# Patient Record
Sex: MALE | Race: WHITE | Employment: OTHER | URBAN - METROPOLITAN AREA
[De-identification: names, ages, dates, MRNs, and addresses within clinical notes are randomized per-mention and may not be internally consistent; named-entity substitution may affect disease eponyms.]

---

## 2017-02-27 ENCOUNTER — HOSPITAL ENCOUNTER (OUTPATIENT)
Dept: VASCULAR SURGERY | Age: 77
Discharge: HOME | End: 2017-02-27
Attending: INTERNAL MEDICINE
Payer: MEDICARE

## 2017-02-27 DIAGNOSIS — R09.89 DECREASED PULSES IN FEET: ICD-10-CM

## 2017-02-27 PROCEDURE — 93922 UPR/L XTREMITY ART 2 LEVELS: CPT

## 2017-02-27 NOTE — PROCEDURES
[facility]  *** FINAL REPORT ***    Name: Belvia Castleman  MRN: [id number]    Outpatient  : 1940  HIS Order #: [id number]  [address] [facility] Visit #: [id number]  Date: 2017    TYPE OF TEST: Peripheral Arterial Testing    REASON FOR TEST  Tingling, Cold Feet, Decreased pulses. Right Leg  Segmentals: Abnormal                     mmHg  Brachial         144  High thigh  Low thigh  Calf  Posterior tibial 121  Dorsalis pedis   101  Peroneal  Metatarsal  Toe pressure      98  Doppler:    Normal  PVR:  Ankle/Brachial: 0.83    Left Leg  Segmentals: Normal                     mmHg  Brachial         145  High thigh  Low thigh  Calf  Posterior tibial 164  Dorsalis pedis   154  Peroneal  Metatarsal  Toe pressure     127  Doppler:    Normal  PVR:  Ankle/Brachial: 1.13    INTERPRETATION/FINDINGS  PROCEDURE:  Evaluation of lower extremity arteries with systolic blood   pressure measurement at the ankle and brachial level and calculation  of the ankle/brachial pressure index (TANNA). The exam may also include   pulse volume recording (PVR) plethysmography at the ankle level. IMPRESSION:  1. Mild peripheral arterial disease indicated at rest in the right  leg. 2. No evidence of significant peripheral arterial disease at rest in  the left leg. 3. The right ankle/brachial index is 0.83 and the left ankle/brachial  index is 1.13.  4. The right toe/brachial index is 0.68 and the left toe/brachial  index is 0.88. ADDITIONAL COMMENTS    I have personally reviewed the data relevant to the interpretation of  this  study. TECHNOLOGIST: Robles Miller RDCS  Signed: 2017 05:12 PM    PHYSICIAN: Darline Ferreira.  Angeli Renteria MD  Signed: 2017 08:36 AM

## 2017-08-08 ENCOUNTER — OFFICE VISIT (OUTPATIENT)
Dept: CARDIOLOGY CLINIC | Age: 77
End: 2017-08-08

## 2017-08-08 VITALS
HEART RATE: 73 BPM | HEIGHT: 72 IN | WEIGHT: 228 LBS | BODY MASS INDEX: 30.88 KG/M2 | SYSTOLIC BLOOD PRESSURE: 122 MMHG | DIASTOLIC BLOOD PRESSURE: 62 MMHG

## 2017-08-08 DIAGNOSIS — Z79.4 TYPE 2 DIABETES MELLITUS WITHOUT COMPLICATION, WITH LONG-TERM CURRENT USE OF INSULIN (HCC): ICD-10-CM

## 2017-08-08 DIAGNOSIS — E78.5 DYSLIPIDEMIA: ICD-10-CM

## 2017-08-08 DIAGNOSIS — R06.00 PND (PAROXYSMAL NOCTURNAL DYSPNEA): ICD-10-CM

## 2017-08-08 DIAGNOSIS — I10 HYPERTENSION, BENIGN: ICD-10-CM

## 2017-08-08 DIAGNOSIS — I25.10 CORONARY ARTERY DISEASE INVOLVING NATIVE CORONARY ARTERY OF NATIVE HEART WITHOUT ANGINA PECTORIS: Primary | ICD-10-CM

## 2017-08-08 DIAGNOSIS — E11.9 TYPE 2 DIABETES MELLITUS WITHOUT COMPLICATION, WITH LONG-TERM CURRENT USE OF INSULIN (HCC): ICD-10-CM

## 2017-08-08 RX ORDER — CARVEDILOL 12.5 MG/1
TABLET ORAL
Qty: 180 TAB | Refills: 3 | Status: SHIPPED | OUTPATIENT
Start: 2017-08-08 | End: 2017-08-08 | Stop reason: SDUPTHER

## 2017-08-08 RX ORDER — CARVEDILOL 12.5 MG/1
TABLET ORAL
Qty: 180 TAB | Refills: 3 | Status: SHIPPED | OUTPATIENT
Start: 2017-08-08 | End: 2018-08-10 | Stop reason: SDUPTHER

## 2017-08-08 NOTE — MR AVS SNAPSHOT
Visit Information Date & Time Provider Department Dept. Phone Encounter #  
 8/8/2017  1:00 PM Kendall Delarosa MD [facility] Hilaria Wilkes [phone] [id number] Follow-up Instructions Return in about 1 year (around 8/8/2018). Your Appointments 8/23/2017  2:00 PM  
ECHO CARDIOGRAMS 2D with ECHO, [facility]  
[facility] ([facility] SCHEDULING) Appt Note: echo per dr Lock Adrianna [address] [facility] [address] [facility] [address]  
[phone]  
  
   
 [address] [facility] [address] [place] [id number]  
  
    
 8/10/2018  1:20 PM  
ESTABLISHED PATIENT with Kendall Delaroas MD  
[facility] ([address]) Appt Note: annual  
 [address] [facility] [address] [address] [place] [address]  
[address] [facility] [address] [address] [address] Upcoming Health Maintenance Date Due  
 FOOT EXAM Q1 4/29/1952 DTaP/Tdap/Td series (1 - Tdap) 4/29/1963 ZOSTER VACCINE AGE 60> 2/28/2002 Pneumococcal 65+ Low/Medium Risk (1 of 2 - PCV13) 4/29/2007 MEDICARE YEARLY EXAM 4/29/2007 MICROALBUMIN Q1 6/26/2015 LIPID PANEL Q1 6/26/2015 EYE EXAM RETINAL OR DILATED Q1 4/9/2016 HEMOGLOBIN A1C Q6M 4/28/2016 GLAUCOMA SCREENING Q2Y 4/9/2017 INFLUENZA AGE 9 TO ADULT 8/1/2017 Allergies as of 8/8/2017  Review Complete On: 8/8/2017 By: Judith Aly LPN Severity Noted Reaction Type Reactions Amoxicillin  12/10/2010   Systemic Hives, Itching Current Immunizations  Never Reviewed No immunizations on file. Not reviewed this visit You Were Diagnosed With   
  
 Codes Comments Coronary artery disease involving native coronary artery of native heart without angina pectoris    -  Primary ICD-10-CM: I25.10 ICD-9-CM: 414.01 Dyslipidemia     ICD-10-CM: E78.5 ICD-9-CM: 272.4 Hypertension, benign     ICD-10-CM: I10 
ICD-9-CM: 401.1 Type 2 diabetes mellitus without complication, without long-term current use of insulin (HCC)     ICD-10-CM: E11.9 ICD-9-CM: 250.00 Vitals BP Pulse Height(growth percentile) Weight(growth percentile) BMI Smoking Status 122/62 (BP 1 Location: Left arm, BP Patient Position: Sitting) 73 6' (1.829 m) 228 lb (103.4 kg) 30.92 kg/m2 Never Smoker Vitals History BMI and BSA Data Body Mass Index Body Surface Area 30.92 kg/m 2 2.29 m 2 Your Updated Medication List  
  
   
This list is accurate as of: 8/8/17  1:46 PM.  Always use your most recent med list.  
  
  
  
  
 aspirin delayed-release 81 mg tablet Take 81 mg by mouth daily. carvedilol 12.5 mg tablet Commonly known as:  COREG  
TAKE ONE (1) TABLET(S) BY MOUTH TWICE DAILY WITH FOOD  
  
 COZAAR 25 mg tablet Generic drug:  losartan Take  by mouth daily. insulin NPH/insulin regular 100 unit/mL (70-30) injection Commonly known as:  NOVOLIN 70/30, HUMULIN 70/30  
by SubCUTAneous route. pravastatin 40 mg tablet Commonly known as:  PRAVACHOL Take 1 Tab by mouth daily. Prescriptions Printed Refills  
 carvedilol (COREG) 12.5 mg tablet (Discontinued) 3 Sig: TAKE ONE (1) TABLET(S) BY MOUTH TWICE DAILY WITH FOOD Class: Print Reason for Discontinue: Reorder We Performed the Following AMB POC EKG ROUTINE W/ 12 LEADS, INTER & REP [02371 CPT(R)] Follow-up Instructions Return in about 1 year (around 8/8/2018). Please provide this summary of care documentation to your next provider. Your primary care clinician is listed as NONE. If you have any questions after today's visit, please call [phone].

## 2017-08-08 NOTE — PROGRESS NOTES
Luca Bai MD [facility]  [address]  [address]  [place], [address] [facility]    Office [phone]  Fax [phone]  Cell [phone]         Gautam Elise is a 78 y.o. male. Last seen 1 year ago. Assessment  Encounter Diagnoses   Name Primary?  Coronary artery disease involving native coronary artery of native heart without angina pectoris Yes    Dyslipidemia     Hypertension, benign     Type 2 diabetes mellitus without complication, with long-term current use of insulin (HCC)     PND (paroxysmal nocturnal dyspnea)        Recommendations:    Nick Champion Sr. 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 Stress echo 2012 demonstrated no evidence of ischemia. Echo 2013 demonstrated EF 55-60% with inferior akinesis. He leads a fairly sedentary lifestyle but I am concerned about recent episodes of PND. He does not have any new exertional sxs. Will reassess LV function with Echo. Consider repeat stress assessment since it has been 8 years post CABG. Normotensive at home. Lipids and DM monitored by Dr. Gillian العراقي. Phone follow up after reviewing tests    Follow-up Disposition:  Return in about 1 year (around 8/8/2018). Echo near future    Subjective:    Mr. David Horne is here for a 1 month follow up. During the night while the pt is sleeping he wakes up with SOB. He has difficulty breathing. When this occurs he sits up on the edge of his bed and catch his breath. These sxs are new within in the past few weeks. His wife says that he snores at time. He does have a large hernia. He denies any indigestion. Pt leads a fairly sedentary lifestyle. He denies any claudication sxs but suffers from dysequilibrium. Pt has had recent falls due to his balance problems. He also suffers from neuropathy in his feet bilaterally. He notes dysphagia and occasionally chokes on his food.  Patient denies any exertional chest pain, palpitations, syncope, orthopnea, or edema. Cardiac risk factors   HTN yes  DM yes   Smoking no    Cardiac testing  Echo 2d adult 12/2009  EF 50%, basal inferior akinesis, mild MR  Echo 2d adult 9/21/11  LVEF 50%, inferior AK, unchanged from Dec 2009  Stress echo March 2012 - 3 min, resting EF 50%, inferior AK, no ischemia  Echo 10/29/13 - EF 55 % to 60 %. There was akinesis of the basal-mid inferior wall(s). There was akinesis of the basal inferolateral wall(s). Paradoxical ventricular septal motion, no PFO, no significant  Change from previous study. Past Medical History:   Diagnosis Date    CAD (coronary artery disease), native coronary artery     Cancer ([facility] [address]     melanoma removed on forehead    Depression     Diabetic neuropathy ([facility]     ED (erectile dysfunction)     Hypertension, benign     Ill-defined condition     states he has memory problems    Postsurgical aortocoronary bypass status     Type II or unspecified type diabetes mellitus without mention of complication, not stated as uncontrolled         Current Outpatient Prescriptions   Medication Sig Dispense Refill    insulin NPH/insulin regular (NOVOLIN 70/30, HUMULIN 70/30) 100 unit/mL (70-30) injection by SubCUTAneous route.  pravastatin (PRAVACHOL) 40 mg tablet Take 1 Tab by mouth daily. 30 Tab 3    losartan (COZAAR) 25 mg tablet Take  by mouth daily.  aspirin delayed-release 81 mg tablet Take 81 mg by mouth daily.  carvedilol (COREG) 12.5 mg tablet TAKE ONE (1) TABLET(S) BY MOUTH TWICE DAILY WITH FOOD 180 Tab 3       Allergies   Allergen Reactions    Amoxicillin Hives and Itching          Review of Systems  Constitutional: Negative for fever, chills, malaise/fatigue and diaphoresis. Respiratory: Negative for cough, hemoptysis, sputum production, shortness of breath and wheezing. Cardiovascular: Negative for chest pain, palpitations, orthopnea, claudication, leg swelling and PND.    Gastrointestinal: Negative for heartburn, nausea, vomiting, blood in stool and melena. Positive for constipation. Genitourinary: Negative for dysuria and flank pain. Musculoskeletal: Positive for right leg pain. Skin: Negative for rash. Neurological: Negative for focal weakness, seizures, loss of consciousness, weakness and headaches. Positive for LE numbness. Endo/Heme/Allergies: Does not bruise/bleed easily. Psychiatric/Behavioral: Negative for memory loss. The patient does not have insomnia. Physical Exam    Visit Vitals    /62 (BP 1 Location: Left arm, BP Patient Position: Sitting)  Comment: 136/69    Pulse 73    Ht 6' (1.829 m)    Wt 228 lb (103.4 kg)    BMI 30.92 kg/m2     Wt Readings from Last 3 Encounters:   08/08/17 228 lb (103.4 kg)   08/08/16 229 lb (103.9 kg)   05/03/16 229 lb (103.9 kg)      General - well developed well nourished  Neck - JVP normal, thyroid nl  Cardiac - normal S1,S2, no murmurs, rubs or gallops.  No clicks  Vascular - carotids without bruits, radials, femorals and pedal pulses equal bilateral  Lungs - clear to auscultation bilaterals, no rales, wheezing or rhonchi  Abd - soft nontender, no HSM, no abd bruits  Extremities - no edema  Skin - no rash  Neuro - nonfocal  Psych - normal mood and affect      Cardiographics  EKG 8/8/16 - SR 79, old inferior MI, unchanged from 10/29/13  EKG 8/8/17 - SR, old inferior MI, unchanged from 8/8/16    Written by Sonny Mason, as dictated by Sean Gallardo MD.  Sean Gallardo MD

## 2017-08-13 PROBLEM — R06.00 PND (PAROXYSMAL NOCTURNAL DYSPNEA): Status: ACTIVE | Noted: 2017-08-13

## 2017-08-23 ENCOUNTER — CLINICAL SUPPORT (OUTPATIENT)
Dept: CARDIOLOGY CLINIC | Age: 77
End: 2017-08-23

## 2017-08-23 DIAGNOSIS — I25.10 CORONARY ARTERY DISEASE INVOLVING NATIVE CORONARY ARTERY OF NATIVE HEART WITHOUT ANGINA PECTORIS: Primary | ICD-10-CM

## 2017-09-07 ENCOUNTER — TELEPHONE (OUTPATIENT)
Dept: CARDIOLOGY CLINIC | Age: 77
End: 2017-09-07

## 2017-09-07 NOTE — TELEPHONE ENCOUNTER
Papa Parr, LINNEA De Luna LPN                     Cardiac testing   Echo 8/23/17 - EF 55-60%. Inferior AK. Mild LVH. Mild MAC with no MR. AoV sclerosis without stenosis. No change compared to study 2013. Please notify Mr. Misbah Arroyo that his Echo is unchanged compared to 2013.       Please continue his current medication regimen and notify us of any progression of symptoms recently discussed with Dr. Parikh Bolus will see him in 1 year or sooner as needed.         Patient notified. He voices understanding.

## 2018-08-10 ENCOUNTER — OFFICE VISIT (OUTPATIENT)
Dept: CARDIOLOGY CLINIC | Age: 78
End: 2018-08-10

## 2018-08-10 VITALS
DIASTOLIC BLOOD PRESSURE: 70 MMHG | HEIGHT: 72 IN | BODY MASS INDEX: 30.07 KG/M2 | OXYGEN SATURATION: 96 % | WEIGHT: 222 LBS | SYSTOLIC BLOOD PRESSURE: 120 MMHG | RESPIRATION RATE: 20 BRPM | HEART RATE: 78 BPM

## 2018-08-10 DIAGNOSIS — Z79.4 TYPE 2 DIABETES MELLITUS WITHOUT COMPLICATION, WITH LONG-TERM CURRENT USE OF INSULIN (HCC): ICD-10-CM

## 2018-08-10 DIAGNOSIS — I25.10 CORONARY ARTERY DISEASE INVOLVING NATIVE CORONARY ARTERY OF NATIVE HEART WITHOUT ANGINA PECTORIS: Primary | ICD-10-CM

## 2018-08-10 DIAGNOSIS — E78.5 DYSLIPIDEMIA: ICD-10-CM

## 2018-08-10 DIAGNOSIS — I10 HYPERTENSION, BENIGN: ICD-10-CM

## 2018-08-10 DIAGNOSIS — E11.9 TYPE 2 DIABETES MELLITUS WITHOUT COMPLICATION, WITH LONG-TERM CURRENT USE OF INSULIN (HCC): ICD-10-CM

## 2018-08-10 RX ORDER — CARVEDILOL 12.5 MG/1
TABLET ORAL
Qty: 180 TAB | Refills: 3 | Status: SHIPPED | OUTPATIENT
Start: 2018-08-10 | End: 2019-10-14 | Stop reason: SDUPTHER

## 2018-08-10 NOTE — PROGRESS NOTES
Visit Vitals    /70    Pulse 78    Resp 20    Ht 6' (1.829 m)    Wt 222 lb (100.7 kg)    SpO2 96%    BMI 30.11 kg/m2

## 2018-08-10 NOTE — MR AVS SNAPSHOT
[address] [facility] [address] [facility] [address] [phone] Patient: Christa Devine Sr. MRN: [id number] DYG:[id number] Visit Information Date & Time Provider Department Dept. Phone Encounter #  
 8/10/2018  1:20 PM Eric Salazar MD [facility] Leida Garner [phone] [id number] Follow-up Instructions Return in about 1 year (around 8/10/2019). Upcoming Health Maintenance Date Due  
 FOOT EXAM Q1 4/29/1952 DTaP/Tdap/Td series (1 - Tdap) 4/29/1963 ZOSTER VACCINE AGE 60> 2/28/2002 Pneumococcal 65+ Low/Medium Risk (1 of 2 - PCV13) 4/29/2007 MICROALBUMIN Q1 6/26/2015 LIPID PANEL Q1 6/26/2015 EYE EXAM RETINAL OR DILATED Q1 4/9/2016 HEMOGLOBIN A1C Q6M 4/28/2016 GLAUCOMA SCREENING Q2Y 4/9/2017 MEDICARE YEARLY EXAM 3/14/2018 Influenza Age 5 to Adult 8/1/2018 Allergies as of 8/10/2018  Review Complete On: 8/8/2017 By: Luis Calderon LPN Severity Noted Reaction Type Reactions Amoxicillin  12/10/2010   Systemic Hives, Itching Current Immunizations  Never Reviewed No immunizations on file. Not reviewed this visit You Were Diagnosed With   
  
 Codes Comments Coronary artery disease involving native coronary artery of native heart without angina pectoris    -  Primary ICD-10-CM: I25.10 ICD-9-CM: 414.01 Hypertension, benign     ICD-10-CM: I10 
ICD-9-CM: 401.1 Dyslipidemia     ICD-10-CM: E78.5 ICD-9-CM: 272.4 Type 2 diabetes mellitus without complication, with long-term current use of insulin (HCC)     ICD-10-CM: E11.9, Z79.4 ICD-9-CM: 250.00, V58.67 Vitals BP Pulse Resp Height(growth percentile) Weight(growth percentile) SpO2  
 120/70 78 20 6' (1.829 m) 222 lb (100.7 kg) 96% BMI Smoking Status 30.11 kg/m2 Never Smoker Vitals History BMI and BSA Data Body Mass Index Body Surface Area 30.11 kg/m 2 2.26 m 2 Your Updated Medication List  
  
   
This list is accurate as of 8/10/18  1:43 PM.  Always use your most recent med list.  
  
  
  
  
 aspirin delayed-release 81 mg tablet Take 81 mg by mouth daily. carvedilol 12.5 mg tablet Commonly known as:  COREG  
TAKE ONE (1) TABLET(S) BY MOUTH TWICE DAILY WITH FOOD  
  
 COZAAR 25 mg tablet Generic drug:  losartan Take  by mouth daily. insulin NPH/insulin regular 100 unit/mL (70-30) injection Commonly known as:  NOVOLIN 70/30, HUMULIN 70/30  
by SubCUTAneous route. pravastatin 40 mg tablet Commonly known as:  PRAVACHOL Take 1 Tab by mouth daily. We Performed the Following AMB POC EKG ROUTINE W/ 12 LEADS, INTER & REP [12852 CPT(R)] Follow-up Instructions Return in about 1 year (around 8/10/2019). Introducing [facility]! Oniel La introduces [employer] patient portal. Now you can access parts of your medical record, email your doctor's office, and request medication refills online. 1. In your internet browser, go to [email or web address] 2. Click on the First Time User? Click Here link in the Sign In box. You will see the New Member Sign Up page. 3. Enter your [employer] Access Code exactly as it appears below. You will not need to use this code after youve completed the sign-up process. If you do not sign up before the expiration date, you must request a new code. · [employer] Access Code: [email or web address] Expires: 11/8/2018  1:41 PM 
 
4. Enter the last four digits of your Social Security Number (xxxx) and Date of Birth (mm/dd/yyyy) as indicated and click Submit. You will be taken to the next sign-up page. 5. Create a [employer] ID. This will be your [employer] login ID and cannot be changed, so think of one that is secure and easy to remember. 6. Create a [employer] password. You can change your password at any time. 7. Enter your Password Reset Question and Answer. This can be used at a later time if you forget your password. 8. Enter your e-mail address. You will receive e-mail notification when new information is available in [address] 9. Click Sign Up. You can now view and download portions of your medical record. 10. Click the Download Summary menu link to download a portable copy of your medical information. If you have questions, please visit the Frequently Asked Questions section of the This [employer] In website. Remember, [employer] is NOT to be used for urgent needs. For medical emergencies, dial 911. Now available from your iPhone and Android! Please provide this summary of care documentation to your next provider. Your primary care clinician is listed as Jeannie Juarez. If you have any questions after today's visit, please call [phone].

## 2018-08-10 NOTE — PROGRESS NOTES
Luca Maya MD [facility]  [address]  [address]  [place], [address] [facility]    Office [phone]  Fax [phone]  Cell [phone]    Tatiana Bowling is a 70 y.o. male. Last seen 1 year ago. Assessment  Encounter Diagnoses   Name Primary?  Coronary artery disease involving native coronary artery of native heart without angina pectoris Yes    Hypertension, benign        Recommendations:    Guilherme Cota Sr. 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 Stress echo 2012 demonstrated no evidence of ischemia. Echo 1 year ago demonstrated EF 55-60% with inferior akinesis. We discussed the role of stress testing next year, but he is not interested. His quality of life seems impaired by balance and depression issues. Normotensive at home. Lipids and DM monitored by Dr. Michael Baron. Recent A1c 7.0%. Follow-up Disposition: Not on File     Subjective:  Mr. Hemant Malone states he has balance issues, and has had multiple falls in the past year. He also has some neuropathy in his feet. He has not done physical therapy. He leads a sedentary lifestyle. Per wife, he is frequently \"parked on his chair\" watching television. He notes his last A1c was 7.0% on 7/3/18. He is only on insulin. His wife notes he enjoys eating sweets. Patient denies any exertional chest pain, dyspnea, palpitations, syncope, orthopnea, edema or paroxysmal nocturnal dyspnea. Cardiac risk factors   HTN yes  DM yes   Smoking no    Cardiac testing  Echo 2d adult 12/2009  EF 50%, basal inferior akinesis, mild MR  Echo 2d adult 9/21/11  LVEF 50%, inferior AK, unchanged from Dec 2009  Stress echo March 2012 - 3 min, resting EF 50%, inferior AK, no ischemia  Echo 10/29/13 - EF 55 % to 60 %. There was akinesis of the basal-mid inferior wall(s). There was akinesis of the basal inferolateral wall(s).  Paradoxical ventricular septal motion, no PFO, no significant Change from previous study. Echo 8/23/17 - EF 55-60%. Inferior AK. Mild LVH. Mild MAC with no MR. AoV sclerosis without stenosis. No change compared to study 2013. Past Medical History:   Diagnosis Date    CAD (coronary artery disease), native coronary artery     Cancer ([facility] [address]     melanoma removed on forehead    Depression     Diabetic neuropathy ([facility] [address]     ED (erectile dysfunction)     Hypertension, benign     Ill-defined condition     states he has memory problems    Postsurgical aortocoronary bypass status     Type II or unspecified type diabetes mellitus without mention of complication, not stated as uncontrolled         Current Outpatient Prescriptions   Medication Sig Dispense Refill    carvedilol (COREG) 12.5 mg tablet TAKE ONE (1) TABLET(S) BY MOUTH TWICE DAILY WITH FOOD 180 Tab 3    insulin NPH/insulin regular (NOVOLIN 70/30, HUMULIN 70/30) 100 unit/mL (70-30) injection by SubCUTAneous route.  pravastatin (PRAVACHOL) 40 mg tablet Take 1 Tab by mouth daily. 30 Tab 3    losartan (COZAAR) 25 mg tablet Take  by mouth daily.  aspirin delayed-release 81 mg tablet Take 81 mg by mouth daily. Allergies   Allergen Reactions    Amoxicillin Hives and Itching          Review of Systems  Constitutional: Negative for fever, chills, malaise/fatigue and diaphoresis. Respiratory: Negative for cough, hemoptysis, sputum production, shortness of breath and wheezing. Cardiovascular: Negative for chest pain, palpitations, orthopnea, claudication, leg swelling and PND. Gastrointestinal: Negative for heartburn, nausea, vomiting, blood in stool and melena. Genitourinary: Negative for dysuria and flank pain. Musculoskeletal: Negative for back pain. Skin: Negative for rash. Neurological: Negative for focal weakness, seizures, loss of consciousness, weakness and headaches. Positive for LE numbness, gait instability. Endo/Heme/Allergies: Does not bruise/bleed easily. Psychiatric/Behavioral: Negative for memory loss. The patient does not have insomnia. Physical Exam    Visit Vitals    Ht 6' (1.829 m)     Wt Readings from Last 3 Encounters:   08/08/17 228 lb (103.4 kg)   08/08/16 229 lb (103.9 kg)   05/03/16 229 lb (103.9 kg)      General - well developed well nourished  Neck - JVP normal, thyroid nl  Cardiac - normal S1,S2, no murmurs, rubs or gallops. No clicks  Vascular - carotids without bruits, radials, femorals and pedal pulses equal bilateral  Lungs - clear to auscultation bilaterals, no rales, wheezing or rhonchi  Abd - soft nontender, no HSM, no abd bruits  Extremities - no edema  Skin - no rash  Neuro - nonfocal  Psych - normal mood and affect      Cardiographics  EKG 8/8/16 - SR 79, old inferior MI, unchanged from 10/29/13  EKG 8/8/17 - SR, old inferior MI, unchanged from 8/8/16  EKG 8/10/18 - Sinus 73, nonspecific T wave flattening, old inferior MI, no significant change from 8/8/17    Written by John Romeo, as dictated by Dr. Hari Franklin.      Hari Franklin MD

## 2018-08-11 PROBLEM — R06.00 PND (PAROXYSMAL NOCTURNAL DYSPNEA): Status: RESOLVED | Noted: 2017-08-13 | Resolved: 2018-08-11

## 2019-05-03 ENCOUNTER — OFFICE VISIT (OUTPATIENT)
Dept: NEUROLOGY | Age: 79
End: 2019-05-03

## 2019-05-03 VITALS
HEART RATE: 73 BPM | HEIGHT: 72 IN | RESPIRATION RATE: 18 BRPM | DIASTOLIC BLOOD PRESSURE: 84 MMHG | BODY MASS INDEX: 30.34 KG/M2 | OXYGEN SATURATION: 97 % | SYSTOLIC BLOOD PRESSURE: 142 MMHG | TEMPERATURE: 98.2 F | WEIGHT: 224 LBS

## 2019-05-03 DIAGNOSIS — R26.9 GAIT ABNORMALITY: ICD-10-CM

## 2019-05-03 DIAGNOSIS — R42 DIZZY: ICD-10-CM

## 2019-05-03 DIAGNOSIS — R27.8 SENSORY ATAXIA: Primary | ICD-10-CM

## 2019-05-03 DIAGNOSIS — R27.8 SENSORY ATAXIA: ICD-10-CM

## 2019-05-03 NOTE — PROGRESS NOTES
[facility] and [address]atan Collins at [facility] at [place] [address] [address] [place], [address] [place] [address] [address] [facility], [address] [place] [address] 
[phone] Office 
[phone] Referring: Dr. David Sweeney 
Primary: Whit Rosario MD 
 
 
Chief Complaint Patient presents with  New Patient  Fall  
  freqent over the past yr but increasing over the past few mo  
 
42-year-old gentleman who presents today coming by his wife for evaluation was called balance problems and falls. He has been progressively having difficulty with gait and balance over the last year. He has just started to use a Rollator walker in the last few months after they were out at a restaurant and he was used and fell in the parking lot. He has had multiple falls. He will fall forward. He will fall backward. Was out of the mailbox and fell backwards into the road. He has no tremor. He has some difficulty getting out of a chair his wife says. He does not drag his feet. He says he has not walked well after his heart surgery which was in 2009. He says sometimes he will pitch forward and cannot catch himself. He does have numbness and tingling in his feet. He has long-standing diabetes. His last hemoglobin A1c was 7. No focal weakness. No loss or alteration in consciousness. No visual loss. No palpitations or chest pain. There is a question raised whether or not he may have Parkinson's. No rest tremor. Past Medical History:  
Diagnosis Date  CAD (coronary artery disease), native coronary artery  Cancer ([address] melanoma removed on forehead  Depression  Diabetic neuropathy ([facility]  ED (erectile dysfunction)  Hypertension, benign  Ill-defined condition   
 states he has memory problems  Postsurgical aortocoronary bypass status  Type II or unspecified type diabetes mellitus without mention of complication, not stated as uncontrolled Past Surgical History:  
Procedure Laterality Date  ECHO 2D ADULT  12/2009 EF 50%, basal inferior akinesis, mild MR  
 ECHO 2D ADULT  9/21/11 LVEF 50%, inferior AK, unchanged from Dec 2009  HX CORONARY ARTERY BYPASS GRAFT  2009  HX OTHER SURGICAL  2008  
 melanoma removed from forehead Current Outpatient Medications Medication Sig Dispense Refill  carvedilol (COREG) 12.5 mg tablet TAKE ONE (1) TABLET(S) BY MOUTH TWICE DAILY WITH FOOD 180 Tab 3  
 insulin NPH/insulin regular (NOVOLIN 70/30, HUMULIN 70/30) 100 unit/mL (70-30) injection 45 Units by SubCUTAneous route two (2) times a day.  losartan (COZAAR) 25 mg tablet Take  by mouth daily.  aspirin delayed-release 81 mg tablet Take 81 mg by mouth daily.  pravastatin (PRAVACHOL) 40 mg tablet Take 1 Tab by mouth daily. (Patient not taking: Reported on 5/3/2019) 30 Tab 3 Allergies Allergen Reactions  Amoxicillin Hives and Itching  Pravastatin Myalgia Social History Tobacco Use  Smoking status: Never Smoker  Smokeless tobacco: Never Used Substance Use Topics  Alcohol use: No  
 Drug use: No  
 
 
Family History Problem Relation Age of Onset  Breast Cancer Mother   
     w bone mets  Parkinson's Disease Maternal Aunt  Parkinson's Disease Maternal Uncle  Parkinson's Disease Maternal Grandfather Review of Systems Pertinent positives and negatives as noted with remainder of comprehensive review negative Examination Visit Vitals /84 (BP 1 Location: Left arm, BP Patient Position: Sitting) Pulse 73 Temp 98.2 °F (36.8 °C) (Oral) Resp 18 Ht 6' (1.829 m) Wt 101.6 kg (224 lb) SpO2 97% BMI 30.38 kg/m² Pleasant overweight elderly gentleman. He is with regular heart rate. I do not hear bruit. Neck is supple.   He has edema of the lower extremities. Symmetric pulses. Neurologically he is awake alert oriented. Normal speech and language. Discussed his medical history. He is not hypophonic. He has left pupil status post cataract surgery. Right with a cataract. Reactive pupils. Disc margins not well seen. Full versions. He has no limitation of gaze in any direction. No nystagmus. Face symmetrical.  Facial sensation symmetrical.  Tongue and palate midline. 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 There is no abnormal movement and specifically there is no tremor. He has no cogwheeling at the wrists. He has no pronation or drift. He resists symmetrically early in the upper and lower extremities in all muscle groups to direct testing. Reflexes globally depressed and he has no ankle jerks bilaterally. No pathologic reflexes elicited. He has graded sensory loss in the feet to pinprick up to mid shin and to vibratory sense at the ankles. He has slowness in arising off the examination table and he is imbalanced and doing that. He has a wide-based stance and he is quite tentative without using his device. Just trying to narrow in his stance causes him to become increasingly imbalance. He is unable to do Romberg testing. He does however just standing even with his wide-based stance closes eyes and begins to fall over. Impression/Plan 56-year-old gentleman with gait imbalance and falls and sensory ataxia. This is likely secondary to diabetic peripheral neuropathy. Discussed this today with him and his wife at length. Explained that his brain does not know where his feet are. Discussed that we cannot reverse this but we may make it a bit better and that he can have some physical therapy to learn to overcome this and to improve his balance a bit. Continue use assistive device. We will get an EMG of the lower extremities to document the peripheral neuropathy. Carotid Doppler to be complete.   Follow-up in about 6 weeks after he is had a chance to have some therapy and certainly if there is anything from his testing that needs to be discussed we will call sooner Jinny Hutchison MD 
 
This note was created using voice recognition software. Despite editing, there may be syntax errors. This note will not be viewable in [address]

## 2019-05-03 NOTE — PATIENT INSTRUCTIONS
If you choose to go to imaging center outside of [facility], please be sure to bring imaging report and disc to follow up appointment. If we have ordered testing for you, know that; \"NO NEWS IS GOOD NEWS! \" It is our policy that we know longer call patients with results, nor do we  give test results over the phone. We schedule follow up appointments so that your results can be discussed in person. This allows you to address any questions you have regarding the results. If something of concern is revealed on your test, we will contact you to discuss the matter and if needed schedule a sooner follow up appointment. Additionally, results may be found by using the My Chart feature and one of our patient service representatives at the  can give you instructions on how to access this feature to utilize our electronic medical record system. Thank you for your understanding. Preventing Falls: Care Instructions Your Care Instructions Getting around your home safely can be a challenge if you have injuries or health problems that make it easy for you to fall. Loose rugs and furniture in walkways are among the dangers for many older people who have problems walking or who have poor eyesight. People who have conditions such as arthritis, osteoporosis, or dementia also have to be careful not to fall. You can make your home safer with a few simple measures. Follow-up care is a key part of your treatment and safety. Be sure to make and go to all appointments, and call your doctor if you are having problems. It's also a good idea to know your test results and keep a list of the medicines you take. How can you care for yourself at home? Taking care of yourself · You may get dizzy if you do not drink enough water. To prevent dehydration, drink plenty of fluids, enough so that your urine is light yellow or clear like water.  Choose water and other caffeine-free clear liquids. If you have kidney, heart, or liver disease and have to limit fluids, talk with your doctor before you increase the amount of fluids you drink. · Exercise regularly to improve your strength, muscle tone, and balance. Walk if you can. Swimming may be a good choice if you cannot walk easily. · Have your vision and hearing checked each year or any time you notice a change. If you have trouble seeing and hearing, you might not be able to avoid objects and could lose your balance. · Know the side effects of the medicines you take. Ask your doctor or pharmacist whether the medicines you take can affect your balance. Sleeping pills or sedatives can affect your balance. · Limit the amount of alcohol you drink. Alcohol can impair your balance and other senses. · Ask your doctor whether calluses or corns on your feet need to be removed. If you wear loose-fitting shoes because of calluses or corns, you can lose your balance and fall. · Talk to your doctor if you have numbness in your feet. Preventing falls at home · Remove raised doorway thresholds, throw rugs, and clutter. Repair loose carpet or raised areas in the floor. · Move furniture and electrical cords to keep them out of walking paths. · Use nonskid floor wax, and wipe up spills right away, especially on ceramic tile floors. · If you use a walker or cane, put rubber tips on it. If you use crutches, clean the bottoms of them regularly with an abrasive pad, such as steel wool. · Keep your house well lit, especially Mammie Sitter, and outside walkways. Use night-lights in areas such as hallways and bathrooms. Add extra light switches or use remote switches (such as switches that go on or off when you clap your hands) to make it easier to turn lights on if you have to get up during the night. · Install sturdy handrails on stairways. · Move items in your cabinets so that the things you use a lot are on the lower shelves (about waist level). · Keep a cordless phone and a flashlight with new batteries by your bed. If possible, put a phone in each of the main rooms of your house, or carry a cell phone in case you fall and cannot reach a phone. Or, you can wear a device around your neck or wrist. You push a button that sends a signal for help. · Wear low-heeled shoes that fit well and give your feet good support. Use footwear with nonskid soles. Check the heels and soles of your shoes for wear. Repair or replace worn heels or soles. · Do not wear socks without shoes on wood floors. · Walk on the grass when the sidewalks are slippery. If you live in an area that gets snow and ice in the winter, sprinkle salt on slippery steps and sidewalks. Preventing falls in the bath · Install grab bars and nonskid mats inside and outside your shower or tub and near the toilet and sinks. · Use shower chairs and bath benches. · Use a hand-held shower head that will allow you to sit while showering. · Get into a tub or shower by putting the weaker leg in first. Get out of a tub or shower with your strong side first. 
· Repair loose toilet seats and consider installing a raised toilet seat to make getting on and off the toilet easier. · Keep your bathroom door unlocked while you are in the shower. Where can you learn more? Go to http://jonathan-gonzalo.info/. Enter 0476 [id number] in the search box to learn more about \"Preventing Falls: Care Instructions. \" Current as of: March 15, 2018 Content Version: 11.9 © 2014-6095 [employer]. Care instructions adapted under license by [employer] (which disclaims liability or warranty for this information). If you have questions about a medical condition or this instruction, always ask your healthcare professional. [address] any warranty or liability for your use of this information. How to Get Up Safely After a Fall: Care Instructions Your Care Instructions If you have injuries, health problems, or other reasons that may make it easy for you to fall at home, it is a good idea to learn how to get up safely after a fall. Learning how to get up correctly can help you avoid making an injury worse. Also, knowing what to do if you cannot get up can help you stay safe until help arrives. Follow-up care is a key part of your treatment and safety. Be sure to make and go to all appointments, and call your doctor if you are having problems. It's also a good idea to know your test results and keep a list of the medicines you take. How can you care for yourself after a fall? If you think you can get up First lie still for a few minutes and think about how you feel. If your body feels okay and you think you can get up safely, follow the rest of the steps below: 1. Look for a chair or other piece of furniture that is close to you. 2. Roll onto your side and rest. Roll by turning your head in the direction you want to roll, move your shoulder and arm, then hip and leg in the same direction. 3. Lie still for a moment to let your blood pressure adjust. 
4. Slowly push your upper body up, lift your head, and take a moment to rest. 
5. Slowly get up on your hands and knees, and crawl to the chair or other stable piece of furniture. 6. Put your hands on the chair. 7. Move one foot forward, and place it flat on the floor. Your other leg should be bent with the knee on the floor. 8. Rise slowly, turn your body, and sit in the chair. Stay seated for a bit and think about how you feel. Call for help. Even if you feel okay, let someone know what happened to you. You might not know that you have a serious injury. If you cannot get up 1. If you think you are injured after a fall or you cannot get up, try not to panic. 2. Call out for help. 3. If you have a phone within reach or you have an emergency call device, use it to call for help. 4. If you do not have a phone within reach, try to slide yourself toward it. If you cannot get to the phone, try to slide toward a door or window or a place where you think you can be heard. 5. Coal or use an object to make noise so someone might hear you. 6. If you can reach something that you can use for a pillow, place it under your head. Try to stay warm by covering yourself with a blanket or clothing while you wait for help. When should you call for help? Call 911 anytime you think you may need emergency care. For example, call if: 
  · You passed out (lost consciousness).  
  · You cannot get up after a fall.  
  · You have severe pain.  
 Call your doctor now or seek immediate medical care if: 
  · You have new or worse pain.  
  · You are dizzy or lightheaded.  
  · You hit your head.  
 Watch closely for changes in your health, and be sure to contact your doctor if: 
  · You do not get better as expected. Where can you learn more? Go to http://jonathan-gonzalo.info/. Enter V742 in the search box to learn more about \"How to Get Up Safely After a Fall: Care Instructions. \" Current as of: March 15, 2018 Content Version: 11.9 © 4686-3225 [employer]. Care instructions adapted under license by [employer] (which disclaims liability or warranty for this information). If you have questions about a medical condition or this instruction, always ask your healthcare professional. [place] [address] any warranty or liability for your use of this information.

## 2019-05-13 ENCOUNTER — OFFICE VISIT (OUTPATIENT)
Dept: NEUROLOGY | Age: 79
End: 2019-05-13

## 2019-05-13 DIAGNOSIS — G60.8 POLYNEUROPATHY, PERIPHERAL SENSORIMOTOR AXONAL: Primary | ICD-10-CM

## 2019-05-13 NOTE — PROCEDURES
EMG/ NCS Report  [facility], [address]  Ph: [phone]/ [phone]  [facility] [address]: [address] [phone]  Test Date:  2019    Patient: Shay Rodriguez : 1940 Physician: Monika Peralta M.D. Sex: Male Height: ' \" Ref Phys: Miki Clark MD   ID#: [id number] Weight:  lbs. Technician: Jennifer Garcia     Patient History:  CC: SENSORY ATAXIA,R/O NEUROPATHY,KNOWN DM    Focused Exam: reduced pinprick, temp, vibration in both feet, normal upper calves; DTRs: absent Achilles, 1+ patellars (symmetric); 2-3/ 5 dorsiflexion/ plantar flexion bilaterally, proximal leg strength 4+/ 5 bilateral; upper exts 5/ 5. EMG & NCV Findings:  Evaluation of the Left Fibular motor and the Right Fibular motor nerves showed no response (Ankle), no response (B Fib), and no response (Poplt). The Left Fibular TA motor nerve showed prolonged distal onset latency (6.3 ms), reduced amplitude (0.4 mV), and normal conduction velocity (Poplit-Fib Head, 43 m/s). The Right Fibular TA motor nerve showed prolonged distal onset latency (5.9 ms), reduced amplitude (0.7 mV), and decreased conduction velocity (Poplit-Fib Head, 24 m/s). The Left median motor and the Right median motor nerves showed normal distal onset latency (L3.6, R3.5 ms), normal amplitude (L7.5, R5.0 mV), and decreased conduction velocity (Elbow-Wrist, L39, R39 m/s). The Left tibial motor and the Right tibial motor nerves showed no response (Ankle) and no response (Knee). The Left median sensory and the Right median sensory nerves showed no response (Wrist). The Left Sup Fibular sensory and the Right Sup Fibular sensory nerves showed no response (Lower leg). The Left sural sensory and the Right sural sensory nerves showed no response (Calf). F Wave studies indicate that the Left median F wave has prolonged latency (33.06 ms). The Right median F wave has prolonged latency (33.97 ms).   The Left tibial F wave has no response. The Right tibial F wave has no response. H-reflex studies indicate that the Left tibial H-reflex has no response. The Right tibial H-reflex has no response. Needle evaluation of the Left medial gastrocnemius muscle showed increased insertional activity, widespread spontaneous activity, recruitment, and decreased motor unit amplitude. The Left anterior tibialis muscle showed increased insertional activity, increased spontaneous activity, diminished recruitment, Incr Duration, and increased motor unit amplitude. The Right medial gastrocnemius muscle showed increased insertional activity, widespread spontaneous activity, diminished recruitment, and decreased motor unit amplitude. The Right anterior tibialis muscle showed increased insertional activity, widespread spontaneous activity, diminished recruitment, Incr Duration, and increased motor unit amplitude. The Right vastus medialis muscle showed diminished recruitment, Incr Duration, and increased motor unit amplitude. The Left biceps femoris (long head) and the Right biceps femoris (long head) muscles showed diminished recruitment and increased motor unit amplitude. All remaining muscles (as indicated in the following table) showed no evidence of electrical instability. 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 Not demyelinating as upper extremity median motor responses and F-waves are only mildly slowed, without demyelinating waveforms. Severe active denervation in bilateral L5, S1 myotomes. May be due to bilateral lumbar radiculopathies (L5 and S1) but more likely related to the severe underlying peripheral neuropathy.      No evidence of denervation in the right lower thoracic paraspinal muscles that would suggest motor neuron disease.     __________________________  Sung Velez M.D.    Nerve Conduction Studies  Anti Sensory Summary Table     Site NR Peak (ms) Norm Peak (ms) P-T Amp (µV) Norm P-T Amp Site1 Site2 Dist (cm)   Left Median Anti Sensory (2nd Digit)  30.3°C   Wrist NR  <4  >13 Wrist 2nd Digit 14.0   Right Median Anti Sensory (2nd Digit)  31.8°C   Wrist NR  <4  >13 Wrist 2nd Digit 14.0   Left Sup Fibular Anti Sensory (Lat ankle)  29°C   Lower leg NR  <4.6  >4 Lower leg Lat ankle 10.0   Right Sup Fibular Anti Sensory (Lat ankle)  31.8°C   Lower leg NR  <4.6  >4 Lower leg Lat ankle 10.0   Left Sural Anti Sensory (Lat Mall)  32.3°C   Calf NR  <4.5  >4.0 Calf Lat Mall 14.0   Right Sural Anti Sensory (Lat Mall)  31.1°C   Calf NR  <4.5  >4.0 Calf Lat Mall 14.0     Motor Summary Table     Site NR Onset (ms) Norm Onset (ms) O-P Amp (mV) Norm O-P Amp Amp% (Prev) Site1 Site2 Dist (cm) Chaparro (m/s) Norm Chaparro (m/s)   Left Fibular Motor (Ext Dig Brev)  32°C   Ankle NR  <6.5  >1.1  Ankle Ext Dig Brev 8.0     B Fib NR      B Fib Ankle 0.0  >38   Poplt NR      Poplt B Fib 10.0  >42   Right Fibular Motor (Ext Dig Brev)  29.2°C   Ankle NR  <6.5  >1.1  Ankle Ext Dig Brev 8.0     B Fib NR      B Fib Ankle 0.0  >38   Poplt NR      Poplt B Fib 10.0  >42   Left Fibular TA Motor (Tib Ant)  31°C   Fib Head    6.3 <4.5 0.4 >3.0 100.0 Fib Head Tib Ant 10.0     Poplit    8.6  0.5  125.0 Poplit Fib Head 10.0 43 >40   Right Fibular TA Motor (Tib Ant)  27.8°C   Fib Head    5.9 <4.5 0.7 >3.0 100.0 Fib Head Tib Ant 10.0     Poplit    10.0  0.7  100.0 Poplit Fib Head 10.0 24 >40   Left Median Motor (Abd Poll Brev)  29.5°C   Wrist    3.6 <4.5 7.5 >4.1 100.0 Wrist Abd Poll Brev 8.0     Elbow    9.1  6.8  90.7 Elbow Wrist 21.5 39 >49   Right Median Motor (Abd Poll Brev)  28.3°C   Wrist    3.5 <4.5 5.0 >4.1 100.0 Wrist Abd Poll Brev 8.0     Elbow    9.1  4.8  96.0 Elbow Wrist 22.0 39 >49   Left Tibial Motor (Abd Garcia Brev)  31.4°C   Ankle NR  <6.1  >1.1  Ankle Abd Garcia Brev 8.0     Knee NR      Knee Ankle 0.0  >39   Right Tibial Motor (Abd Garcia Brev)  28.5°C   Ankle NR  <6.1  >1.1  Ankle Abd Garcia Brev 8.0 Knee NR      Knee Ankle 0.0  >39     F Wave Studies     NR F-Lat (ms) Lat Norm (ms) L-R F-Lat (ms) L-R Lat Norm   Left Median (Mrkrs) (Abd Poll Brev)  29°C      33.06 <31 0.91 <2.2   Right Median (Mrkrs) (Abd Poll Brev)  32.9°C      33.97 <31 0.91 <2.2   Left Tibial (Mrkrs) (Abd Hallucis)  30.4°C   NR  <56  <5.7   Right Tibial (Mrkrs) (Abd Hallucis)  27.7°C   NR  <56  <5.7     H Reflex Studies     NR H-Lat (ms) L-R H-Lat (ms) L-R Lat Norm   Left Tibial (Gastroc)  30.3°C   NR   <2.0   Right Tibial (Gastroc)  27.5°C   NR   <2.0     EMG     Side Muscle Nerve Root Ins Act Fibs Psw Recrt Duration Amp Poly Comment   Left MedGastroc Tibial S1-2 Incr Nml 4+ No MUAP Nml Decr Nml very few/ small units   Left AntTibialis Dp Br Peron L4-5 Incr Nml 3+ Reduced Incr Incr Nml    Left VastusMed Femoral L2-4 Nml Nml Nml Nml Nml Nml Nml    Left GluteusMax InfGluteal L5-S2 Nml Nml Nml Nml Nml Nml Nml    Left Lower Lumb Parasp Rami L5,S1 Nml Nml Nml Nml Nml Nml Nml    Left Mid Lumb Parasp Rami L4,5 Nml Nml Nml Nml Nml Nml Nml    Left Iliopsoas Femoral L2-3 Nml Nml Nml Nml Nml Nml Nml    Left BicepsFemL Sciatic L5-S2 Nml Nml Nml Reduced Nml Incr Nml    Right AntTibialis Dp Br Peron L4-5 Incr Nml 4+ Reduced Incr Incr Nml    Right GluteusMax InfGluteal L5-S2 Nml Nml Nml Nml Nml Nml Nml    Right Lower Lumb Parasp Rami L5,S1 Nml Nml Nml Nml Nml Nml Nml    Right BicepsFemS Sciatic L5-S1 Nml Nml Nml Nml Nml Nml Nml    Right BicepsFemL Sciatic L5-S2 Nml Nml Nml Reduced Nml Incr Nml    Right MedGastroc Tibial S1-2 Incr 1+ 4+ Reduced Nml Decr Nml severe reduced rcrt   Right Mid Lumb Parasp Rami L4,5 Nml Nml Nml Nml Nml Nml Nml    Right VastusMed Femoral L2-4 Nml Nml Nml Reduced Incr Incr Nml    Right T1 Rami T1 Nml Nml Nml Nml Nml Nml Nml T9-T10         Waveforms:

## 2019-05-21 ENCOUNTER — HOSPITAL ENCOUNTER (OUTPATIENT)
Dept: PHYSICAL THERAPY | Age: 79
Discharge: HOME | End: 2019-05-21
Payer: MEDICARE

## 2019-05-21 PROCEDURE — 97116 GAIT TRAINING THERAPY: CPT | Performed by: PHYSICAL THERAPIST

## 2019-05-21 PROCEDURE — 97110 THERAPEUTIC EXERCISES: CPT | Performed by: PHYSICAL THERAPIST

## 2019-05-21 PROCEDURE — 97162 PT EVAL MOD COMPLEX 30 MIN: CPT | Performed by: PHYSICAL THERAPIST

## 2019-05-21 NOTE — PROGRESS NOTES
[address] [place] [address] [place] [address] Lina Green 59  Phone: [phone]  Fax: [phone]    Plan of Care/Statement of Necessity for Physical Therapy Services  2-15    Patient name: Melany Boyce Sr.  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 Assessment/ key information: The patient presents with progressively worsening balance, weakness and low back pain contributing to difficulty standing, walking, negotiating steps and performing ADLs. The patient's condition is complicated by complex PMH noted above, memory deficits, inactive lifestyle and postural dysfunction. The patient completed the TUG in 20 seconds indicating increased risk for falls. He is unable to hold NBOS wiht EO or WBOS with EC without LOB. He reports 6-8 falls over the past month and is ambulating with a rollator walker that is too short for his height and has been encouraged to find another rollator walker that is the appropriate height. Evaluation Complexity History HIGH Complexity :3+ comorbidities / personal factors will impact the outcome/ POC ; Examination HIGH Complexity : 4+ Standardized tests and measures addressing body structure, function, activity limitation and / or participation in recreation  ;Presentation MEDIUM Complexity : Evolving with changing characteristics  ; Clinical Decision Making MEDIUM Complexity : FOTO score of 26-74  Overall Complexity Rating: MEDIUM    Problem List: pain affecting function, decrease ROM, decrease strength, edema affecting function, impaired gait/ balance, decrease ADL/ functional abilitiies, decrease activity tolerance, decrease flexibility/ joint mobility and decrease transfer abilities   Treatment Plan may include any combination of the following: Therapeutic exercise, Neuromuscular re-education, Physical agent/modality, Gait/balance training, Manual therapy, Patient education and Functional mobility training  Patient / Family readiness to learn indicated by: asking questions, trying to perform skills and interest  Persons(s) to be included in education: patient (P)  Barriers to Learning/Limitations: yes;  cognitive  Patient Goal (s): better balance to keep from falling and pain free back  Patient Self Reported Health Status: poor  Rehabilitation Potential: good    Short Term Goals: To be accomplished in 4 weeks:  1) The patient will be independent with introductory HEP  2) The patient will demonstrate ability to hold NBOS EO for 30 seconds without LOB to indicate decreased risk of falls  3) The patient will demonstrate ability to transfer sit to/from stand without UE assist  Long Term Goals: To be accomplished in 12 weeks:  1) The patient will improve time to complete TUG to 13 seconds or faster to indicate decreased risk of falls  2) The patient will report ability to negotiate 12 steps reciprocally with railing, without fear of falling to improve mobility in the home  3) The patient will report ability to complete ADLs without a significant increase in pain  Frequency / Duration: Patient to be seen 2 times per week for 12 weeks.     Patient/ Caregiver education and instruction: self care, activity modification and exercises    [x]  Plan of care has been reviewed with PTA    Certification Period: 5/21/19-8/21/19    Nicole Kaur, PT 5/21/2019 ________________________________________________________________________    I certify that the above Therapy Services are being furnished while the patient is under my care. I agree with the treatment plan and certify that this therapy is necessary.     [de-identified] Signature:____________________  Date:____________Time: _________

## 2019-05-21 NOTE — PROGRESS NOTES
PT INITIAL EVALUATION NOTE - [facility] 2-15    Patient Name: Yadiel Alonso.  Date:2019  : 1940  [x]  Patient  Verified  Payor: [employer]ar Signs / Plan: [place] MEDICARE PART A & B / Product Type: Medicare /    In time:1:30 PM  Out time:2:40 PM  Total Treatment Time (min): 70  Total Timed Codes (min): 30  1:1 Treatment Time ( only): 70   Visit #: 1     Treatment Area: Dizziness and giddiness [R42]  Other abnormalities of gait and mobility [R26.89]    SUBJECTIVE  Pain Level (0-10 scale): 0/10  At worst 10/10, pain is increased by standing   At best: 0/10, pain is decreased with rest  Any medication changes, allergies to medications, adverse drug reactions, diagnosis change, or new procedure performed?: [] No    [x] Yes (see summary sheet for update)  Subjective:    Pt reports \"I keep falling down\" Pt reports this has been going on for the past year. \"Its getting worse\" Pt reports he has 6-8 falls in the past month. Pt denies dizziness. 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 Pt reports he has some tension in his head which has not changed over the past year. \"I have fallen backwards a couple times\"  Pt reports he has not fallen when using the rollator walker. Pt has been using the rollator walker for the past year. Pt has fallen inside and outside. \"The majority of them have been outside. \" \"I have crashed into the furniture a couple times\" Pt does not have throw rugs in his house. Pt has not noticed a difference on firm or hard surfaces. Pt c/o back pain. Back pain does not radiate. Pain is in the middle. Pt c/o back pain for the past 6 months. Pt has not had any imaging.   Pt does not take anything for pain  Pt reports he has noticed a loss of memory over the past year  Pt has trouble negotiating steps, \"I have trouble going down stairs and I have fallen several times\"  PLOF: Pt is a retired male, he enjoys watching TV, using the computer  Mechanism of Injury: insidious onset  Previous Treatment/Compliance: None  PMHx/Surgical Hx: Depression, DM, HTN, [place], Melanoma, heart By-pass (09)  Work Hx: Not working  Living Situation: Lives with his wife, 2 story home  Pt Goals: \"Better balance to keep from falling and pain free back\"  Barriers: chronic nature of condition  Motivation: fair  Substance use: none  Cognition: A & O x 3        OBJECTIVE/EXAMINATION    Posture:   Forward flexed posture  Gait: Toe out gait posture, decreased stance time on R  *Rollator walker too short for patient height, unable to adjust to a taller height, pt encouraged to purchase a new rollator    Lumbar AROM:                             Flexion: unable due to fear of falling, \"I have to hold on\"  Rotation: L 17.5 inches R 17 inches        LOWER QUARTER   MUSCLE STRENGTH  KEY       R  L  0 - No Contraction  L1, L2 Psoas  4  4+  1 - Trace   L3 Quads  4  4+  2 - Poor   L4 Tib Ant  3  3  3 - Fair    L5 EHL  unable  unable  4 - Good   S1 FHL  4  4+  5 - Normal   S2 Hams  5  4+        Sensation: WNL    Balance/ Equilibrium:         Standing Balance:    WBOS EO 30 seconds EC 4 seconds    *when cued to look straight ahead instead of down at feet pt only able to tolerate 4 seconds WBOS EO   [place]homberg EO 8 seconds    Functional Mobility      Bed Mobility:            Transfers:       Sit-Stand: Pt requires B UE assist       Optional Tests:       TUG 20 seconds using rollator walker and verbal cues to push up from the chair instead of reaching for the rollator, 2 trials      Modality rationale: decrease pain and increase tissue extensibility to improve the patients ability to sit, stand, transfer, ambulate, lift, carry, reach, complete ADLs   Min Type Additional Details    [] Estim: []Att   []Unatt        []TENS instruct                  []IFC  []Premod   []NMES                     []Other:  []w/US   []w/ice   []w/heat  Position:  Location:    []  Traction: [] Cervical []Lumbar                       [] Prone          []Supine                       []Intermittent   []Continuous Lbs:  [] before manual  [] after manual  []w/heat    []  Ultrasound: []Continuous   [] Pulsed at:                           []1MHz   []3MHz Location:  W/cm2:    [] Paraffin         Location:   []w/heat   15 []  Ice     [x]  Heat  []  Ice massage Position: supine  Location: lumbar spine    []  Laser  []  Other: Position:  Location:      []  Vasopneumatic Device Pressure:       [] lo [] med [] hi   Temperature:      [x] Skin assessment post-treatment:  [x]intact []redness- no adverse reaction    []redness  adverse reaction:     20 min Therapeutic Exercise:  [x] See flow sheet :   Rationale: increase ROM, increase strength and improve coordination to improve the patients ability to sit, stand, transfer, ambulate, lift, carry, reach, complete ADLs    10 min Gait Training:  __30_ feet with _rollator walker_ device on level surfaces with _SBA__ level of assist, instructed on purchasing taller walker   Rationale: improve coordination and improve balance  to improve the patients ability to ambulate safely in the home          With   [x] TE   [] TA   [] neuro   [] other: Patient Education: [x] Review HEP    [] Progressed/Changed HEP based on:   [x] positioning   [x] body mechanics   [] transfers   [x] heat/ice application    [] other:      Other Objective/Functional Measures:   Unable to perform standing HR    Pain Level (0-10 scale) post treatment: 2    ASSESSMENT/Changes in Function:     [x]  See Plan of [address] [place] [address], PT 5/21/2019

## 2019-05-23 ENCOUNTER — HOSPITAL ENCOUNTER (OUTPATIENT)
Dept: PHYSICAL THERAPY | Age: 79
Discharge: HOME | End: 2019-05-23
Payer: MEDICARE

## 2019-05-23 PROCEDURE — 97112 NEUROMUSCULAR REEDUCATION: CPT

## 2019-05-23 PROCEDURE — 97110 THERAPEUTIC EXERCISES: CPT

## 2019-05-23 NOTE — PROGRESS NOTES
PT DAILY TREATMENT NOTE - [facility] 2-15    Patient Name: Melany Boyce Sr.  Date:2019  : 1940  [x]  Patient  Verified  Payor: Johnson Irwin / Plan: [place] MEDICARE PART A & B / Product Type: Medicare /    In time:11:30a  Out time:12:35p  Total Treatment Time (min): 65  Total Timed Codes (min): 55  1:1 Treatment Time ( [address] only): 54   Visit #:  2    Treatment Area: Dizziness and giddiness [R42]  Other abnormalities of gait and mobility [R26.89]    SUBJECTIVE  Pain Level (0-10 scale): 5  Any medication changes, allergies to medications, adverse drug reactions, diagnosis change, or new procedure performed?: [x] No    [] Yes (see summary sheet for update)  Subjective functional status/changes:   [] No changes reported  Patient reports his back is bothering him and has a difficult time getting going today.     OBJECTIVE    Modality rationale: decrease pain and increase tissue extensibility to improve the patients ability to sit, stand, ambulate, lift, carry, reach and complete ADL's   Min Type Additional Details       [] Estim: []Att   []Unatt    []TENS instruct                  []IFC  []Premod   []NMES                     []Other:  []w/US   []w/ice   []w/heat  Position:  Location:       []  Traction: [] Cervical       []Lumbar                       [] Prone          []Supine                       []Intermittent   []Continuous Lbs:  [] before manual  [] after manual  []w/heat    []  Ultrasound: []Continuous   [] Pulsed                       at: []1MHz   []3MHz Location:  W/cm2:    [] Paraffin         Location:   []w/heat   10 []  Ice     [x]  Heat  []  Ice massage Position:supine  Location: low back    []  Laser  []  Other: Position:  Location:      []  Vasopneumatic Device Pressure:       [] lo [] med [] hi   Temperature:      [x] Skin assessment post-treatment:  [x]intact []redness- no adverse reaction    []redness  adverse reaction:     30 min Therapeutic Exercise:  [x] See flow sheet :   Rationale: increase ROM, increase strength, improve coordination, improve balance and increase proprioception to improve the patients ability to sit, stand, ambulate, lift, carry, reach and complete ADL's    25 min Neuromuscular Re-education:  [x]  See flow sheet :   Rationale: increase ROM, increase strength, improve coordination, improve balance and increase proprioception  to improve the patients ability to sit, stand, ambulate, lift, carry, reach and complete ADL's    With   [] TE   [] TA   [] neuro   [] other: Patient Education: [x] Review HEP    [] Progressed/Changed HEP based on:   [] positioning   [] body mechanics   [] transfers   [] heat/ice application    [] other:      Other Objective/Functional Measures:  Pt able to perform NBOS stand with 1 UE with mod sway, but had increased back pain after 20 seconds so was unable to continue, pt required max verbal and tactile cueing for proper muscle activation      Pain Level (0-10 scale) post treatment: 2    ASSESSMENT/Changes in Function:     Patient will continue to benefit from skilled PT services to modify and progress therapeutic interventions, address functional mobility deficits, address ROM deficits, address strength deficits, analyze and address soft tissue restrictions, analyze and cue movement patterns, analyze and modify body mechanics/ergonomics, assess and modify postural abnormalities and address imbalance/dizziness to attain remaining goals. []  See Plan of Care  []  See progress note/recertification  []  See Discharge Summary         Progress towards goals / Updated goals:  Patient demonstrates fair tolerance for interventions with increased fatigue. Patient will do well with continued strengthening to improve endurance and decrease risk of falls.     PLAN  [x]  Upgrade activities as tolerated     [x]  Continue plan of care  [x]  Update interventions per flow sheet       []  Discharge due to:_  []  Other:_      Reginald Banks 5/23/2019

## 2019-05-28 ENCOUNTER — HOSPITAL ENCOUNTER (OUTPATIENT)
Dept: PHYSICAL THERAPY | Age: 79
Discharge: HOME | End: 2019-05-28
Payer: MEDICARE

## 2019-05-28 PROCEDURE — 97110 THERAPEUTIC EXERCISES: CPT

## 2019-05-28 PROCEDURE — 97112 NEUROMUSCULAR REEDUCATION: CPT

## 2019-05-28 NOTE — PROGRESS NOTES
PT DAILY TREATMENT NOTE - [facility] 2-15    Patient Name: Dano Stanton Sr.  Date:2019  : 1940  [x]  Patient  Verified  Payor: Cholo Solders / Plan: VA MEDICARE PART A & B / Product Type: Medicare /    In time:3:00p  Out time: 3:55p  Total Treatment Time (min): 55  Total Timed Codes (min): 55  1:1 Treatment Time ( [address] only): 25   Visit #:  3    Treatment Area: Dizziness and giddiness [R42]  Other abnormalities of gait and mobility [R26.89]    SUBJECTIVE  Pain Level (0-10 scale): 3  Any medication changes, allergies to medications, adverse drug reactions, diagnosis change, or new procedure performed?: [x] No    [] Yes (see summary sheet for update)  Subjective functional status/changes:   [] No changes reported  Patient states no issues after last session, but his back is still bothersome.     OBJECTIVE    Modality rationale: declined   Min Type Additional Details       [] Estim: []Att   []Unatt    []TENS instruct                  []IFC  []Premod   []NMES                     []Other:  []w/US   []w/ice   []w/heat  Position:  Location:       []  Traction: [] Cervical       []Lumbar                       [] Prone          []Supine                       []Intermittent   []Continuous Lbs:  [] before manual  [] after manual  []w/heat    []  Ultrasound: []Continuous   [] Pulsed                       at: []1MHz   []3MHz Location:  W/cm2:    [] Paraffin         Location:   []w/heat   10 []  Ice     [x]  Heat  []  Ice massage Position:supine  Location: low back    []  Laser  []  Other: Position:  Location:      []  Vasopneumatic Device Pressure:       [] lo [] med [] hi   Temperature:      [x] Skin assessment post-treatment:  [x]intact []redness- no adverse reaction    []redness  adverse reaction:     30 min Therapeutic Exercise:  [x] See flow sheet :   Rationale: increase ROM, increase strength, improve coordination, improve balance and increase proprioception to improve the patients ability to sit, stand, ambulate, lift, carry, reach and complete ADL's    25 min Neuromuscular Re-education:  [x]  See flow sheet :   Rationale: increase ROM, increase strength, improve coordination, improve balance and increase proprioception  to improve the patients ability to sit, stand, ambulate, lift, carry, reach and complete ADL's    With   [] TE   [] TA   [] neuro   [] other: Patient Education: [x] Review HEP    [] Progressed/Changed HEP based on:   [] positioning   [] body mechanics   [] transfers   [] heat/ice application    [] other:      Other Objective/Functional Measures:  Pt presents with new rollator adjusted to appropriate height, pt required additional straps behind heels to keep bilateral feet on bike pedals. Pain Level (0-10 scale) post treatment: 2    ASSESSMENT/Changes in Function:     Patient will continue to benefit from skilled PT services to modify and progress therapeutic interventions, address functional mobility deficits, address ROM deficits, address strength deficits, analyze and address soft tissue restrictions, analyze and cue movement patterns, analyze and modify body mechanics/ergonomics, assess and modify postural abnormalities and address imbalance/dizziness to attain remaining goals. []  See Plan of Care  []  See progress note/recertification  []  See Discharge Summary         Progress towards goals / Updated goals:  Patient continues to require mod verbal cues for proper muscle activation and decreased compensation strategies with several exercises. Patient with mod fatigue noted at end of session.      PLAN  [x]  Upgrade activities as tolerated     [x]  Continue plan of care  [x]  Update interventions per flow sheet       []  Discharge due to:_  []  Other:_      Sharona Palumbo 5/28/2019

## 2019-05-30 ENCOUNTER — HOSPITAL ENCOUNTER (OUTPATIENT)
Dept: PHYSICAL THERAPY | Age: 79
Discharge: HOME | End: 2019-05-30
Payer: MEDICARE

## 2019-05-30 PROCEDURE — 97112 NEUROMUSCULAR REEDUCATION: CPT | Performed by: PHYSICAL MEDICINE & REHABILITATION

## 2019-05-30 PROCEDURE — 97110 THERAPEUTIC EXERCISES: CPT | Performed by: PHYSICAL MEDICINE & REHABILITATION

## 2019-05-30 NOTE — PROGRESS NOTES
PT DAILY TREATMENT NOTE - [facility] 2-15    Patient Name: Good Ash.  Date:2019  : 1940  [x]  Patient  Verified  Payor: Elena Ellis / Plan: [place] MEDICARE PART A & B / Product Type: Medicare /    In time:130p  Out time: 225p  Total Treatment Time (min): 55  Total Timed Codes (min): 45  1:1 Treatment Time ([address] only): 45  Visit #:  4    Treatment Area: Dizziness and giddiness [R42]  Other abnormalities of gait and mobility [R26.89]    SUBJECTIVE  Pain Level (0-10 scale): 0  Any medication changes, allergies to medications, adverse drug reactions, diagnosis change, or new procedure performed?: [x] No    [] Yes (see summary sheet for update)  Subjective functional status/changes:   [] No changes reported  Patient reports he is just tired today but is not having any pain. Patient stated he was slightly fatigued following last visit.      OBJECTIVE    Modality rationale: decrease pain and increase tissue extensibility to improve the patients ability to sit, stand, ambulate, lift, carry, reach and complete ADL's   Min Type Additional Details       [] Estim: []Att   []Unatt    []TENS instruct                  []IFC  []Premod   []NMES                     []Other:  []w/US   []w/ice   []w/heat  Position:  Location:       []  Traction: [] Cervical       []Lumbar                       [] Prone          []Supine                       []Intermittent   []Continuous Lbs:  [] before manual  [] after manual  []w/heat    []  Ultrasound: []Continuous   [] Pulsed                       at: []1MHz   []3MHz Location:  W/cm2:    [] Paraffin         Location:   []w/heat   10 []  Ice     [x]  Heat  []  Ice massage Position:supine  Location: low back    []  Laser  []  Other: Position:  Location:      []  Vasopneumatic Device Pressure:       [] lo [] med [] hi   Temperature:      [x] Skin assessment post-treatment:  [x]intact []redness- no adverse reaction    []redness  adverse reaction:     30 min Therapeutic Exercise:  [x] See flow sheet :   Rationale: increase ROM, increase strength, improve coordination, improve balance and increase proprioception to improve the patients ability to sit, stand, ambulate, lift, carry, reach and complete ADL's    15 min Neuromuscular Re-education:  [x]  See flow sheet :   Rationale: increase ROM, increase strength, improve coordination, improve balance and increase proprioception  to improve the patients ability to sit, stand, ambulate, lift, carry, reach and complete ADL's    With   [] TE   [] TA   [] neuro   [] other: Patient Education: [x] Review HEP    [] Progressed/Changed HEP based on:   [] positioning   [] body mechanics   [] transfers   [] heat/ice application    [] other:      Other Objective/Functional Measures:    Multiple LOB with EO WBOS today. All LOB were falling backwards even after max vcs. Pain Level (0-10 scale) post treatment: 0    ASSESSMENT/Changes in Function:     Patient will continue to benefit from skilled PT services to modify and progress therapeutic interventions, address functional mobility deficits, address ROM deficits, address strength deficits, analyze and address soft tissue restrictions, analyze and cue movement patterns, analyze and modify body mechanics/ergonomics, assess and modify postural abnormalities and address imbalance/dizziness to attain remaining goals. []  See Plan of Care  []  See progress note/recertification  []  See Discharge Summary         Progress towards goals / Updated goals:  Patient continues to require mod verbal cues for proper muscle activation and decreased compensation strategies with several exercises. Patient with mod fatigue noted at end of session.       PLAN  [x]  Upgrade activities as tolerated     [x]  Continue plan of care  [x]  Update interventions per flow sheet       []  Discharge due to:_  []  Other:_      Luis Felipe Gomez PTA, CPT 5/30/2019

## 2019-06-04 ENCOUNTER — HOSPITAL ENCOUNTER (OUTPATIENT)
Dept: PHYSICAL THERAPY | Age: 79
Discharge: HOME | End: 2019-06-04
Payer: MEDICARE

## 2019-06-04 PROCEDURE — 97112 NEUROMUSCULAR REEDUCATION: CPT | Performed by: PHYSICAL MEDICINE & REHABILITATION

## 2019-06-04 PROCEDURE — 97110 THERAPEUTIC EXERCISES: CPT | Performed by: PHYSICAL MEDICINE & REHABILITATION

## 2019-06-04 NOTE — PROGRESS NOTES
PT DAILY TREATMENT NOTE - [facility] 2-15    Patient Name: Yadiel Alonso.  Date:2019  : 1940  [x]  Patient  Verified  Payor: Bandar Signs / Plan: [place] MED[place] & [place] / Product Type: Medicare /    In time:200p  Out time: 255p  Total Treatment Time (min): 55  Total Timed Codes (min): 45  1:1 Treatment Time ([address] only): 30  Visit #:  5    Treatment Area: Dizziness and giddiness [R42]  Other abnormalities of gait and mobility [R26.89]    SUBJECTIVE  Pain Level (0-10 scale): 0  Any medication changes, allergies to medications, adverse drug reactions, diagnosis change, or new procedure performed?: [x] No    [] Yes (see summary sheet for update)  Subjective functional status/changes:   [] No changes reported  Patient reports he had some aching in his legs after last visit but that went away the next day.     OBJECTIVE    Modality rationale: decrease pain and increase tissue extensibility to improve the patients ability to sit, stand, ambulate, lift, carry, reach and complete ADL's   Min Type Additional Details       [] Estim: []Att   []Unatt    []TENS instruct                  []IFC  []Premod   []NMES                     []Other:  []w/US   []w/ice   []w/heat  Position:  Location:       []  Traction: [] Cervical       []Lumbar                       [] Prone          []Supine                       []Intermittent   []Continuous Lbs:  [] before manual  [] after manual  []w/heat    []  Ultrasound: []Continuous   [] Pulsed                       at: []1MHz   []3MHz Location:  W/cm2:    [] Paraffin         Location:   []w/heat   10 []  Ice     [x]  Heat  []  Ice massage Position:supine  Location: low back    []  Laser  []  Other: Position:  Location:      []  Vasopneumatic Device Pressure:       [] lo [] med [] hi   Temperature:      [x] Skin assessment post-treatment:  [x]intact []redness- no adverse reaction    []redness  adverse reaction:     30 min Therapeutic Exercise:  [x] See flow sheet :   Rationale: increase ROM, increase strength, improve coordination, improve balance and increase proprioception to improve the patients ability to sit, stand, ambulate, lift, carry, reach and complete ADL's    15 min Neuromuscular Re-education:  [x]  See flow sheet :   Rationale: increase ROM, increase strength, improve coordination, improve balance and increase proprioception  to improve the patients ability to sit, stand, ambulate, lift, carry, reach and complete ADL's    With   [] TE   [] TA   [] neuro   [] other: Patient Education: [x] Review HEP    [] Progressed/Changed HEP based on:   [] positioning   [] body mechanics   [] transfers   [] heat/ice application    [] other:      Other Objective/Functional Measures:    Continued multiple LOB with EO WBOS today. All LOB were falling backwards even after max vcs. Overall good tolerance with today's standing therex with mod fatigue present. Pain Level (0-10 scale) post treatment: 0    ASSESSMENT/Changes in Function:     Patient will continue to benefit from skilled PT services to modify and progress therapeutic interventions, address functional mobility deficits, address ROM deficits, address strength deficits, analyze and address soft tissue restrictions, analyze and cue movement patterns, analyze and modify body mechanics/ergonomics, assess and modify postural abnormalities and address imbalance/dizziness to attain remaining goals. []  See Plan of Care  []  See progress note/recertification  []  See Discharge Summary         Progress towards goals / Updated goals:  No progression today due to aching after last visit. Will look to progress next visit.     PLAN  [x]  Upgrade activities as tolerated     [x]  Continue plan of care  [x]  Update interventions per flow sheet       []  Discharge due to:_  []  Other:_      [facility], PTA, CPT 6/4/2019

## 2019-06-06 ENCOUNTER — HOSPITAL ENCOUNTER (OUTPATIENT)
Dept: PHYSICAL THERAPY | Age: 79
Discharge: HOME | End: 2019-06-06
Payer: MEDICARE

## 2019-06-06 PROCEDURE — 97110 THERAPEUTIC EXERCISES: CPT | Performed by: PHYSICAL MEDICINE & REHABILITATION

## 2019-06-06 PROCEDURE — 97112 NEUROMUSCULAR REEDUCATION: CPT | Performed by: PHYSICAL MEDICINE & REHABILITATION

## 2019-06-06 NOTE — PROGRESS NOTES
PT DAILY TREATMENT NOTE - [facility] 2-15    Patient Name: Chente Flannery.  Date:2019  : 1940  [x]  Patient  Verified  Payor: Rico Trejo / Plan: [place] MEDICARE PART A & B / Product Type: Medicare /    In time:130p  Out time: 230p  Total Treatment Time (min): 60  Total Timed Codes (min): 45  1:1 Treatment Time ([address] only): 30  Visit #:  6    Treatment Area: Dizziness and giddiness [R42]  Other abnormalities of gait and mobility [R26.89]    SUBJECTIVE  Pain Level (0-10 scale): 0  Any medication changes, allergies to medications, adverse drug reactions, diagnosis change, or new procedure performed?: [x] No    [] Yes (see summary sheet for update)  Subjective functional status/changes:   [] No changes reported  Patient reports he felt better after last visit with no aching in the legs.     OBJECTIVE    Modality rationale: decrease pain and increase tissue extensibility to improve the patients ability to sit, stand, ambulate, lift, carry, reach and complete ADL's   Min Type Additional Details       [] Estim: []Att   []Unatt    []TENS instruct                  []IFC  []Premod   []NMES                     []Other:  []w/US   []w/ice   []w/heat  Position:  Location:       []  Traction: [] Cervical       []Lumbar                       [] Prone          []Supine                       []Intermittent   []Continuous Lbs:  [] before manual  [] after manual  []w/heat    []  Ultrasound: []Continuous   [] Pulsed                       at: []1MHz   []3MHz Location:  W/cm2:    [] Paraffin         Location:   []w/heat   10 []  Ice     [x]  Heat  []  Ice massage Position:supine  Location: low back    []  Laser  []  Other: Position:  Location:      []  Vasopneumatic Device Pressure:       [] lo [] med [] hi   Temperature:      [x] Skin assessment post-treatment:  [x]intact []redness- no adverse reaction    []redness  adverse reaction:     35 min Therapeutic Exercise:  [x] See flow sheet :   Rationale: increase ROM, increase strength, improve coordination, improve balance and increase proprioception to improve the patients ability to sit, stand, ambulate, lift, carry, reach and complete ADL's    15 min Neuromuscular Re-education:  [x]  See flow sheet :   Rationale: increase ROM, increase strength, improve coordination, improve balance and increase proprioception  to improve the patients ability to sit, stand, ambulate, lift, carry, reach and complete ADL's    With   [] TE   [] TA   [] neuro   [] other: Patient Education: [x] Review HEP    [] Progressed/Changed HEP based on:   [] positioning   [] body mechanics   [] transfers   [] heat/ice application    [] other:      Other Objective/Functional Measures:    Slight improvement in balance exercise but continued max difficulty overall. Pain Level (0-10 scale) post treatment: 0    ASSESSMENT/Changes in Function:     Patient will continue to benefit from skilled PT services to modify and progress therapeutic interventions, address functional mobility deficits, address ROM deficits, address strength deficits, analyze and address soft tissue restrictions, analyze and cue movement patterns, analyze and modify body mechanics/ergonomics, assess and modify postural abnormalities and address imbalance/dizziness to attain remaining goals. []  See Plan of Care  []  See progress note/recertification  []  See Discharge Summary         Progress towards goals / Updated goals:  Able to progress today and will monitor aching following each session.     PLAN  [x]  Upgrade activities as tolerated     [x]  Continue plan of care  [x]  Update interventions per flow sheet       []  Discharge due to:_  []  Other:_      Ranjit Munoz PTA, CPT 6/6/2019

## 2019-06-11 ENCOUNTER — HOSPITAL ENCOUNTER (OUTPATIENT)
Dept: PHYSICAL THERAPY | Age: 79
Discharge: HOME | End: 2019-06-11
Payer: MEDICARE

## 2019-06-11 PROCEDURE — 97112 NEUROMUSCULAR REEDUCATION: CPT | Performed by: PHYSICAL MEDICINE & REHABILITATION

## 2019-06-11 PROCEDURE — 97110 THERAPEUTIC EXERCISES: CPT | Performed by: PHYSICAL MEDICINE & REHABILITATION

## 2019-06-11 NOTE — PROGRESS NOTES
PT DAILY TREATMENT NOTE - [facility] 2-15    Patient Name: Abby Burnett Sr.  Date:2019  : 1940  [x]  Patient  Verified  Payor: Mine Sawyer / Plan: [place] MEDICARE PART A & B / Product Type: Medicare /    In time:130p  Out time: 220p  Total Treatment Time (min): 50  Total Timed Codes (min): 50  1:1 Treatment Time ([facility] only): 40  Visit #:  7    Treatment Area: Dizziness and giddiness [R42]  Other abnormalities of gait and mobility [R26.89]    SUBJECTIVE  Pain Level (0-10 scale): 0  Any medication changes, allergies to medications, adverse drug reactions, diagnosis change, or new procedure performed?: [x] No    [] Yes (see summary sheet for update)  Subjective functional status/changes:   [] No changes reported  Patient reports he had some aching after last visit but that went away the next day. OBJECTIVE    40 min Therapeutic Exercise:  [x] See flow sheet :   Rationale: increase ROM, increase strength, improve coordination, improve balance and increase proprioception to improve the patients ability to sit, stand, ambulate, lift, carry, reach and complete ADL's    10 min Neuromuscular Re-education:  [x]  See flow sheet :   Rationale: increase ROM, increase strength, improve coordination, improve balance and increase proprioception  to improve the patients ability to sit, stand, ambulate, lift, carry, reach and complete ADL's    With   [] TE   [] TA   [] neuro   [] other: Patient Education: [x] Review HEP    [] Progressed/Changed HEP based on:   [] positioning   [] body mechanics   [] transfers   [] heat/ice application    [] other:      Other Objective/Functional Measures:    Patient need max verbal and tactile cues for proper weight shifting forward to prevent losing balance backwards. Unable to perform consistently on his own.     Pain Level (0-10 scale) post treatment: 0    ASSESSMENT/Changes in Function:     Patient will continue to benefit from skilled PT services to modify and progress therapeutic interventions, address functional mobility deficits, address ROM deficits, address strength deficits, analyze and address soft tissue restrictions, analyze and cue movement patterns, analyze and modify body mechanics/ergonomics, assess and modify postural abnormalities and address imbalance/dizziness to attain remaining goals. []  See Plan of Care  []  See progress note/recertification  []  See Discharge Summary         Progress towards goals / Updated goals: Will continue to work on anterior shifting to prevent falls backwards when without rollator.     PLAN  [x]  Upgrade activities as tolerated     [x]  Continue plan of care  [x]  Update interventions per flow sheet       []  Discharge due to:_  []  Other:_      Krishna Hsu, PTA, CPT 6/11/2019

## 2019-06-12 ENCOUNTER — OFFICE VISIT (OUTPATIENT)
Dept: NEUROLOGY | Age: 79
End: 2019-06-12

## 2019-06-12 VITALS
RESPIRATION RATE: 18 BRPM | BODY MASS INDEX: 30.07 KG/M2 | SYSTOLIC BLOOD PRESSURE: 120 MMHG | HEART RATE: 69 BPM | WEIGHT: 222 LBS | TEMPERATURE: 98.2 F | DIASTOLIC BLOOD PRESSURE: 72 MMHG | OXYGEN SATURATION: 98 % | HEIGHT: 72 IN

## 2019-06-12 DIAGNOSIS — R27.8 SENSORY ATAXIA: Primary | ICD-10-CM

## 2019-06-12 DIAGNOSIS — G60.8 POLYNEUROPATHY, PERIPHERAL SENSORIMOTOR AXONAL: ICD-10-CM

## 2019-06-12 NOTE — PROGRESS NOTES
[facility] and [address] at [facility] at [address] [facility], [address] [place] [address] [address] [facility], [address] [place] [address]   [phone]              Chief Complaint   Patient presents with    Results     dop, emg for polyneuropathy     Current Outpatient Medications   Medication Sig Dispense Refill    carvedilol (COREG) 12.5 mg tablet TAKE ONE (1) TABLET(S) BY MOUTH TWICE DAILY WITH FOOD 180 Tab 3    insulin NPH/insulin regular (NOVOLIN 70/30, HUMULIN 70/30) 100 unit/mL (70-30) injection 45 Units by SubCUTAneous route two (2) times a day.  losartan (COZAAR) 25 mg tablet Take  by mouth daily.  aspirin delayed-release 81 mg tablet Take 81 mg by mouth daily.  pravastatin (PRAVACHOL) 40 mg tablet Take 1 Tab by mouth daily. (Patient not taking: Reported on 5/3/2019) 30 Tab 3      Allergies   Allergen Reactions    Amoxicillin Hives and Itching    Pravastatin Myalgia     Social History     Tobacco Use    Smoking status: Never Smoker    Smokeless tobacco: Never Used   Substance Use Topics    Alcohol use: No    Drug use: No     Patient returns today for follow-up sensory ataxia imbalance and falls. EMG does indeed reveal neuropathy likely on the basis of his diabetes. He finishes physical therapy tomorrow. He has a home exercise program.  Carotid Dopplers are fine    We discussed his test results. We discussed what peripheral neuropathy is in the fact that his brain does not know where his feet are i.e. the sensory ataxia. Discussed doing a home exercise program and that will help keep things hopefully stable but he really needs to get his sugar under good control. His wife notes that is a difficult thing for him. Answered all their questions and concerns today.     From my standpoint follow as needed    Carmen Farias MD    This note will not be viewable in [facility]. Examination  Visit Vitals  /72 (BP 1 Location: Left arm, BP Patient Position: Sitting)   Pulse 69   Temp 98.2 °F (36.8 °C) (Oral)   Resp 18   Ht 6' (1.829 m)   Wt 100.7 kg (222 lb)   SpO2 98%   BMI 30.11 kg/m²         Impression/Plan      Reinier Hi MD      This note was created using voice recognition software. Despite editing, there may be syntax errors.

## 2019-06-13 ENCOUNTER — HOSPITAL ENCOUNTER (OUTPATIENT)
Dept: PHYSICAL THERAPY | Age: 79
Discharge: HOME | End: 2019-06-13
Payer: MEDICARE

## 2019-06-13 PROCEDURE — 97112 NEUROMUSCULAR REEDUCATION: CPT | Performed by: PHYSICAL MEDICINE & REHABILITATION

## 2019-06-13 PROCEDURE — 97110 THERAPEUTIC EXERCISES: CPT | Performed by: PHYSICAL MEDICINE & REHABILITATION

## 2019-06-13 NOTE — PROGRESS NOTES
PT DAILY TREATMENT NOTE - [facility] 2-15    Patient Name: Alvaro Hamilton Sr.  Date:2019  : 1940  [x]  Patient  Verified  Payor: Jesse Dear / Plan: [place] MEDICARE PART A & B / Product Type: Medicare /    In time:125p  Out time: 220p  Total Treatment Time (min): 55  Total Timed Codes (min): 55  1:1 Treatment Time ( [address] only): 40  Visit #:  8    Treatment Area: Dizziness and giddiness [R42]  Other abnormalities of gait and mobility [R26.89]    SUBJECTIVE  Pain Level (0-10 scale): 0  Any medication changes, allergies to medications, adverse drug reactions, diagnosis change, or new procedure performed?: [x] No    [] Yes (see summary sheet for update)  Subjective functional status/changes:   [] No changes reported  Patient reports he feels like he can continue his HEP at home at this time. OBJECTIVE    45 min Therapeutic Exercise:  [x] See flow sheet :   Rationale: increase ROM, increase strength, improve coordination, improve balance and increase proprioception to improve the patients ability to sit, stand, ambulate, lift, carry, reach and complete ADL's    10 min Neuromuscular Re-education:  [x]  See flow sheet :   Rationale: increase ROM, increase strength, improve coordination, improve balance and increase proprioception  to improve the patients ability to sit, stand, ambulate, lift, carry, reach and complete ADL's    With   [] TE   [] TA   [] neuro   [] other: Patient Education: [x] Review HEP    [] Progressed/Changed HEP based on:   [] positioning   [] body mechanics   [] transfers   [] heat/ice application    [] other:      Other Objective/Functional Measures:    Patient need max verbal and tactile cues for proper weight shifting forward to prevent losing balance backwards. Unable to perform consistently on his own. Mod fatigue present with standing strength exercises.     Updated HEP    Pain Level (0-10 scale) post treatment: 0    ASSESSMENT/Changes in Function:        []  See Plan of Care  []  See progress note/recertification  [x]  See Discharge Summary         Progress towards goals / Updated goals:  Patient has been seen for 8 visits and has significant reduction in low back pain with ADLs and ambulation. Patient continues to have max difficulty with balance and is a high fall risk. Attempted to have patient continue PT for 3-4 weeks but patient wanted to be done and continue his HEP on his own. Short Term Goals: To be accomplished in 4 weeks:  1) The patient will be independent with introductory HEP - MET  2) The patient will demonstrate ability to hold NBOS EO for 30 seconds without LOB to indicate decreased risk of falls - Not Met  3) The patient will demonstrate ability to transfer sit to/from stand without UE assist - Not Met  Long Term Goals:  To be accomplished in 12 weeks:  1) The patient will improve time to complete TUG to 13 seconds or faster to indicate decreased risk of falls - Not Met  2) The patient will report ability to negotiate 12 steps reciprocally with railing, without fear of falling to improve mobility in the home - MET  3) The patient will report ability to complete ADLs without a significant increase in pain - MET        PLAN  []  Upgrade activities as tolerated     []  Continue plan of care  []  Update interventions per flow sheet       [x]  Discharge due to: Self discharge  []  Other:_      Oralia Fontenot PTA, CPT 6/13/2019

## 2019-08-07 NOTE — ANCILLARY DISCHARGE INSTRUCTIONS
[facility] [address] [facility] [facility], [address] Phone: [phone] Fax: [phone] Discharge Summary 2-15 Patient name: Nino Boudreaux  : 1940  Provider#: [id number] Referral source: Johnna Callahan MD     
Medical/Treatment Diagnosis: Dizziness and giddiness [R42] Other abnormalities of gait and mobility [R26.89] Prior Hospitalization: see medical history Comorbidities: See Plan of Care Prior Level of Function: See Plan of Care Medications: Verified on Patient Summary List 
 
Start of Care: 19      Onset Date:year ago Visits from Start of Care: 8     Missed Visits: 0 Reporting Period : 19 to 19 Assessment/Summary of care:  
Patient has been seen for 8 visits and reports a significant reduction in low back pain with ADLs and ambulation. Patient continues to have max difficulty with balance and is a high fall risk. Attempted to have patient continue PT for 3-4 weeks but patient wanted to be done and continue his HEP on his own.  
  
Short Term Goals: To be accomplished in 4 weeks: 
1) The patient will be independent with introductory HEP - MET 2) The patient will demonstrate ability to hold NBOS EO for 30 seconds without LOB to indicate decreased risk of falls - NOT MET 3) The patient will demonstrate ability to transfer sit to/from stand without UE assist - NOT MET Long Term Goals: To be accomplished in 12 weeks: 
1) The patient will improve time to complete TUG to 13 seconds or faster to indicate decreased risk of falls - NOT MET 
2) The patient will report ability to negotiate 12 steps reciprocally with railing, without fear of falling to improve mobility in the home - MET 3) The patient will report ability to complete ADLs without a significant increase in pain - MET 
 
 
 
RECOMMENDATIONS: 
[x]Discontinue therapy: []Patient has reached or is progressing toward set goals []Patient is non-compliant or has abdicated 
   []Due to lack of appreciable progress towards set goals [x]Other - Self Discharge Cl Burkett, PT 8/7/2019

## 2019-10-14 ENCOUNTER — OFFICE VISIT (OUTPATIENT)
Dept: CARDIOLOGY CLINIC | Age: 79
End: 2019-10-14

## 2019-10-14 VITALS
SYSTOLIC BLOOD PRESSURE: 130 MMHG | HEART RATE: 80 BPM | WEIGHT: 221.8 LBS | OXYGEN SATURATION: 98 % | DIASTOLIC BLOOD PRESSURE: 80 MMHG | BODY MASS INDEX: 30.04 KG/M2 | HEIGHT: 72 IN

## 2019-10-14 DIAGNOSIS — I10 HYPERTENSION, BENIGN: ICD-10-CM

## 2019-10-14 DIAGNOSIS — I25.10 CORONARY ARTERY DISEASE INVOLVING NATIVE CORONARY ARTERY OF NATIVE HEART WITHOUT ANGINA PECTORIS: ICD-10-CM

## 2019-10-14 DIAGNOSIS — R07.89 OTHER CHEST PAIN: Primary | ICD-10-CM

## 2019-10-14 DIAGNOSIS — E78.5 DYSLIPIDEMIA: ICD-10-CM

## 2019-10-14 DIAGNOSIS — Z79.4 TYPE 2 DIABETES MELLITUS WITHOUT COMPLICATION, WITH LONG-TERM CURRENT USE OF INSULIN (HCC): ICD-10-CM

## 2019-10-14 DIAGNOSIS — E11.9 TYPE 2 DIABETES MELLITUS WITHOUT COMPLICATION, WITH LONG-TERM CURRENT USE OF INSULIN (HCC): ICD-10-CM

## 2019-10-14 RX ORDER — CARVEDILOL 12.5 MG/1
TABLET ORAL
Qty: 180 TAB | Refills: 3 | Status: SHIPPED | OUTPATIENT
Start: 2019-10-14 | End: 2020-09-28 | Stop reason: ALTCHOICE

## 2019-10-14 RX ORDER — GLUCOSAMINE SULFATE 1500 MG
1000 POWDER IN PACKET (EA) ORAL DAILY
Status: ON HOLD | COMMUNITY
End: 2020-02-08

## 2019-10-14 NOTE — PROGRESS NOTES
[address] [facility]  [place], [address] [facility]    Office [phone]  Fax [phone]      Briana Billings. is a 70 y.o. male. Last seen approximately 1 year ago. Assessment  Encounter Diagnoses   Name Primary?  Coronary artery disease involving native coronary artery of native heart without angina pectoris     Hypertension, benign     Type 2 diabetes mellitus without complication, with long-term current use of insulin (HCC)     Dyslipidemia     Other chest pain Yes     Recommendations:  1. 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 Stress echo 2012 demonstrated no evidence of ischemia. Echo 1 year ago demonstrated EF 55-60% with inferior akinesis. Now with new onset jaw pain and chest pain. - Plan to update stress testing with [facility] in the near future. Phone follow up to review results  - We discussed a  trial of 20 mg dose of Pravastatin. Add back COQ10 enzyme, if we re-challenge. - Continue ASA     2. HTN - Normotensive in the office today  - Continue current medication regimen.   - Continue home monitoring. Subjective:  Mr. Tessa Stewart reports that his balance remains an issue for him. Diagnosed with ataxia. He continues to have intermittent falls. Using a rollator walker in place of a cane now. He also has neuropathy pain in his feet. His activity level is limited due to his instability. He is followed closely by Dr. Jacquelin Anderson. DM and Lipids followed by Dr. Rex Wilkes. He discontinued statin several months ago due to myalgias. Previously intolerant to another statin, but they cannot recall the name. Has taken CoQ10 in the past, but discontinued this as he didn't find much benefit. Most recent dose of statin was Prava 40. He denies any attempts to reduce dose to 20 mg before stopping. He notes new onset bilateral jaw pain and left sided chest pain at rest and with exertion. Denies any relieving factors. He continues to enjoy eating sweets. He is only on insulin. He denies any palpitations, syncope, orthopnea, edema or paroxysmal nocturnal dyspnea. Lipids and DM monitored by Dr. Robin Burgess. Cardiac risk factors   HTN yes  DM yes   Smoking no    Cardiac testing  Echo 2d adult 12/2009  EF 50%, basal inferior akinesis, mild MR  Echo 9/21/11 - LVEF 50%, inferior AK, unchanged from Dec 2009    Stress echo March 2012 - 3 min, resting EF 50%, inferior AK, no ischemia    Echo 10/29/13 - EF 55 % to 60 %. There was akinesis of the basal-mid inferior wall(s). There was akinesis of the basal inferolateral wall(s). Paradoxical ventricular septal motion, no PFO, no significant Change from previous study. Echo 8/23/17 - EF 55-60%. Inferior AK. Mild LVH. Mild MAC with no MR. AoV sclerosis without stenosis. No change compared to study 2013. Past Medical History:   Diagnosis Date    CAD (coronary artery disease), native coronary artery     Cancer ([facility] [address]     melanoma removed on forehead    Depression     Diabetic neuropathy ([facility]     ED (erectile dysfunction)     Hypertension, benign     Ill-defined condition     states he has memory problems    Postsurgical aortocoronary bypass status     Type II or unspecified type diabetes mellitus without mention of complication, not stated as uncontrolled         Current Outpatient Medications   Medication Sig Dispense Refill    carvedilol (COREG) 12.5 mg tablet TAKE ONE (1) TABLET(S) BY MOUTH TWICE DAILY WITH FOOD 180 Tab 3    insulin NPH/insulin regular (NOVOLIN 70/30, HUMULIN 70/30) 100 unit/mL (70-30) injection 45 Units by SubCUTAneous route two (2) times a day.  pravastatin (PRAVACHOL) 40 mg tablet Take 1 Tab by mouth daily. (Patient not taking: Reported on 5/3/2019) 30 Tab 3    losartan (COZAAR) 25 mg tablet Take  by mouth daily.  aspirin delayed-release 81 mg tablet Take 81 mg by mouth daily.          Allergies   Allergen Reactions    Amoxicillin Hives and Itching    Pravastatin Myalgia      Review of Systems  Constitutional: Negative for fever, chills,and diaphoresis. +fatigue  Respiratory: Negative for cough, hemoptysis, sputum production, shortness of breath and wheezing. Cardiovascular: Negative for  palpitations, orthopnea, claudication, leg swelling and PND. +chest pain   Gastrointestinal: Negative for heartburn, nausea, vomiting, blood in stool and melena. Genitourinary: Negative for dysuria and flank pain. Musculoskeletal: Negative for back pain. Skin: Negative for rash. Neurological: Negative for focal weakness, seizures, loss of consciousness, weakness and headaches. +LE numbness, gait instability. Endo/Heme/Allergies: Does not bruise/bleed easily. Psychiatric/Behavioral: Negative for memory loss. The patient does not have insomnia. Physical Exam    Visit Vitals  /80 (BP 1 Location: Left arm, BP Patient Position: Sitting)   Pulse 80   Ht 6' (1.829 m)   Wt 221 lb 12.8 oz (100.6 kg)   SpO2 98%   BMI 30.08 kg/m²     Wt Readings from Last 3 Encounters:   10/14/19 221 lb 12.8 oz (100.6 kg)   06/12/19 222 lb (100.7 kg)   05/03/19 224 lb (101.6 kg)      General - well developed well nourished  Neck - JVP normal, thyroid nl  Cardiac - normal S1,S2, no murmurs, rubs or gallops.  No clicks  Vascular - carotids without bruits, radials, femorals and pedal pulses equal bilateral  Lungs - clear to auscultation bilaterals, no rales, wheezing or rhonchi  Abd - soft nontender, no HSM, no abd bruits  Extremities - no edema  Skin - no rash  Neuro - nonfocal  Psych - normal mood and affect    Cardiographics  EKG 8/8/16 - SR 79, old inferior MI, unchanged from 10/29/13  EKG 8/8/17 - SR, old inferior MI, unchanged from 8/8/16  EKG 8/10/18 - Sinus 73, nonspecific T wave flattening, old inferior MI, no significant change from 8/8/17  EKG 10/14/19 - SR 76    Luhesham Graft, NP  Eric Brand MD

## 2019-10-14 NOTE — PROGRESS NOTES
Patient says that he has a pain on left side breast area    Patient says that is lip is numb at time and that he has jaw pain       Visit Vitals  /80 (BP 1 Location: Left arm, BP Patient Position: Sitting)   Pulse 80   Ht 6' (1.829 m)   Wt 221 lb 12.8 oz (100.6 kg)   SpO2 98%   BMI 30.08 kg/m²

## 2019-10-14 NOTE — Clinical Note
10/15/19 Patient: Lety Crouch Sr. YOB: 1940 Date of Visit: 10/14/2019 Milton Sanford, [address] VIA Facsimile: [phone] Dear Milton Sanford MD, Thank you for referring Mr. Williams Su to [facility] for evaluation. My notes for this consultation are attached. If you have questions, please do not hesitate to call me. I look forward to following your patient along with you. Sincerely, Annia Howard MD

## 2019-10-14 NOTE — PATIENT INSTRUCTIONS
We are going to re-evaluate your heart with a stress test with a chemical called Lexiscan. I will call you with the results and we will continue our discussion about the statin medication when I call.

## 2019-11-27 ENCOUNTER — HOSPITAL ENCOUNTER (EMERGENCY)
Age: 79
Discharge: HOME | DRG: 566 | End: 2019-11-27
Attending: EMERGENCY MEDICINE
Payer: MEDICARE

## 2019-11-27 ENCOUNTER — APPOINTMENT (OUTPATIENT)
Dept: CT IMAGING | Age: 79
DRG: 566 | End: 2019-11-27
Attending: EMERGENCY MEDICINE
Payer: MEDICARE

## 2019-11-27 ENCOUNTER — APPOINTMENT (OUTPATIENT)
Dept: GENERAL RADIOLOGY | Age: 79
DRG: 566 | End: 2019-11-27
Attending: EMERGENCY MEDICINE
Payer: MEDICARE

## 2019-11-27 VITALS
HEIGHT: 72 IN | WEIGHT: 220 LBS | OXYGEN SATURATION: 99 % | BODY MASS INDEX: 29.8 KG/M2 | DIASTOLIC BLOOD PRESSURE: 72 MMHG | TEMPERATURE: 97.8 F | HEART RATE: 92 BPM | RESPIRATION RATE: 16 BRPM | SYSTOLIC BLOOD PRESSURE: 148 MMHG

## 2019-11-27 DIAGNOSIS — R53.83 MALAISE AND FATIGUE: ICD-10-CM

## 2019-11-27 DIAGNOSIS — R10.9 FLANK PAIN: Primary | ICD-10-CM

## 2019-11-27 DIAGNOSIS — R53.81 MALAISE AND FATIGUE: ICD-10-CM

## 2019-11-27 LAB
ANION GAP SERPL CALC-SCNC: 9 MMOL/L (ref 5–15)
APPEARANCE UR: CLEAR
ATRIAL RATE: 82 BPM
BACTERIA URNS QL MICRO: ABNORMAL /HPF
BASOPHILS # BLD: 0 K/UL (ref 0–0.1)
BASOPHILS NFR BLD: 0 % (ref 0–1)
BILIRUB UR QL: NEGATIVE
BUN SERPL-MCNC: 24 MG/DL (ref 6–20)
BUN/CREAT SERPL: 23 (ref 12–20)
CALCIUM SERPL-MCNC: 9.2 MG/DL (ref 8.5–10.1)
CALCULATED P AXIS, ECG09: 36 DEGREES
CALCULATED R AXIS, ECG10: -36 DEGREES
CALCULATED T AXIS, ECG11: 31 DEGREES
CHLORIDE SERPL-SCNC: 105 MMOL/L (ref 97–108)
CO2 SERPL-SCNC: 23 MMOL/L (ref 21–32)
COLOR UR: ABNORMAL
COMMENT, HOLDF: NORMAL
CREAT SERPL-MCNC: 1.04 MG/DL (ref 0.7–1.3)
DIAGNOSIS, 93000: NORMAL
DIFFERENTIAL METHOD BLD: ABNORMAL
EOSINOPHIL # BLD: 0 K/UL (ref 0–0.4)
EOSINOPHIL NFR BLD: 0 % (ref 0–7)
EPITH CASTS URNS QL MICRO: ABNORMAL /LPF
ERYTHROCYTE [DISTWIDTH] IN BLOOD BY AUTOMATED COUNT: 13.1 % (ref 11.5–14.5)
GLUCOSE SERPL-MCNC: 222 MG/DL (ref 65–100)
GLUCOSE UR STRIP.AUTO-MCNC: >1000 MG/DL
HCT VFR BLD AUTO: 44.4 % (ref 36.6–50.3)
HGB BLD-MCNC: 15 G/DL (ref 12.1–17)
HGB UR QL STRIP: ABNORMAL
HYALINE CASTS URNS QL MICRO: ABNORMAL /LPF (ref 0–5)
IMM GRANULOCYTES # BLD AUTO: 0 K/UL (ref 0–0.04)
IMM GRANULOCYTES NFR BLD AUTO: 0 % (ref 0–0.5)
KETONES UR QL STRIP.AUTO: 40 MG/DL
LEUKOCYTE ESTERASE UR QL STRIP.AUTO: NEGATIVE
LYMPHOCYTES # BLD: 0.7 K/UL (ref 0.8–3.5)
LYMPHOCYTES NFR BLD: 11 % (ref 12–49)
MAGNESIUM SERPL-MCNC: 2.2 MG/DL (ref 1.6–2.4)
MCH RBC QN AUTO: 29.6 PG (ref 26–34)
MCHC RBC AUTO-ENTMCNC: 33.8 G/DL (ref 30–36.5)
MCV RBC AUTO: 87.6 FL (ref 80–99)
MONOCYTES # BLD: 0.8 K/UL (ref 0–1)
MONOCYTES NFR BLD: 13 % (ref 5–13)
MUCOUS THREADS URNS QL MICRO: ABNORMAL /LPF
NEUTS SEG # BLD: 4.5 K/UL (ref 1.8–8)
NEUTS SEG NFR BLD: 76 % (ref 32–75)
NITRITE UR QL STRIP.AUTO: NEGATIVE
NRBC # BLD: 0 K/UL (ref 0–0.01)
NRBC BLD-RTO: 0 PER 100 WBC
P-R INTERVAL, ECG05: 158 MS
PH UR STRIP: 5 [PH] (ref 5–8)
PLATELET # BLD AUTO: 270 K/UL (ref 150–400)
PMV BLD AUTO: 8.7 FL (ref 8.9–12.9)
POTASSIUM SERPL-SCNC: 4.1 MMOL/L (ref 3.5–5.1)
PROT UR STRIP-MCNC: 30 MG/DL
Q-T INTERVAL, ECG07: 402 MS
QRS DURATION, ECG06: 96 MS
QTC CALCULATION (BEZET), ECG08: 469 MS
RBC # BLD AUTO: 5.07 M/UL (ref 4.1–5.7)
RBC #/AREA URNS HPF: ABNORMAL /HPF (ref 0–5)
RBC MORPH BLD: ABNORMAL
SAMPLES BEING HELD,HOLD: NORMAL
SODIUM SERPL-SCNC: 137 MMOL/L (ref 136–145)
SP GR UR REFRACTOMETRY: >1.03 (ref 1–1.03)
TROPONIN I SERPL-MCNC: 0.05 NG/ML
TSH SERPL DL<=0.05 MIU/L-ACNC: 0.84 UIU/ML (ref 0.36–3.74)
UR CULT HOLD, URHOLD: NORMAL
UROBILINOGEN UR QL STRIP.AUTO: 0.2 EU/DL (ref 0.2–1)
VENTRICULAR RATE, ECG03: 82 BPM
WBC # BLD AUTO: 6 K/UL (ref 4.1–11.1)
WBC URNS QL MICRO: ABNORMAL /HPF (ref 0–4)

## 2019-11-27 PROCEDURE — 36415 COLL VENOUS BLD VENIPUNCTURE: CPT

## 2019-11-27 PROCEDURE — 80048 BASIC METABOLIC PNL TOTAL CA: CPT

## 2019-11-27 PROCEDURE — 74176 CT ABD & PELVIS W/O CONTRAST: CPT

## 2019-11-27 PROCEDURE — 74011250637 HC RX REV CODE- 250/637: Performed by: EMERGENCY MEDICINE

## 2019-11-27 PROCEDURE — 81001 URINALYSIS AUTO W/SCOPE: CPT

## 2019-11-27 PROCEDURE — 84484 ASSAY OF TROPONIN QUANT: CPT

## 2019-11-27 PROCEDURE — 84443 ASSAY THYROID STIM HORMONE: CPT

## 2019-11-27 PROCEDURE — 96361 HYDRATE IV INFUSION ADD-ON: CPT

## 2019-11-27 PROCEDURE — 93005 ELECTROCARDIOGRAM TRACING: CPT

## 2019-11-27 PROCEDURE — 74011000250 HC RX REV CODE- 250: Performed by: EMERGENCY MEDICINE

## 2019-11-27 PROCEDURE — 70450 CT HEAD/BRAIN W/O DYE: CPT

## 2019-11-27 PROCEDURE — 74011250636 HC RX REV CODE- 250/636: Performed by: EMERGENCY MEDICINE

## 2019-11-27 PROCEDURE — 71046 X-RAY EXAM CHEST 2 VIEWS: CPT

## 2019-11-27 PROCEDURE — 83735 ASSAY OF MAGNESIUM: CPT

## 2019-11-27 PROCEDURE — 96360 HYDRATION IV INFUSION INIT: CPT

## 2019-11-27 PROCEDURE — 85025 COMPLETE CBC W/AUTO DIFF WBC: CPT

## 2019-11-27 PROCEDURE — 99285 EMERGENCY DEPT VISIT HI MDM: CPT

## 2019-11-27 RX ORDER — GUAIFENESIN 100 MG/5ML
LIQUID (ML) ORAL
Status: ON HOLD | COMMUNITY
End: 2019-11-30 | Stop reason: CLARIF

## 2019-11-27 RX ORDER — LIDOCAINE 4 G/100G
1 PATCH TOPICAL
Status: DISCONTINUED | OUTPATIENT
Start: 2019-11-27 | End: 2019-11-27 | Stop reason: HOSPADM

## 2019-11-27 RX ORDER — ACETAMINOPHEN 325 MG/1
975 TABLET ORAL
Status: COMPLETED | OUTPATIENT
Start: 2019-11-27 | End: 2019-11-27

## 2019-11-27 RX ORDER — SODIUM CHLORIDE 9 MG/ML
125 INJECTION, SOLUTION INTRAVENOUS CONTINUOUS
Status: DISCONTINUED | OUTPATIENT
Start: 2019-11-27 | End: 2019-11-27 | Stop reason: HOSPADM

## 2019-11-27 RX ORDER — LIDOCAINE 50 MG/G
PATCH TOPICAL
Qty: 1 EACH | Refills: 0 | Status: ON HOLD | OUTPATIENT
Start: 2019-11-27 | End: 2020-02-08

## 2019-11-27 RX ADMIN — SODIUM CHLORIDE 1000 ML: 900 INJECTION, SOLUTION INTRAVENOUS at 11:08

## 2019-11-27 RX ADMIN — SODIUM CHLORIDE 125 ML/HR: 900 INJECTION, SOLUTION INTRAVENOUS at 12:17

## 2019-11-27 RX ADMIN — ACETAMINOPHEN 975 MG: 325 TABLET ORAL at 12:14

## 2019-11-27 NOTE — ED NOTES
Provider has reviewed discharge instructions with the patient. The patient verbalized understanding. Patient was wheeled out of the emergency department in a wheelchair and assisted in to the car of the son.

## 2019-11-27 NOTE — ED TRIAGE NOTES
Pt arrives with the c/c of generalized weakness and fatigue that has been going on for the last week, pt reports went to get out of bed this morning and had glf; pt denies any loss of consciousness of hitting head, pt takes 81 of asa at home, pt has history of falls and uses cane and walker at home. Per ems pt bs 240; pt denies injury or trauma at this time.

## 2019-11-27 NOTE — DISCHARGE INSTRUCTIONS
Patient Education        Patient Education        Fatigue: Care Instructions  Your Care Instructions    Fatigue is a feeling of tiredness, exhaustion, or lack of energy. You may feel fatigue because of too much or not enough activity. It can also come from stress, lack of sleep, boredom, and poor diet. Many medical problems, such as viral infections, can cause fatigue. Emotional problems, especially depression, are often the cause of fatigue. Fatigue is most often a symptom of another problem. Treatment for fatigue depends on the cause. For example, if you have fatigue because you have a certain health problem, treating this problem also treats your fatigue. If depression or anxiety is the cause, treatment may help. Follow-up care is a key part of your treatment and safety. Be sure to make and go to all appointments, and call your doctor if you are having problems. It's also a good idea to know your test results and keep a list of the medicines you take. How can you care for yourself at home? · Get regular exercise. But don't overdo it. Go back and forth between rest and exercise. · Get plenty of rest.  · Eat a healthy diet. Do not skip meals, especially breakfast.  · Reduce your use of caffeine, tobacco, and alcohol. Caffeine is most often found in coffee, tea, cola drinks, and chocolate. · Limit medicines that can cause fatigue. This includes tranquilizers and cold and allergy medicines. When should you call for help? Watch closely for changes in your health, and be sure to contact your doctor if:    · You have new symptoms such as fever or a rash.     · Your fatigue gets worse.     · You have been feeling down, depressed, or hopeless. Or you may have lost interest in things that you usually enjoy.     · You are not getting better as expected. Where can you learn more? Go to http://jonathan-gonzalo.info/.   Enter Z900 in the search box to learn more about \"Fatigue: Care Instructions. \"  Current as of: June 26, 2019  Content Version: 12.2  © 4164-9730 [employer]. Care instructions adapted under license by [employer] (which disclaims liability or warranty for this information). If you have questions about a medical condition or this instruction, always ask your healthcare professional. [address] any warranty or liability for your use of this information. Learning About Relief for Back Pain  What is back tension and strain? Back strain happens when you overstretch, or pull, a muscle in your back. You may hurt your back in an accident or when you exercise or lift something. Most back pain will get better with rest and time. You can take care of yourself at home to help your back heal.  What can you do first to relieve back pain? When you first feel back pain, try these steps:  · Walk. Take a short walk (10 to 20 minutes) on a level surface (no slopes, hills, or stairs) every 2 to 3 hours. Walk only distances you can manage without pain, especially leg pain. · Relax. Find a comfortable position for rest. Some people are comfortable on the floor or a medium-firm bed with a small pillow under their head and another under their knees. Some people prefer to lie on their side with a pillow between their knees. Don't stay in one position for too long. · Try heat or ice. Try using a heating pad on a low or medium setting, or take a warm shower, for 15 to 20 minutes every 2 to 3 hours. Or you can buy single-use heat wraps that last up to 8 hours. You can also try an ice pack for 10 to 15 minutes every 2 to 3 hours. You can use an ice pack or a bag of frozen vegetables wrapped in a thin towel. There is not strong evidence that either heat or ice will help, but you can try them to see if they help. You may also want to try switching between heat and cold. · Take pain medicine exactly as directed.   ? If the doctor gave you a prescription medicine for pain, take it as prescribed. ? If you are not taking a prescription pain medicine, ask your doctor if you can take an over-the-counter medicine. What else can you do? · Stretch and exercise. Exercises that increase flexibility may relieve your pain and make it easier for your muscles to keep your spine in a good, neutral position. And don't forget to keep walking. · Do self-massage. You can use self-massage to unwind after work or school or to energize yourself in the morning. You can easily massage your feet, hands, or neck. Self-massage works best if you are in comfortable clothes and are sitting or lying in a comfortable position. Use oil or lotion to massage bare skin. · Reduce stress. Back pain can lead to a vicious [place]: Distress about the pain tenses the muscles in your back, which in turn causes more pain. Learn how to relax your mind and your muscles to lower your stress. Where can you learn more? Go to http://jonathan-gonzalo.info/. Enter D141 in the search box to learn more about \"Learning About Relief for Back Pain. \"  Current as of: June 26, 2019  Content Version: 12.2  © 9314-4389 [employer]. Care instructions adapted under license by [employer] (which disclaims liability or warranty for this information). If you have questions about a medical condition or this instruction, always ask your healthcare professional. [address] any warranty or liability for your use of this information.

## 2019-11-27 NOTE — ED NOTES
Patient was able to ambulate to the restroom and back with assistance. Reported that he has a walker and a cane at home for use. Patient desires to go home at this time.

## 2019-11-27 NOTE — ED PROVIDER NOTES
70 y.o. male with past medical history significant for CAD, DM, HTN, Depression, neuropathy, ED, melanoma, MI, and hiatal hernia who presents via EMS with chief complaint of back pain. Pt c/o pain to her R lower back that he says has been going on for months. His wife states he fell 1 year ago and injured his back and never saw a doctor and his pain has worsened today causing him difficulty ambulating. Pt endorses coughing and weakness. His wife states he was on the floor most of last night due to sliding down to floor due to weakness. His wife reports decreased appetite for the past few days and says he has not eaten anything, but has been drinking some water. He has trouble with his memory at baseline and had to defer to his wife to answer many of the questions. There are no other acute medical concerns at this time. Social hx: denies tobacco use and ETOH    PCP: Orin Roland MD  Old Chart review: PT last seen at cardiologist 10/14 for f/u HTN and CAD    Note written by Pennie Springer, as dictated by Kris Ross DO 10:02 AM    Spoke to pt's wife via phone. She is not present in ED. The history is provided by the patient and the spouse. No  was used. Full history, physical exam, and ROS unable to be obtained due to:  Dementia, poor short term memory.       Past Medical History:   Diagnosis Date    CAD (coronary artery disease), native coronary artery     Cancer ([facility]     melanoma removed on forehead    Depression     Diabetic neuropathy ([facility]     ED (erectile dysfunction)     Hypertension, benign     Ill-defined condition     states he has memory problems    Postsurgical aortocoronary bypass status     Type II or unspecified type diabetes mellitus without mention of complication, not stated as uncontrolled        Past Surgical History:   Procedure Laterality Date    ECHO 2D ADULT  12/2009    EF 50%, basal inferior akinesis, mild MR    ECHO 2D ADULT  9/21/11    LVEF 50%, inferior AK, unchanged from Dec 2009    HX CORONARY ARTERY BYPASS GRAFT  2009    HX OTHER SURGICAL  2008    melanoma removed from forehead         Family History:   Problem Relation Age of Onset    Breast Cancer Mother         w bone mets    Parkinson's Disease Maternal Aunt     Parkinson's Disease Maternal Uncle     Parkinson's Disease Maternal Grandfather        Social History     Socioeconomic History    Marital status:      Spouse name: Not on file    Number of children: Not on file    Years of education: Not on file    Highest education level: Not on file   Occupational History    Not on file   Social Needs    Financial resource strain: Not on file    Food insecurity:     Worry: Not on file     Inability: Not on file    Transportation needs:     Medical: Not on file     Non-medical: Not on file   Tobacco Use    Smoking status: Never Smoker    Smokeless tobacco: Never Used   Substance and Sexual Activity    Alcohol use: No    Drug use: No    Sexual activity: Not on file   Lifestyle    Physical activity:     Days per week: Not on file     Minutes per session: Not on file    Stress: Not on file   Relationships    Social connections:     Talks on phone: Not on file     Gets together: Not on file     Attends Hoahaoism service: Not on file     Active member of club or organization: Not on file     Attends meetings of clubs or organizations: Not on file     Relationship status: Not on file    Intimate partner violence:     Fear of current or ex partner: Not on file     Emotionally abused: Not on file     Physically abused: Not on file     Forced sexual activity: Not on file   Other Topics Concern    Not on file   Social History Narrative    Not on file         ALLERGIES: Amoxicillin and Pravastatin    Review of Systems   Constitutional: Negative for fever. Respiratory: Positive for cough. Cardiovascular: Negative for chest pain.    Gastrointestinal: Negative for abdominal pain, diarrhea, nausea and vomiting. Musculoskeletal: Positive for back pain. Neurological: Positive for weakness. All other systems reviewed and are negative. Vitals:    11/27/19 0944   BP: 136/73   Pulse: 87   Resp: 16   Temp: 97.6 °F (36.4 °C)   SpO2: 95%   Weight: 99.8 kg (220 lb)   Height: 6' (1.829 m)            Physical Exam  Vitals signs and nursing note reviewed. Constitutional:       Appearance: He is normal weight. HENT:      Head: Normocephalic. Nose: Nose normal.      Mouth/Throat:      Mouth: Mucous membranes are moist.   Eyes:      Pupils: Pupils are equal, round, and reactive to light. Neck:      Comments: Trachea midline  Cardiovascular:      Rate and Rhythm: Normal rate and regular rhythm. Pulmonary:      Effort: Pulmonary effort is normal.      Breath sounds: Normal breath sounds. No rales. Abdominal:      Palpations: Abdomen is soft. Tenderness: There is no tenderness. Musculoskeletal: Normal range of motion. General: No swelling. Skin:     General: Skin is warm and dry. Comments: Old abrasion to R knee near tibial tuberosity   Neurological:      General: No focal deficit present. Mental Status: He is alert. Sensory: Sensation is intact. No sensory deficit. Motor: No weakness. Psychiatric:         Mood and Affect: Mood normal.      full ROM of all large joints without pain or trauma noted other than abrasion. MDM       Procedures    ED EKG interpretation:  Rhythm: normal sinus rhythm; and regular . Rate (approx.): 82; Axis: right axis deviation; ST/T wave: normal; no acute ST or T wave changes. Note written by Pennie Desai, as dictated by Kenya Santamaria DO 10:13 AM    11:48 AM  Kenya Santamaria DO reviewed chest X-ray. 12:00 PM  Kenya Santamaria DO spoke with pt's son, Bridgette Terrell. Updated him on plan to try to ambulate the patient.     1:26 PM  Kenya Santamaria DO spoke to pt and family and reviewed results. Will try a lidocaine patch and pt will take tylenol at home. Labs Reviewed   CBC WITH AUTOMATED DIFF - Abnormal; Notable for the following components:       Result Value    MPV 8.7 (*)     NEUTROPHILS 76 (*)     LYMPHOCYTES 11 (*)     ABS.  LYMPHOCYTES 0.7 (*)     All other components within normal limits   METABOLIC PANEL, BASIC - Abnormal; Notable for the following components:    Glucose 222 (*)     BUN 24 (*)     BUN/Creatinine ratio 23 (*)     All other components within normal limits   TROPONIN I - Abnormal; Notable for the following components:    Troponin-I, Qt. 0.05 (*)     All other components within normal limits   URINALYSIS W/MICROSCOPIC - Abnormal; Notable for the following components:    Specific gravity >1.030 (*)     Protein 30 (*)     Glucose >1,000 (*)     Ketone 40 (*)     Blood SMALL (*)     Bacteria 1+ (*)     Mucus 1+ (*)     All other components within normal limits   URINE CULTURE HOLD SAMPLE   MAGNESIUM   TSH 3RD GENERATION   SAMPLES BEING HELD

## 2019-11-29 ENCOUNTER — APPOINTMENT (OUTPATIENT)
Dept: CT IMAGING | Age: 79
DRG: 566 | End: 2019-11-29
Attending: EMERGENCY MEDICINE
Payer: MEDICARE

## 2019-11-29 ENCOUNTER — HOSPITAL ENCOUNTER (INPATIENT)
Age: 79
LOS: 7 days | Discharge: NURSING, REHAB OR LONG-TERM CARE | DRG: 566 | End: 2019-12-06
Attending: EMERGENCY MEDICINE | Admitting: FAMILY MEDICINE
Payer: MEDICARE

## 2019-11-29 DIAGNOSIS — R41.0 CONFUSION: ICD-10-CM

## 2019-11-29 DIAGNOSIS — T79.6XXA TRAUMATIC RHABDOMYOLYSIS, INITIAL ENCOUNTER (HCC): Primary | ICD-10-CM
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
## 2019-11-29 LAB
ALBUMIN SERPL-MCNC: 3.2 G/DL (ref 3.5–5)
ALBUMIN/GLOB SERPL: 0.8 {RATIO} (ref 1.1–2.2)
ALP SERPL-CCNC: 51 U/L (ref 45–117)
ALT SERPL-CCNC: 43 U/L (ref 12–78)
ANION GAP SERPL CALC-SCNC: 9 MMOL/L (ref 5–15)
APPEARANCE UR: CLEAR
AST SERPL-CCNC: 101 U/L (ref 15–37)
BACTERIA URNS QL MICRO: NEGATIVE /HPF
BASOPHILS # BLD: 0 K/UL (ref 0–0.1)
BASOPHILS NFR BLD: 0 % (ref 0–1)
BILIRUB SERPL-MCNC: 0.9 MG/DL (ref 0.2–1)
BILIRUB UR QL: NEGATIVE
BUN SERPL-MCNC: 22 MG/DL (ref 6–20)
BUN/CREAT SERPL: 26 (ref 12–20)
CALCIUM SERPL-MCNC: 8.3 MG/DL (ref 8.5–10.1)
CHLORIDE SERPL-SCNC: 105 MMOL/L (ref 97–108)
CK SERPL-CCNC: 3365 U/L (ref 39–308)
CO2 SERPL-SCNC: 21 MMOL/L (ref 21–32)
COLOR UR: ABNORMAL
COMMENT, HOLDF: NORMAL
CREAT SERPL-MCNC: 0.86 MG/DL (ref 0.7–1.3)
DIFFERENTIAL METHOD BLD: ABNORMAL
EOSINOPHIL # BLD: 0 K/UL (ref 0–0.4)
EOSINOPHIL NFR BLD: 0 % (ref 0–7)
EPITH CASTS URNS QL MICRO: ABNORMAL /LPF
ERYTHROCYTE [DISTWIDTH] IN BLOOD BY AUTOMATED COUNT: 12.7 % (ref 11.5–14.5)
GLOBULIN SER CALC-MCNC: 4.1 G/DL (ref 2–4)
GLUCOSE BLD STRIP.AUTO-MCNC: 203 MG/DL (ref 65–100)
GLUCOSE SERPL-MCNC: 198 MG/DL (ref 65–100)
GLUCOSE UR STRIP.AUTO-MCNC: >1000 MG/DL
HCT VFR BLD AUTO: 45.5 % (ref 36.6–50.3)
HGB BLD-MCNC: 15.4 G/DL (ref 12.1–17)
HGB UR QL STRIP: ABNORMAL
IMM GRANULOCYTES # BLD AUTO: 0 K/UL (ref 0–0.04)
IMM GRANULOCYTES NFR BLD AUTO: 0 % (ref 0–0.5)
KETONES UR QL STRIP.AUTO: 15 MG/DL
LEUKOCYTE ESTERASE UR QL STRIP.AUTO: NEGATIVE
LYMPHOCYTES # BLD: 1.5 K/UL (ref 0.8–3.5)
LYMPHOCYTES NFR BLD: 22 % (ref 12–49)
MAGNESIUM SERPL-MCNC: 2.3 MG/DL (ref 1.6–2.4)
MCH RBC QN AUTO: 29.3 PG (ref 26–34)
MCHC RBC AUTO-ENTMCNC: 33.8 G/DL (ref 30–36.5)
MCV RBC AUTO: 86.7 FL (ref 80–99)
MONOCYTES # BLD: 0.9 K/UL (ref 0–1)
MONOCYTES NFR BLD: 14 % (ref 5–13)
MUCOUS THREADS URNS QL MICRO: ABNORMAL /LPF
NEUTS SEG # BLD: 4.1 K/UL (ref 1.8–8)
NEUTS SEG NFR BLD: 64 % (ref 32–75)
NITRITE UR QL STRIP.AUTO: NEGATIVE
NRBC # BLD: 0 K/UL (ref 0–0.01)
NRBC BLD-RTO: 0 PER 100 WBC
PH UR STRIP: 5.5 [PH] (ref 5–8)
PLATELET # BLD AUTO: 236 K/UL (ref 150–400)
PMV BLD AUTO: 9 FL (ref 8.9–12.9)
POTASSIUM SERPL-SCNC: 3.6 MMOL/L (ref 3.5–5.1)
PROT SERPL-MCNC: 7.3 G/DL (ref 6.4–8.2)
PROT UR STRIP-MCNC: 30 MG/DL
RBC # BLD AUTO: 5.25 M/UL (ref 4.1–5.7)
RBC #/AREA URNS HPF: ABNORMAL /HPF (ref 0–5)
SAMPLES BEING HELD,HOLD: NORMAL
SERVICE CMNT-IMP: ABNORMAL
SODIUM SERPL-SCNC: 135 MMOL/L (ref 136–145)
SP GR UR REFRACTOMETRY: >1.03 (ref 1–1.03)
UA: UC IF INDICATED,UAUC: ABNORMAL
UROBILINOGEN UR QL STRIP.AUTO: 0.2 EU/DL (ref 0.2–1)
WBC # BLD AUTO: 6.5 K/UL (ref 4.1–11.1)
WBC URNS QL MICRO: ABNORMAL /HPF (ref 0–4)

## 2019-11-29 PROCEDURE — 82962 GLUCOSE BLOOD TEST: CPT

## 2019-11-29 PROCEDURE — 65270000029 HC RM PRIVATE

## 2019-11-29 PROCEDURE — 83735 ASSAY OF MAGNESIUM: CPT

## 2019-11-29 PROCEDURE — 74011250636 HC RX REV CODE- 250/636: Performed by: EMERGENCY MEDICINE

## 2019-11-29 PROCEDURE — 70450 CT HEAD/BRAIN W/O DYE: CPT

## 2019-11-29 PROCEDURE — 81001 URINALYSIS AUTO W/SCOPE: CPT

## 2019-11-29 PROCEDURE — 85025 COMPLETE CBC W/AUTO DIFF WBC: CPT

## 2019-11-29 PROCEDURE — 80053 COMPREHEN METABOLIC PANEL: CPT

## 2019-11-29 PROCEDURE — 82550 ASSAY OF CK (CPK): CPT

## 2019-11-29 PROCEDURE — 74011250636 HC RX REV CODE- 250/636: Performed by: FAMILY MEDICINE

## 2019-11-29 PROCEDURE — 99285 EMERGENCY DEPT VISIT HI MDM: CPT

## 2019-11-29 PROCEDURE — 94762 N-INVAS EAR/PLS OXIMTRY CONT: CPT

## 2019-11-29 RX ORDER — SODIUM CHLORIDE 9 MG/ML
25 INJECTION, SOLUTION INTRAVENOUS CONTINUOUS
Status: DISCONTINUED | OUTPATIENT
Start: 2019-11-30 | End: 2019-12-04

## 2019-11-29 RX ORDER — PRAVASTATIN SODIUM 20 MG/1
40 TABLET ORAL DAILY
Status: CANCELLED | OUTPATIENT
Start: 2019-11-30

## 2019-11-29 RX ADMIN — SODIUM CHLORIDE 1000 ML: 900 INJECTION, SOLUTION INTRAVENOUS at 20:09

## 2019-11-29 RX ADMIN — SODIUM CHLORIDE 1000 ML: 900 INJECTION, SOLUTION INTRAVENOUS at 23:13

## 2019-11-29 NOTE — PROGRESS NOTES
11/29/2019  Case management note    Wife called back today stating her  was having trouble walking. She stated she needs help  Ordered home health with [facility]. Referral sent  Wife also took number for dispatch health  Will continue to follow if needed.   Bro Flores

## 2019-11-30 ENCOUNTER — APPOINTMENT (OUTPATIENT)
Dept: GENERAL RADIOLOGY | Age: 79
DRG: 566 | End: 2019-11-30
Attending: FAMILY MEDICINE
Payer: MEDICARE

## 2019-11-30 LAB
CK SERPL-CCNC: 1738 U/L (ref 39–308)
CK SERPL-CCNC: 2074 U/L (ref 39–308)
CK SERPL-CCNC: 2883 U/L (ref 39–308)
GLUCOSE BLD STRIP.AUTO-MCNC: 149 MG/DL (ref 65–100)
GLUCOSE BLD STRIP.AUTO-MCNC: 188 MG/DL (ref 65–100)
GLUCOSE BLD STRIP.AUTO-MCNC: 195 MG/DL (ref 65–100)
GLUCOSE BLD STRIP.AUTO-MCNC: 210 MG/DL (ref 65–100)
LACTATE SERPL-SCNC: 1.6 MMOL/L (ref 0.4–2)
SERVICE CMNT-IMP: ABNORMAL
TROPONIN I SERPL-MCNC: <0.05 NG/ML

## 2019-11-30 PROCEDURE — 73562 X-RAY EXAM OF KNEE 3: CPT

## 2019-11-30 PROCEDURE — 74011250636 HC RX REV CODE- 250/636: Performed by: FAMILY MEDICINE

## 2019-11-30 PROCEDURE — 82962 GLUCOSE BLOOD TEST: CPT

## 2019-11-30 PROCEDURE — 74011636637 HC RX REV CODE- 636/637: Performed by: FAMILY MEDICINE

## 2019-11-30 PROCEDURE — 73502 X-RAY EXAM HIP UNI 2-3 VIEWS: CPT

## 2019-11-30 PROCEDURE — 36415 COLL VENOUS BLD VENIPUNCTURE: CPT

## 2019-11-30 PROCEDURE — 74011000250 HC RX REV CODE- 250: Performed by: FAMILY MEDICINE

## 2019-11-30 PROCEDURE — 82550 ASSAY OF CK (CPK): CPT

## 2019-11-30 PROCEDURE — 65660000000 HC RM CCU STEPDOWN

## 2019-11-30 PROCEDURE — 84484 ASSAY OF TROPONIN QUANT: CPT

## 2019-11-30 PROCEDURE — 83605 ASSAY OF LACTIC ACID: CPT

## 2019-11-30 PROCEDURE — 74011250637 HC RX REV CODE- 250/637: Performed by: FAMILY MEDICINE

## 2019-11-30 RX ORDER — HEPARIN SODIUM 5000 [USP'U]/ML
5000 INJECTION, SOLUTION INTRAVENOUS; SUBCUTANEOUS EVERY 8 HOURS
Status: DISCONTINUED | OUTPATIENT
Start: 2019-11-30 | End: 2019-12-06 | Stop reason: HOSPADM

## 2019-11-30 RX ORDER — LOSARTAN POTASSIUM 25 MG/1
25 TABLET ORAL DAILY
Status: DISCONTINUED | OUTPATIENT
Start: 2019-11-30 | End: 2019-12-06 | Stop reason: HOSPADM

## 2019-11-30 RX ORDER — SODIUM CHLORIDE 0.9 % (FLUSH) 0.9 %
5-40 SYRINGE (ML) INJECTION EVERY 8 HOURS
Status: DISCONTINUED | OUTPATIENT
Start: 2019-11-30 | End: 2019-12-06 | Stop reason: HOSPADM

## 2019-11-30 RX ORDER — GUAIFENESIN 100 MG/5ML
81 LIQUID (ML) ORAL DAILY
Status: DISCONTINUED | OUTPATIENT
Start: 2019-11-30 | End: 2019-12-06 | Stop reason: HOSPADM

## 2019-11-30 RX ORDER — LIDOCAINE 4 G/100G
1 PATCH TOPICAL DAILY
Status: DISCONTINUED | OUTPATIENT
Start: 2019-11-30 | End: 2019-12-06 | Stop reason: HOSPADM

## 2019-11-30 RX ORDER — MELATONIN
1000 DAILY
Status: DISCONTINUED | OUTPATIENT
Start: 2019-11-30 | End: 2019-12-06 | Stop reason: HOSPADM

## 2019-11-30 RX ORDER — SODIUM CHLORIDE 0.9 % (FLUSH) 0.9 %
5-40 SYRINGE (ML) INJECTION AS NEEDED
Status: DISCONTINUED | OUTPATIENT
Start: 2019-11-30 | End: 2019-12-06 | Stop reason: HOSPADM

## 2019-11-30 RX ORDER — CARVEDILOL 12.5 MG/1
12.5 TABLET ORAL 2 TIMES DAILY WITH MEALS
Status: DISCONTINUED | OUTPATIENT
Start: 2019-11-30 | End: 2019-12-06 | Stop reason: HOSPADM

## 2019-11-30 RX ADMIN — INSULIN HUMAN 10 UNITS: 100 INJECTION, SUSPENSION SUBCUTANEOUS at 17:29

## 2019-11-30 RX ADMIN — SODIUM CHLORIDE 125 ML/HR: 900 INJECTION, SOLUTION INTRAVENOUS at 00:34

## 2019-11-30 RX ADMIN — CARVEDILOL 12.5 MG: 12.5 TABLET, FILM COATED ORAL at 17:29

## 2019-11-30 RX ADMIN — SODIUM CHLORIDE 125 ML/HR: 900 INJECTION, SOLUTION INTRAVENOUS at 13:49

## 2019-11-30 RX ADMIN — CARVEDILOL 12.5 MG: 12.5 TABLET, FILM COATED ORAL at 08:33

## 2019-11-30 RX ADMIN — LOSARTAN POTASSIUM 25 MG: 25 TABLET, FILM COATED ORAL at 08:33

## 2019-11-30 RX ADMIN — HEPARIN SODIUM 5000 UNITS: 5000 INJECTION INTRAVENOUS; SUBCUTANEOUS at 13:48

## 2019-11-30 RX ADMIN — Medication 10 ML: at 13:49

## 2019-11-30 RX ADMIN — ASPIRIN 81 MG 81 MG: 81 TABLET ORAL at 08:33

## 2019-11-30 RX ADMIN — VITAMIN D, TAB 1000IU (100/BT) 1 TABLET: 25 TAB at 08:32

## 2019-11-30 RX ADMIN — HEPARIN SODIUM 5000 UNITS: 5000 INJECTION INTRAVENOUS; SUBCUTANEOUS at 22:21

## 2019-11-30 RX ADMIN — INSULIN HUMAN 10 UNITS: 100 INJECTION, SUSPENSION SUBCUTANEOUS at 08:32

## 2019-11-30 NOTE — ED NOTES
TRANSFER - OUT REPORT:    Verbal report given to Natty(name) on Germán Lopez Sr.  being transferred to [facility](unit) for routine progression of care       Report consisted of patients Situation, Background, Assessment and   Recommendations(SBAR). Information from the following report(s) SBAR, Kardex, ED Summary and MAR was reviewed with the receiving nurse. Lines:   Peripheral IV 11/29/19 Right Hand (Active)   Site Assessment Clean, dry, & intact 11/29/2019  8:16 PM   Phlebitis Assessment 0 11/29/2019  8:16 PM   Infiltration Assessment 0 11/29/2019  8:16 PM   Dressing Status Clean, dry, & intact 11/29/2019  8:16 PM   Dressing Type Tape;Transparent 11/29/2019  8:16 PM   Hub Color/Line Status Patent; Flushed;Pink 11/29/2019  8:16 PM   Action Taken Blood drawn 11/29/2019  8:16 PM        Opportunity for questions and clarification was provided.       Patient transported with:   Monitor  Registered Nurse

## 2019-11-30 NOTE — PROGRESS NOTES
Daily Progress Note: 11/30/2019  [place] Gary, Artie Carl MD    Assessment/Plan:   Traumatic rhabdomyolysis  -- 0.9% normal saline IV fluids for hydration/ resuscitation  -- Repeat serial CK levels   -- Hold statin drugs     S/p fall  -- Place on fall precautions  -- neuro checks     Bilateral knee and right hip pain  -- Xray knee with OA  -- Right hip Xray neg for Fx     Type 2 DM with neuropathy  -- Humalog insulin correctional coverage  -- Restart NPH but only at 10 units BID  -- BS ac and HS  -- A1c pending     Generalized weakness/ physical debility  -- Consult PT     CAD  -- check troponin     VTE prophylaxis  -- SCDs            Problem List:  Problem List as of 11/30/2019 Date Reviewed: 10/14/2019          Codes Class Noted - Resolved    Fall ICD-10-CM: W19. Jayson Groom  ICD-9-CM: S515.0  11/29/2019 - Present        Traumatic rhabdomyolysis ([facility] ICD-10-CM: T79. 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  - PCI RCA with PTCA, significant LAD disease  - suspected reocclusion several hrs later with VF, IABP placed  3v CABG July 2009 Riaz Aleman @ [facility] [place])  - LIMA-LAD, VG-OM, VG-PDA             Type 2 diabetes mellitus without complication ([facility] CNI-32-OP: E11.9  ICD-9-CM: 250.00  Unknown - Present        Hypertension, benign ICD-10-CM: I10  ICD-9-CM: 401.1  Unknown - Present        RESOLVED: PND (paroxysmal nocturnal dyspnea) ICD-10-CM: R06.00  ICD-9-CM: 786.09  8/13/2017 - 8/11/2018        RESOLVED: Near syncope ICD-10-CM: R55  ICD-9-CM: 780.2  10/29/2013 - 4/15/2014              HPI:    Ty Ingram Sr. is a 70 y.o. white male with past medical history of CAD, melanoma, depression, diabetic neuropathy, ED, and type 2 DM presented to the ED from home with chief complaints of fall with right hip and bilateral knee pain. Patient is a limited historian. He provided limited details regarding recent ground level fall at home. He had poor recall; loss of consciousness was not definitively known. Patient was found with uncertain down time on the ground. Pain in right hip was severe, constant, aggravated with touch or movement with onset after fall. On arrival in the ED, workup included CT head which was negative. CK= 3,365. ED requested admission to the hospitalist service. There were no reports of new onset syncope, loss of consciousness, headache, neck pain, visual disturbance, numbness, paresthesias, focal weakness, chest pain, shortness of breath, cough, palpitations, abdominal pain, nausea, vomiting, diarrhea, melena, dysuria, hematuria, calf pain, increased leg swelling/ edema, fever, chills, or rash. (Dr Cherri Lynch)    : He is not sure what happened to bring him in. He is having right hip and knee pain. Xrays are neg. Blood sugars are good but he reports that he has not eaten. Will restart NPH but at a  Lower dose than at home. CK is a little better. PT consulted. Review of Systems:   Review of systems not obtained due to patient factors. Objective:   Physical Exam:     Visit Vitals  /82 (BP 1 Location: Left arm, BP Patient Position: At rest)   Pulse 60   Temp 97.9 °F (36.6 °C)   Resp 24   Ht 6' (1.829 m)   Wt 220 lb (99.8 kg)   SpO2 95%   BMI 29.84 kg/m²      O2 Device: Room air    Temp (24hrs), Av.1 °F (36.7 °C), Min:97.5 °F (36.4 °C), Max:98.5 °F (36.9 °C)    No intake/output data recorded.  1901 -  0700  In: 766.7 [I.V.:766.7]  Out: 650 [Urine:650]    General:  Alert, cooperative, no distress, appears stated age. Head:  Normocephalic, without obvious abnormality, atraumatic. Eyes:  Conjunctivae/corneas clear. PERRL, EOMs intact. Nose: Nares normal. Septum midline.  Mucosa normal. No drainage or sinus tenderness. Throat: Lips, mucosa, and tongue normal. Teeth and gums normal.   Neck: Supple, symmetrical, trachea midline, no adenopathy, thyroid: no enlargement/tenderness/nodules, no carotid bruit and no JVD. Back:   Symmetric, no curvature. ROM normal. No CVA tenderness. Lungs:   Clear to auscultation bilaterally. Chest wall:  No tenderness or deformity. Heart:  Regular rate and rhythm, S1, S2 normal, no murmur, click, rub or gallop. Abdomen:   Soft, non-tender. Bowel sounds normal. No masses,  No organomegaly. Extremities: Extremities with abrasions of both knees, able to rotate right hip without difficulty, no cyanosis or edema. No calf tenderness or cords. Pulses: 2+ and symmetric all extremities. Skin: Skin color, texture, turgor normal. No rashes or lesions   Neurologic: CNII-XII intact. Alert and oriented X 2. Fine motor of hands and fingers normal.   equal.  No cogwheeling or rigidity. Gait not tested at this time. Sensation grossly normal to touch. Gross motor of extremities normal.       Data Review:       Recent Days:  Recent Labs     11/29/19 2000 11/27/19  1058   WBC 6.5 6.0   HGB 15.4 15.0   HCT 45.5 44.4    270     Recent Labs     11/29/19 2000 11/27/19  1058   * 137   K 3.6 4.1    105   CO2 21 23   * 222*   BUN 22* 24*   CREA 0.86 1.04   CA 8.3* 9.2   MG 2.3 2.2   ALB 3.2*  --    TBILI 0.9  --    SGOT 101*  --    ALT 43  --      No results for input(s): PH, PCO2, PO2, HCO3, FIO2 in the last 72 hours. 24 Hour Results:  Recent Results (from the past 24 hour(s))   SAMPLES BEING HELD    Collection Time: 11/29/19  8:00 PM   Result Value Ref Range    SAMPLES BEING HELD [email or web address]     COMMENT        Add-on orders for these samples will be processed based on acceptable specimen integrity and analyte stability, which may vary by analyte.    CBC WITH AUTOMATED DIFF    Collection Time: 11/29/19  8:00 PM   Result Value Ref Range    WBC 6.5 4.1 - 11.1 K/uL    RBC 5.25 4. 10 - 5.70 M/uL    HGB 15.4 12.1 - 17.0 g/dL    HCT 45.5 36.6 - 50.3 %    MCV 86.7 80.0 - 99.0 FL    MCH 29.3 26.0 - 34.0 PG    MCHC 33.8 30.0 - 36.5 g/dL    RDW 12.7 11.5 - 14.5 %    PLATELET 015 741 - 297 K/uL    MPV 9.0 8.9 - 12.9 FL    NRBC 0.0 0  WBC    ABSOLUTE NRBC 0.00 0.00 - 0.01 K/uL    NEUTROPHILS 64 32 - 75 %    LYMPHOCYTES 22 12 - 49 %    MONOCYTES 14 (H) 5 - 13 %    EOSINOPHILS 0 0 - 7 %    BASOPHILS 0 0 - 1 %    IMMATURE GRANULOCYTES 0 0.0 - 0.5 %    ABS. NEUTROPHILS 4.1 1.8 - 8.0 K/UL    ABS. LYMPHOCYTES 1.5 0.8 - 3.5 K/UL    ABS. MONOCYTES 0.9 0.0 - 1.0 K/UL    ABS. EOSINOPHILS 0.0 0.0 - 0.4 K/UL    ABS. BASOPHILS 0.0 0.0 - 0.1 K/UL    ABS. IMM. GRANS. 0.0 0.00 - 0.04 K/UL    DF AUTOMATED     METABOLIC PANEL, COMPREHENSIVE    Collection Time: 11/29/19  8:00 PM   Result Value Ref Range    Sodium 135 (L) 136 - 145 mmol/L    Potassium 3.6 3.5 - 5.1 mmol/L    Chloride 105 97 - 108 mmol/L    CO2 21 21 - 32 mmol/L    Anion gap 9 5 - 15 mmol/L    Glucose 198 (H) 65 - 100 mg/dL    BUN 22 (H) 6 - 20 MG/DL    Creatinine 0.86 0.70 - 1.30 MG/DL    BUN/Creatinine ratio 26 (H) 12 - 20      GFR est AA >60 >60 ml/min/1.73m2    GFR est non-AA >60 >60 ml/min/1.73m2    Calcium 8.3 (L) 8.5 - 10.1 MG/DL    Bilirubin, total 0.9 0.2 - 1.0 MG/DL    ALT (SGPT) 43 12 - 78 U/L    AST (SGOT) 101 (H) 15 - 37 U/L    Alk.  phosphatase 51 45 - 117 U/L    Protein, total 7.3 6.4 - 8.2 g/dL    Albumin 3.2 (L) 3.5 - 5.0 g/dL    Globulin 4.1 (H) 2.0 - 4.0 g/dL    A-G Ratio 0.8 (L) 1.1 - 2.2     MAGNESIUM    Collection Time: 11/29/19  8:00 PM   Result Value Ref Range    Magnesium 2.3 1.6 - 2.4 mg/dL   CK    Collection Time: 11/29/19  8:00 PM   Result Value Ref Range    CK 3,365 (H) 39 - 308 U/L   GLUCOSE, POC    Collection Time: 11/29/19  8:05 PM   Result Value Ref Range    Glucose (POC) 203 (H) 65 - 100 mg/dL    Performed by Joao Rich [facility])    URINALYSIS W/ REFLEX CULTURE    Collection Time: 11/29/19  9:54 PM   Result Value Ref Range    Color YELLOW/STRAW      Appearance CLEAR CLEAR      Specific gravity >1.030 (H) 1.003 - 1.030    pH (UA) 5.5 5.0 - 8.0      Protein 30 (A) NEG mg/dL    Glucose >1,000 (A) NEG mg/dL    Ketone 15 (A) NEG mg/dL    Bilirubin NEGATIVE  NEG      Blood MODERATE (A) NEG      Urobilinogen 0.2 0.2 - 1.0 EU/dL    Nitrites NEGATIVE  NEG      Leukocyte Esterase NEGATIVE  NEG      WBC 0-4 0 - 4 /hpf    RBC 0-5 0 - 5 /hpf    Epithelial cells FEW FEW /lpf    Bacteria NEGATIVE  NEG /hpf    UA:UC IF INDICATED CULTURE NOT INDICATED BY UA RESULT CNI      Mucus 1+ (A) NEG /lpf   CK    Collection Time: 11/30/19  3:20 AM   Result Value Ref Range    CK 2,883 (H) 39 - 308 U/L   TROPONIN I    Collection Time: 11/30/19  3:20 AM   Result Value Ref Range    Troponin-I, Qt. <0.05 <0.05 ng/mL       Medications reviewed  Current Facility-Administered Medications   Medication Dose Route Frequency    aspirin chewable tablet 81 mg  81 mg Oral DAILY    carvedilol (COREG) tablet 12.5 mg  12.5 mg Oral BID WITH MEALS    cholecalciferol (VITAMIN D3) (1000 Units /25 mcg) tablet 1 Tab  1,000 Units Oral DAILY    lidocaine 4 % patch 1 Patch  1 Patch TransDERmal DAILY    losartan (COZAAR) tablet 25 mg  25 mg Oral DAILY    sodium chloride (NS) flush 5-40 mL  5-40 mL IntraVENous Q8H    sodium chloride (NS) flush 5-40 mL  5-40 mL IntraVENous PRN    heparin (porcine) injection 5,000 Units  5,000 Units SubCUTAneous Q8H    0.9% sodium chloride infusion  125 mL/hr IntraVENous CONTINUOUS       Care Plan discussed with: Patient/Family    Total time spent with patient and review of records: 30 minutes.     Mary Benito MD

## 2019-11-30 NOTE — ED TRIAGE NOTES
Pt experienced a ground level fall today at home, time unknown per EMS. Pt denies hitting his head and LOC. Pt reports he was on the floor for a couple hours. Pt c/o of pain with urination, generalized weakness, confusion, and visual hallucinations.

## 2019-11-30 NOTE — PROGRESS NOTES
[facility]: MED RECONCILIATION    Comments/Recommendations:   Patient alert and oriented at time of interview, but occasionally grimacing in pain. Patient confirmed name and date of birth. Patient seemed unsure about some of his medications. He did state that he is not currently taking any medications for hypercholesterolemia. Information obtained from: patient    Prior to Admission Medications   Prescriptions Last Dose Informant Patient Reported? Taking?   aspirin delayed-release 81 mg tablet 2019 at Unknown time Self Yes Yes   Sig: Take 81 mg by mouth daily. carvedilol (COREG) 12.5 mg tablet 2019 at Unknown time Self No Yes   Sig: TAKE ONE (1) TABLET(S) BY MOUTH TWICE DAILY WITH FOOD   cholecalciferol (VITAMIN D3) 1,000 unit cap 2019 at Unknown time Self Yes Yes   Sig: Take 1,000 Units by mouth daily. insulin NPH/insulin regular (NOVOLIN 70/30, HUMULIN 70/30) 100 unit/mL (70-30) injection  Self Yes Yes   Si Units by SubCUTAneous route two (2) times a day. lidocaine (LIDODERM) 5 %  Self No Yes   Sig: Apply patch to the affected area for 12 hours a day and remove for 12 hours a day. losartan (COZAAR) 25 mg tablet 2019 at Unknown time Self Yes Yes   Sig: Take 25 mg by mouth daily.         Itz Dejesus, Pharmacist

## 2019-11-30 NOTE — H&P
History & Physical      Date of admission: 11/29/2019    Patient name: Maile Carl Sr. MRN: [id number]  YOB: 1940  Age: 70 y.o. Primary care provider:  Bassem Molina MD     Source of Information: patient, ED and electronic medical records                              Chief complaint:  Fall, right hip and knee pain    History of present illness  Zhanna Mullen is a 70 y.o. white male with past medical history of CAD, melanoma, depression, diabetic neuropathy, ED, and type 2 DM presented to the ED from home with chief complaints of fall with right hip and bilateral knee pain. Patient is a limited historian. He provided limited details regarding recent ground level fall at home. He had poor recall; loss of consciousness was not definitively known. Patient was found with uncertain down time on the ground. Pain in right hip was severe, constant, aggravated with touch or movement with onset after fall. On arrival in the ED, workup included CT head which was negative. CK= 3,365. ED requested admission to the hospitalist service. There were no reports of new onset syncope, loss of consciousness, headache, neck pain, visual disturbance, numbness, paresthesias, focal weakness, chest pain, shortness of breath, cough, palpitations, abdominal pain, nausea, vomiting, diarrhea, melena, dysuria, hematuria, calf pain, increased leg swelling/ edema, fever, chills, or rash.      Past Medical History:   Diagnosis Date    CAD (coronary artery disease), native coronary artery     Cancer ([facility]     melanoma removed on forehead    Depression     Diabetic neuropathy ([facility]     ED (erectile dysfunction)     Hypertension, benign     Ill-defined condition     states he has memory problems    Postsurgical aortocoronary bypass status     Type II or unspecified type diabetes mellitus without mention of complication, not stated as uncontrolled       Past Surgical History:   Procedure Laterality Date    ECHO 2D ADULT  12/2009    EF 50%, basal inferior akinesis, mild MR    ECHO 2D ADULT  9/21/11    LVEF 50%, inferior AK, unchanged from Dec 2009    HX CORONARY ARTERY BYPASS GRAFT  2009    HX OTHER SURGICAL  2008    melanoma removed from forehead     Prior to Admission medications    Medication Sig Start Date End Date Taking? Authorizing Provider   aspirin 81 mg chewable tablet 1 tablet    Other, MD Sandi   lidocaine (LIDODERM) 5 % Apply patch to the affected area for 12 hours a day and remove for 12 hours a day. 11/27/19   Juan Jose Day,    cholecalciferol (VITAMIN D3) 1,000 unit cap Take  by mouth daily. Provider, Historical   carvedilol (COREG) 12.5 mg tablet TAKE ONE (1) TABLET(S) BY MOUTH TWICE DAILY WITH FOOD 10/14/19   Ranjeet Avendaño NP   insulin NPH/insulin regular (NOVOLIN 70/30, HUMULIN 70/30) 100 unit/mL (70-30) injection 45 Units by SubCUTAneous route two (2) times a day. Provider, Historical   pravastatin (PRAVACHOL) 40 mg tablet Take 1 Tab by mouth daily. 4/15/14   Gume Luna MD   losartan (COZAAR) 25 mg tablet Take  by mouth daily. Provider, Historical   aspirin delayed-release 81 mg tablet Take 81 mg by mouth daily. Provider, Historical     Allergies   Allergen Reactions    Amoxicillin Hives and Itching    Pravastatin Myalgia         Social history  Patient resides       x  With family care            Ambulates      x  With cane     x  Assisted walker         Alcohol history   x  None           Smoking history  x  None             Family History   Problem Relation Age of Onset    Breast Cancer Mother         w bone mets    Parkinson's Disease Maternal Aunt     Parkinson's Disease Maternal Uncle     Parkinson's Disease Maternal Grandfather       Review of systems  Pertinent positives as noted in HPI.   All other systems were reviewed and were negative     Physical Examination   Visit Vitals  BP 155/72 (BP 1 Location: Right arm, BP Patient Position: At rest)   Pulse 66   Temp 97.5 °F (36.4 °C)   Resp 24   Ht 6' (1.829 m)   Wt 99.8 kg (220 lb)   SpO2 98%   BMI 29.84 kg/m²          O2 Device: Room air    General:  Patient is in no acute respiratory distress. Head:  Normocephalic, without obvious abnormality, atraumatic   Eyes:  Conjunctivae/corneas clear. PERRL, EOMs intact   E/N/M/T: Nares normal. Septum midline. No nasal drainage or sinus tenderness  Tongue midline/ non-edematous  Clear oropharynx   Neck: Normal appearance and movements, symmetrical, trachea midline  No palpable adenopathy  No thyroid enlargement, tenderness or nodules  No carotid bruit   No JVD  Trachea midline   Lungs:   Symmetrical chest expansion and respiratory effort  Clear to auscultation bilaterally   Chest wall:  No tenderness or deformity   Heart:  Regular rate and rhythm   Normal S1 and S2; no murmur, click, rub or gallop   Abdomen:   Soft, no tenderness  No rebound, guarding, or rigidity  Non-distended   Bowel sounds normal  No masses or hepatosplenomegaly  No hernias present   Back: No costovertebral angle tenderness  No step-off deformity   Extremities: Tenderness on palpation of right hip and bilateral knees (which had old skin abrasions)  No cyanosis   Trace leg non-pitting edema     Vascular/  Pulses: 2+ radial/ 1+ DP bilateral pulses   Integument/  Skin: Warm and dry   Musculo-      skeletal: Gait not tested  Limited range of motion of BLE  No calf tenderness   Neuro: GCS 15. Moves all extremities x 4 with generalized weakness. No slurred speech. No facial droop. Sensation grossly intact. No pronator drift.    Psych: Alert, oriented x 3           I reviewed the following data:        24 Hour Results:  Recent Results (from the past 24 hour(s))   SAMPLES BEING HELD    Collection Time: 11/29/19  8:00 PM   Result Value Ref Range    SAMPLES BEING HELD [email or web address]     COMMENT        Add-on orders for these samples will be processed based on acceptable specimen integrity and analyte stability, which may vary by analyte. CBC WITH AUTOMATED DIFF    Collection Time: 11/29/19  8:00 PM   Result Value Ref Range    WBC 6.5 4.1 - 11.1 K/uL    RBC 5.25 4. 10 - 5.70 M/uL    HGB 15.4 12.1 - 17.0 g/dL    HCT 45.5 36.6 - 50.3 %    MCV 86.7 80.0 - 99.0 FL    MCH 29.3 26.0 - 34.0 PG    MCHC 33.8 30.0 - 36.5 g/dL    RDW 12.7 11.5 - 14.5 %    PLATELET 414 997 - 341 K/uL    MPV 9.0 8.9 - 12.9 FL    NRBC 0.0 0  WBC    ABSOLUTE NRBC 0.00 0.00 - 0.01 K/uL    NEUTROPHILS 64 32 - 75 %    LYMPHOCYTES 22 12 - 49 %    MONOCYTES 14 (H) 5 - 13 %    EOSINOPHILS 0 0 - 7 %    BASOPHILS 0 0 - 1 %    IMMATURE GRANULOCYTES 0 0.0 - 0.5 %    ABS. NEUTROPHILS 4.1 1.8 - 8.0 K/UL    ABS. LYMPHOCYTES 1.5 0.8 - 3.5 K/UL    ABS. MONOCYTES 0.9 0.0 - 1.0 K/UL    ABS. EOSINOPHILS 0.0 0.0 - 0.4 K/UL    ABS. BASOPHILS 0.0 0.0 - 0.1 K/UL    ABS. IMM. GRANS. 0.0 0.00 - 0.04 K/UL    DF AUTOMATED     METABOLIC PANEL, COMPREHENSIVE    Collection Time: 11/29/19  8:00 PM   Result Value Ref Range    Sodium 135 (L) 136 - 145 mmol/L    Potassium 3.6 3.5 - 5.1 mmol/L    Chloride 105 97 - 108 mmol/L    CO2 21 21 - 32 mmol/L    Anion gap 9 5 - 15 mmol/L    Glucose 198 (H) 65 - 100 mg/dL    BUN 22 (H) 6 - 20 MG/DL    Creatinine 0.86 0.70 - 1.30 MG/DL    BUN/Creatinine ratio 26 (H) 12 - 20      GFR est AA >60 >60 ml/min/1.73m2    GFR est non-AA >60 >60 ml/min/1.73m2    Calcium 8.3 (L) 8.5 - 10.1 MG/DL    Bilirubin, total 0.9 0.2 - 1.0 MG/DL    ALT (SGPT) 43 12 - 78 U/L    AST (SGOT) 101 (H) 15 - 37 U/L    Alk.  phosphatase 51 45 - 117 U/L    Protein, total 7.3 6.4 - 8.2 g/dL    Albumin 3.2 (L) 3.5 - 5.0 g/dL    Globulin 4.1 (H) 2.0 - 4.0 g/dL    A-G Ratio 0.8 (L) 1.1 - 2.2     MAGNESIUM    Collection Time: 11/29/19  8:00 PM   Result Value Ref Range    Magnesium 2.3 1.6 - 2.4 mg/dL   CK    Collection Time: 11/29/19  8:00 PM   Result Value Ref Range    CK 3,365 (H) 39 - 308 U/L GLUCOSE, POC    Collection Time: 11/29/19  8:05 PM   Result Value Ref Range    Glucose (POC) 203 (H) 65 - 100 mg/dL    Performed by Joao [facility])    URINALYSIS W/ REFLEX CULTURE    Collection Time: 11/29/19  9:54 PM   Result Value Ref Range    Color YELLOW/STRAW      Appearance CLEAR CLEAR      Specific gravity >1.030 (H) 1.003 - 1.030    pH (UA) 5.5 5.0 - 8.0      Protein 30 (A) NEG mg/dL    Glucose >1,000 (A) NEG mg/dL    Ketone 15 (A) NEG mg/dL    Bilirubin NEGATIVE  NEG      Blood MODERATE (A) NEG      Urobilinogen 0.2 0.2 - 1.0 EU/dL    Nitrites NEGATIVE  NEG      Leukocyte Esterase NEGATIVE  NEG      WBC 0-4 0 - 4 /hpf    RBC 0-5 0 - 5 /hpf    Epithelial cells FEW FEW /lpf    Bacteria NEGATIVE  NEG /hpf    UA:UC IF INDICATED CULTURE NOT INDICATED BY UA RESULT CNI      Mucus 1+ (A) NEG /lpf   CK    Collection Time: 11/30/19  3:20 AM   Result Value Ref Range    CK 2,883 (H) 39 - 308 U/L     Recent Labs     11/29/19 2000 11/27/19  1058   WBC 6.5 6.0   HGB 15.4 15.0   HCT 45.5 44.4    270     Recent Labs     11/29/19 2000 11/27/19  1058   * 137   K 3.6 4.1    105   CO2 21 23   * 222*   BUN 22* 24*   CREA 0.86 1.04   CA 8.3* 9.2   MG 2.3 2.2   ALB 3.2*  --    TBILI 0.9  --    SGOT 101*  --    ALT 43  --        Imaging  CT HEAD WO CONT     INDICATION: Ground-level fall and down for a couple of hours. Generalized  weakness and confusion.     COMPARISON: CT head on 11/27/2019.     TECHNIQUE: Noncontrast head CT. Coronal and sagittal reformats. CT dose  reduction was achieved through the use of a standardized protocol tailored for  this examination and automatic exposure control for dose modulation.     FINDINGS: Motion artifact obscures fine detail and limits sensitivity for  detecting pathology. The ventricles and sulci are age-appropriate without  hydrocephalus. There is no mass effect or midline shift. There is no  intracranial hemorrhage or extra-axial fluid collection. There is no abnormal  area of decreased density to suggest infarct. Pineal gland is calcified.     The calvarium is intact. The visualized paranasal sinuses and mastoid air cells  are clear.     IMPRESSION  IMPRESSION:      No acute intracranial abnormality on this noncontrast head CT. Limited by motion  artifact. No significant change since 2 days ago.              Assessment and Plan     1. Traumatic rhabdomyolysis        -  Admit to remote telemetry        -  Order 0.9% normal saline IV fluids for hydration/ resuscitation        -  Repeat serial CK levels         -  Order lactic acid level        -  Hold statin drugs    2. S/p fall       -  Place on fall precautions/ neuro checks    3. Bilateral knee and right hip pain       - order xrays to evaluation for acute injury    4. Type 2 DM       - Order Humalog insulin correctional coverage, scheduled blood glucose checks and check hemoglobin A1c level. 5.  Generalized weakness/ physical debility       -  Consult PT    6. CAD       - check troponin; continue remote telemetry    7.   VTE prophylaxis       - SCDs to BLEs             Signed by: Lucero Moulton MD    November 30, 2019 at 4:58 AM

## 2019-11-30 NOTE — ED PROVIDER NOTES
Patient presents via EMS after being found down after a fall. Patient reports that he fell earlier today but he is unsure of the circumstances of his fall or how long he was on the ground but he believes he was on the ground for a number of hours. He reports that he has had some nausea but no vomiting, as well as dysuria. Patient has not had fevers or chills. He has not found any alleviating or exacerbating factors. He reports that he has had falls and once he falls he is unable to get up without assistance. The history is provided by the patient. Fall   The accident occurred 6 to 12 hours ago. Fall occurred: ground level. He fell from a height of ground level. Point of impact: unknown. The patient is experiencing no pain. He was not ambulatory at the scene. Associated symptoms include nausea and extremity weakness. Pertinent negatives include no fever, no abdominal pain, no vomiting and no loss of consciousness. The risk factors include recurrent falls and being elderly. Fatigue   Associated symptoms include nausea. Pertinent negatives include no shortness of breath, no chest pain and no vomiting.         Past Medical History:   Diagnosis Date    CAD (coronary artery disease), native coronary artery     Cancer ([facility]     melanoma removed on forehead    Depression     Diabetic neuropathy ([facility]     ED (erectile dysfunction)     Hypertension, benign     Ill-defined condition     states he has memory problems    Postsurgical aortocoronary bypass status     Type II or unspecified type diabetes mellitus without mention of complication, not stated as uncontrolled        Past Surgical History:   Procedure Laterality Date    ECHO 2D ADULT  12/2009    EF 50%, basal inferior akinesis, mild MR    ECHO 2D ADULT  9/21/11    LVEF 50%, inferior AK, unchanged from Dec 2009    HX CORONARY ARTERY BYPASS GRAFT  2009    HX OTHER SURGICAL  2008    melanoma removed from forehead         Family History:   Problem Relation Age of Onset    Breast Cancer Mother         w bone mets    Parkinson's Disease Maternal Aunt     Parkinson's Disease Maternal Uncle     Parkinson's Disease Maternal Grandfather        Social History     Socioeconomic History    Marital status:      Spouse name: Not on file    Number of children: Not on file    Years of education: Not on file    Highest education level: Not on file   Occupational History    Not on file   Social Needs    Financial resource strain: Not on file    Food insecurity:     Worry: Not on file     Inability: Not on file    Transportation needs:     Medical: Not on file     Non-medical: Not on file   Tobacco Use    Smoking status: Never Smoker    Smokeless tobacco: Never Used   Substance and Sexual Activity    Alcohol use: No    Drug use: No    Sexual activity: Not on file   Lifestyle    Physical activity:     Days per week: Not on file     Minutes per session: Not on file    Stress: Not on file   Relationships    Social connections:     Talks on phone: Not on file     Gets together: Not on file     Attends Yazidism service: Not on file     Active member of club or organization: Not on file     Attends meetings of clubs or organizations: Not on file     Relationship status: Not on file    Intimate partner violence:     Fear of current or ex partner: Not on file     Emotionally abused: Not on file     Physically abused: Not on file     Forced sexual activity: Not on file   Other Topics Concern    Not on file   Social History Narrative    Not on file         ALLERGIES: Amoxicillin and Pravastatin    Review of Systems   Constitutional: Positive for fatigue. Negative for chills and fever. Respiratory: Negative for shortness of breath. Cardiovascular: Negative for chest pain. Gastrointestinal: Positive for nausea. Negative for abdominal pain, constipation, diarrhea and vomiting. Musculoskeletal: Positive for extremity weakness.    Neurological: Negative for dizziness, loss of consciousness and light-headedness. All other systems reviewed and are negative. Vitals:    11/29/19 1955 11/29/19 2003   BP: 140/51    Pulse: 66    Resp: 25    Temp: 98.5 °F (36.9 °C)    SpO2: 95% 95%   Weight: 99.8 kg (220 lb)    Height: 6' (1.829 m)             Physical Exam  Vitals signs and nursing note reviewed. Constitutional:       General: He is not in acute distress. Appearance: He is well-developed. HENT:      Head: Normocephalic and atraumatic. Eyes:      Conjunctiva/sclera: Conjunctivae normal.      Pupils: Pupils are equal, round, and reactive to light. Neck:      Musculoskeletal: Normal range of motion. Cardiovascular:      Rate and Rhythm: Normal rate and regular rhythm. Pulmonary:      Effort: Pulmonary effort is normal.      Breath sounds: Normal breath sounds. Abdominal:      General: There is no distension. Palpations: Abdomen is soft. Tenderness: There is no tenderness. Musculoskeletal: Normal range of motion. Skin:     General: Skin is warm and dry. Capillary Refill: Capillary refill takes less than 2 seconds. Neurological:      Mental Status: He is alert. Psychiatric:         Mood and Affect: Mood normal.         Behavior: Behavior normal.          MDM  Number of Diagnoses or Management Options  Traumatic rhabdomyolysis, initial encounter [facility]):   Diagnosis management comments: The patient is now resting comfortably and feels better, is alert and in no distress. DDX: Arrhythmia, UTI, sepsis, rhabdomyolysis, intra-cranial, intra-thoracic, intra-abdominal, or musculoskeletal trauma. The vital signs have been stable. Procedures    Patient is being admitted to the hospital.  The results of their tests and reasons for their admission have been discussed with them and/or available family. They convey agreement and understanding for the need to be admitted and for their admission diagnosis.   Consultation will be made now with the inpatient physician for hospitalization. Susanist Rosy Serve for Admission  9:35 PM    ED Room Number: LP12/13  Patient Name and age:  Pascale Delarosa. 70 y.o.  male  Working Diagnosis:   1.  Traumatic rhabdomyolysis, initial encounter [facility])      Readmission: no  Isolation Requirements:  no  Recommended Level of Care:  med/surg  Code Status:  Full Code  Department:[facility] - [phone]  Other:  Found down

## 2019-12-01 LAB
ALBUMIN SERPL-MCNC: 2.8 G/DL (ref 3.5–5)
ALBUMIN/GLOB SERPL: 0.8 {RATIO} (ref 1.1–2.2)
ALP SERPL-CCNC: 46 U/L (ref 45–117)
ALT SERPL-CCNC: 38 U/L (ref 12–78)
ANION GAP SERPL CALC-SCNC: 7 MMOL/L (ref 5–15)
AST SERPL-CCNC: 64 U/L (ref 15–37)
BASOPHILS # BLD: 0 K/UL (ref 0–0.1)
BASOPHILS NFR BLD: 0 % (ref 0–1)
BILIRUB SERPL-MCNC: 0.8 MG/DL (ref 0.2–1)
BUN SERPL-MCNC: 14 MG/DL (ref 6–20)
BUN/CREAT SERPL: 18 (ref 12–20)
CALCIUM SERPL-MCNC: 7.7 MG/DL (ref 8.5–10.1)
CHLORIDE SERPL-SCNC: 109 MMOL/L (ref 97–108)
CK SERPL-CCNC: 1158 U/L (ref 39–308)
CO2 SERPL-SCNC: 21 MMOL/L (ref 21–32)
CREAT SERPL-MCNC: 0.76 MG/DL (ref 0.7–1.3)
DIFFERENTIAL METHOD BLD: NORMAL
EOSINOPHIL # BLD: 0 K/UL (ref 0–0.4)
EOSINOPHIL NFR BLD: 1 % (ref 0–7)
ERYTHROCYTE [DISTWIDTH] IN BLOOD BY AUTOMATED COUNT: 12.7 % (ref 11.5–14.5)
EST. AVERAGE GLUCOSE BLD GHB EST-MCNC: 148 MG/DL
GLOBULIN SER CALC-MCNC: 3.6 G/DL (ref 2–4)
GLUCOSE BLD STRIP.AUTO-MCNC: 115 MG/DL (ref 65–100)
GLUCOSE BLD STRIP.AUTO-MCNC: 122 MG/DL (ref 65–100)
GLUCOSE BLD STRIP.AUTO-MCNC: 135 MG/DL (ref 65–100)
GLUCOSE BLD STRIP.AUTO-MCNC: 142 MG/DL (ref 65–100)
GLUCOSE SERPL-MCNC: 120 MG/DL (ref 65–100)
HBA1C MFR BLD: 6.8 % (ref 4–5.6)
HCT VFR BLD AUTO: 41 % (ref 36.6–50.3)
HGB BLD-MCNC: 13.9 G/DL (ref 12.1–17)
IMM GRANULOCYTES # BLD AUTO: 0 K/UL (ref 0–0.04)
IMM GRANULOCYTES NFR BLD AUTO: 0 % (ref 0–0.5)
LYMPHOCYTES # BLD: 1.1 K/UL (ref 0.8–3.5)
LYMPHOCYTES NFR BLD: 20 % (ref 12–49)
MAGNESIUM SERPL-MCNC: 2 MG/DL (ref 1.6–2.4)
MCH RBC QN AUTO: 29.4 PG (ref 26–34)
MCHC RBC AUTO-ENTMCNC: 33.9 G/DL (ref 30–36.5)
MCV RBC AUTO: 86.7 FL (ref 80–99)
MONOCYTES # BLD: 0.5 K/UL (ref 0–1)
MONOCYTES NFR BLD: 10 % (ref 5–13)
NEUTS SEG # BLD: 3.7 K/UL (ref 1.8–8)
NEUTS SEG NFR BLD: 69 % (ref 32–75)
NRBC # BLD: 0 K/UL (ref 0–0.01)
NRBC BLD-RTO: 0 PER 100 WBC
PLATELET # BLD AUTO: 195 K/UL (ref 150–400)
PMV BLD AUTO: 9 FL (ref 8.9–12.9)
POTASSIUM SERPL-SCNC: 3.3 MMOL/L (ref 3.5–5.1)
PROT SERPL-MCNC: 6.4 G/DL (ref 6.4–8.2)
RBC # BLD AUTO: 4.73 M/UL (ref 4.1–5.7)
SERVICE CMNT-IMP: ABNORMAL
SODIUM SERPL-SCNC: 137 MMOL/L (ref 136–145)
WBC # BLD AUTO: 5.4 K/UL (ref 4.1–11.1)

## 2019-12-01 PROCEDURE — 74011250637 HC RX REV CODE- 250/637: Performed by: FAMILY MEDICINE

## 2019-12-01 PROCEDURE — 85025 COMPLETE CBC W/AUTO DIFF WBC: CPT

## 2019-12-01 PROCEDURE — 97535 SELF CARE MNGMENT TRAINING: CPT

## 2019-12-01 PROCEDURE — 97530 THERAPEUTIC ACTIVITIES: CPT

## 2019-12-01 PROCEDURE — 83735 ASSAY OF MAGNESIUM: CPT

## 2019-12-01 PROCEDURE — 94760 N-INVAS EAR/PLS OXIMETRY 1: CPT

## 2019-12-01 PROCEDURE — 36415 COLL VENOUS BLD VENIPUNCTURE: CPT

## 2019-12-01 PROCEDURE — 74011000250 HC RX REV CODE- 250: Performed by: FAMILY MEDICINE

## 2019-12-01 PROCEDURE — 82962 GLUCOSE BLOOD TEST: CPT

## 2019-12-01 PROCEDURE — 82550 ASSAY OF CK (CPK): CPT

## 2019-12-01 PROCEDURE — 97165 OT EVAL LOW COMPLEX 30 MIN: CPT

## 2019-12-01 PROCEDURE — 83036 HEMOGLOBIN GLYCOSYLATED A1C: CPT

## 2019-12-01 PROCEDURE — 80053 COMPREHEN METABOLIC PANEL: CPT

## 2019-12-01 PROCEDURE — 74011636637 HC RX REV CODE- 636/637: Performed by: FAMILY MEDICINE

## 2019-12-01 PROCEDURE — 97161 PT EVAL LOW COMPLEX 20 MIN: CPT

## 2019-12-01 PROCEDURE — 65660000000 HC RM CCU STEPDOWN

## 2019-12-01 PROCEDURE — 74011250636 HC RX REV CODE- 250/636: Performed by: FAMILY MEDICINE

## 2019-12-01 RX ORDER — POTASSIUM CHLORIDE 750 MG/1
40 TABLET, FILM COATED, EXTENDED RELEASE ORAL
Status: COMPLETED | OUTPATIENT
Start: 2019-12-01 | End: 2019-12-01

## 2019-12-01 RX ADMIN — INSULIN HUMAN 10 UNITS: 100 INJECTION, SUSPENSION SUBCUTANEOUS at 08:43

## 2019-12-01 RX ADMIN — Medication 10 ML: at 22:29

## 2019-12-01 RX ADMIN — HEPARIN SODIUM 5000 UNITS: 5000 INJECTION INTRAVENOUS; SUBCUTANEOUS at 22:29

## 2019-12-01 RX ADMIN — CARVEDILOL 12.5 MG: 12.5 TABLET, FILM COATED ORAL at 08:43

## 2019-12-01 RX ADMIN — INSULIN HUMAN 10 UNITS: 100 INJECTION, SUSPENSION SUBCUTANEOUS at 16:37

## 2019-12-01 RX ADMIN — SODIUM CHLORIDE 125 ML/HR: 900 INJECTION, SOLUTION INTRAVENOUS at 06:19

## 2019-12-01 RX ADMIN — LOSARTAN POTASSIUM 25 MG: 25 TABLET, FILM COATED ORAL at 08:43

## 2019-12-01 RX ADMIN — HEPARIN SODIUM 5000 UNITS: 5000 INJECTION INTRAVENOUS; SUBCUTANEOUS at 15:08

## 2019-12-01 RX ADMIN — HEPARIN SODIUM 5000 UNITS: 5000 INJECTION INTRAVENOUS; SUBCUTANEOUS at 05:29

## 2019-12-01 RX ADMIN — POTASSIUM CHLORIDE 40 MEQ: 750 TABLET, FILM COATED, EXTENDED RELEASE ORAL at 08:43

## 2019-12-01 RX ADMIN — CARVEDILOL 12.5 MG: 12.5 TABLET, FILM COATED ORAL at 16:37

## 2019-12-01 RX ADMIN — Medication 10 ML: at 15:09

## 2019-12-01 RX ADMIN — VITAMIN D, TAB 1000IU (100/BT) 1 TABLET: 25 TAB at 08:43

## 2019-12-01 RX ADMIN — ASPIRIN 81 MG 81 MG: 81 TABLET ORAL at 08:43

## 2019-12-01 NOTE — PROGRESS NOTES
12/1/2019  11:43 AM    Reason for Admission:   Fall (right hip pain); relevant medical diagnoses: CAD, melanoma, depression, diabetic neuropathy                   RRAT Score:    9                 Plan for utilizing home health:     Not at this time; patient states he \"might be\" interested if it is recommended                     Current Advanced Directive/Advance Care Plan:   Patient stated he had one but not with him                         Transition of Care Plan:      EMR reviewed with patient, who was a limited historian. Patient reports he lives with his wife and son (55 yo, handicapped) in a 1 level home with 5 entry steps. He reports having a rollator and denies a history of HH/rehab. His preferred pharmacy is [facility]. CM provided information on [facility] as well as brochure. CM educated anne marie that he would be assessed by PT/OT and CM would follow for recommendations; however, patient states he has had PT in the past \"and it did nothing for me. \"  Cm left a message with patient's son to have his mother call back to discuss transportation at discharge. Of note, patient's wife has not been in to see patient since his admission via ambulance. 1.  Pending PT/OT consults  2. CM to provide 2nd IMM letter prior to discharge  3. Cm to follow-up with family about possibility of discharge transport  4. Pending CM consult for possible rehab after patient is seen by PT/OT                  Care Management Interventions  PCP Verified by CM: Yes(Dr. Yang Sagastume; No NN)  Last Visit to PCP: 04/01/19  Mode of Transport at Discharge:  Other (see comment)(attempted to contact patient's wife; unable to reach her)  Transition of Care Consult (CM Consult): Discharge Planning  Physical Therapy Consult: Yes  Occupational Therapy Consult: Yes  Current Support Network: Lives with Spouse  Discharge Location  Discharge Placement: Unable to determine at this time

## 2019-12-01 NOTE — PROGRESS NOTES
Problem: Self Care Deficits Care Plan (Adult)  Goal: *Acute Goals and Plan of Care (Insert Text)  Description    FUNCTIONAL STATUS PRIOR TO ADMISSION: Patient was modified independent using a rolling walker for functional mobility. HOME SUPPORT: The patient lived with spouse but did not require assist.    Occupational Therapy Goals  Initiated 12/1/2019  1. Patient will perform grooming with supervision/set-up within 7 day(s). 2.  Patient will perform lower body dressing with supervision/set-up within 7 day(s). 3.  Patient will perform toilet transfers with minimum assistance within 7 day(s). 4.  Patient will perform all aspects of toileting with minimal assistance/contact guard assist within 7 day(s). 5.  Patient will participate in upper extremity therapeutic exercise/activities with supervision/set-up for 10 minutes within 7 day(s). 6.  Patient will utilize energy conservation techniques during functional activities with verbal cues within 7 day(s). Outcome: Progressing Towards Goal   OCCUPATIONAL THERAPY EVALUATION  Patient: Dixie Dukes Sr. (81 y.o. male)  Date: 12/1/2019  Primary Diagnosis: [facility] [T79. Kristina Crea. XXXA]        Precautions: fall       ASSESSMENT  Based on the objective data described below, the patient presents with decreased functional mobility, decreased activity tolerance, decreased cognition -though currently oriented, decreased balance in standing and decreased insight into deficits following hospital admission secondary to  traumatic rhabdomyolysis. Patient complains of pain to right hip but tolerated mobility. Patient reports ability to perform ADLs at home with increased time and use of RW, however today patient presents with posterior lean in standing, mod assist x 2 for transfers and up to max assist for ADLs.      Current Level of Function Impacting Discharge (ADLs/self-care): mod x 2 for transfers     Functional Outcome Measure: The patient scored 30/100 on the Barthel Index  outcome measure. Other factors to consider for discharge: lives with spouse and disabled son     Patient will benefit from skilled therapy intervention to address the above noted impairments. PLAN :  Recommendations and Planned Interventions: self care training, functional mobility training, therapeutic exercise, balance training, therapeutic activities, endurance activities, patient education, home safety training, and family training/education    Frequency/Duration: Patient will be followed by occupational therapy 5 times a week to address goals. Recommendation for discharge: (in order for the patient to meet his/her long term goals)  Therapy up to 5 days/week in SNF setting    This discharge recommendation:  Has not yet been discussed the attending provider and/or case management    IF patient discharges home will need the following DME: to be determined (TBD)       SUBJECTIVE:   Patient stated I'm in a mood today.     OBJECTIVE DATA SUMMARY:   HISTORY:   Past Medical History:   Diagnosis Date    CAD (coronary artery disease), native coronary artery     Cancer ([facility] [address]     melanoma removed on forehead    Depression     Diabetic neuropathy ([facility]     ED (erectile dysfunction)     Hypertension, benign     Ill-defined condition     states he has memory problems    Postsurgical aortocoronary bypass status     Type II or unspecified type diabetes mellitus without mention of complication, not stated as uncontrolled      Past Surgical History:   Procedure Laterality Date    ECHO 2D ADULT  12/2009    EF 50%, basal inferior akinesis, mild MR    ECHO 2D ADULT  9/21/11    LVEF 50%, inferior AK, unchanged from Dec 2009    HX CORONARY ARTERY BYPASS GRAFT  2009    HX OTHER SURGICAL  2008    melanoma removed from forehead       Expanded or extensive additional review of patient history:     Home Situation  Home Environment: Private residence  Living Alone: No  Support Systems: Spouse/Significant Other/Partner, Child(ana laura)  Patient Expects to be Discharged to[de-identified] Private residence  Current DME Used/Available at Home: Walker, rolling    Hand dominance: Right    EXAMINATION OF PERFORMANCE DEFICITS:  Cognitive/Behavioral Status:  Neurologic State: Alert;Confused  Orientation Level: Oriented X4  Cognition: Follows commands  Perception: Appears intact  Perseveration: No perseveration noted  Safety/Judgement: Awareness of environment    Skin: intact as seen, abrasions to knees    Edema: none noted     Hearing: Auditory  Auditory Impairment: None    Vision/Perceptual:                                     Range of Motion:  AROM: Generally decreased, functional  PROM: Within functional limits                      Strength:  Strength: Generally decreased, functional                Coordination:  Coordination: Within functional limits  Fine Motor Skills-Upper: Left Intact; Right Intact    Gross Motor Skills-Upper: Left Intact; Right Intact    Tone & Sensation:    Tone: Normal  Sensation: Intact                      Balance:  Sitting: Intact  Standing: Impaired; With support  Standing - Static: Constant support  Standing - Dynamic : Poor    Functional Mobility and Transfers for ADLs:  Bed Mobility:  Supine to Sit: Minimum assistance  Scooting: Contact guard assistance    Transfers:  Sit to Stand: Moderate assistance;Assist x2  Stand to Sit: Moderate assistance;Assist x2(verbal cues )  Bed to Chair: Moderate assistance;Assist x2; Additional time  Toilet Transfer : Moderate assistance;Assist x2(BSC next to bed)  Assistive Device : Walker, rolling    ADL Assessment:  Feeding: Supervision    Oral Facial Hygiene/Grooming: Minimum assistance    Bathing: Moderate assistance    Upper Body Dressing: Minimum assistance    Lower Body Dressing: Maximum assistance    Toileting:  Moderate assistance                ADL Intervention and task modifications:                                     Cognitive Retraining  Safety/Judgement: Awareness of environment    Therapeutic Exercise:     Functional Measure:  Barthel Index:    Bathin  Bladder: 0  Bowels: 10  Groomin  Dressin  Feedin  Mobility: 0  Stairs: 0  Toilet Use: 5  Transfer (Bed to Chair and Back): 5  Total: 30/100        The Barthel ADL Index: Guidelines  1. The index should be used as a record of what a patient does, not as a record of what a patient could do. 2. The main aim is to establish degree of independence from any help, physical or verbal, however minor and for whatever reason. 3. The need for supervision renders the patient not independent. 4. A patient's performance should be established using the best available evidence. Asking the patient, friends/relatives and nurses are the usual sources, but direct observation and common sense are also important. However direct testing is not needed. 5. Usually the patient's performance over the preceding 24-48 hours is important, but occasionally longer periods will be relevant. 6. Middle categories imply that the patient supplies over 50 per cent of the effort. 7. Use of aids to be independent is allowed. Renford Brittle., Barthel, D.W. ([id number]). Functional evaluation: the Barthel Index. [facility] (14)2. Chico Abreu divine Fernando, GEGEF, Bailey Jarquin., Leopoldo Bruce., [place], [address] [place] [address] (). Measuring the change indisability after inpatient rehabilitation; comparison of the responsiveness of the Barthel Index and Functional Cutler Measure. Journal of Neurology, Neurosurgery, and Psychiatry, 66(4), 432-736. Hailey Soni, N.J.A, TAM Aquino.BLAYNE, & He Bernstein M.A. (2004.) Assessment of post-stroke quality of life in cost-effectiveness studies: The usefulness of the Barthel Index and the EuroQoL-5D.  Quality of Life Research, 15, 671-91         Occupational Therapy Evaluation Charge Determination   History Examination Decision-Making   LOW Complexity : Brief history review  LOW Complexity : 1-3 performance deficits relating to physical, cognitive , or psychosocial skils that result in activity limitations and / or participation restrictions  LOW Complexity : No comorbidities that affect functional and no verbal or physical assistance needed to complete eval tasks       Based on the above components, the patient evaluation is determined to be of the following complexity level: LOW   Pain Rating:      Activity Tolerance:   Fair  Please refer to the flowsheet for vital signs taken during this treatment. After treatment patient left in no apparent distress:    Sitting in chair, Call bell within reach, and Bed / chair alarm activated    COMMUNICATION/EDUCATION:   The patients plan of care was discussed with: Physical Therapist and Registered Nurse. Home safety education was provided and the patient/caregiver indicated understanding., Patient/family have participated as able in goal setting and plan of care. , and Patient/family agree to work toward stated goals and plan of care. This patients plan of care is appropriate for delegation to [facility].     Thank you for this referral.  Meir Zeng OTR/L  Time Calculation: 26 mins

## 2019-12-01 NOTE — PROGRESS NOTES
Problem: Mobility Impaired (Adult and Pediatric)  Goal: *Acute Goals and Plan of Care (Insert Text)  Description  FUNCTIONAL STATUS PRIOR TO ADMISSION: patient not a good historian    HOME SUPPORT PRIOR TO ADMISSION: The patient lived with wife and 49 yo disabled son. Physical Therapy Goals  Initiated 12/1/2019  1. Patient will move from supine to sit and sit to supine , scoot up and down and roll side to side in bed with minimal assistance/contact guard assist within 7 day(s). 2.  Patient will transfer from bed to chair and chair to bed with minimal assistance/contact guard assist using the least restrictive device within 7 day(s). 3.  Patient will perform sit to stand with minimal assistance/contact guard assist within 7 day(s). 4.  Patient will ambulate with minimal assistance/contact guard assist for 25 feet with the least restrictive device within 7 day(s). Note:   PHYSICAL THERAPY EVALUATION  Patient: Gopi Barber Sr. (81 y.o. male)  Date: 12/1/2019  Primary Diagnosis: Traumatic rhabdomyolysis ([facility] [T79. Desirae Sahu. XXXA]        Precautions: Bed Alarm, Fall      ASSESSMENT  Based on the objective data described below, the 69 yo patient presents with decreased strength and ROM throughout, decreased standing balance, decreased tolerance for activity, reported pain but states all over including R hip not the knee, and as a result poor functional mob skills. Patient came to ED by ambulance on 11/29/19 after being found on the ground following GLF. Unknown how long patient lay on ground. Patient's family has yet to come to ED or return call. Patient answers orientation questions correctly but is confused and unsafe to be alone. He was received soiled with urine in bed and exposing himself removing the gown and linens. Patient yelled he couldn't find his urinal. Assisted with out of bed and change of gown. Stand pivot to chair after initially marching in place bedside.  Patient complains of dizziness but VSS pre /post. Patient appears to require 24/7 care and supervision. Unknown if family can manage at home. Will follow for PT   Current Level of Function Impacting Discharge (mobility/balance): mod x 2 ; heavy posterior lean; cognitive deficits    Functional Outcome Measure: The patient scored 30/100 on the Barthel Index outcome measure. Other factors to consider for discharge: wife able to manage at home, stairs? Patient will benefit from skilled therapy intervention to address the above noted impairments. PLAN :  Recommendations and Planned Interventions: bed mobility training, transfer training, gait training, therapeutic exercises, patient and family training/education, and therapeutic activities      Frequency/Duration: Patient will be followed by physical therapy:  5 times a week to address goals. Recommendation for discharge: (in order for the patient to meet his/her long term goals)  Therapy up to 5 days/week in SNF setting    This discharge recommendation:  Has not yet been discussed the attending provider and/or case management    IF patient discharges home will need the following DME: TBD         SUBJECTIVE:   Patient stated Jeseniabrock Monzon sometimes she gets me picked up when I fall.     OBJECTIVE DATA SUMMARY:   HISTORY:    Past Medical History:   Diagnosis Date    CAD (coronary artery disease), native coronary artery     Cancer ([facility]     melanoma removed on forehead    Depression     Diabetic neuropathy ([facility]     ED (erectile dysfunction)     Hypertension, benign     Ill-defined condition     states he has memory problems    Postsurgical aortocoronary bypass status     Type II or unspecified type diabetes mellitus without mention of complication, not stated as uncontrolled      Past Surgical History:   Procedure Laterality Date    ECHO 2D ADULT  12/2009    EF 50%, basal inferior akinesis, mild MR    ECHO 2D ADULT  9/21/11    LVEF 50%, inferior AK, unchanged from Dec 2009    HX CORONARY ARTERY BYPASS GRAFT  2009    HX OTHER SURGICAL  2008    melanoma removed from forehead       Personal factors and/or comorbidities impacting plan of care: see above    Home Situation  Home Environment: Private residence  Living Alone: No  Support Systems: Spouse/Significant Other/Partner, Child(ana laura)  Patient Expects to be Discharged to[de-identified] Private residence  Current DME Used/Available at Home: Junious Locket, rolling    EXAMINATION/PRESENTATION/DECISION MAKING:   Critical Behavior:  Neurologic State: Alert, Confused  Orientation Level: Oriented X4  Cognition: Follows commands  Safety/Judgement: Awareness of environment  Hearing: Auditory  Auditory Impairment: None    Range Of Motion:  AROM: Generally decreased, functional           PROM: Within functional limits           Strength:    Strength: Generally decreased, functional                    Tone & Sensation:   Tone: Normal              Sensation: Intact               Coordination:  Coordination: Within functional limits        Functional Mobility:  Bed Mobility:     Supine to Sit: Minimum assistance     Scooting: Contact guard assistance  Transfers:  Sit to Stand: Moderate assistance;Assist x2  Stand to Sit: Moderate assistance;Assist x2(verbal cues )        Bed to Chair: Moderate assistance;Assist x2; Additional time              Balance:   Sitting: Intact  Standing: Impaired; With support  Standing - Static: Constant support  Standing - Dynamic : Poor  Ambulation/Gait Training:  Distance (ft): 1 Feet (ft)  Assistive Device: Gait belt;Walker, rolling  Ambulation - Level of Assistance: Moderate assistance; Additional time;Assist x2        Gait Abnormalities: Antalgic;Decreased step clearance;Shuffling gait(HEavy posterior lean)        Base of Support: Center of gravity altered;Narrowed          Stairs - Level of Assistance: (NT)      Functional Measure:  Barthel Index:    Bathin  Bladder: 0  Bowels: 10  Groomin  Dressin  Feedin  Mobility: 0  Stairs: 0  Toilet Use: 5  Transfer (Bed to Chair and Back): 5  Total: 30/100       The Barthel ADL Index: Guidelines  1. The index should be used as a record of what a patient does, not as a record of what a patient could do. 2. The main aim is to establish degree of independence from any help, physical or verbal, however minor and for whatever reason. 3. The need for supervision renders the patient not independent. 4. A patient's performance should be established using the best available evidence. Asking the patient, friends/relatives and nurses are the usual sources, but direct observation and common sense are also important. However direct testing is not needed. 5. Usually the patient's performance over the preceding 24-48 hours is important, but occasionally longer periods will be relevant. 6. Middle categories imply that the patient supplies over 50 per cent of the effort. 7. Use of aids to be independent is allowed. Hina Nevarez., Barthel, D.W. ([id number]). Functional evaluation: the Barthel Index. [facility] (14)2. JEROD Sellers, Nicole Arrington., Essie Rhoades., [place], [address] [place] [address] (1999). Measuring the change indisability after inpatient rehabilitation; comparison of the responsiveness of the Barthel Index and Functional [place] Measure. Journal of Neurology, Neurosurgery, and Psychiatry, 66(4), 812-389. KEYANNA Whitmore, ANDRIY Aquino, & Sherly Shelton, M.A. (2004.) Assessment of post-stroke quality of life in cost-effectiveness studies: The usefulness of the Barthel Index and the EuroQoL-5D.  Quality of Life Research, 15, 874-62           Physical Therapy Evaluation Charge Determination   History Examination Presentation Decision-Making   HIGH Complexity :3+ comorbidities / personal factors will impact the outcome/ POC  HIGH Complexity : 4+ Standardized tests and measures addressing body structure, function, activity limitation and / or participation in recreation  HIGH Complexity : Unstable and unpredictable characteristics  Other outcome measures Barthel Index  HIGH       Based on the above components, the patient evaluation is determined to be of the following complexity level: HIGH     Pain Rating:  Right hip pain but not rated    Activity Tolerance:   Poor  Please refer to the flowsheet for vital signs taken during this treatment. After treatment patient left in no apparent distress:   Sitting in chair, Heels elevated for pressure relief, Call bell within reach, and Bed / chair alarm activated    COMMUNICATION/EDUCATION:   The patients plan of care was discussed with: Occupational Therapist and Registered Nurse. Patient is unable to participate in goal setting and plan of care.     Thank you for this referral.  Ester Samuel, PT, DPT   Time Calculation: 25 mins

## 2019-12-01 NOTE — PROGRESS NOTES
Daily Progress Note: 12/1/2019  Mershon  Paty Elizabeth, Rosa Carlos MD    Assessment/Plan:   Traumatic rhabdomyolysis  -- 0.9% normal saline IV fluids for hydration/ resuscitation  -- Serial CK levels   -- Hold statin drugs     S/p fall  -- Place on fall precautions  -- neuro checks  -- OT/PT     Bilateral knee and right hip pain  -- Xray knee with OA  -- Right hip Xray neg for Fx     Type 2 DM with neuropathy  -- Humalog insulin correctional coverage  -- Restart NPH but only at 10 units BID  -- BS ac and HS  -- A1c pending     Generalized weakness/ physical debility  -- Consult PT     CAD  -- check troponin    Hypokalemia  -- Replete and monitor     VTE prophylaxis  -- SCDs     Disp  -- Case management for Rehab placement           Problem List:  Problem List as of 12/1/2019 Date Reviewed: 10/14/2019          Codes Class Noted - Resolved    Fall ICD-10-CM: W19. Guillaume Sean  ICD-9-CM: G537.8  11/29/2019 - Present        Tra[facility] ICD-10-CM: T79. 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 FJS-50-AC: E11.9  ICD-9-CM: 250.00  Unknown - Present        Hypertension, benign ICD-10-CM: I10  ICD-9-CM: 401.1  Unknown - Present        RESOLVED: PND (paroxysmal nocturnal dyspnea) ICD-10-CM: R06.00  ICD-9-CM: 786.09  8/13/2017 - 8/11/2018        RESOLVED: Near syncope ICD-10-CM: R55  ICD-9-CM: 780.2  10/29/2013 - 4/15/2014              HPI:    Lety Crouch Sr. is a 70 y.o. white male with past medical history of CAD, melanoma, depression, diabetic neuropathy, ED, and type 2 DM presented to the ED from home with chief complaints of fall with right hip and bilateral knee pain. Patient is a limited historian. He provided limited details regarding recent ground level fall at home. He had poor recall; loss of consciousness was not definitively known. Patient was found with uncertain down time on the ground. Pain in right hip was severe, constant, aggravated with touch or movement with onset after fall. On arrival in the ED, workup included CT head which was negative. CK= 3,365. ED requested admission to the hospitalist service. There were no reports of new onset syncope, loss of consciousness, headache, neck pain, visual disturbance, numbness, paresthesias, focal weakness, chest pain, shortness of breath, cough, palpitations, abdominal pain, nausea, vomiting, diarrhea, melena, dysuria, hematuria, calf pain, increased leg swelling/ edema, fever, chills, or rash. (Dr Rasheed Gruber)    : He is not sure what happened to bring him in. He is having right hip and knee pain. Xrays are neg. Blood sugars are good but he reports that he has not eaten. Will restart NPH but at a lower dose than at home. CK is a little better. PT consulted. : Nurses report he is max assist and quite weak. Will ask case management to see for rehab placement. CK down so will decrease IVF and replete K+. BS are stable. Continue NPH. A1c pending. Tried calling family yesterday and this am but no answer at home number. Review of Systems:   Review of systems not obtained due to patient factors.     Objective:   Physical Exam:     Visit Vitals  /68 (BP 1 Location: Left arm, BP Patient Position: At rest)   Pulse 67   Temp 98.7 °F (37.1 °C)   Resp 18   Ht 6' (1.829 m)   Wt 220 lb (99.8 kg)   SpO2 94%   BMI 29.84 kg/m²      O2 Device: Room air    Temp (24hrs), Av.3 °F (36.8 °C), Min:97.2 °F (36.2 °C), Max:99 °F (37.2 °C)    No intake/output data recorded. 11/29 1901 - 12/01 0700  In: 3350 [I.V.:3350]  Out: 1775 [Urine:1775]    General:  Alert, cooperative, no distress, appears stated age. Head:  Normocephalic, without obvious abnormality, atraumatic. Eyes:  Conjunctivae/corneas clear. PERRL, EOMs intact. Nose: Nares normal. Septum midline. Mucosa normal. No drainage or sinus tenderness. Throat: Lips, mucosa, and tongue normal. Teeth and gums normal.   Neck: Supple, symmetrical, trachea midline, no adenopathy, thyroid: no enlargement/tenderness/nodules, no carotid bruit and no JVD. Back:   Symmetric, no curvature. ROM normal. No CVA tenderness. Lungs:   Clear to auscultation bilaterally. Chest wall:  No tenderness or deformity. Heart:  Regular rate and rhythm, S1, S2 normal, no murmur, click, rub or gallop. Abdomen:   Soft, non-tender. Bowel sounds normal. No masses,  No organomegaly. Extremities: Extremities with abrasions of both knees, able to rotate right hip without difficulty, no cyanosis or edema. No calf tenderness or cords. Pulses: 2+ and symmetric all extremities. Skin: Skin color, texture, turgor normal. No rashes or lesions   Neurologic: CNII-XII intact. Alert and oriented X 2. Fine motor of hands and fingers normal.   equal.  No cogwheeling or rigidity. Gait not tested at this time. Sensation grossly normal to touch. Gross motor of extremities normal.       Data Review:       Recent Days:  Recent Labs     12/01/19  0303 11/29/19 2000   WBC 5.4 6.5   HGB 13.9 15.4   HCT 41.0 45.5    236     Recent Labs     12/01/19  0303 11/29/19 2000    135*   K 3.3* 3.6   * 105   CO2 21 21   * 198*   BUN 14 22*   CREA 0.76 0.86   CA 7.7* 8.3*   MG  --  2.3   ALB 2.8* 3.2*   TBILI 0.8 0.9   SGOT 64* 101*   ALT 38 43     No results for input(s): PH, PCO2, PO2, HCO3, FIO2 in the last 72 hours.     24 Hour Results:  Recent Results (from the past 24 hour(s))   CK    Collection Time: 11/30/19 10:38 AM   Result Value Ref Range    CK 2,074 (H) 39 - 308 U/L   GLUCOSE, POC    Collection Time: 11/30/19 12:11 PM   Result Value Ref Range    Glucose (POC) 210 (H) 65 - 100 mg/dL    Performed by Cordell Berumen  (PCT)    GLUCOSE, POC    Collection Time: 11/30/19  4:34 PM   Result Value Ref Range    Glucose (POC) 188 (H) 65 - 100 mg/dL    Performed by Cordell Berumen  (PCT)    CK    Collection Time: 11/30/19  5:30 PM   Result Value Ref Range    CK 1,738 (H) 39 - 308 U/L   GLUCOSE, POC    Collection Time: 11/30/19  8:56 PM   Result Value Ref Range    Glucose (POC) 195 (H) 65 - 100 mg/dL    Performed by Mara Francisco (PCT)    CBC WITH AUTOMATED DIFF    Collection Time: 12/01/19  3:03 AM   Result Value Ref Range    WBC 5.4 4.1 - 11.1 K/uL    RBC 4.73 4.10 - 5.70 M/uL    HGB 13.9 12.1 - 17.0 g/dL    HCT 41.0 36.6 - 50.3 %    MCV 86.7 80.0 - 99.0 FL    MCH 29.4 26.0 - 34.0 PG    MCHC 33.9 30.0 - 36.5 g/dL    RDW 12.7 11.5 - 14.5 %    PLATELET 012 905 - 954 K/uL    MPV 9.0 8.9 - 12.9 FL    NRBC 0.0 0  WBC    ABSOLUTE NRBC 0.00 0.00 - 0.01 K/uL    NEUTROPHILS 69 32 - 75 %    LYMPHOCYTES 20 12 - 49 %    MONOCYTES 10 5 - 13 %    EOSINOPHILS 1 0 - 7 %    BASOPHILS 0 0 - 1 %    IMMATURE GRANULOCYTES 0 0.0 - 0.5 %    ABS. NEUTROPHILS 3.7 1.8 - 8.0 K/UL    ABS. LYMPHOCYTES 1.1 0.8 - 3.5 K/UL    ABS. MONOCYTES 0.5 0.0 - 1.0 K/UL    ABS. EOSINOPHILS 0.0 0.0 - 0.4 K/UL    ABS. BASOPHILS 0.0 0.0 - 0.1 K/UL    ABS. IMM.  GRANS. 0.0 0.00 - 0.04 K/UL    DF AUTOMATED     METABOLIC PANEL, COMPREHENSIVE    Collection Time: 12/01/19  3:03 AM   Result Value Ref Range    Sodium 137 136 - 145 mmol/L    Potassium 3.3 (L) 3.5 - 5.1 mmol/L    Chloride 109 (H) 97 - 108 mmol/L    CO2 21 21 - 32 mmol/L    Anion gap 7 5 - 15 mmol/L    Glucose 120 (H) 65 - 100 mg/dL    BUN 14 6 - 20 MG/DL    Creatinine 0.76 0.70 - 1.30 MG/DL    BUN/Creatinine ratio 18 12 - 20      GFR est AA >60 >60 ml/min/1.73m2    GFR est non-AA >60 >60 ml/min/1.73m2 Calcium 7.7 (L) 8.5 - 10.1 MG/DL    Bilirubin, total 0.8 0.2 - 1.0 MG/DL    ALT (SGPT) 38 12 - 78 U/L    AST (SGOT) 64 (H) 15 - 37 U/L    Alk. phosphatase 46 45 - 117 U/L    Protein, total 6.4 6.4 - 8.2 g/dL    Albumin 2.8 (L) 3.5 - 5.0 g/dL    Globulin 3.6 2.0 - 4.0 g/dL    A-G Ratio 0.8 (L) 1.1 - 2.2     CK    Collection Time: 12/01/19  3:03 AM   Result Value Ref Range    CK 1,158 (H) 39 - 308 U/L   GLUCOSE, POC    Collection Time: 12/01/19  7:02 AM   Result Value Ref Range    Glucose (POC) 122 (H) 65 - 100 mg/dL    Performed by Rito Jones (PCT)        Medications reviewed  Current Facility-Administered Medications   Medication Dose Route Frequency    aspirin chewable tablet 81 mg  81 mg Oral DAILY    carvedilol (COREG) tablet 12.5 mg  12.5 mg Oral BID WITH MEALS    cholecalciferol (VITAMIN D3) (1000 Units /25 mcg) tablet 1 Tab  1,000 Units Oral DAILY    lidocaine 4 % patch 1 Patch  1 Patch TransDERmal DAILY    losartan (COZAAR) tablet 25 mg  25 mg Oral DAILY    sodium chloride (NS) flush 5-40 mL  5-40 mL IntraVENous Q8H    sodium chloride (NS) flush 5-40 mL  5-40 mL IntraVENous PRN    heparin (porcine) injection 5,000 Units  5,000 Units SubCUTAneous Q8H    insulin NPH (NOVOLIN N, HUMULIN N) injection 10 Units  10 Units SubCUTAneous ACB&D    0.9% sodium chloride infusion  125 mL/hr IntraVENous CONTINUOUS       Care Plan discussed with: Patient/Family    Total time spent with patient and review of records: 30 minutes.     Juan Thornton MD

## 2019-12-02 LAB
ALBUMIN SERPL-MCNC: 2.5 G/DL (ref 3.5–5)
ALBUMIN/GLOB SERPL: 0.7 {RATIO} (ref 1.1–2.2)
ALP SERPL-CCNC: 41 U/L (ref 45–117)
ALT SERPL-CCNC: 32 U/L (ref 12–78)
ANION GAP SERPL CALC-SCNC: 8 MMOL/L (ref 5–15)
AST SERPL-CCNC: 48 U/L (ref 15–37)
BASOPHILS # BLD: 0 K/UL (ref 0–0.1)
BASOPHILS NFR BLD: 0 % (ref 0–1)
BILIRUB SERPL-MCNC: 0.8 MG/DL (ref 0.2–1)
BUN SERPL-MCNC: 11 MG/DL (ref 6–20)
BUN/CREAT SERPL: 17 (ref 12–20)
CALCIUM SERPL-MCNC: 7.8 MG/DL (ref 8.5–10.1)
CHLORIDE SERPL-SCNC: 109 MMOL/L (ref 97–108)
CK SERPL-CCNC: 689 U/L (ref 39–308)
CO2 SERPL-SCNC: 22 MMOL/L (ref 21–32)
CREAT SERPL-MCNC: 0.64 MG/DL (ref 0.7–1.3)
DIFFERENTIAL METHOD BLD: NORMAL
EOSINOPHIL # BLD: 0.1 K/UL (ref 0–0.4)
EOSINOPHIL NFR BLD: 1 % (ref 0–7)
ERYTHROCYTE [DISTWIDTH] IN BLOOD BY AUTOMATED COUNT: 12.6 % (ref 11.5–14.5)
GLOBULIN SER CALC-MCNC: 3.4 G/DL (ref 2–4)
GLUCOSE BLD STRIP.AUTO-MCNC: 163 MG/DL (ref 65–100)
GLUCOSE BLD STRIP.AUTO-MCNC: 179 MG/DL (ref 65–100)
GLUCOSE BLD STRIP.AUTO-MCNC: 201 MG/DL (ref 65–100)
GLUCOSE BLD STRIP.AUTO-MCNC: 88 MG/DL (ref 65–100)
GLUCOSE SERPL-MCNC: 85 MG/DL (ref 65–100)
HCT VFR BLD AUTO: 39.7 % (ref 36.6–50.3)
HGB BLD-MCNC: 13.1 G/DL (ref 12.1–17)
IMM GRANULOCYTES # BLD AUTO: 0 K/UL (ref 0–0.04)
IMM GRANULOCYTES NFR BLD AUTO: 0 % (ref 0–0.5)
LYMPHOCYTES # BLD: 1.7 K/UL (ref 0.8–3.5)
LYMPHOCYTES NFR BLD: 36 % (ref 12–49)
MCH RBC QN AUTO: 29.4 PG (ref 26–34)
MCHC RBC AUTO-ENTMCNC: 33 G/DL (ref 30–36.5)
MCV RBC AUTO: 89 FL (ref 80–99)
MONOCYTES # BLD: 0.6 K/UL (ref 0–1)
MONOCYTES NFR BLD: 12 % (ref 5–13)
NEUTS SEG # BLD: 2.4 K/UL (ref 1.8–8)
NEUTS SEG NFR BLD: 51 % (ref 32–75)
NRBC # BLD: 0 K/UL (ref 0–0.01)
NRBC BLD-RTO: 0 PER 100 WBC
PLATELET # BLD AUTO: 176 K/UL (ref 150–400)
PMV BLD AUTO: 9.2 FL (ref 8.9–12.9)
POTASSIUM SERPL-SCNC: 3.3 MMOL/L (ref 3.5–5.1)
PROT SERPL-MCNC: 5.9 G/DL (ref 6.4–8.2)
RBC # BLD AUTO: 4.46 M/UL (ref 4.1–5.7)
SERVICE CMNT-IMP: ABNORMAL
SERVICE CMNT-IMP: NORMAL
SODIUM SERPL-SCNC: 139 MMOL/L (ref 136–145)
WBC # BLD AUTO: 4.8 K/UL (ref 4.1–11.1)

## 2019-12-02 PROCEDURE — 85025 COMPLETE CBC W/AUTO DIFF WBC: CPT

## 2019-12-02 PROCEDURE — 74011250637 HC RX REV CODE- 250/637: Performed by: FAMILY MEDICINE

## 2019-12-02 PROCEDURE — 74011000250 HC RX REV CODE- 250: Performed by: FAMILY MEDICINE

## 2019-12-02 PROCEDURE — 82550 ASSAY OF CK (CPK): CPT

## 2019-12-02 PROCEDURE — 36415 COLL VENOUS BLD VENIPUNCTURE: CPT

## 2019-12-02 PROCEDURE — 74011636637 HC RX REV CODE- 636/637: Performed by: FAMILY MEDICINE

## 2019-12-02 PROCEDURE — 97530 THERAPEUTIC ACTIVITIES: CPT

## 2019-12-02 PROCEDURE — 82962 GLUCOSE BLOOD TEST: CPT

## 2019-12-02 PROCEDURE — 65660000000 HC RM CCU STEPDOWN

## 2019-12-02 PROCEDURE — 74011250636 HC RX REV CODE- 250/636: Performed by: FAMILY MEDICINE

## 2019-12-02 PROCEDURE — 94760 N-INVAS EAR/PLS OXIMETRY 1: CPT

## 2019-12-02 PROCEDURE — 80053 COMPREHEN METABOLIC PANEL: CPT

## 2019-12-02 PROCEDURE — 97116 GAIT TRAINING THERAPY: CPT

## 2019-12-02 RX ORDER — POTASSIUM CHLORIDE 750 MG/1
10 TABLET, FILM COATED, EXTENDED RELEASE ORAL 2 TIMES DAILY
Status: DISCONTINUED | OUTPATIENT
Start: 2019-12-02 | End: 2019-12-03

## 2019-12-02 RX ADMIN — ASPIRIN 81 MG 81 MG: 81 TABLET ORAL at 08:47

## 2019-12-02 RX ADMIN — Medication 10 ML: at 21:33

## 2019-12-02 RX ADMIN — HEPARIN SODIUM 5000 UNITS: 5000 INJECTION INTRAVENOUS; SUBCUTANEOUS at 21:32

## 2019-12-02 RX ADMIN — Medication 10 ML: at 14:00

## 2019-12-02 RX ADMIN — VITAMIN D, TAB 1000IU (100/BT) 1 TABLET: 25 TAB at 08:47

## 2019-12-02 RX ADMIN — CARVEDILOL 12.5 MG: 12.5 TABLET, FILM COATED ORAL at 18:35

## 2019-12-02 RX ADMIN — CARVEDILOL 12.5 MG: 12.5 TABLET, FILM COATED ORAL at 08:47

## 2019-12-02 RX ADMIN — INSULIN HUMAN 10 UNITS: 100 INJECTION, SUSPENSION SUBCUTANEOUS at 18:37

## 2019-12-02 RX ADMIN — HEPARIN SODIUM 5000 UNITS: 5000 INJECTION INTRAVENOUS; SUBCUTANEOUS at 15:18

## 2019-12-02 RX ADMIN — POTASSIUM CHLORIDE 10 MEQ: 750 TABLET, FILM COATED, EXTENDED RELEASE ORAL at 08:47

## 2019-12-02 RX ADMIN — HEPARIN SODIUM 5000 UNITS: 5000 INJECTION INTRAVENOUS; SUBCUTANEOUS at 05:34

## 2019-12-02 RX ADMIN — POTASSIUM CHLORIDE 10 MEQ: 750 TABLET, FILM COATED, EXTENDED RELEASE ORAL at 18:35

## 2019-12-02 RX ADMIN — INSULIN HUMAN 10 UNITS: 100 INJECTION, SUSPENSION SUBCUTANEOUS at 08:47

## 2019-12-02 RX ADMIN — LOSARTAN POTASSIUM 25 MG: 25 TABLET, FILM COATED ORAL at 08:47

## 2019-12-02 RX ADMIN — SODIUM CHLORIDE 25 ML/HR: 900 INJECTION, SOLUTION INTRAVENOUS at 06:32

## 2019-12-02 NOTE — PROGRESS NOTES
12/2/2019   CARE MANAGEMENT NOTE:  CM reviewed EMR and handoff received from the weekend . Pt was admitted with dx of Rhabdo and falls. Reportedly, pt resides with his wife and handicapped son. Transition Plan of Care:  1. CM met with pt and wife at bedside to discuss SNF as an option (choices are [id number], [facility], and [facility]). Pt is somewhat reluctant however he will allow me to explore SNF availability. [address] [facility] (skilled nursing, PT, OT) was arranged prior to hospital admission. CM will continue to follow pt for definitive discharge plan.   Antonette

## 2019-12-02 NOTE — ROUTINE PROCESS
Bedside and Verbal shift change report given to Júnior Prakash 69 (oncoming nurse) by Leslie Garcia RN (offgoing nurse). Report included the following information SBAR, Kardex, MAR, Accordion and Med Rec Status.

## 2019-12-02 NOTE — PROGRESS NOTES
Problem: Self Care Deficits Care Plan (Adult)  Goal: *Acute Goals and Plan of Care (Insert Text)  Description    FUNCTIONAL STATUS PRIOR TO ADMISSION: Patient was modified independent using a rolling walker for functional mobility. HOME SUPPORT: The patient lived with spouse but did not require assist.    Occupational Therapy Goals  Initiated 12/1/2019  1. Patient will perform grooming with supervision/set-up within 7 day(s). 2.  Patient will perform lower body dressing with supervision/set-up within 7 day(s). 3.  Patient will perform toilet transfers with minimum assistance within 7 day(s). 4.  Patient will perform all aspects of toileting with minimal assistance/contact guard assist within 7 day(s). 5.  Patient will participate in upper extremity therapeutic exercise/activities with supervision/set-up for 10 minutes within 7 day(s). 6.  Patient will utilize energy conservation techniques during functional activities with verbal cues within 7 day(s). Outcome: Progressing Towards Goal   OCCUPATIONAL THERAPY TREATMENT  Patient: Michael Harris Sr. (81 y.o. male)  Date: 12/2/2019  Diagnosis: [facility] [T79. Jacklyn Velasquez. XXXA]   <principal problem not specified>       Precautions: Bed Alarm, Fall  Chart, occupational therapy assessment, plan of care, and goals were reviewed. ASSESSMENT  Patient continues with skilled OT services and is progressing towards goals. Pts wife present for treatment and verbalized that pt \"falls a lot\" at home and with this last episode pt lay on the floor for 4 days because she was unable to get him up. Today after pt sat edge of bed he needed 2 assist for sit to  prep for functional tasks. He verbalizes pain lower right back/hip area. Pt needs assist x 2 to transfer to [facility] and when he side stepped he had LOB x 1. Pt encouraged to engage with UE exercises to increase strength and endurance for functional tasks.  O2 sats 97% on room air however pt has a raspy cough which his wife states has been present for a week or more. Current Level of Function Impacting Discharge (ADLs): Moderate assist LB bath and dress    Other factors to consider for discharge: Pt lives with his wife who is unable to assist him safely or consistently         PLAN :  Patient continues to benefit from skilled intervention to address the above impairments. Continue treatment per established plan of care. to address goals. Recommend with staff: OOB to chair as tolerated, BSC for safety    Recommend next OT session: Toileting, LB dressing    Recommendation for discharge: (in order for the patient to meet his/her long term goals)  Therapy up to 5 days/week in SNF setting    This discharge recommendation:  Has not yet been discussed the attending provider and/or case management    IF patient discharges home will need the following DME: none       SUBJECTIVE:   Patient stated Raad Elizabeth is so concerned about cleaning.     OBJECTIVE DATA SUMMARY:   Cognitive/Behavioral Status:  Neurologic State: Alert;Confused  Orientation Level: Oriented to person;Oriented to place;Oriented to situation;Disoriented to time  Cognition: Follows commands             Functional Mobility and Transfers for ADLs:  Bed Mobility:  Supine to Sit: Minimum assistance  Sit to Supine: Contact guard assistance  Scooting: Contact guard assistance    Transfers:  Sit to Stand: Minimum assistance;Assist x2; Additional time          Balance:  Sitting: Intact  Standing: Impaired; With support  Standing - Static: Constant support; Fair  Standing - Dynamic : Fair    ADL Intervention:     Moderate assist LB bath and dress          Therapeutic Exercises:   Pt engaged with one set of chest presses, encouraged to engage with UE exercises to increase strength and endurance for functional tasks. Activity Tolerance:   Fair  Please refer to the flowsheet for vital signs taken during this treatment.     After treatment patient left in no apparent distress:   Call bell within reach    COMMUNICATION/COLLABORATION:   The patients plan of care was discussed with: Occupational Therapist and Registered Nurse    RUBINA Yousif/L  Time Calculation: 30 mins

## 2019-12-02 NOTE — PROGRESS NOTES
Spiritual Care Partner Volunteer visited patient in 5 Med Surg on 12/2/19.   Documented by:  Visited by: Sylvie Cat, MS., [id number] [facility] Janet ([id number])

## 2019-12-02 NOTE — PROGRESS NOTES
Problem: Mobility Impaired (Adult and Pediatric)  Goal: *Acute Goals and Plan of Care (Insert Text)  Description  FUNCTIONAL STATUS PRIOR TO ADMISSION: patient not a good historian    HOME SUPPORT PRIOR TO ADMISSION: The patient lived with wife and 47 yo disabled son. Physical Therapy Goals  Initiated 12/1/2019  1. Patient will move from supine to sit and sit to supine , scoot up and down and roll side to side in bed with minimal assistance/contact guard assist within 7 day(s). 2.  Patient will transfer from bed to chair and chair to bed with minimal assistance/contact guard assist using the least restrictive device within 7 day(s). 3.  Patient will perform sit to stand with minimal assistance/contact guard assist within 7 day(s). 4.  Patient will ambulate with minimal assistance/contact guard assist for 25 feet with the least restrictive device within 7 day(s). Outcome: Progressing Towards Goal  Note:   PHYSICAL THERAPY TREATMENT  Patient: Josh Mackenzie Sr. (81 y.o. male)  Date: 12/2/2019  Diagnosis: Traumatic rhabdomyolysis ([facility] [T79. Janelle Parkinson. XXXA]   <principal problem not specified>       Precautions: Bed Alarm, Fall  Chart, physical therapy assessment, plan of care and goals were reviewed. ASSESSMENT  Patient continues with skilled PT services and progressing towards goals. Pt reports R sore hip/buttocks with bed mobility with Min A to come to sitting. 5 x sit<>stand with increased time and redirection to task. Gait training using RW with Mod A for steadying, demonstrates uneven step length, taking large step with LLE, no knee buckling noted. Pt demonstrated LOB with attempted side stepping toward HOB toward R and sat without warning. PT reported \"feeling tired\" with limited activity     Wife at bedside,she reports he often losing his balance \"like that\" but can usually assist her to get up.  Wife reports she was home with patient after he fell but she was unable to get him up and waited 4 days before calling non-emergency number rescue crew to assist pt up and Non-emergency crew advised to bring pt to hospital.      Current Level of Function Impacting Discharge (mobility/balance): Mod A     Other factors to consider for discharge: fall history, wife unable to assist patient safely with mobility         PLAN :  Patient continues to benefit from skilled intervention to address the above impairments. Continue treatment per established plan of care. to address goals. Recommendation for discharge: (in order for the patient to meet his/her long term goals)  Therapy up to 5 days/week in SNF setting    This discharge recommendation:  Has been made in collaboration with the attending provider and/or case management    IF patient discharges home will need the following DME: to be determined (TBD)       SUBJECTIVE:   Patient stated Yissel [place] don't know how or why I fall, it just happens    OBJECTIVE DATA SUMMARY:   Critical Behavior:  Neurologic State: Alert, Confused  Orientation Level: Oriented to person, Oriented to place, Oriented to situation, Disoriented to time  Cognition: Follows commands  Safety/Judgement: Awareness of environment  Functional Mobility Training:  Bed Mobility:     Supine to Sit: Minimum assistance  Sit to Supine: Contact guard assistance  Scooting: Contact guard assistance        Transfers:  Sit to Stand: Minimum assistance;Assist x2; Additional time  Stand to Sit: Minimum assistance;Assist x2; Additional time                             Balance:  Sitting: Intact  Standing: Impaired; With support  Standing - Static: Constant support; Fair  Standing - Dynamic : Fair  Ambulation/Gait Training:  Distance (ft): 20 Feet (ft)  Assistive Device: Walker, rolling;Gait belt  Ambulation - Level of Assistance: Moderate assistance;Assist x1;Additional time        Gait Abnormalities: Decreased step clearance; Step to gait        Base of Support: Narrowed                             Stairs: Therapeutic Exercises:     Pain Ratin/10 R hip/buttock     Activity Tolerance:   Fair  Please refer to the flowsheet for vital signs taken during this treatment.     After treatment patient left in no apparent distress:   Supine in bed, Call bell within reach, Bed / chair alarm activated, and Caregiver / family present    COMMUNICATION/COLLABORATION:   The patients plan of care was discussed with: Registered Nurse    Julissa Hong   Time Calculation: 30 mins

## 2019-12-02 NOTE — PROGRESS NOTES
Daily Progress Note: 12/2/2019  Lisa Arreguin, Chapito Nguyen MD    Assessment/Plan:   Traumatic rhabdomyolysis  -- Fluids as needed  -- Serial CK levels   -- Hold statin     S/p fall  -- fall precautions  -- neuro checks  -- OT/PT and likely rehab     Bilateral knee and right hip pain  -- Xray knee with OA  -- Right hip Xray neg for Fx     Type 2 DM with neuropathy  -- Humalog insulin correctional coverage  -- Restarted NPH but only at 10 units BID  -- BS ac and HS  -- A1c 6.8 on 12/1/19     Generalized weakness/ physical debility  -- Consulted PT - likely needs rehab     CAD  -- check troponin    Hypokalemia  -- Replete and monitor     VTE prophylaxis  -- SCDs     Disp  -- Case management for Rehab placement        Problem List:  Problem List as of 12/2/2019 Date Reviewed: 10/14/2019          Codes Class Noted - Resolved    Fall ICD-10-CM: W19. Samuel Rodriguez  ICD-9-CM: P708.3  11/29/2019 - Present        Traumatic rhabdomyolysis ([facility] ICD-10-CM: T79. 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 is a 70 y.o. white male with past medical history of CAD, melanoma, depression, diabetic neuropathy, ED, and type 2 DM presented to the ED from home with chief complaints of fall with right hip and bilateral knee pain. Patient is a limited historian. He provided limited details regarding recent ground level fall at home. He had poor recall; loss of consciousness was not definitively known. Patient was found with uncertain down time on the ground. Pain in right hip was severe, constant, aggravated with touch or movement with onset after fall. On arrival in the ED, workup included CT head which was negative. CK= 3,365. ED requested admission to the hospitalist service. There were no reports of new onset syncope, loss of consciousness, headache, neck pain, visual disturbance, numbness, paresthesias, focal weakness, chest pain, shortness of breath, cough, palpitations, abdominal pain, nausea, vomiting, diarrhea, melena, dysuria, hematuria, calf pain, increased leg swelling/ edema, fever, chills, or rash. (Dr Teri Chan)    11/30: He is not sure what happened to bring him in. He is having right hip and knee pain. Xrays are neg. Blood sugars are good but he reports that he has not eaten. Will restart NPH but at a lower dose than at home. CK is a little better. PT consulted. 12/1: Nurses report he is max assist and quite weak. Will ask case management to see for rehab placement. CK down so will decrease IVF and replete K+. BS are stable. Continue NPH. A1c pending. Tried calling family yesterday and this am but no answer at home number. 12/2:  Still weak and not fully oriented at times - did not know his location this AM.  No specific complaint except \"feeling bad. \"  Will likely need rehab. CK coming down rapidly. K+ a little low - replacing. Review of Systems:   Review of systems not obtained due to patient factors.     Objective:   Physical Exam:   Visit Vitals  BP (!) 139/96 (BP 1 Location: Left arm, BP Patient Position: Lying right side)   Pulse 62 Temp 98.1 °F (36.7 °C)   Resp 16   Ht 6' (1.829 m)   Wt 99.8 kg (220 lb)   SpO2 95%   BMI 29.84 kg/m²      O2 Device: Room air  Temp (24hrs), Av.3 °F (36.8 °C), Min:97.9 °F (36.6 °C), Max:98.7 °F (37.1 °C)    1901 -  07  In: 0   Out: 275 [Urine:275]   701 - 1900  In: 2583.3 [I.V.:2583.3]  Out: 3913 [Urine:1175]    General:  Alert, cooperative, no distress, appears stated age. Head:  Normocephalic, without obvious abnormality, atraumatic. Eyes:  Conjunctivae/corneas clear. EOMs intact. Throat: Lips, mucosa, and tongue normal. Teeth and gums normal.   Neck: Supple, symmetrical, trachea midline, no adenopathy, thyroid: no enlargement/tenderness/nodules, no carotid bruit and no JVD. Lungs:   Clear to auscultation bilaterally. Chest wall:  No tenderness or deformity. Heart:  Regular rate and rhythm, S1, S2 normal, no murmur, click, rub or gallop. Abdomen:   Soft, non-tender. Bowel sounds normal. No masses,  No organomegaly. Extremities: Extremities with abrasions of both knees, able to rotate right hip without difficulty, no cyanosis or edema. No calf tenderness or cords. Pulses: 2+ and symmetric all extremities. Skin:  turgor normal. No rashes    Neurologic:  Alert and oriented X 2. Fine motor of hands and fingers normal.   equal.  No cogwheeling or rigidity. Gait not tested at this time. Sensation grossly normal to touch. Gross motor of extremities normal.       Data Review:       EXAM: CT HEAD WO CONT 19  INDICATION: Ground-level fall and down for a couple of hours. Generalized  weakness and confusion. COMPARISON: CT head on 2019. FINDINGS: Motion artifact obscures fine detail and limits sensitivity for  detecting pathology. The ventricles and sulci are age-appropriate without  hydrocephalus. There is no mass effect or midline shift. There is no  intracranial hemorrhage or extra-axial fluid collection.  There is no abnormal  area of decreased density to suggest infarct. Pineal gland is calcified. The calvarium is intact. The visualized paranasal sinuses and mastoid air cells  are clear. IMPRESSION:   No acute intracranial abnormality on this noncontrast head CT. Limited by motion  artifact. No significant change since 2 days ago.        Recent Days:  Recent Labs     12/02/19 0325 12/01/19 0303 11/29/19 2000   WBC 4.8 5.4 6.5   HGB 13.1 13.9 15.4   HCT 39.7 41.0 45.5    195 236     Recent Labs     12/02/19 0325 12/01/19 0303 11/29/19 2000    137 135*   K 3.3* 3.3* 3.6   * 109* 105   CO2 22 21 21   GLU 85 120* 198*   BUN 11 14 22*   CREA 0.64* 0.76 0.86   CA 7.8* 7.7* 8.3*   MG  --  2.0 2.3   ALB 2.5* 2.8* 3.2*   TBILI 0.8 0.8 0.9   SGOT 48* 64* 101*   ALT 32 38 43     No results for input(s): PH, PCO2, PO2, HCO3, FIO2 in the last 72 hours.     24 Hour Results:  Recent Results (from the past 24 hour(s))   GLUCOSE, POC    Collection Time: 12/01/19  7:02 AM   Result Value Ref Range    Glucose (POC) 122 (H) 65 - 100 mg/dL    Performed by Rito Jones (PCT)    GLUCOSE, POC    Collection Time: 12/01/19 11:22 AM   Result Value Ref Range    Glucose (POC) 115 (H) 65 - 100 mg/dL    Performed by Kia Carreno (PCT)    GLUCOSE, POC    Collection Time: 12/01/19  4:33 PM   Result Value Ref Range    Glucose (POC) 142 (H) 65 - 100 mg/dL    Performed by [id number], POC    Collection Time: 12/01/19  8:51 PM   Result Value Ref Range    Glucose (POC) 135 (H) 65 - 100 mg/dL    Performed by Rito Jones (PCT)    CBC WITH AUTOMATED DIFF    Collection Time: 12/02/19  3:25 AM   Result Value Ref Range    WBC 4.8 4.1 - 11.1 K/uL    RBC 4.46 4.10 - 5.70 M/uL    HGB 13.1 12.1 - 17.0 g/dL    HCT 39.7 36.6 - 50.3 %    MCV 89.0 80.0 - 99.0 FL    MCH 29.4 26.0 - 34.0 PG    MCHC 33.0 30.0 - 36.5 g/dL    RDW 12.6 11.5 - 14.5 %    PLATELET 032 484 - 147 K/uL    MPV 9.2 8.9 - 12.9 FL    NRBC 0.0 0  WBC    ABSOLUTE NRBC 0.00 0.00 - 0.01 K/uL NEUTROPHILS 51 32 - 75 %    LYMPHOCYTES 36 12 - 49 %    MONOCYTES 12 5 - 13 %    EOSINOPHILS 1 0 - 7 %    BASOPHILS 0 0 - 1 %    IMMATURE GRANULOCYTES 0 0.0 - 0.5 %    ABS. NEUTROPHILS 2.4 1.8 - 8.0 K/UL    ABS. LYMPHOCYTES 1.7 0.8 - 3.5 K/UL    ABS. MONOCYTES 0.6 0.0 - 1.0 K/UL    ABS. EOSINOPHILS 0.1 0.0 - 0.4 K/UL    ABS. BASOPHILS 0.0 0.0 - 0.1 K/UL    ABS. IMM. GRANS. 0.0 0.00 - 0.04 K/UL    DF AUTOMATED     METABOLIC PANEL, COMPREHENSIVE    Collection Time: 12/02/19  3:25 AM   Result Value Ref Range    Sodium 139 136 - 145 mmol/L    Potassium 3.3 (L) 3.5 - 5.1 mmol/L    Chloride 109 (H) 97 - 108 mmol/L    CO2 22 21 - 32 mmol/L    Anion gap 8 5 - 15 mmol/L    Glucose 85 65 - 100 mg/dL    BUN 11 6 - 20 MG/DL    Creatinine 0.64 (L) 0.70 - 1.30 MG/DL    BUN/Creatinine ratio 17 12 - 20      GFR est AA >60 >60 ml/min/1.73m2    GFR est non-AA >60 >60 ml/min/1.73m2    Calcium 7.8 (L) 8.5 - 10.1 MG/DL    Bilirubin, total 0.8 0.2 - 1.0 MG/DL    ALT (SGPT) 32 12 - 78 U/L    AST (SGOT) 48 (H) 15 - 37 U/L    Alk.  phosphatase 41 (L) 45 - 117 U/L    Protein, total 5.9 (L) 6.4 - 8.2 g/dL    Albumin 2.5 (L) 3.5 - 5.0 g/dL    Globulin 3.4 2.0 - 4.0 g/dL    A-G Ratio 0.7 (L) 1.1 - 2.2     CK    Collection Time: 12/02/19  3:25 AM   Result Value Ref Range     (H) 39 - 308 U/L     Medications reviewed  Current Facility-Administered Medications   Medication Dose Route Frequency    aspirin chewable tablet 81 mg  81 mg Oral DAILY    carvedilol (COREG) tablet 12.5 mg  12.5 mg Oral BID WITH MEALS    cholecalciferol (VITAMIN D3) (1000 Units /25 mcg) tablet 1 Tab  1,000 Units Oral DAILY    lidocaine 4 % patch 1 Patch  1 Patch TransDERmal DAILY    losartan (COZAAR) tablet 25 mg  25 mg Oral DAILY    sodium chloride (NS) flush 5-40 mL  5-40 mL IntraVENous Q8H    sodium chloride (NS) flush 5-40 mL  5-40 mL IntraVENous PRN    heparin (porcine) injection 5,000 Units  5,000 Units SubCUTAneous Q8H    insulin NPH (Estefania David N, HUMULIN N) injection 10 Units  10 Units SubCUTAneous ACB&D    0.9% sodium chloride infusion  75 mL/hr IntraVENous CONTINUOUS       Care Plan discussed with: Patient/Family  Total time spent with patient and review of records: 30 minutes.   Neil Barragan MD

## 2019-12-03 ENCOUNTER — APPOINTMENT (OUTPATIENT)
Dept: MRI IMAGING | Age: 79
DRG: 566 | End: 2019-12-03
Attending: PSYCHIATRY & NEUROLOGY
Payer: MEDICARE

## 2019-12-03 LAB
ALBUMIN SERPL-MCNC: 2.7 G/DL (ref 3.5–5)
ALBUMIN/GLOB SERPL: 0.7 {RATIO} (ref 1.1–2.2)
ALP SERPL-CCNC: 44 U/L (ref 45–117)
ALT SERPL-CCNC: 34 U/L (ref 12–78)
ANION GAP SERPL CALC-SCNC: 8 MMOL/L (ref 5–15)
APPEARANCE UR: CLEAR
AST SERPL-CCNC: 37 U/L (ref 15–37)
BASOPHILS # BLD: 0 K/UL (ref 0–0.1)
BASOPHILS NFR BLD: 0 % (ref 0–1)
BILIRUB SERPL-MCNC: 1 MG/DL (ref 0.2–1)
BILIRUB UR QL: NEGATIVE
BUN SERPL-MCNC: 11 MG/DL (ref 6–20)
BUN/CREAT SERPL: 16 (ref 12–20)
CALCIUM SERPL-MCNC: 8.1 MG/DL (ref 8.5–10.1)
CHLORIDE SERPL-SCNC: 106 MMOL/L (ref 97–108)
CK SERPL-CCNC: 338 U/L (ref 39–308)
CO2 SERPL-SCNC: 22 MMOL/L (ref 21–32)
COLOR UR: ABNORMAL
CREAT SERPL-MCNC: 0.68 MG/DL (ref 0.7–1.3)
DIFFERENTIAL METHOD BLD: NORMAL
EOSINOPHIL # BLD: 0.1 K/UL (ref 0–0.4)
EOSINOPHIL NFR BLD: 1 % (ref 0–7)
ERYTHROCYTE [DISTWIDTH] IN BLOOD BY AUTOMATED COUNT: 12.8 % (ref 11.5–14.5)
GLOBULIN SER CALC-MCNC: 3.9 G/DL (ref 2–4)
GLUCOSE BLD STRIP.AUTO-MCNC: 112 MG/DL (ref 65–100)
GLUCOSE BLD STRIP.AUTO-MCNC: 172 MG/DL (ref 65–100)
GLUCOSE BLD STRIP.AUTO-MCNC: 182 MG/DL (ref 65–100)
GLUCOSE BLD STRIP.AUTO-MCNC: 244 MG/DL (ref 65–100)
GLUCOSE SERPL-MCNC: 134 MG/DL (ref 65–100)
GLUCOSE UR STRIP.AUTO-MCNC: >1000 MG/DL
HCT VFR BLD AUTO: 39.9 % (ref 36.6–50.3)
HGB BLD-MCNC: 13.9 G/DL (ref 12.1–17)
HGB UR QL STRIP: NEGATIVE
IMM GRANULOCYTES # BLD AUTO: 0 K/UL (ref 0–0.04)
IMM GRANULOCYTES NFR BLD AUTO: 0 % (ref 0–0.5)
KETONES UR QL STRIP.AUTO: 15 MG/DL
LEUKOCYTE ESTERASE UR QL STRIP.AUTO: NEGATIVE
LYMPHOCYTES # BLD: 1.9 K/UL (ref 0.8–3.5)
LYMPHOCYTES NFR BLD: 27 % (ref 12–49)
MCH RBC QN AUTO: 29.6 PG (ref 26–34)
MCHC RBC AUTO-ENTMCNC: 34.8 G/DL (ref 30–36.5)
MCV RBC AUTO: 85.1 FL (ref 80–99)
MONOCYTES # BLD: 0.8 K/UL (ref 0–1)
MONOCYTES NFR BLD: 11 % (ref 5–13)
NEUTS SEG # BLD: 4.2 K/UL (ref 1.8–8)
NEUTS SEG NFR BLD: 61 % (ref 32–75)
NITRITE UR QL STRIP.AUTO: NEGATIVE
NRBC # BLD: 0 K/UL (ref 0–0.01)
NRBC BLD-RTO: 0 PER 100 WBC
PH UR STRIP: 6 [PH] (ref 5–8)
PLATELET # BLD AUTO: 202 K/UL (ref 150–400)
PMV BLD AUTO: 9.3 FL (ref 8.9–12.9)
POTASSIUM SERPL-SCNC: 3.3 MMOL/L (ref 3.5–5.1)
PROT SERPL-MCNC: 6.6 G/DL (ref 6.4–8.2)
PROT UR STRIP-MCNC: NEGATIVE MG/DL
RBC # BLD AUTO: 4.69 M/UL (ref 4.1–5.7)
SERVICE CMNT-IMP: ABNORMAL
SODIUM SERPL-SCNC: 136 MMOL/L (ref 136–145)
SP GR UR REFRACTOMETRY: 1.01 (ref 1–1.03)
UR CULT HOLD, URHOLD: NORMAL
UROBILINOGEN UR QL STRIP.AUTO: 1 EU/DL (ref 0.2–1)
WBC # BLD AUTO: 7 K/UL (ref 4.1–11.1)

## 2019-12-03 PROCEDURE — 97530 THERAPEUTIC ACTIVITIES: CPT

## 2019-12-03 PROCEDURE — 74011250636 HC RX REV CODE- 250/636: Performed by: PSYCHIATRY & NEUROLOGY

## 2019-12-03 PROCEDURE — 82962 GLUCOSE BLOOD TEST: CPT

## 2019-12-03 PROCEDURE — 81003 URINALYSIS AUTO W/O SCOPE: CPT

## 2019-12-03 PROCEDURE — 97535 SELF CARE MNGMENT TRAINING: CPT

## 2019-12-03 PROCEDURE — 74011250636 HC RX REV CODE- 250/636: Performed by: FAMILY MEDICINE

## 2019-12-03 PROCEDURE — 80053 COMPREHEN METABOLIC PANEL: CPT

## 2019-12-03 PROCEDURE — 82550 ASSAY OF CK (CPK): CPT

## 2019-12-03 PROCEDURE — 95816 EEG AWAKE AND DROWSY: CPT | Performed by: PSYCHIATRY & NEUROLOGY

## 2019-12-03 PROCEDURE — 36415 COLL VENOUS BLD VENIPUNCTURE: CPT

## 2019-12-03 PROCEDURE — 94760 N-INVAS EAR/PLS OXIMETRY 1: CPT

## 2019-12-03 PROCEDURE — 74011000258 HC RX REV CODE- 258: Performed by: PSYCHIATRY & NEUROLOGY

## 2019-12-03 PROCEDURE — 74011250637 HC RX REV CODE- 250/637: Performed by: FAMILY MEDICINE

## 2019-12-03 PROCEDURE — 74011636637 HC RX REV CODE- 636/637: Performed by: FAMILY MEDICINE

## 2019-12-03 PROCEDURE — 85025 COMPLETE CBC W/AUTO DIFF WBC: CPT

## 2019-12-03 PROCEDURE — 74011000250 HC RX REV CODE- 250: Performed by: FAMILY MEDICINE

## 2019-12-03 PROCEDURE — 65660000000 HC RM CCU STEPDOWN

## 2019-12-03 PROCEDURE — 70551 MRI BRAIN STEM W/O DYE: CPT

## 2019-12-03 RX ORDER — POTASSIUM CHLORIDE 750 MG/1
20 TABLET, FILM COATED, EXTENDED RELEASE ORAL 2 TIMES DAILY
Status: DISCONTINUED | OUTPATIENT
Start: 2019-12-03 | End: 2019-12-06 | Stop reason: HOSPADM

## 2019-12-03 RX ORDER — THIAMINE HYDROCHLORIDE 100 MG/ML
100 INJECTION, SOLUTION INTRAMUSCULAR; INTRAVENOUS DAILY
Status: DISCONTINUED | OUTPATIENT
Start: 2019-12-03 | End: 2019-12-03 | Stop reason: SDUPTHER

## 2019-12-03 RX ADMIN — CARVEDILOL 12.5 MG: 12.5 TABLET, FILM COATED ORAL at 08:58

## 2019-12-03 RX ADMIN — CARVEDILOL 12.5 MG: 12.5 TABLET, FILM COATED ORAL at 17:12

## 2019-12-03 RX ADMIN — Medication 10 ML: at 14:30

## 2019-12-03 RX ADMIN — HEPARIN SODIUM 5000 UNITS: 5000 INJECTION INTRAVENOUS; SUBCUTANEOUS at 06:03

## 2019-12-03 RX ADMIN — Medication 10 ML: at 06:03

## 2019-12-03 RX ADMIN — INSULIN HUMAN 10 UNITS: 100 INJECTION, SUSPENSION SUBCUTANEOUS at 08:58

## 2019-12-03 RX ADMIN — LOSARTAN POTASSIUM 25 MG: 25 TABLET, FILM COATED ORAL at 08:58

## 2019-12-03 RX ADMIN — HEPARIN SODIUM 5000 UNITS: 5000 INJECTION INTRAVENOUS; SUBCUTANEOUS at 21:00

## 2019-12-03 RX ADMIN — VITAMIN D, TAB 1000IU (100/BT) 1 TABLET: 25 TAB at 08:57

## 2019-12-03 RX ADMIN — POTASSIUM CHLORIDE 20 MEQ: 750 TABLET, FILM COATED, EXTENDED RELEASE ORAL at 17:12

## 2019-12-03 RX ADMIN — THIAMINE HYDROCHLORIDE 100 MG: 100 INJECTION, SOLUTION INTRAMUSCULAR; INTRAVENOUS at 09:54

## 2019-12-03 RX ADMIN — INSULIN HUMAN 10 UNITS: 100 INJECTION, SUSPENSION SUBCUTANEOUS at 17:11

## 2019-12-03 RX ADMIN — POTASSIUM CHLORIDE 20 MEQ: 750 TABLET, FILM COATED, EXTENDED RELEASE ORAL at 08:58

## 2019-12-03 RX ADMIN — ASPIRIN 81 MG 81 MG: 81 TABLET ORAL at 08:58

## 2019-12-03 RX ADMIN — HEPARIN SODIUM 5000 UNITS: 5000 INJECTION INTRAVENOUS; SUBCUTANEOUS at 14:34

## 2019-12-03 RX ADMIN — SODIUM CHLORIDE 25 ML/HR: 900 INJECTION, SOLUTION INTRAVENOUS at 20:56

## 2019-12-03 RX ADMIN — Medication 10 ML: at 21:01

## 2019-12-03 NOTE — PROGRESS NOTES
Problem: Falls - Risk of  Goal: *Absence of Falls  Description  Document Zulma Hawk Fall Risk and appropriate interventions in the flowsheet. Outcome: Progressing Towards Goal  Note: Fall Risk Interventions:  Mobility Interventions: Communicate number of staff needed for ambulation/transfer, OT consult for ADLs, Patient to call before getting OOB, PT Consult for mobility concerns, PT Consult for assist device competence, Utilize walker, cane, or other assistive device    Mentation Interventions: Adequate sleep, hydration, pain control, Door open when patient unattended, Bed/chair exit alarm, Increase mobility, More frequent rounding, Reorient patient, Toileting rounds, Update white board, Room close to nurse's station    Medication Interventions: Assess postural VS orthostatic hypotension, Patient to call before getting OOB, Teach patient to arise slowly, Utilize gait belt for transfers/ambulation, Bed/chair exit alarm    Elimination Interventions: Call light in reach, Patient to call for help with toileting needs, Stay With Me (per policy), Toileting schedule/hourly rounds, Urinal in reach, Bed/chair exit alarm    History of Falls Interventions: Bed/chair exit alarm, Door open when patient unattended, Investigate reason for fall, Room close to nurse's station, Utilize gait belt for transfer/ambulation, Vital signs minimum Q4HRs X 24 hrs (comment for end date)         Problem: Patient Education: Go to Patient Education Activity  Goal: Patient/Family Education  Outcome: Progressing Towards Goal     Problem: Pressure Injury - Risk of  Goal: *Prevention of pressure injury  Description  Document Savage Scale and appropriate interventions in the flowsheet.   Outcome: Progressing Towards Goal  Note: Pressure Injury Interventions:       Moisture Interventions: Absorbent underpads, Check for incontinence Q2 hours and as needed, Internal/External urinary devices, Limit adult briefs, Maintain skin hydration (lotion/cream), Minimize layers, Moisture barrier, Offer toileting Q_hr, Apply protective barrier, creams and emollients    Activity Interventions: Increase time out of bed, Pressure redistribution bed/mattress(bed type), PT/OT evaluation    Mobility Interventions: HOB 30 degrees or less, Pressure redistribution bed/mattress (bed type), PT/OT evaluation, Turn and reposition approx.  every two hours(pillow and wedges)    Nutrition Interventions: Document food/fluid/supplement intake, Offer support with meals,snacks and hydration    Friction and Shear Interventions: Apply protective barrier, creams and emollients, Lift sheet, Lift team/patient mobility team, Minimize layers, HOB 30 degrees or less                Problem: Patient Education: Go to Patient Education Activity  Goal: Patient/Family Education  Outcome: Progressing Towards Goal

## 2019-12-03 NOTE — PROGRESS NOTES
Daily Progress Note: 12/3/2019  Eugenia Valles MD    Assessment/Plan:   Traumatic rhabdomyolysis  -- Fluids as needed  -- Serial CK levels   -- Hold statin     S/p fall  -- fall precautions  -- OT/PT and likely rehab     Bilateral knee and right hip pain  -- Xray knee with OA  -- Right hip Xray neg for Fx     Type 2 DM with neuropathy  -- Humalog insulin correctional coverage  -- Restarted NPH but only at 10 units BID  -- BS ac and HS  -- A1c 6.8 on 12/1/19     Generalized weakness/ physical debility  -- Consulted PT - likely needs rehab    Altered Mental status - unk cause; likely some underlying dementia  -- consulted Neuro     CAD  -- check troponin    Hypokalemia  -- Replete and monitor     VTE prophylaxis  -- SCDs     Disp  -- Case management for Rehab placement        Problem List:  Problem List as of 12/3/2019 Date Reviewed: 10/14/2019          Codes Class Noted - Resolved    Fall ICD-10-CM: W19Janet Caldwell  ICD-9-CM: Y480.1  11/29/2019 - Present        Traumatic rhabdomyolysis ([address] ICD-10-CM: T79. 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  - PCI RCA with PTCA, significant LAD disease  - suspected reocclusion several hrs later with VF, IABP placed  3v CABG July 2009 Yung Oliva @ [facility])  - LIMA-LAD, VG-OM, VG-PDA             Type 2 diabetes mellitus without complication ([address] [id number]: E11.9  ICD-9-CM: 250.00  Unknown - Present        Hypertension, benign ICD-10-CM: I10  ICD-9-CM: 401.1  Unknown - Present        RESOLVED: PND (paroxysmal nocturnal dyspnea) ICD-10-CM: R06.00  ICD-9-CM: 786.09  8/13/2017 - 8/11/2018        RESOLVED: Near syncope ICD-10-CM: R55  ICD-9-CM: 780.2  10/29/2013 - 4/15/2014              HPI: precious Lopez Sr. is a 70 y.o. white male with past medical history of CAD, melanoma, depression, diabetic neuropathy, ED, and type 2 DM presented to the ED from home with chief complaints of fall with right hip and bilateral knee pain. Patient is a limited historian. He provided limited details regarding recent ground level fall at home. He had poor recall; loss of consciousness was not definitively known. Patient was found with uncertain down time on the ground. Pain in right hip was severe, constant, aggravated with touch or movement with onset after fall. On arrival in the ED, workup included CT head which was negative. CK= 3,365. ED requested admission to the hospitalist service. There were no reports of new onset syncope, loss of consciousness, headache, neck pain, visual disturbance, numbness, paresthesias, focal weakness, chest pain, shortness of breath, cough, palpitations, abdominal pain, nausea, vomiting, diarrhea, melena, dysuria, hematuria, calf pain, increased leg swelling/ edema, fever, chills, or rash. (Dr Nazanin Beckham)    11/30: He is not sure what happened to bring him in. He is having right hip and knee pain. Xrays are neg. Blood sugars are good but he reports that he has not eaten. Will restart NPH but at a lower dose than at home. CK is a little better. PT consulted. 12/1: Nurses report he is max assist and quite weak. Will ask case management to see for rehab placement. CK down so will decrease IVF and replete K+. BS are stable. Continue NPH. A1c pending. Tried calling family yesterday and this am but no answer at home number. 12/2:  Still weak and not fully oriented at times - did not know his location this AM.  No specific complaint except \"feeling bad. \"  Will likely need rehab. CK coming down rapidly. K+ a little low - replacing. 12/3:  Continues to be confused off and on - he thinks he is at home this AM and thinks he has to clean up for his wife's \"gathering\" later today. He thinks his wife is talking to him from the johnson \"complaining about something or the other again. \"  He has no visual hallucinations - will get Neuro to see also. K+ a little low - replacing. Review of Systems:   Review of systems not obtained due to patient factors. Objective:   Physical Exam:   Visit Vitals  /79 (BP 1 Location: Left arm, BP Patient Position: At rest)   Pulse 71   Temp 98.6 °F (37 °C)   Resp 17   Ht 6' (1.829 m)   Wt 99.8 kg (220 lb)   SpO2 95%   BMI 29.84 kg/m²      O2 Device: Room air  Temp (24hrs), Av.6 °F (37 °C), Min:97.7 °F (36.5 °C), Max:99.8 °F (37.7 °C)    No intake/output data recorded.  0701 -  1900  In: 782.9 [I.V.:782.9]  Out: 0199 [Urine:1035]    General:  Alert, cooperative for the most part, no distress, appears stated age. Head:  Normocephalic, without obvious abnormality, atraumatic. Eyes:  Conjunctivae/corneas clear. EOMs intact. Throat: Lips, mucosa, and tongue normal. Teeth and gums normal.   Neck: Supple, symmetrical, trachea midline, no adenopathy, thyroid: no enlargement/tenderness/nodules, no carotid bruit and no JVD. Lungs:   Clear to auscultation bilaterally. Chest wall:  No tenderness or deformity. Heart:  Regular rate and rhythm, S1, S2 normal, no murmur, click, rub or gallop. Abdomen:   Soft, non-tender. Bowel sounds normal. No masses,  No organomegaly. Extremities: Extremities with abrasions of both knees, able to rotate right hip without difficulty, no cyanosis or edema. No calf tenderness or cords. Pulses: 2+ and symmetric all extremities. Skin:  turgor normal. No rashes    Neurologic:  Alert and oriented X 2. Derails easily at this time. No tremor or shakes.  equal.  No cogwheeling or rigidity. Gait not tested at this time. Sensation grossly normal to touch.   Gross motor of extremities normal.       Data Review:       EXAM: CT HEAD WO CONT 19  INDICATION: Ground-level fall and down for a couple of hours. Generalized  weakness and confusion. COMPARISON: CT head on 11/27/2019. FINDINGS: Motion artifact obscures fine detail and limits sensitivity for  detecting pathology. The ventricles and sulci are age-appropriate without  hydrocephalus. There is no mass effect or midline shift. There is no  intracranial hemorrhage or extra-axial fluid collection. There is no abnormal  area of decreased density to suggest infarct. Pineal gland is calcified. The calvarium is intact. The visualized paranasal sinuses and mastoid air cells  are clear. IMPRESSION:   No acute intracranial abnormality on this noncontrast head CT. Limited by motion  artifact. No significant change since 2 days ago.        Recent Days:  Recent Labs     12/03/19  0345 12/02/19  0325 12/01/19  0303   WBC 7.0 4.8 5.4   HGB 13.9 13.1 13.9   HCT 39.9 39.7 41.0    176 195     Recent Labs     12/03/19  0345 12/02/19  0325 12/01/19  0303    139 137   K 3.3* 3.3* 3.3*    109* 109*   CO2 22 22 21   * 85 120*   BUN 11 11 14   CREA 0.68* 0.64* 0.76   CA 8.1* 7.8* 7.7*   MG  --   --  2.0   ALB 2.7* 2.5* 2.8*   TBILI 1.0 0.8 0.8   SGOT 37 48* 64*   ALT 34 32 38     No results for input(s): PH, PCO2, PO2, HCO3, FIO2 in the last 72 hours.     24 Hour Results:  Recent Results (from the past 24 hour(s))   GLUCOSE, POC    Collection Time: 12/02/19  7:02 AM   Result Value Ref Range    Glucose (POC) 88 65 - 100 mg/dL    Performed by Renee Marker (PCT)    GLUCOSE, POC    Collection Time: 12/02/19 12:49 PM   Result Value Ref Range    Glucose (POC) 179 (H) 65 - 100 mg/dL    Performed by David  (PCT)    GLUCOSE, POC    Collection Time: 12/02/19  4:25 PM   Result Value Ref Range    Glucose (POC) 163 (H) 65 - 100 mg/dL    Performed by Sutton  (PCT)    GLUCOSE, POC    Collection Time: 12/02/19  9:59 PM   Result Value Ref Range    Glucose (POC) 201 (H) 65 - 100 mg/dL    Performed by Lisette Nair (PCT)    CBC WITH AUTOMATED DIFF Collection Time: 12/03/19  3:45 AM   Result Value Ref Range    WBC 7.0 4.1 - 11.1 K/uL    RBC 4.69 4.10 - 5.70 M/uL    HGB 13.9 12.1 - 17.0 g/dL    HCT 39.9 36.6 - 50.3 %    MCV 85.1 80.0 - 99.0 FL    MCH 29.6 26.0 - 34.0 PG    MCHC 34.8 30.0 - 36.5 g/dL    RDW 12.8 11.5 - 14.5 %    PLATELET 289 972 - 111 K/uL    MPV 9.3 8.9 - 12.9 FL    NRBC 0.0 0  WBC    ABSOLUTE NRBC 0.00 0.00 - 0.01 K/uL    NEUTROPHILS 61 32 - 75 %    LYMPHOCYTES 27 12 - 49 %    MONOCYTES 11 5 - 13 %    EOSINOPHILS 1 0 - 7 %    BASOPHILS 0 0 - 1 %    IMMATURE GRANULOCYTES 0 0.0 - 0.5 %    ABS. NEUTROPHILS 4.2 1.8 - 8.0 K/UL    ABS. LYMPHOCYTES 1.9 0.8 - 3.5 K/UL    ABS. MONOCYTES 0.8 0.0 - 1.0 K/UL    ABS. EOSINOPHILS 0.1 0.0 - 0.4 K/UL    ABS. BASOPHILS 0.0 0.0 - 0.1 K/UL    ABS. IMM. GRANS. 0.0 0.00 - 0.04 K/UL    DF AUTOMATED     METABOLIC PANEL, COMPREHENSIVE    Collection Time: 12/03/19  3:45 AM   Result Value Ref Range    Sodium 136 136 - 145 mmol/L    Potassium 3.3 (L) 3.5 - 5.1 mmol/L    Chloride 106 97 - 108 mmol/L    CO2 22 21 - 32 mmol/L    Anion gap 8 5 - 15 mmol/L    Glucose 134 (H) 65 - 100 mg/dL    BUN 11 6 - 20 MG/DL    Creatinine 0.68 (L) 0.70 - 1.30 MG/DL    BUN/Creatinine ratio 16 12 - 20      GFR est AA >60 >60 ml/min/1.73m2    GFR est non-AA >60 >60 ml/min/1.73m2    Calcium 8.1 (L) 8.5 - 10.1 MG/DL    Bilirubin, total 1.0 0.2 - 1.0 MG/DL    ALT (SGPT) 34 12 - 78 U/L    AST (SGOT) 37 15 - 37 U/L    Alk.  phosphatase 44 (L) 45 - 117 U/L    Protein, total 6.6 6.4 - 8.2 g/dL    Albumin 2.7 (L) 3.5 - 5.0 g/dL    Globulin 3.9 2.0 - 4.0 g/dL    A-G Ratio 0.7 (L) 1.1 - 2.2     CK    Collection Time: 12/03/19  3:45 AM   Result Value Ref Range     (H) 39 - 308 U/L     Medications reviewed  Current Facility-Administered Medications   Medication Dose Route Frequency    potassium chloride SR (KLOR-CON 10) tablet 10 mEq  10 mEq Oral BID    aspirin chewable tablet 81 mg  81 mg Oral DAILY    carvedilol (COREG) tablet 12.5 mg 12.5 mg Oral BID WITH MEALS    cholecalciferol (VITAMIN D3) (1000 Units /25 mcg) tablet 1 Tab  1,000 Units Oral DAILY    lidocaine 4 % patch 1 Patch  1 Patch TransDERmal DAILY    losartan (COZAAR) tablet 25 mg  25 mg Oral DAILY    sodium chloride (NS) flush 5-40 mL  5-40 mL IntraVENous Q8H    sodium chloride (NS) flush 5-40 mL  5-40 mL IntraVENous PRN    heparin (porcine) injection 5,000 Units  5,000 Units SubCUTAneous Q8H    insulin NPH (NOVOLIN N, HUMULIN N) injection 10 Units  10 Units SubCUTAneous ACB&D    0.9% sodium chloride infusion  25 mL/hr IntraVENous CONTINUOUS       Care Plan discussed with: Patient/nurse  Total time spent with patient and review of records: 30 minutes.   Teresa Swann MD

## 2019-12-03 NOTE — PROGRESS NOTES
Problem: Self Care Deficits Care Plan (Adult)  Goal: *Acute Goals and Plan of Care (Insert Text)  Description    FUNCTIONAL STATUS PRIOR TO ADMISSION: Patient was modified independent using a rolling walker for functional mobility. HOME SUPPORT: The patient lived with spouse but did not require assist.    Occupational Therapy Goals  Initiated 12/1/2019  1. Patient will perform grooming with supervision/set-up within 7 day(s). 2.  Patient will perform lower body dressing with supervision/set-up within 7 day(s). 3.  Patient will perform toilet transfers with minimum assistance within 7 day(s). 4.  Patient will perform all aspects of toileting with minimal assistance/contact guard assist within 7 day(s). 5.  Patient will participate in upper extremity therapeutic exercise/activities with supervision/set-up for 10 minutes within 7 day(s). 6.  Patient will utilize energy conservation techniques during functional activities with verbal cues within 7 day(s). Outcome: Progressing Towards Goal   OCCUPATIONAL THERAPY TREATMENT  Patient: Theo Hurd . (81 y.o. male)  Date: 12/3/2019  Diagnosis: Traumatic rhabdomyolysis ([facility] [T79. Aziza Egan. XXXA]   <principal problem not specified>       Precautions: Bed Alarm, Fall  Chart, occupational therapy assessment, plan of care, and goals were reviewed. ASSESSMENT  Patient continues with skilled OT services and is progressing towards goals. Pt transfers with assist x 2 bed to chair. He was wanting to shave and set-up to do so. Pt educated as to energy conservation techniques to incorporate with ADl's and to pace himself if fatigued. Pt tolerated activity without needing rest break. Pts wife present for tx.      Current Level of Function Impacting Discharge (ADLs): Set-up for grooming in chair, assist x 2 for transfers    Other factors to consider for discharge: Pt not safe to return home, frequent falls at home         PLAN :  Patient continues to benefit from skilled intervention to address the above impairments. Continue treatment per established plan of care. to address goals. Recommend with staff: OOB to chair with assist x 2    Recommend next OT session: Cont towards goals    Recommendation for discharge: (in order for the patient to meet his/her long term goals)  Therapy up to 5 days/week in SNF setting     This discharge recommendation:  Has not yet been discussed the attending provider and/or case management    IF patient discharges home will need the following DME: none       SUBJECTIVE:   Patient stated I would like to shave    OBJECTIVE DATA SUMMARY:   Cognitive/Behavioral Status:  Neurologic State: Alert;Eyes open spontaneously  Orientation Level: Oriented to person;Oriented to place;Oriented to situation;Disoriented to time  Cognition: Follows commands;Decreased attention/concentration;Memory loss; Impaired decision making             Functional Mobility and Transfers for ADLs:  Bed Mobility:  Supine to Sit: Moderate assistance;Maximum assistance    Transfers:  Sit to Stand: Minimum assistance;Assist x2          Balance:  Sitting: Intact  Standing: With support    ADL Intervention:       Grooming  Shaving: Set-up(seated in chair)       Activity Tolerance:   Fair  Please refer to the flowsheet for vital signs taken during this treatment.     After treatment patient left in no apparent distress:   Sitting in chair    COMMUNICATION/COLLABORATION:   The patients plan of care was discussed with: Occupational Therapist    SAMIRA Xavier  Time Calculation: 30 mins

## 2019-12-03 NOTE — PROGRESS NOTES
Problem: Mobility Impaired (Adult and Pediatric)  Goal: *Acute Goals and Plan of Care (Insert Text)  Description  FUNCTIONAL STATUS PRIOR TO ADMISSION: patient not a good historian    HOME SUPPORT PRIOR TO ADMISSION: The patient lived with wife and 49 yo disabled son. Physical Therapy Goals  Initiated 12/1/2019  1. Patient will move from supine to sit and sit to supine , scoot up and down and roll side to side in bed with minimal assistance/contact guard assist within 7 day(s). 2.  Patient will transfer from bed to chair and chair to bed with minimal assistance/contact guard assist using the least restrictive device within 7 day(s). 3.  Patient will perform sit to stand with minimal assistance/contact guard assist within 7 day(s). 4.  Patient will ambulate with minimal assistance/contact guard assist for 25 feet with the least restrictive device within 7 day(s). Note:   PHYSICAL THERAPY TREATMENT  Patient: Ty Ingram Sr. (81 y.o. male)  Date: 12/3/2019  Diagnosis: Traumatic rhabdomyolysis ([facility] [T79. [facility]. XXXA]   <principal problem not specified>       Precautions: Bed Alarm, Fall  Chart, physical therapy assessment, plan of care and goals were reviewed. ASSESSMENT  Patient continues with skilled PT services. Pt had just returned from testing but agreed to sit up in chair. Pt comes to sit with mod to max assist.Pt to stand with min assist of 2. Pt took 4 steps to chair with RW min assist of 2. Pt with increased stability mobilizing out of bed today. Continue goals. Current Level of Function Impacting Discharge (mobility/balance): Pt is min assist of 2 for tranfers with RW. PLAN :  Patient continues to benefit from skilled intervention to address the above impairments. Continue treatment per established plan of care. to address goals.     Recommendation for discharge: (in order for the patient to meet his/her long term goals)  Therapy up to 5 days/week in SNF setting    This discharge recommendation:  Has been made in collaboration with the attending provider and/or case management    IF patient discharges home will need the following DME: rolling walker       SUBJECTIVE:       OBJECTIVE DATA SUMMARY:   Critical Behavior:  Neurologic State: Alert, Eyes open spontaneously  Orientation Level: Oriented to person, Oriented to place, Oriented to situation, Disoriented to time  Cognition: Follows commands, Decreased attention/concentration, Memory loss, Impaired decision making  Safety/Judgement: Awareness of environment  Functional Mobility Training:  Bed Mobility:     Supine to Sit: Moderate assistance;Maximum assistance              Transfers:  Sit to Stand: Minimum assistance;Assist x2                                Balance:  Sitting: Intact  Standing: With support  Ambulation/Gait Training:  Distance (ft): 2 Feet (ft)  Assistive Device: Gait belt;Walker, rolling  Ambulation - Level of Assistance: Minimal assistance;Assist x2        Gait Abnormalities: Decreased step clearance        Base of Support: Narrowed        Step Length: Left shortened;Right shortened                Activity Tolerance:   Good and Fair  Please refer to the flowsheet for vital signs taken during this treatment.     After treatment patient left in no apparent distress:   Sitting in chair    COMMUNICATION/COLLABORATION:   The patients plan of care was discussed with: Physical Therapist    Bear Vincent PTA   Time Calculation: 23 mins

## 2019-12-03 NOTE — CONSULTS
[address]     Name:  Raysa Ricks  MR#:  [id number]  :  1940  ACCOUNT #:  [de-identified]  DATE OF SERVICE:  2019      NEUROLOGY CONSULTATION    REQUESTING PHYSICIAN:  Dr. Kati Calhoun. HISTORY OF PRESENT ILLNESS:  Thank you for asking us to see this patient in neurologic consultation regarding confusion. He is a gentleman, I actually saw back in 2019 for sensory ataxia and falls. He had an EMG in the office that demonstrated neuropathy likely on the basis of his diabetes. He was undergoing physical therapy and was supposed to be doing a home exercise program.  In any regard, he came into the ER on  after being found down. He was on the ground for a number of hours, although, truly unclear. He had rhabdomyolysis. He has been noted to be intermittently confused during his hospitalization here. When I saw him back initially for the initial consultation in 2019, his examination was nonfocal.  He was able to discuss his medical history and he was oriented. The patient tells me he came in because he thinks he lost consciousness. When I asked him why he thinks that he says he does not know. He says he fell, but he is unsure as to how long he was down, he says maybe 2 to 3 hours. Head CT has been done on admission, which I personally reviewed and unremarkable. He also had one two days prior for an emergency room visit and it was listed as confusion as the indication. Review of the chart finds he came in on the  to the ED with complaints of back pain and apparently he had taken a fall as well. He was discharged with a lidocaine patch and some Tylenol. PAST MEDICAL HISTORY:  1. 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 2.  Coronary artery disease. 3.  Melanoma, status post resection from the forehead. 4.  Depression. 5.  Erectile dysfunction. 6.  Hypertension.   7.  Status post coronary artery bypass graft. PRESENT MEDICATIONS:  Aspirin 81 mg, Coreg, vitamin D, subQ heparin, insulin, lidocaine patch, Cozaar, potassium. ALLERGIES:  AMOXICILLIN AND PRAVASTATIN. SOCIAL HISTORY:  He is . He does not smoke. Denies alcohol. FAMILY HISTORY:  Significant for Parkinson's in several family members. REVIEW OF SYSTEMS:  As noted above, otherwise negative. PHYSICAL EXAMINATION:  GENERAL:  He is lying in bed, appears comfortable. VITAL SIGNS:  Afebrile. Pulse 70. Blood pressure 155/73, oxygen saturation 96% on room air. HEENT:  Sclerae anicteric. Oropharynx clear and moist.  LUNGS:  Pulses are symmetrical.  EXTREMITIES:  He has got excoriations on the knees bilaterally. He has got mild edema of the lower extremities. NEUROLOGIC:  Neurologically, he is awake, alert, oriented to [facility].  He says that it is December when I gave him a list of months from which to choose. He says the president is Ricci. Holiday was Thanksgiving just passed. Registration 3/3, recall 2/3 at 5 minutes with a hint 3/3. He is fluent. Follows all commands. No right and left confusion. Full versions. No nystagmus. Symmetric face and facial sensation. Tongue and palate are midline. Shoulder shrug is symmetrical.  He has no pronation or drift. No abnormal movement. He resists fully in the upper and lower extremities in all muscle groups to direct testing. Reflexes are globally depressed and he has no ankle jerks. No ataxia. Gait deferred. LABORATORY DATA:  Studies as stated. Laboratory analysis with no significant metabolic derangement. CBC unremarkable. IMPRESSION/PLAN:  A 68-year-old gentleman with multiple falls secondary to sensory ataxia related to his diabetic peripheral neuropathy. Periods of confusion as noted above, question underlying dementing type process with superimposed hospital delirium.   At this juncture, we will exclude any other etiology from an organic standpoint and we will get an MRI of the brain as well as an EEG. We will give him thiamine. If these are unremarkable, then I will have a formal neuropsychological evaluation as outpatient. Thank you for your request for consultation.       Jelena Garza MD      [id number]  D:  12/03/2019 9:24  T:  12/03/2019 11:24  JOB #:  [id number]

## 2019-12-03 NOTE — PROGRESS NOTES
Bedside and Verbal shift [facility] by Angela Raphael RN  (offgoing nurse). Report included the following information SBAR, Kardex and MAR.

## 2019-12-03 NOTE — PROGRESS NOTES
1100: MRI checklist completed via phone with wife. 2nd RN signed off with primary RN. MRI notified. 1500: Patient with complaint of burning when urinating. Notified Dr. Bijan Willson. Orders for UA and culture placed. Bedside shift change report given to [address] [place] [address] (oncoming nurse) by Adis Valderrama RN (offgoing nurse). Report included the following information SBAR, Kardex, Intake/Output, MAR, Recent Results and Cardiac Rhythm NSR.

## 2019-12-04 LAB
ALBUMIN SERPL-MCNC: 2.7 G/DL (ref 3.5–5)
ALBUMIN/GLOB SERPL: 0.7 {RATIO} (ref 1.1–2.2)
ALP SERPL-CCNC: 46 U/L (ref 45–117)
ALT SERPL-CCNC: 33 U/L (ref 12–78)
ANION GAP SERPL CALC-SCNC: 8 MMOL/L (ref 5–15)
AST SERPL-CCNC: 35 U/L (ref 15–37)
BASOPHILS # BLD: 0 K/UL (ref 0–0.1)
BASOPHILS NFR BLD: 0 % (ref 0–1)
BILIRUB SERPL-MCNC: 1.1 MG/DL (ref 0.2–1)
BUN SERPL-MCNC: 11 MG/DL (ref 6–20)
BUN/CREAT SERPL: 15 (ref 12–20)
CALCIUM SERPL-MCNC: 8.3 MG/DL (ref 8.5–10.1)
CHLORIDE SERPL-SCNC: 108 MMOL/L (ref 97–108)
CK SERPL-CCNC: 188 U/L (ref 39–308)
CO2 SERPL-SCNC: 23 MMOL/L (ref 21–32)
CREAT SERPL-MCNC: 0.72 MG/DL (ref 0.7–1.3)
DIFFERENTIAL METHOD BLD: NORMAL
EOSINOPHIL # BLD: 0.1 K/UL (ref 0–0.4)
EOSINOPHIL NFR BLD: 2 % (ref 0–7)
ERYTHROCYTE [DISTWIDTH] IN BLOOD BY AUTOMATED COUNT: 12.8 % (ref 11.5–14.5)
ERYTHROCYTE [SEDIMENTATION RATE] IN BLOOD: 27 MM/HR (ref 0–20)
GLOBULIN SER CALC-MCNC: 4.1 G/DL (ref 2–4)
GLUCOSE BLD STRIP.AUTO-MCNC: 130 MG/DL (ref 65–100)
GLUCOSE BLD STRIP.AUTO-MCNC: 160 MG/DL (ref 65–100)
GLUCOSE BLD STRIP.AUTO-MCNC: 161 MG/DL (ref 65–100)
GLUCOSE BLD STRIP.AUTO-MCNC: 212 MG/DL (ref 65–100)
GLUCOSE SERPL-MCNC: 140 MG/DL (ref 65–100)
HCT VFR BLD AUTO: 40.6 % (ref 36.6–50.3)
HGB BLD-MCNC: 13.9 G/DL (ref 12.1–17)
IMM GRANULOCYTES # BLD AUTO: 0 K/UL (ref 0–0.04)
IMM GRANULOCYTES NFR BLD AUTO: 0 % (ref 0–0.5)
LYMPHOCYTES # BLD: 1.8 K/UL (ref 0.8–3.5)
LYMPHOCYTES NFR BLD: 26 % (ref 12–49)
MCH RBC QN AUTO: 29.4 PG (ref 26–34)
MCHC RBC AUTO-ENTMCNC: 34.2 G/DL (ref 30–36.5)
MCV RBC AUTO: 86 FL (ref 80–99)
MONOCYTES # BLD: 0.9 K/UL (ref 0–1)
MONOCYTES NFR BLD: 13 % (ref 5–13)
NEUTS SEG # BLD: 4.1 K/UL (ref 1.8–8)
NEUTS SEG NFR BLD: 59 % (ref 32–75)
NRBC # BLD: 0 K/UL (ref 0–0.01)
NRBC BLD-RTO: 0 PER 100 WBC
PLATELET # BLD AUTO: 230 K/UL (ref 150–400)
PMV BLD AUTO: 9.5 FL (ref 8.9–12.9)
POTASSIUM SERPL-SCNC: 3.6 MMOL/L (ref 3.5–5.1)
PROT SERPL-MCNC: 6.8 G/DL (ref 6.4–8.2)
RBC # BLD AUTO: 4.72 M/UL (ref 4.1–5.7)
SERVICE CMNT-IMP: ABNORMAL
SODIUM SERPL-SCNC: 139 MMOL/L (ref 136–145)
WBC # BLD AUTO: 6.9 K/UL (ref 4.1–11.1)

## 2019-12-04 PROCEDURE — 82550 ASSAY OF CK (CPK): CPT

## 2019-12-04 PROCEDURE — 74011250636 HC RX REV CODE- 250/636: Performed by: PSYCHIATRY & NEUROLOGY

## 2019-12-04 PROCEDURE — 94760 N-INVAS EAR/PLS OXIMETRY 1: CPT

## 2019-12-04 PROCEDURE — 80053 COMPREHEN METABOLIC PANEL: CPT

## 2019-12-04 PROCEDURE — 74011250637 HC RX REV CODE- 250/637: Performed by: FAMILY MEDICINE

## 2019-12-04 PROCEDURE — 85025 COMPLETE CBC W/AUTO DIFF WBC: CPT

## 2019-12-04 PROCEDURE — 36415 COLL VENOUS BLD VENIPUNCTURE: CPT

## 2019-12-04 PROCEDURE — 97530 THERAPEUTIC ACTIVITIES: CPT

## 2019-12-04 PROCEDURE — 74011000258 HC RX REV CODE- 258: Performed by: PSYCHIATRY & NEUROLOGY

## 2019-12-04 PROCEDURE — 97535 SELF CARE MNGMENT TRAINING: CPT

## 2019-12-04 PROCEDURE — 85652 RBC SED RATE AUTOMATED: CPT

## 2019-12-04 PROCEDURE — 97110 THERAPEUTIC EXERCISES: CPT

## 2019-12-04 PROCEDURE — 74011000250 HC RX REV CODE- 250: Performed by: FAMILY MEDICINE

## 2019-12-04 PROCEDURE — 74011636637 HC RX REV CODE- 636/637: Performed by: FAMILY MEDICINE

## 2019-12-04 PROCEDURE — 65660000000 HC RM CCU STEPDOWN

## 2019-12-04 PROCEDURE — 74011250636 HC RX REV CODE- 250/636: Performed by: FAMILY MEDICINE

## 2019-12-04 PROCEDURE — 82962 GLUCOSE BLOOD TEST: CPT

## 2019-12-04 RX ADMIN — INSULIN HUMAN 12 UNITS: 100 INJECTION, SUSPENSION SUBCUTANEOUS at 17:50

## 2019-12-04 RX ADMIN — VITAMIN D, TAB 1000IU (100/BT) 1 TABLET: 25 TAB at 08:11

## 2019-12-04 RX ADMIN — HEPARIN SODIUM 5000 UNITS: 5000 INJECTION INTRAVENOUS; SUBCUTANEOUS at 22:09

## 2019-12-04 RX ADMIN — INSULIN HUMAN 12 UNITS: 100 INJECTION, SUSPENSION SUBCUTANEOUS at 08:11

## 2019-12-04 RX ADMIN — Medication 10 ML: at 06:13

## 2019-12-04 RX ADMIN — POTASSIUM CHLORIDE 20 MEQ: 750 TABLET, FILM COATED, EXTENDED RELEASE ORAL at 17:50

## 2019-12-04 RX ADMIN — Medication 10 ML: at 22:11

## 2019-12-04 RX ADMIN — HEPARIN SODIUM 5000 UNITS: 5000 INJECTION INTRAVENOUS; SUBCUTANEOUS at 06:13

## 2019-12-04 RX ADMIN — POTASSIUM CHLORIDE 20 MEQ: 750 TABLET, FILM COATED, EXTENDED RELEASE ORAL at 08:11

## 2019-12-04 RX ADMIN — HEPARIN SODIUM 5000 UNITS: 5000 INJECTION INTRAVENOUS; SUBCUTANEOUS at 14:47

## 2019-12-04 RX ADMIN — THIAMINE HYDROCHLORIDE 100 MG: 100 INJECTION, SOLUTION INTRAMUSCULAR; INTRAVENOUS at 11:07

## 2019-12-04 RX ADMIN — Medication 10 ML: at 14:49

## 2019-12-04 RX ADMIN — LOSARTAN POTASSIUM 25 MG: 25 TABLET, FILM COATED ORAL at 08:11

## 2019-12-04 RX ADMIN — CARVEDILOL 12.5 MG: 12.5 TABLET, FILM COATED ORAL at 17:50

## 2019-12-04 RX ADMIN — CARVEDILOL 12.5 MG: 12.5 TABLET, FILM COATED ORAL at 08:11

## 2019-12-04 RX ADMIN — ASPIRIN 81 MG 81 MG: 81 TABLET ORAL at 08:11

## 2019-12-04 NOTE — PROGRESS NOTES
[facility] NEUROLOGY [place], [address]   [place] [address]   [address]    [facility]    Fax         Confusion    MRI reviewed and normal    EEG reviewed and diffuse slowing    Chart reviewed  No events  Awake, alert  oriented to [facility] and December, [place]  No motor focality    Studies unremarkable  ?  Mild underlying dementia exacerbated/de-clouded by hospitalization vs ? ETOH but no hx vs other  Would have formal neuropsych eval as OP    Maite Irby MD

## 2019-12-04 NOTE — PROGRESS NOTES
Problem: Mobility Impaired (Adult and Pediatric)  Goal: *Acute Goals and Plan of Care (Insert Text)  Description  FUNCTIONAL STATUS PRIOR TO ADMISSION: patient not a good historian    HOME SUPPORT PRIOR TO ADMISSION: The patient lived with wife and 49 yo disabled son. Physical Therapy Goals  Initiated 12/1/2019  1. Patient will move from supine to sit and sit to supine , scoot up and down and roll side to side in bed with minimal assistance/contact guard assist within 7 day(s). 2.  Patient will transfer from bed to chair and chair to bed with minimal assistance/contact guard assist using the least restrictive device within 7 day(s). 3.  Patient will perform sit to stand with minimal assistance/contact guard assist within 7 day(s). 4.  Patient will ambulate with minimal assistance/contact guard assist for 25 feet with the least restrictive device within 7 day(s). Note:   PHYSICAL THERAPY TREATMENT  Patient: Ida Erickson Sr. (81 y.o. male)  Date: 12/4/2019  Diagnosis: [facility] [T79. Kelsy Damona. XXXA]   <principal problem not specified>       Precautions: Bed Alarm, Fall  Chart, physical therapy assessment, plan of care and goals were reviewed. ASSESSMENT  Patient continues with skilled PT services. Pt comes to sit with mod to max assist.Pt to stand with min to mod assist.Pt was able to take 4 steps to chair with RW mod assist.Pt is a high fall risk. Pt left sitting with chair alarm in place. Pt progressing slowly. Continue goals. Current Level of Function Impacting Discharge (mobility/balance): Pt is mod to max assist for mobility. PLAN :  Patient continues to benefit from skilled intervention to address the above impairments. Continue treatment per established plan of care. to address goals.     Recommendation for discharge: (in order for the patient to meet his/her long term goals)  Therapy up to 5 days/week in SNF setting    This discharge recommendation:  Has been made in collaboration with the attending provider and/or case management    IF patient discharges home will need the following DME: rolling walker       SUBJECTIVE:       OBJECTIVE DATA SUMMARY:   Critical Behavior:  Neurologic State: Alert  Orientation Level: Oriented to person, Oriented to place, Oriented to situation(periods of confusion)  Cognition: Follows commands, Memory loss  Safety/Judgement: Awareness of environment  Functional Mobility Training:  Bed Mobility:     Supine to Sit: Maximum assistance; Moderate assistance              Transfers:  Sit to Stand: Minimum assistance; Moderate assistance  Stand to Sit: Minimum assistance                             Balance:  Standing: With support  Standing - Static: Fair  Ambulation/Gait Training:  Distance (ft): 2 Feet (ft)  Assistive Device: Gait belt;Walker, rolling  Ambulation - Level of Assistance: Moderate assistance        Gait Abnormalities: Decreased step clearance;Trunk sway increased        Base of Support: Narrowed     Speed/Brooke: Pace decreased (<100 feet/min)  Step Length: Right shortened;Left shortened                Activity Tolerance:   Good and Fair  Please refer to the flowsheet for vital signs taken during this treatment.     After treatment patient left in no apparent distress:   Sitting in chair    COMMUNICATION/COLLABORATION:   The patients plan of care was discussed with: Physical Therapist    Will Cdoy PTA   Time Calculation: 23 mins

## 2019-12-04 NOTE — PROGRESS NOTES
Nutrition Assessment:    RECOMMENDATIONS/INTERVENTION(S):   1. Recommend ONS BID (Glucerna, chocolate) while PO is low    2. Continue consistent carb diet    3. Monitor wt, PO, BG, Ca      ASSESSMENT:   12/4: Pt seen for LOS, admitted for fall and traumatic rhabdomyolysis. Current diet- consistent carb. Pt reports poor appetite with occasional nausea. Appetite was good PTA. Pt reports he lives with his son and wife, where wife cooks his 3 meals each day. Pt unable to report intake from yesterday or breakfast today- confusion reported in EMR. Agreeable to try ONS- Glucerna, will send BID chocolate. No significant wt changes to note per EMR. Pt reports it is normal for him to go several days between BMs (LBM 11/30) but he is not on a bowel regimen at home or currently. Labs: , 172, 244; Ca 8.3L, A1c 6.8 (12/1)  Meds: D3, insulin NPH, potassium chloride, thiamine. Diet Order: Consistent carb  % Eaten:    Patient Vitals for the past 72 hrs:   % Diet Eaten   12/04/19 0835 10 %   12/03/19 1208 10 %   12/03/19 0849 10 %       Pertinent Medications: [x] Reviewed    Labs: [x] Reviewed    Anthropometrics: Height: 6' (182.9 cm) Weight: 99.8 kg (220 lb)    IBW (%IBW):   ( ) UBW (%UBW):   (  %)      BMI: Body mass index is 29.84 kg/m². This BMI is indicative of:   [] Underweight    [] Normal    [x] Overweight    []  Obesity    []  Extreme Obesity (BMI>40)  Estimated Nutrition Needs (Based on): 2083 Kcals/day(REE x 1.3) , 99 g(1.0 g/kg) Protein  Carbohydrate: At Least 130 g/day  Fluids: 2083 mL/day (1 ml/kcal)    Last BM: 11/30   [x]Active     []Hyperactive  []Hypoactive       [] Absent   BS  Skin:    [x] Intact   [] Incision  [] Breakdown   [] DTI   [] Tears/Excoriation/Abrasion  []Edema [] Other:    Wt Readings from Last 30 Encounters:   11/29/19 99.8 kg (220 lb)   11/27/19 99.8 kg (220 lb)   10/14/19 100.6 kg (221 lb 12.8 oz)   06/12/19 100.7 kg (222 lb)   05/03/19 101.6 kg (224 lb)   08/10/18 100.7 kg (222 lb) 08/08/17 103.4 kg (228 lb)   08/08/16 103.9 kg (229 lb)   05/03/16 103.9 kg (229 lb)   04/15/14 105.5 kg (232 lb 9.6 oz)   10/29/13 102.5 kg (226 lb)   04/29/13 102.5 kg (226 lb)   11/25/12 102.1 kg (225 lb)   10/03/12 102.1 kg (225 lb)   03/21/12 100.7 kg (222 lb)   09/21/11 99.8 kg (220 lb)   03/22/11 98.4 kg (217 lb)      NUTRITION DIAGNOSES:   Problem:  Inadequate energy intake     Etiology: related to inability to consume sufficient energy to meet EEN     Signs/Symptoms: as evidenced by recorded poor PO, pt reported poor appetite and continued nausea x6days      NUTRITION INTERVENTIONS:  Meals/Snacks: General/healthful diet   Supplements: Commercial supplement              GOAL:   increase intake >50% meals + ONS over next 4-5 days    Cultural, Bahai, or Ethnic Dietary Needs: None     EDUCATION & DISCHARGE NEEDS:    [x] None Identified   [] Identified and Education Provided/Documented   [] Identified and Pt declined/was not appropriate      [] Interdisciplinary Care Plan Reviewed/Documented    [x] Discharge Needs:  Consistent CHO diet   [] No Nutrition Related Discharge Needs    NUTRITION RISK:   Pt Is At Nutrition Risk  [x]     No Nutrition Risk Identified  []       PT SEEN FOR:    []  MD Consult: []Calorie Count      []Diabetic Diet Education        []Diet Education     []Electrolyte Management     []General Nutrition Management and Supplements     []Management of Tube Feeding     []TPN Recommendations    []  RN Referral:  []MST score >=2     []Enteral/Parenteral Nutrition PTA     []Pregnant: Gestational DM or Multigestation                 [] Pressure Ulcer    []  Low BMI      [x]  Length of Stay       [] Dysphagia Diet         [] Ventilator  []  Follow-up     Previous Recommendations:   [] Implemented          [] Not Implemented          [x] Not Applicable    Previous Goal:   [] Met              [] Progressing Towards Goal              [] Not Progressing Towards Goal   [x] Not Applicable Xenia Vega,   Pager [phone]  Phone [phone]

## 2019-12-04 NOTE — PROGRESS NOTES
Bedside and Verbal shift change report given to [facility] (oncoming nurse) by Anil Paul RN  (offgoing nurse). Report included the following information SBAR, Kardex and MAR.

## 2019-12-04 NOTE — PROCEDURES
[facility]ryan Gallegos [facility]  EEG    Name:  Rizwan Lerner  MR#:  [id number]  :  1940  ACCOUNT #:  [de-identified]  DATE OF SERVICE:  2019      REQUESTING PROVIDER:  Jenny Bolivar MD    CLINICAL HISTORY:  An EEG is requested in this 53-year-old with confusion to evaluate for epileptiform abnormality. MEDICATIONS:  Listed as aspirin, Coreg, heparin and insulin. EEG REPORT:  This tracing is obtained during the awake, drowsy, and sleeping states. During wakefulness, the background consists of diffuse mixed alpha and beta frequencies, although no definitive alpha rhythm is seen. Hyperventilation not performed. Intermittent photic stimulation little alters the tracing. Stage II sleep is attained. IMPRESSION:  This EEG recorded during the awake, drowsy, and sleeping stage is abnormal secondary to diffuse slowing and disorganization of the background rhythms, indicative of a mild-to-moderate degree of bihemispheric dysfunction as is commonly seen with an encephalopathy, which may have contributions from toxic, metabolic, diffuse structural, and/or pharmacologic effects, and clinical correlation is recommended. This pattern is also commonly seen in chronic neurodegenerative disorders such as dementia and again clinical correlation recommended.       Leidy Hensley MD [email or web address]/  D:  2019 6:20  T:  2019 6:55  JOB #:  [id number]

## 2019-12-04 NOTE — PROGRESS NOTES
Problem: Self Care Deficits Care Plan (Adult)  Goal: *Acute Goals and Plan of Care (Insert Text)  Description    FUNCTIONAL STATUS PRIOR TO ADMISSION: Patient was modified independent using a rolling walker for functional mobility. HOME SUPPORT: The patient lived with spouse but did not require assist.    Occupational Therapy Goals  Initiated 12/1/2019  1. Patient will perform grooming with supervision/set-up within 7 day(s). 2.  Patient will perform lower body dressing with supervision/set-up within 7 day(s). 3.  Patient will perform toilet transfers with minimum assistance within 7 day(s). 4.  Patient will perform all aspects of toileting with minimal assistance/contact guard assist within 7 day(s). 5.  Patient will participate in upper extremity therapeutic exercise/activities with supervision/set-up for 10 minutes within 7 day(s). 6.  Patient will utilize energy conservation techniques during functional activities with verbal cues within 7 day(s). Outcome: Progressing Towards Goal   OCCUPATIONAL THERAPY TREATMENT  Patient: Jason Puckett Sr. (81 y.o. male)  Date: 12/4/2019  Diagnosis: [facility]dom[facility] [T79. Wana Hailee. XXXA]   <principal problem not specified>       Precautions: Bed Alarm, Fall  Chart, occupational therapy assessment, plan of care, and goals were reviewed. ASSESSMENT  Patient continues with skilled OT services and is progressing towards goals. Pt seated in chair, engaged with bathing and dressing UB, assist x 1 to bathe his back and to don gown. Pt also engaged with UE exercises to increase strength and endurance for functional tasks. Current Level of Function Impacting Discharge (ADLs): Min assist UB bath and dress, assist to wash distal LE's and feet    Other factors to consider for discharge:          PLAN :  Patient continues to benefit from skilled intervention to address the above impairments.   Continue treatment per established plan of care.  to address goals. Recommend with staff: OOB to chair for meals and activity    Recommend next OT session: Cont towards goals    Recommendation for discharge: (in order for the patient to meet his/her long term goals)  Therapy up to 5 days/week in SNF setting    This discharge recommendation:  Has not yet been discussed the attending provider and/or case management    IF patient discharges home will need the following DME: none       SUBJECTIVE:   Patient stated My neck gets stiff.     OBJECTIVE DATA SUMMARY:   Cognitive/Behavioral Status:  Neurologic State: Alert  Orientation Level: Oriented to person;Oriented to place;Oriented to situation(periods of confusion)  Cognition: Follows commands;Memory loss             Functional Mobility and Transfers for ADLs:  Bed Mobility:   Not tested as pt already up out of bed    Transfers:   Not tested          Balance:Impaired standing balance       ADL Intervention:       Grooming  Washing Face: Set-up(seated in chair)    Upper Body Bathing  Bathing Assistance: Minimum assistance  Position Performed: Seated in chair  Cues: Physical assistance    Lower Body Bathing  Lower Body : Minimum assistance  Position Performed: Seated in chair    Upper Body Dressing Assistance  Dressing Assistance: [facility] Rosad: Minimum  assistance       Therapeutic Exercises:     EXERCISE   Sets   Reps   Active Active Assist   Passive   Comments   Shoulder flex/ext 1 10 [x]           []           []              Elbow flex/ext 1 10 [x]           []           []              Chest presses 1 10 [x]           []           []              Neck rotation 1 10 [x]           []           []                 []           []           []                 []           []           []                 []           []           []                 []           []           []                 []           []           []                 []           []           []                 [] []           []                   Activity Tolerance:   Fair  Please refer to the flowsheet for vital signs taken during this treatment.     After treatment patient left in no apparent distress:   Sitting in chair    COMMUNICATION/COLLABORATION:   The patients plan of care was discussed with: Occupational Therapist    SAMIRA Jacome  Time Calculation: 23 mins

## 2019-12-04 NOTE — PROGRESS NOTES
Daily Progress Note: 12/4/2019  Eugenia Valles MD    Assessment/Plan:   Traumatic rhabdomyolysis  -- Fluids as needed  -- Serial CK levels   -- Holding statin     S/p fall  -- fall precautions  -- OT/PT and likely rehab     Bilateral knee and right hip pain  -- Xray knee with OA  -- Right hip Xray neg for Fx     Type 2 DM with neuropathy  -- Humalog insulin correctional coverage  -- Restarted NPH but only at 10 units BID  -- BS ac and HS  -- A1c 6.8 on 12/1/19     Generalized weakness/ physical debility  -- Consulted PT - likely needs rehab    Altered Mental status - unk cause; likely some underlying dementia  -- consulted Neuro- MRI neg  -- EEG planned     CAD  -- check troponin    Hypokalemia  -- Replete and monitor     VTE prophylaxis  -- SCDs     Disp  -- Case management for Rehab placement        Problem List:  Problem List as of 12/4/2019 Date Reviewed: 10/14/2019          Codes Class Noted - Resolved    Fall ICD-10-CM: W19Janet Caldwell  ICD-9-CM: Y671.5  11/29/2019 - Present        Tra[facility] ([facility] ICD-10-CM: T79. 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 is a 70 y.o. white male with past medical history of CAD, melanoma, depression, diabetic neuropathy, ED, and type 2 DM presented to the ED from home with chief complaints of fall with right hip and bilateral knee pain. Patient is a limited historian. He provided limited details regarding recent ground level fall at home. He had poor recall; loss of consciousness was not definitively known. Patient was found with uncertain down time on the ground. Pain in right hip was severe, constant, aggravated with touch or movement with onset after fall. On arrival in the ED, workup included CT head which was negative. CK= 3,365. ED requested admission to the hospitalist service. There were no reports of new onset syncope, loss of consciousness, headache, neck pain, visual disturbance, numbness, paresthesias, focal weakness, chest pain, shortness of breath, cough, palpitations, abdominal pain, nausea, vomiting, diarrhea, melena, dysuria, hematuria, calf pain, increased leg swelling/ edema, fever, chills, or rash. (Dr Teri Chan)    11/30: He is not sure what happened to bring him in. He is having right hip and knee pain. Xrays are neg. Blood sugars are good but he reports that he has not eaten. Will restart NPH but at a lower dose than at home. CK is a little better. PT consulted. 12/1: Nurses report he is max assist and quite weak. Will ask case management to see for rehab placement. CK down so will decrease IVF and replete K+. BS are stable. Continue NPH. A1c pending. Tried calling family yesterday and this am but no answer at home number. 12/2:  Still weak and not fully oriented at times - did not know his location this AM.  No specific complaint except \"feeling bad. \"  Will likely need rehab. CK coming down rapidly. K+ a little low - replacing.      12/3:  Continues to be confused off and on - he thinks he is at home this AM and thinks he has to clean up for his wife's \"gathering\" later today. He thinks his wife is talking to him from the johnson \"complaining about something or the other again. \"  He has no visual hallucinations - will get Neuro to see also. K+ a little low - replacing. : Appreciate neuro consult. MRI neg. EEG pending. CK is normal. Knows month and place but not the day of the week. PM blood sugars are high so will adjust insulin. Electrolytes ok. Review of Systems:   Review of systems not obtained due to patient factors. Objective:   Physical Exam:   Visit Vitals  /65 (BP 1 Location: Left arm, BP Patient Position: At rest)   Pulse 79   Temp 98 °F (36.7 °C)   Resp 18   Ht 6' (1.829 m)   Wt 220 lb (99.8 kg)   SpO2 96%   BMI 29.84 kg/m²      O2 Device: Room air  Temp (24hrs), Av.9 °F (36.6 °C), Min:97.5 °F (36.4 °C), Max:98.4 °F (36.9 °C)    1901 - 700  In: -   Out: 500 [Urine:450]   701 - 1900  In: 1421.7 [P.O.:480; I.V.:941.7]  Out:  [Urine:1860]    General:  Alert, cooperative  no distress, appears stated age. Head:  Normocephalic, without obvious abnormality, atraumatic. Eyes:  Conjunctivae/corneas clear. EOMs intact. Throat: Lips, mucosa, and tongue normal. Teeth and gums normal.   Neck: Supple, symmetrical, trachea midline, no adenopathy, thyroid: no enlargement/tenderness/nodules, no carotid bruit and no JVD. Lungs:   Clear to auscultation bilaterally. Chest wall:  No tenderness or deformity. Heart:  Regular rate and rhythm, S1, S2 normal, no murmur, click, rub or gallop. Abdomen:   Soft, non-tender. Bowel sounds normal. No masses,  No organomegaly. Extremities: Extremities with abrasions of both knees, able to rotate right hip without difficulty, no cyanosis or edema. No calf tenderness or cords. Pulses: 2+ and symmetric all extremities. Skin:  turgor normal. No rashes    Neurologic:  Alert and oriented X 2. Derails easily at this time. No tremor or shakes.    equal.  No cogwheeling or rigidity. Gait not tested at this time. Sensation grossly normal to touch. Gross motor of extremities normal.       Data Review:       EXAM: CT HEAD WO CONT 11/29/19  INDICATION: Ground-level fall and down for a couple of hours. Generalized  weakness and confusion. COMPARISON: CT head on 11/27/2019. FINDINGS: Motion artifact obscures fine detail and limits sensitivity for  detecting pathology. The ventricles and sulci are age-appropriate without  hydrocephalus. There is no mass effect or midline shift. There is no  intracranial hemorrhage or extra-axial fluid collection. There is no abnormal  area of decreased density to suggest infarct. Pineal gland is calcified. The calvarium is intact. The visualized paranasal sinuses and mastoid air cells  are clear. IMPRESSION:   No acute intracranial abnormality on this noncontrast head CT. Limited by motion  artifact. No significant change since 2 days ago.        Recent Days:  Recent Labs     12/04/19  0406 12/03/19  0345 12/02/19  0325   WBC 6.9 7.0 4.8   HGB 13.9 13.9 13.1   HCT 40.6 39.9 39.7    202 176     Recent Labs     12/04/19  0406 12/03/19  0345 12/02/19  0325    136 139   K 3.6 3.3* 3.3*    106 109*   CO2 23 22 22   * 134* 85   BUN 11 11 11   CREA 0.72 0.68* 0.64*   CA 8.3* 8.1* 7.8*   ALB 2.7* 2.7* 2.5*   TBILI 1.1* 1.0 0.8   SGOT 35 37 48*   ALT 33 34 32     No results for input(s): PH, PCO2, PO2, HCO3, FIO2 in the last 72 hours.     24 Hour Results:  Recent Results (from the past 24 hour(s))   GLUCOSE, POC    Collection Time: 12/03/19 12:23 PM   Result Value Ref Range    Glucose (POC) 182 (H) 65 - 100 mg/dL    Performed by Rufina Merchant    GLUCOSE, POC    Collection Time: 12/03/19  4:16 PM   Result Value Ref Range    Glucose (POC) 244 (H) 65 - 100 mg/dL    Performed by Marlen Knowles (PCT)    URINALYSIS W/ RFLX MICROSCOPIC    Collection Time: 12/03/19  5:17 PM   Result Value Ref Range    Color YELLOW/STRAW Appearance CLEAR CLEAR      Specific gravity 1.014 1.003 - 1.030      pH (UA) 6.0 5.0 - 8.0      Protein NEGATIVE  NEG mg/dL    Glucose >1,000 (A) NEG mg/dL    Ketone 15 (A) NEG mg/dL    Bilirubin NEGATIVE  NEG      Blood NEGATIVE  NEG      Urobilinogen 1.0 0.2 - 1.0 EU/dL    Nitrites NEGATIVE  NEG      Leukocyte Esterase NEGATIVE  NEG     URINE CULTURE HOLD SAMPLE    Collection Time: 12/03/19  5:17 PM   Result Value Ref Range    Urine culture hold        URINE ON HOLD IN MICROBIOLOGY DEPT FOR 3 DAYS. IF UNPRESERVED URINE IS SUBMITTED, IT CANNOT BE USED FOR ADDITIONAL TESTING AFTER 24 HRS, RECOLLECTION WILL BE REQUIRED. GLUCOSE, POC    Collection Time: 12/03/19  9:22 PM   Result Value Ref Range    Glucose (POC) 172 (H) 65 - 100 mg/dL    Performed by Jazmin Gomez  PCT    CBC WITH AUTOMATED DIFF    Collection Time: 12/04/19  4:06 AM   Result Value Ref Range    WBC 6.9 4.1 - 11.1 K/uL    RBC 4.72 4.10 - 5.70 M/uL    HGB 13.9 12.1 - 17.0 g/dL    HCT 40.6 36.6 - 50.3 %    MCV 86.0 80.0 - 99.0 FL    MCH 29.4 26.0 - 34.0 PG    MCHC 34.2 30.0 - 36.5 g/dL    RDW 12.8 11.5 - 14.5 %    PLATELET 299 121 - 191 K/uL    MPV 9.5 8.9 - 12.9 FL    NRBC 0.0 0  WBC    ABSOLUTE NRBC 0.00 0.00 - 0.01 K/uL    NEUTROPHILS 59 32 - 75 %    LYMPHOCYTES 26 12 - 49 %    MONOCYTES 13 5 - 13 %    EOSINOPHILS 2 0 - 7 %    BASOPHILS 0 0 - 1 %    IMMATURE GRANULOCYTES 0 0.0 - 0.5 %    ABS. NEUTROPHILS 4.1 1.8 - 8.0 K/UL    ABS. LYMPHOCYTES 1.8 0.8 - 3.5 K/UL    ABS. MONOCYTES 0.9 0.0 - 1.0 K/UL    ABS. EOSINOPHILS 0.1 0.0 - 0.4 K/UL    ABS. BASOPHILS 0.0 0.0 - 0.1 K/UL    ABS. IMM.  GRANS. 0.0 0.00 - 0.04 K/UL    DF AUTOMATED     METABOLIC PANEL, COMPREHENSIVE    Collection Time: 12/04/19  4:06 AM   Result Value Ref Range    Sodium 139 136 - 145 mmol/L    Potassium 3.6 3.5 - 5.1 mmol/L    Chloride 108 97 - 108 mmol/L    CO2 23 21 - 32 mmol/L    Anion gap 8 5 - 15 mmol/L    Glucose 140 (H) 65 - 100 mg/dL    BUN 11 6 - 20 MG/DL    Creatinine 0. 72 0.70 - 1.30 MG/DL    BUN/Creatinine ratio 15 12 - 20      GFR est AA >60 >60 ml/min/1.73m2    GFR est non-AA >60 >60 ml/min/1.73m2    Calcium 8.3 (L) 8.5 - 10.1 MG/DL    Bilirubin, total 1.1 (H) 0.2 - 1.0 MG/DL    ALT (SGPT) 33 12 - 78 U/L    AST (SGOT) 35 15 - 37 U/L    Alk. phosphatase 46 45 - 117 U/L    Protein, total 6.8 6.4 - 8.2 g/dL    Albumin 2.7 (L) 3.5 - 5.0 g/dL    Globulin 4.1 (H) 2.0 - 4.0 g/dL    A-G Ratio 0.7 (L) 1.1 - 2.2     SED RATE (ESR)    Collection Time: 12/04/19  4:06 AM   Result Value Ref Range    Sed rate, automated 27 (H) 0 - 20 mm/hr   CK    Collection Time: 12/04/19  4:06 AM   Result Value Ref Range     39 - 308 U/L     Medications reviewed  Current Facility-Administered Medications   Medication Dose Route Frequency    potassium chloride SR (KLOR-CON 10) tablet 20 mEq  20 mEq Oral BID    thiamine (B-1) 100 mg in 0.9% sodium chloride 50 mL IVPB  100 mg IntraVENous DAILY    aspirin chewable tablet 81 mg  81 mg Oral DAILY    carvedilol (COREG) tablet 12.5 mg  12.5 mg Oral BID WITH MEALS    cholecalciferol (VITAMIN D3) (1000 Units /25 mcg) tablet 1 Tab  1,000 Units Oral DAILY    lidocaine 4 % patch 1 Patch  1 Patch TransDERmal DAILY    losartan (COZAAR) tablet 25 mg  25 mg Oral DAILY    sodium chloride (NS) flush 5-40 mL  5-40 mL IntraVENous Q8H    sodium chloride (NS) flush 5-40 mL  5-40 mL IntraVENous PRN    heparin (porcine) injection 5,000 Units  5,000 Units SubCUTAneous Q8H    insulin NPH (NOVOLIN N, HUMULIN N) injection 10 Units  10 Units SubCUTAneous ACB&D    0.9% sodium chloride infusion  25 mL/hr IntraVENous CONTINUOUS       Care Plan discussed with: Patient/nurse  Total time spent with patient and review of records: 30 minutes.   Puja Miles MD

## 2019-12-04 NOTE — PROGRESS NOTES
12/4/2019   CARE MANAGEMENT NOTE:  CM reviewed EMR for clinical updates.  Pt was admitted with dx of Rhabdo and falls (remained on the floor for 2 days). Reportedly, pt resides with his wife and handicapped son.     Transition Plan of Care:  1.  SNF - referrals were made to [address] [place] [address], [facility] (accepted),and [facility] (accepted). Wife has toured facilities and she has selected [facility]. Pt is reluctant to SNF however wife agrees to plan. 2.  [facility] (skilled nursing, PT, OT) was arranged prior to hospital admission.     CM will continue to follow pt for SNF placement.   Antonette

## 2019-12-04 NOTE — PROGRESS NOTES
Bedside and Verbal shift change report given to Chandler (oncoming nurse) by Shelley Reddy (offgoing nurse). Report included the following information SBAR, Kardex, Procedure Summary, Intake/Output and MAR.

## 2019-12-05 LAB
ALBUMIN SERPL-MCNC: 2.6 G/DL (ref 3.5–5)
ALBUMIN/GLOB SERPL: 0.7 {RATIO} (ref 1.1–2.2)
ALP SERPL-CCNC: 46 U/L (ref 45–117)
ALT SERPL-CCNC: 36 U/L (ref 12–78)
ANION GAP SERPL CALC-SCNC: 9 MMOL/L (ref 5–15)
AST SERPL-CCNC: 34 U/L (ref 15–37)
BASOPHILS # BLD: 0 K/UL (ref 0–0.1)
BASOPHILS NFR BLD: 0 % (ref 0–1)
BILIRUB SERPL-MCNC: 1.1 MG/DL (ref 0.2–1)
BUN SERPL-MCNC: 17 MG/DL (ref 6–20)
BUN/CREAT SERPL: 24 (ref 12–20)
CALCIUM SERPL-MCNC: 8.5 MG/DL (ref 8.5–10.1)
CHLORIDE SERPL-SCNC: 107 MMOL/L (ref 97–108)
CK SERPL-CCNC: 162 U/L (ref 39–308)
CO2 SERPL-SCNC: 22 MMOL/L (ref 21–32)
CREAT SERPL-MCNC: 0.71 MG/DL (ref 0.7–1.3)
DIFFERENTIAL METHOD BLD: ABNORMAL
EOSINOPHIL # BLD: 0.2 K/UL (ref 0–0.4)
EOSINOPHIL NFR BLD: 2 % (ref 0–7)
ERYTHROCYTE [DISTWIDTH] IN BLOOD BY AUTOMATED COUNT: 12.8 % (ref 11.5–14.5)
GLOBULIN SER CALC-MCNC: 4 G/DL (ref 2–4)
GLUCOSE BLD STRIP.AUTO-MCNC: 133 MG/DL (ref 65–100)
GLUCOSE BLD STRIP.AUTO-MCNC: 181 MG/DL (ref 65–100)
GLUCOSE BLD STRIP.AUTO-MCNC: 205 MG/DL (ref 65–100)
GLUCOSE BLD STRIP.AUTO-MCNC: 266 MG/DL (ref 65–100)
GLUCOSE SERPL-MCNC: 122 MG/DL (ref 65–100)
HCT VFR BLD AUTO: 40.1 % (ref 36.6–50.3)
HGB BLD-MCNC: 13.7 G/DL (ref 12.1–17)
IMM GRANULOCYTES # BLD AUTO: 0.1 K/UL (ref 0–0.04)
IMM GRANULOCYTES NFR BLD AUTO: 1 % (ref 0–0.5)
LYMPHOCYTES # BLD: 2 K/UL (ref 0.8–3.5)
LYMPHOCYTES NFR BLD: 22 % (ref 12–49)
MCH RBC QN AUTO: 29.3 PG (ref 26–34)
MCHC RBC AUTO-ENTMCNC: 34.2 G/DL (ref 30–36.5)
MCV RBC AUTO: 85.9 FL (ref 80–99)
MONOCYTES # BLD: 1.2 K/UL (ref 0–1)
MONOCYTES NFR BLD: 13 % (ref 5–13)
NEUTS SEG # BLD: 5.5 K/UL (ref 1.8–8)
NEUTS SEG NFR BLD: 62 % (ref 32–75)
NRBC # BLD: 0 K/UL (ref 0–0.01)
NRBC BLD-RTO: 0 PER 100 WBC
PLATELET # BLD AUTO: 258 K/UL (ref 150–400)
PMV BLD AUTO: 9.3 FL (ref 8.9–12.9)
POTASSIUM SERPL-SCNC: 3.8 MMOL/L (ref 3.5–5.1)
PROT SERPL-MCNC: 6.6 G/DL (ref 6.4–8.2)
RBC # BLD AUTO: 4.67 M/UL (ref 4.1–5.7)
SERVICE CMNT-IMP: ABNORMAL
SODIUM SERPL-SCNC: 138 MMOL/L (ref 136–145)
WBC # BLD AUTO: 8.9 K/UL (ref 4.1–11.1)

## 2019-12-05 PROCEDURE — 74011250637 HC RX REV CODE- 250/637: Performed by: FAMILY MEDICINE

## 2019-12-05 PROCEDURE — 97116 GAIT TRAINING THERAPY: CPT

## 2019-12-05 PROCEDURE — 36415 COLL VENOUS BLD VENIPUNCTURE: CPT

## 2019-12-05 PROCEDURE — 74011000250 HC RX REV CODE- 250: Performed by: FAMILY MEDICINE

## 2019-12-05 PROCEDURE — 65660000000 HC RM CCU STEPDOWN

## 2019-12-05 PROCEDURE — 74011250636 HC RX REV CODE- 250/636: Performed by: PSYCHIATRY & NEUROLOGY

## 2019-12-05 PROCEDURE — 85025 COMPLETE CBC W/AUTO DIFF WBC: CPT

## 2019-12-05 PROCEDURE — 82550 ASSAY OF CK (CPK): CPT

## 2019-12-05 PROCEDURE — 97535 SELF CARE MNGMENT TRAINING: CPT

## 2019-12-05 PROCEDURE — 74011250636 HC RX REV CODE- 250/636: Performed by: FAMILY MEDICINE

## 2019-12-05 PROCEDURE — 94760 N-INVAS EAR/PLS OXIMETRY 1: CPT

## 2019-12-05 PROCEDURE — 74011636637 HC RX REV CODE- 636/637: Performed by: FAMILY MEDICINE

## 2019-12-05 PROCEDURE — 82962 GLUCOSE BLOOD TEST: CPT

## 2019-12-05 PROCEDURE — 80053 COMPREHEN METABOLIC PANEL: CPT

## 2019-12-05 PROCEDURE — 97530 THERAPEUTIC ACTIVITIES: CPT

## 2019-12-05 PROCEDURE — 74011000258 HC RX REV CODE- 258: Performed by: PSYCHIATRY & NEUROLOGY

## 2019-12-05 RX ORDER — POTASSIUM CHLORIDE 750 MG/1
10 TABLET, FILM COATED, EXTENDED RELEASE ORAL DAILY
Qty: 15 TAB | Refills: 0 | Status: ON HOLD
Start: 2019-12-05 | End: 2020-02-08

## 2019-12-05 RX ADMIN — POTASSIUM CHLORIDE 20 MEQ: 750 TABLET, FILM COATED, EXTENDED RELEASE ORAL at 08:32

## 2019-12-05 RX ADMIN — ASPIRIN 81 MG 81 MG: 81 TABLET ORAL at 08:33

## 2019-12-05 RX ADMIN — LOSARTAN POTASSIUM 25 MG: 25 TABLET, FILM COATED ORAL at 08:33

## 2019-12-05 RX ADMIN — VITAMIN D, TAB 1000IU (100/BT) 1 TABLET: 25 TAB at 08:32

## 2019-12-05 RX ADMIN — HEPARIN SODIUM 5000 UNITS: 5000 INJECTION INTRAVENOUS; SUBCUTANEOUS at 21:40

## 2019-12-05 RX ADMIN — Medication 10 ML: at 05:43

## 2019-12-05 RX ADMIN — Medication 10 ML: at 13:09

## 2019-12-05 RX ADMIN — HEPARIN SODIUM 5000 UNITS: 5000 INJECTION INTRAVENOUS; SUBCUTANEOUS at 13:08

## 2019-12-05 RX ADMIN — POTASSIUM CHLORIDE 20 MEQ: 750 TABLET, FILM COATED, EXTENDED RELEASE ORAL at 17:36

## 2019-12-05 RX ADMIN — Medication 10 ML: at 21:42

## 2019-12-05 RX ADMIN — CARVEDILOL 12.5 MG: 12.5 TABLET, FILM COATED ORAL at 16:18

## 2019-12-05 RX ADMIN — HEPARIN SODIUM 5000 UNITS: 5000 INJECTION INTRAVENOUS; SUBCUTANEOUS at 05:43

## 2019-12-05 RX ADMIN — INSULIN HUMAN 12 UNITS: 100 INJECTION, SUSPENSION SUBCUTANEOUS at 07:34

## 2019-12-05 RX ADMIN — CARVEDILOL 12.5 MG: 12.5 TABLET, FILM COATED ORAL at 07:34

## 2019-12-05 RX ADMIN — THIAMINE HYDROCHLORIDE 100 MG: 100 INJECTION, SOLUTION INTRAMUSCULAR; INTRAVENOUS at 08:54

## 2019-12-05 RX ADMIN — INSULIN HUMAN 12 UNITS: 100 INJECTION, SUSPENSION SUBCUTANEOUS at 16:17

## 2019-12-05 NOTE — PROGRESS NOTES
Problem: Self Care Deficits Care Plan (Adult)  Goal: *Acute Goals and Plan of Care (Insert Text)  Description    FUNCTIONAL STATUS PRIOR TO ADMISSION: Patient was modified independent using a rolling walker for functional mobility. HOME SUPPORT: The patient lived with spouse but did not require assist.    Occupational Therapy Goals  Initiated 12/1/2019  1. Patient will perform grooming with supervision/set-up within 7 day(s). 2.  Patient will perform lower body dressing with supervision/set-up within 7 day(s). 3.  Patient will perform toilet transfers with minimum assistance within 7 day(s). 4.  Patient will perform all aspects of toileting with minimal assistance/contact guard assist within 7 day(s). 5.  Patient will participate in upper extremity therapeutic exercise/activities with supervision/set-up for 10 minutes within 7 day(s). 6.  Patient will utilize energy conservation techniques during functional activities with verbal cues within 7 day(s). Outcome: Progressing Towards Goal   OCCUPATIONAL THERAPY TREATMENT  Patient: Veronica Spears Sr. (81 y.o. male)  Date: 12/5/2019  Diagnosis: Traumatic rhabdomyolysis ([facility] [T79. Taty Duong. XXXA]   <principal problem not specified>       Precautions: Bed Alarm, Fall  Chart, occupational therapy assessment, plan of care, and goals were reviewed. ASSESSMENT  Patient continues with skilled OT services and is progressing towards goals. Pt transferred to [facility] next to bed with assist x 2, pt not needing to void but did wash perineal area in sitting as well as LE's to his feet. Assist x 1 for doff/don gown and pt set-up for lunch. Pt encouraged to engage with UE exercises to increase strength and endurance for functional tasks.     Current Level of Function Impacting Discharge (ADLs): Min assist x 1 bathing and dressing    Other factors to consider for discharge:          PLAN :  Patient continues to benefit from skilled intervention to address the above impairments. Continue treatment per established plan of care. to address goals. Recommend with staff: Irina Fagan for meals and activity, [facility] for toileting    Recommend next OT session: Cont towards goals    Recommendation for discharge: (in order for the patient to meet his/her long term goals)  Therapy up to 5 days/week in SNF setting    This discharge recommendation:  Has not yet been discussed the attending provider and/or case management    IF patient discharges home will need the following DME: none       SUBJECTIVE:   Patient stated My wife drives me crazy.     OBJECTIVE DATA SUMMARY:   Cognitive/Behavioral Status:  Neurologic State: Alert  Orientation Level: Oriented to person;Oriented to time;Disoriented to place; Disoriented to situation  Cognition: Follows commands             Functional Mobility and Transfers for ADLs:  Bed Mobility:   Not tested as pt already seated edge of bed    Transfers:     Functional Transfers  Toilet Transfer : Moderate assistance;Assist x2  Adaptive Equipment: Bedside commode;Walker (comment)       Balance: Intact sitting balance       ADL Intervention:          Upper Body Dressing Assistance  Dressing Assistance: Minimum assistance  Hospital Gown: Minimum  assistance                   Therapeutic Exercises:   Encouraged pt to engage with UE exercises later today to increase strength and endurance for functional tasks. Activity Tolerance:   Fair  Please refer to the flowsheet for vital signs taken during this treatment.     After treatment patient left in no apparent distress:   Sitting in chair and Call bell within reach    COMMUNICATION/COLLABORATION:   The patients plan of care was discussed with: Physical Therapy Assistant, Occupational Therapist, and Registered Nurse    SAMIRA Soto  Time Calculation: 20 mins

## 2019-12-05 NOTE — PROGRESS NOTES
12/5/2019   CARE MANAGEMENT NOTE:  CM reviewed EMR for clinical updates.  Pt was admitted with dx of Rhabdo and falls (remained on the floor for 2 days). Reportedly, pt resides with his wife and handicapped son.     Transition Plan of Care:  1.  SNF - [place] [id number] Wife toured and she is agreeable. .  Skilled bed is available on Friday, Dec. 6.     CM will continue to follow pt for SNF placement.   Antonette

## 2019-12-05 NOTE — PROGRESS NOTES
Problem: Falls - Risk of  Goal: *Absence of Falls  Description  Document Melany Dear Fall Risk and appropriate interventions in the flowsheet. Outcome: Progressing Towards Goal  Note: Fall Risk Interventions:  Mobility Interventions: Patient to call before getting OOB    Mentation Interventions: Adequate sleep, hydration, pain control    Medication Interventions: Evaluate medications/consider consulting pharmacy    Elimination Interventions: Call light in reach    History of Falls Interventions: Bed/chair exit alarm         Problem: Patient Education: Go to Patient Education Activity  Goal: Patient/Family Education  Outcome: Progressing Towards Goal     Problem: Pressure Injury - Risk of  Goal: *Prevention of pressure injury  Description  Document Savage Scale and appropriate interventions in the flowsheet.   Outcome: Progressing Towards Goal  Note: Pressure Injury Interventions:       Moisture Interventions: Apply protective barrier, creams and emollients, Limit adult briefs, Minimize layers    Activity Interventions: Pressure redistribution bed/mattress(bed type)    Mobility Interventions: HOB 30 degrees or less, Pressure redistribution bed/mattress (bed type)    Nutrition Interventions: Document food/fluid/supplement intake    Friction and Shear Interventions: Apply protective barrier, creams and emollients, Lift sheet                Problem: Patient Education: Go to Patient Education Activity  Goal: Patient/Family Education  Outcome: Progressing Towards Goal

## 2019-12-05 NOTE — DISCHARGE INSTRUCTIONS
Patient Discharge Instructions    [place]. / [id number] : 1940    Admitted 2019 Discharged: 2019 6:50 AM     ACUTE DIAGNOSES:  Traumatic rhabdomyolysis ([facility] Francis Urena. [facility]. XXXA]    CHRONIC MEDICAL DIAGNOSES:  Problem List as of 2019 Date Reviewed: 10/14/2019          Codes Class Noted - Resolved    Fall ICD-10-CM: W19. Jayson Groom  ICD-9-CM: X481.3  2019 - Present        Traumatic rhabdomyolysis ([facility] ICD-10-CM: T79. 6XXA  ICD-9-CM: 958.6  2019 - Present        Dyslipidemia ICD-10-CM: E78.5  ICD-9-CM: 272.4  10/3/2012 - Present        CAD (coronary artery disease), native coronary artery ICD-10-CM: I25.10  ICD-9-CM: 414.01  Unknown - Present    Overview Signed 3/22/2011  3:04 PM by Elise Shanks MD     Inferior MI 2009  - PCI RCA with PTCA, significant LAD disease  - suspected reocclusion several hrs later with VF, IABP placed  3v CABG 2009 Riaz Aleman @ [facility])  - LIMA-LAD, VG-OM, VG-PDA             Type 2 diabetes mellitus without complication ([facility] XUA-10-VM: E11.9  ICD-9-CM: 250.00  Unknown - Present        Hypertension, benign ICD-10-CM: I10  ICD-9-CM: 401.1  Unknown - Present        RESOLVED: PND (paroxysmal nocturnal dyspnea) ICD-10-CM: R06.00  ICD-9-CM: 786.09  2017 - 2018        RESOLVED: Near syncope ICD-10-CM: R55  ICD-9-CM: 780.2  10/29/2013 - 4/15/2014              DISCHARGE MEDICATIONS:         · It is important that you take the medication exactly as they are prescribed. · Keep your medication in the bottles provided by the pharmacist and keep a list of the medication names, dosages, and times to be taken in your wallet. · Do not take other medications without consulting your doctor.        DIET:  Diabetic Diet    ACTIVITY: Activity as tolerated    ADDITIONAL INFORMATION: If you experience any of the following symptoms then please call your primary care physician or return to the emergency room if you cannot get hold of your doctor: Fever, chills, nausea, vomiting, diarrhea, change in mentation, falling, bleeding, shortness of breath. FOLLOW UP CARE:  Dr. Carolina Fournier MD  you are to call and set up an appointment to see them with in 1 week. Follow-up with specialists at directed by them      Information obtained by :  I understand that if any problems occur once I am at home I am to contact my physician. I understand and acknowledge receipt of the instructions indicated above.                                                                                                                                            Physician's or R.N.'s Signature                                                                  Date/Time                                                                                                                                              Patient or Representative Signature                                                          Date/Time

## 2019-12-05 NOTE — PROGRESS NOTES
Bedside and Verbal shift change report given to [facility]  (oncoming nurse) by Sandrita Domingo RN  (offgoing nurse). Report included the following information SBAR, Kardex, Intake/Output, MAR and Recent Results.

## 2019-12-05 NOTE — PROGRESS NOTES
Problem: Mobility Impaired (Adult and Pediatric)  Goal: *Acute Goals and Plan of Care (Insert Text)  Description  FUNCTIONAL STATUS PRIOR TO ADMISSION: patient not a good historian    HOME SUPPORT PRIOR TO ADMISSION: The patient lived with wife and 47 yo disabled son. Physical Therapy Goals  Initiated 12/1/2019  1. Patient will move from supine to sit and sit to supine , scoot up and down and roll side to side in bed with minimal assistance/contact guard assist within 7 day(s). 2.  Patient will transfer from bed to chair and chair to bed with minimal assistance/contact guard assist using the least restrictive device within 7 day(s). 3.  Patient will perform sit to stand with minimal assistance/contact guard assist within 7 day(s). 4.  Patient will ambulate with minimal assistance/contact guard assist for 25 feet with the least restrictive device within 7 day(s). Note:   PHYSICAL THERAPY TREATMENT  Patient: Ketan Pascal Sr. (81 y.o. male)  Date: 12/5/2019  Diagnosis: Traumatic r[facility]dom[facility] [T79. Elverna Meals. XXXA]   <principal problem not specified>       Precautions: Bed Alarm, Fall  Chart, physical therapy assessment, plan of care and goals were reviewed. ASSESSMENT  Patient continues with skilled PT services. Pt comes to min to mod assist.Pt mod assist sit to stand. Pt ambulated 15ft with RW min to mod assist.Pt left sitting in bed. Pt left sitting in chair with alarm in place. Pt progressing slowly. Continue goals. Current Level of Function Impacting Discharge (mobility/balance): Pt is min to mod assist for mobility. Other factors to consider for discharge:        PLAN :  Patient continues to benefit from skilled intervention to address the above impairments. Continue treatment per established plan of care. to address goals.     Recommendation for discharge: (in order for the patient to meet his/her long term goals)  Therapy up to 5 days/week in SNF setting    This discharge recommendation:  Has been made in collaboration with the attending provider and/or case management    IF patient discharges home will need the following DME: rolling walker       SUBJECTIVE:       OBJECTIVE DATA SUMMARY:   Critical Behavior:  Neurologic State: Alert  Orientation Level: Oriented to person, Oriented to time, Disoriented to place, Disoriented to situation  Cognition: Follows commands  Safety/Judgement: Awareness of environment  Functional Mobility Training:  Bed Mobility:   Pt is min to mod assist for bed mobility. Transfers:   Pt min to mod assist for tranfers           Balance:     Ambulation/Gait Training:         Pt ambulated 15ft with RW min to mod assist.          Activity Tolerance:   Good and Fair  Please refer to the flowsheet for vital signs taken during this treatment.     After treatment patient left in no apparent distress:   Sitting in chair    COMMUNICATION/COLLABORATION:   The patients plan of care was discussed with: Physical Therapist    Alberto Hill PTA   Time Calculation: 23 mins

## 2019-12-05 NOTE — PROGRESS NOTES
Daily Progress Note: 12/5/2019  Amalia Davidson, Jenna Ga MD    Assessment/Plan:   Traumatic rhabdomyolysis  -- Fluids as needed  -- Serial CK improved  -- resume statin at DC     S/p fall  -- fall precautions  -- rehab     Bilateral knee and right hip pain  -- Xray knee with OA  -- Right hip Xray neg for Fx     Type 2 DM with neuropathy  -- Humalog insulin correctional coverage  -- Restarted home insulin and adjust as needed at Rehab  -- A1c 6.8 on 12/1/19     Generalized weakness/ physical debility  -- rehab    Altered Mental status - unk cause; likely some underlying dementia  -- consulted Neuro- MRI neg  -- EEG with generalized slowing     CAD  -- stable    Hypokalemia  -- Replete and monitor at rehab        Problem List:  Problem List as of 12/5/2019 Date Reviewed: 10/14/2019          Codes Class Noted - Resolved    Fall ICD-10-CM: W19. Leo Lazarus  ICD-9-CM: G728.8  11/29/2019 - Present        Traumatic rhabdom[facility]olysis ([facility] ICD-10-CM: T79. 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 is a 70 y.o. white male with past medical history of CAD, melanoma, depression, diabetic neuropathy, ED, and type 2 DM presented to the ED from home with chief complaints of fall with right hip and bilateral knee pain. Patient is a limited historian. He provided limited details regarding recent ground level fall at home. He had poor recall; loss of consciousness was not definitively known. Patient was found with uncertain down time on the ground. Pain in right hip was severe, constant, aggravated with touch or movement with onset after fall. On arrival in the ED, workup included CT head which was negative. CK= 3,365. ED requested admission to the hospitalist service. There were no reports of new onset syncope, loss of consciousness, headache, neck pain, visual disturbance, numbness, paresthesias, focal weakness, chest pain, shortness of breath, cough, palpitations, abdominal pain, nausea, vomiting, diarrhea, melena, dysuria, hematuria, calf pain, increased leg swelling/ edema, fever, chills, or rash. (Dr Cassidy Taylor)    11/30: He is not sure what happened to bring him in. He is having right hip and knee pain. Xrays are neg. Blood sugars are good but he reports that he has not eaten. Will restart NPH but at a lower dose than at home. CK is a little better. PT consulted. 12/1: Nurses report he is max assist and quite weak. Will ask case management to see for rehab placement. CK down so will decrease IVF and replete K+. BS are stable. Continue NPH. A1c pending. Tried calling family yesterday and this am but no answer at home number. 12/2:  Still weak and not fully oriented at times - did not know his location this AM.  No specific complaint except \"feeling bad. \"  Will likely need rehab. CK coming down rapidly. K+ a little low - replacing. 12/3:  Continues to be confused off and on - he thinks he is at home this AM and thinks he has to clean up for his wife's \"gathering\" later today.   He thinks his wife is talking to him from the johnson \"complaining about something or the other again. \"  He has no visual hallucinations - will get Neuro to see also. K+ a little low - replacing. : Appreciate neuro consult. MRI neg. EEG pending. CK is normal. Knows month and place but not the day of the week. PM blood sugars are high so will adjust insulin. Electrolytes ok. :  No complaints. EEG shows diffuse slowing. At this moment, he is willing to go to rehab. Accepted at rehab with bed available on . Review of Systems:   Review of systems not obtained due to patient factors. Objective:   Physical Exam:   Visit Vitals  /74 (BP 1 Location: Left arm, BP Patient Position: At rest)   Pulse 81   Temp 98.1 °F (36.7 °C)   Resp 18   Ht 6' (1.829 m)   Wt 99.8 kg (220 lb)   SpO2 94%   BMI 29.84 kg/m²      O2 Device: Room air  Temp (24hrs), Av.2 °F (36.8 °C), Min:97.6 °F (36.4 °C), Max:98.6 °F (37 °C)    1901 -  0700  In: -   Out: 100 [Urine:100]   701 - 1900  In: 1176.7 [P.O.:840; I.V.:336.7]  Out: 2780 [Urine:2550]  General:  Alert, cooperative  no distress, appears stated age. Head:  Normocephalic, without obvious abnormality, atraumatic. Eyes:  Conjunctivae/corneas clear. EOMs intact. Throat: Lips, mucosa, and tongue normal. Teeth and gums normal.   Neck: Supple, symmetrical, trachea midline, no adenopathy, thyroid: no enlargement/tenderness/nodules, no carotid bruit and no JVD. Lungs:   Clear to auscultation bilaterally. Chest wall:  No tenderness or deformity. Heart:  Regular rate and rhythm, S1, S2 normal, no murmur, click, rub or gallop. Abdomen:   Soft, non-tender. Bowel sounds normal. No masses,  No organomegaly. Extremities: Extremities with nearly healed abrasions of both knees. no cyanosis or edema. No calf tenderness or cords. Pulses: 2+ and symmetric all extremities. Skin:  turgor normal. No rashes    Neurologic:  Alert and oriented X 2. Derails easily at this time. No tremor or shakes.  equal.  No cogwheeling or rigidity. Gait not tested at this time. Sensation grossly normal to touch. Gross motor of extremities normal.       Data Review:       EXAM: CT HEAD WO CONT 11/29/19  INDICATION: Ground-level fall and down for a couple of hours. Generalized  weakness and confusion. COMPARISON: CT head on 11/27/2019. FINDINGS: Motion artifact obscures fine detail and limits sensitivity for  detecting pathology. The ventricles and sulci are age-appropriate without  hydrocephalus. There is no mass effect or midline shift. There is no  intracranial hemorrhage or extra-axial fluid collection. There is no abnormal  area of decreased density to suggest infarct. Pineal gland is calcified. The calvarium is intact. The visualized paranasal sinuses and mastoid air cells  are clear. IMPRESSION:   No acute intracranial abnormality on this noncontrast head CT. Limited by motion  artifact. No significant change since 2 days ago.        Recent Days:  Recent Labs     12/05/19  0541 12/04/19  0406 12/03/19  0345   WBC 8.9 6.9 7.0   HGB 13.7 13.9 13.9   HCT 40.1 40.6 39.9    230 202     Recent Labs     12/05/19  0541 12/04/19  0406 12/03/19  0345    139 136   K 3.8 3.6 3.3*    108 106   CO2 22 23 22   * 140* 134*   BUN 17 11 11   CREA 0.71 0.72 0.68*   CA 8.5 8.3* 8.1*   ALB 2.6* 2.7* 2.7*   TBILI 1.1* 1.1* 1.0   SGOT 34 35 37   ALT 36 33 34     No results for input(s): PH, PCO2, PO2, HCO3, FIO2 in the last 72 hours.     24 Hour Results:  Recent Results (from the past 24 hour(s))   GLUCOSE, POC    Collection Time: 12/04/19  6:54 AM   Result Value Ref Range    Glucose (POC) 130 (H) 65 - 100 mg/dL    Performed by Annabella Leventhal  PCT    GLUCOSE, POC    Collection Time: 12/04/19 12:06 PM   Result Value Ref Range    Glucose (POC) 161 (H) 65 - 100 mg/dL    Performed by Vy Ca (PCT)    GLUCOSE, POC    Collection Time: 12/04/19  4:08 PM   Result Value Ref Range    Glucose (POC) 212 (H) 65 - 100 mg/dL    Performed by Janine Escalante (PCT)    GLUCOSE, POC    Collection Time: 12/04/19  8:47 PM   Result Value Ref Range    Glucose (POC) 160 (H) 65 - 100 mg/dL    Performed by Mora Bardales (PCT)    CK    Collection Time: 12/05/19  5:41 AM   Result Value Ref Range     39 - 308 U/L   CBC WITH AUTOMATED DIFF    Collection Time: 12/05/19  5:41 AM   Result Value Ref Range    WBC 8.9 4.1 - 11.1 K/uL    RBC 4.67 4.10 - 5.70 M/uL    HGB 13.7 12.1 - 17.0 g/dL    HCT 40.1 36.6 - 50.3 %    MCV 85.9 80.0 - 99.0 FL    MCH 29.3 26.0 - 34.0 PG    MCHC 34.2 30.0 - 36.5 g/dL    RDW 12.8 11.5 - 14.5 %    PLATELET 688 807 - 888 K/uL    MPV 9.3 8.9 - 12.9 FL    NRBC 0.0 0  WBC    ABSOLUTE NRBC 0.00 0.00 - 0.01 K/uL    NEUTROPHILS 62 32 - 75 %    LYMPHOCYTES 22 12 - 49 %    MONOCYTES 13 5 - 13 %    EOSINOPHILS 2 0 - 7 %    BASOPHILS 0 0 - 1 %    IMMATURE GRANULOCYTES 1 (H) 0.0 - 0.5 %    ABS. NEUTROPHILS 5.5 1.8 - 8.0 K/UL    ABS. LYMPHOCYTES 2.0 0.8 - 3.5 K/UL    ABS. MONOCYTES 1.2 (H) 0.0 - 1.0 K/UL    ABS. EOSINOPHILS 0.2 0.0 - 0.4 K/UL    ABS. BASOPHILS 0.0 0.0 - 0.1 K/UL    ABS. IMM. GRANS. 0.1 (H) 0.00 - 0.04 K/UL    DF AUTOMATED     METABOLIC PANEL, COMPREHENSIVE    Collection Time: 12/05/19  5:41 AM   Result Value Ref Range    Sodium 138 136 - 145 mmol/L    Potassium 3.8 3.5 - 5.1 mmol/L    Chloride 107 97 - 108 mmol/L    CO2 22 21 - 32 mmol/L    Anion gap 9 5 - 15 mmol/L    Glucose 122 (H) 65 - 100 mg/dL    BUN 17 6 - 20 MG/DL    Creatinine 0.71 0.70 - 1.30 MG/DL    BUN/Creatinine ratio 24 (H) 12 - 20      GFR est AA >60 >60 ml/min/1.73m2    GFR est non-AA >60 >60 ml/min/1.73m2    Calcium 8.5 8.5 - 10.1 MG/DL    Bilirubin, total 1.1 (H) 0.2 - 1.0 MG/DL    ALT (SGPT) 36 12 - 78 U/L    AST (SGOT) 34 15 - 37 U/L    Alk.  phosphatase 46 45 - 117 U/L    Protein, total 6.6 6.4 - 8.2 g/dL    Albumin 2.6 (L) 3.5 - 5.0 g/dL    Globulin 4.0 2.0 - 4.0 g/dL    A-G Ratio 0.7 (L) 1.1 - 2.2       Medications reviewed  Current Facility-Administered Medications   Medication Dose Route Frequency    insulin NPH (NOVOLIN N, HUMULIN N) injection 12 Units  12 Units SubCUTAneous ACB&D    potassium chloride SR (KLOR-CON 10) tablet 20 mEq  20 mEq Oral BID    thiamine (B-1) 100 mg in 0.9% sodium chloride 50 mL IVPB  100 mg IntraVENous DAILY    aspirin chewable tablet 81 mg  81 mg Oral DAILY    carvedilol (COREG) tablet 12.5 mg  12.5 mg Oral BID WITH MEALS    cholecalciferol (VITAMIN D3) (1000 Units /25 mcg) tablet 1 Tab  1,000 Units Oral DAILY    lidocaine 4 % patch 1 Patch  1 Patch TransDERmal DAILY    losartan (COZAAR) tablet 25 mg  25 mg Oral DAILY    sodium chloride (NS) flush 5-40 mL  5-40 mL IntraVENous Q8H    sodium chloride (NS) flush 5-40 mL  5-40 mL IntraVENous PRN    heparin (porcine) injection 5,000 Units  5,000 Units SubCUTAneous Q8H     Care Plan discussed with: Patient/nurse  Total time spent with patient and review of records: 30 minutes.   Anamaria Mckay MD

## 2019-12-05 NOTE — PROGRESS NOTES
Spiritual Care Assessment/Progress Note  [address]      NAME: Ketan Pascal Sr. MRN: [id number]  AGE: 70 y.o. SEX: male  Confucianism Affiliation: Orthodox   Language: English     12/5/2019     Total Time (in minutes): 5     Spiritual Assessment begun in [facility]  MED SURG 2 through conversation with:         []Patient        [] Family    [] Friend(s)        Reason for Consult: Initial/Spiritual assessment, patient floor     Spiritual beliefs: (Please include comment if needed)     [] Identifies with a justus tradition:         [] Supported by a justus community:            [] Claims no spiritual orientation:           [] Seeking spiritual identity:                [] Adheres to an individual form of spirituality:           [x] Not able to assess:                           Identified resources for coping:      [] Prayer                               [] Music                  [] Guided Imagery     [] Family/friends                 [] Pet visits     [] Devotional reading                         [x] Unknown     [] Other:                                               Interventions offered during this visit: (See comments for more details)    Patient Interventions: Spiritual care volunteer support           Plan of Care:     [] Support spiritual and/or cultural needs    [] Support AMD and/or advance care planning process      [] Support grieving process   [] Coordinate Rites and/or Rituals    [] Coordination with community clergy   [] No spiritual needs identified at this time   [] Detailed Plan of Care below (See Comments)  [] Make referral to Music Therapy  [] Make referral to Pet Therapy     [] Make referral to Addiction services  [] Make referral to [facility]  [] Make referral to Spiritual Care Partner  [] No future visits requested        [x] Follow up visits as needed     Comments:  visit for initial spiritual assessment.   Patient resting at the time of this visit, did not respond to knock on door or verbal greeting. Will continue to follow up as needed and upon request as able. Visited by Rev. Irineo Gilbert MDiv, [facility], [facility] paging service: [phone])

## 2019-12-06 VITALS
WEIGHT: 220 LBS | RESPIRATION RATE: 20 BRPM | OXYGEN SATURATION: 94 % | HEART RATE: 76 BPM | HEIGHT: 72 IN | TEMPERATURE: 98.6 F | BODY MASS INDEX: 29.8 KG/M2 | DIASTOLIC BLOOD PRESSURE: 79 MMHG | SYSTOLIC BLOOD PRESSURE: 134 MMHG

## 2019-12-06 LAB
ALBUMIN SERPL-MCNC: 2.7 G/DL (ref 3.5–5)
ALBUMIN/GLOB SERPL: 0.6 {RATIO} (ref 1.1–2.2)
ALP SERPL-CCNC: 48 U/L (ref 45–117)
ALT SERPL-CCNC: 46 U/L (ref 12–78)
ANION GAP SERPL CALC-SCNC: 10 MMOL/L (ref 5–15)
AST SERPL-CCNC: 40 U/L (ref 15–37)
BASOPHILS # BLD: 0 K/UL (ref 0–0.1)
BASOPHILS NFR BLD: 1 % (ref 0–1)
BILIRUB SERPL-MCNC: 1.1 MG/DL (ref 0.2–1)
BUN SERPL-MCNC: 16 MG/DL (ref 6–20)
BUN/CREAT SERPL: 22 (ref 12–20)
CALCIUM SERPL-MCNC: 8.5 MG/DL (ref 8.5–10.1)
CHLORIDE SERPL-SCNC: 106 MMOL/L (ref 97–108)
CK SERPL-CCNC: 144 U/L (ref 39–308)
CO2 SERPL-SCNC: 23 MMOL/L (ref 21–32)
CREAT SERPL-MCNC: 0.73 MG/DL (ref 0.7–1.3)
DIFFERENTIAL METHOD BLD: ABNORMAL
EOSINOPHIL # BLD: 0.2 K/UL (ref 0–0.4)
EOSINOPHIL NFR BLD: 3 % (ref 0–7)
ERYTHROCYTE [DISTWIDTH] IN BLOOD BY AUTOMATED COUNT: 12.6 % (ref 11.5–14.5)
GLOBULIN SER CALC-MCNC: 4.2 G/DL (ref 2–4)
GLUCOSE BLD STRIP.AUTO-MCNC: 149 MG/DL (ref 65–100)
GLUCOSE SERPL-MCNC: 143 MG/DL (ref 65–100)
HCT VFR BLD AUTO: 41.8 % (ref 36.6–50.3)
HGB BLD-MCNC: 14 G/DL (ref 12.1–17)
IMM GRANULOCYTES # BLD AUTO: 0 K/UL (ref 0–0.04)
IMM GRANULOCYTES NFR BLD AUTO: 1 % (ref 0–0.5)
LYMPHOCYTES # BLD: 1.6 K/UL (ref 0.8–3.5)
LYMPHOCYTES NFR BLD: 19 % (ref 12–49)
MCH RBC QN AUTO: 28.9 PG (ref 26–34)
MCHC RBC AUTO-ENTMCNC: 33.5 G/DL (ref 30–36.5)
MCV RBC AUTO: 86.4 FL (ref 80–99)
MONOCYTES # BLD: 1.1 K/UL (ref 0–1)
MONOCYTES NFR BLD: 13 % (ref 5–13)
NEUTS SEG # BLD: 5.5 K/UL (ref 1.8–8)
NEUTS SEG NFR BLD: 63 % (ref 32–75)
NRBC # BLD: 0 K/UL (ref 0–0.01)
NRBC BLD-RTO: 0 PER 100 WBC
PLATELET # BLD AUTO: 292 K/UL (ref 150–400)
PMV BLD AUTO: 9.5 FL (ref 8.9–12.9)
POTASSIUM SERPL-SCNC: 3.9 MMOL/L (ref 3.5–5.1)
PROT SERPL-MCNC: 6.9 G/DL (ref 6.4–8.2)
RBC # BLD AUTO: 4.84 M/UL (ref 4.1–5.7)
SERVICE CMNT-IMP: ABNORMAL
SODIUM SERPL-SCNC: 139 MMOL/L (ref 136–145)
WBC # BLD AUTO: 8.5 K/UL (ref 4.1–11.1)

## 2019-12-06 PROCEDURE — 74011000250 HC RX REV CODE- 250: Performed by: FAMILY MEDICINE

## 2019-12-06 PROCEDURE — 82962 GLUCOSE BLOOD TEST: CPT

## 2019-12-06 PROCEDURE — 94760 N-INVAS EAR/PLS OXIMETRY 1: CPT

## 2019-12-06 PROCEDURE — 85025 COMPLETE CBC W/AUTO DIFF WBC: CPT

## 2019-12-06 PROCEDURE — 74011250637 HC RX REV CODE- 250/637: Performed by: FAMILY MEDICINE

## 2019-12-06 PROCEDURE — 82550 ASSAY OF CK (CPK): CPT

## 2019-12-06 PROCEDURE — 36415 COLL VENOUS BLD VENIPUNCTURE: CPT

## 2019-12-06 PROCEDURE — 74011250636 HC RX REV CODE- 250/636: Performed by: FAMILY MEDICINE

## 2019-12-06 PROCEDURE — 80053 COMPREHEN METABOLIC PANEL: CPT

## 2019-12-06 PROCEDURE — 74011636637 HC RX REV CODE- 636/637: Performed by: FAMILY MEDICINE

## 2019-12-06 RX ADMIN — INSULIN HUMAN 12 UNITS: 100 INJECTION, SUSPENSION SUBCUTANEOUS at 07:45

## 2019-12-06 RX ADMIN — VITAMIN D, TAB 1000IU (100/BT) 1 TABLET: 25 TAB at 10:40

## 2019-12-06 RX ADMIN — LOSARTAN POTASSIUM 25 MG: 25 TABLET, FILM COATED ORAL at 10:40

## 2019-12-06 RX ADMIN — CARVEDILOL 12.5 MG: 12.5 TABLET, FILM COATED ORAL at 07:45

## 2019-12-06 RX ADMIN — HEPARIN SODIUM 5000 UNITS: 5000 INJECTION INTRAVENOUS; SUBCUTANEOUS at 07:17

## 2019-12-06 RX ADMIN — POTASSIUM CHLORIDE 20 MEQ: 750 TABLET, FILM COATED, EXTENDED RELEASE ORAL at 10:40

## 2019-12-06 RX ADMIN — ASPIRIN 81 MG 81 MG: 81 TABLET ORAL at 10:40

## 2019-12-06 NOTE — PROGRESS NOTES
Bedside shift change report given to Sylvia (oncoming nurse) by Yulia Gardner (offgoing nurse). Report included the following information SBAR, Kardex and MAR.

## 2019-12-06 NOTE — PROGRESS NOTES
Bedside shift change report given to Jade Mijares RN (oncoming nurse) by Robyn Bustamante RN (offgoing nurse). Report included the following information SBAR, Kardex, MAR and Accordion.    Braeden Art

## 2019-12-06 NOTE — PROGRESS NOTES
12/6/2019  11:26 AM     CM verified patient has a qualifying hospital stay for [facility].   RADHA Quezada  Care Management

## 2019-12-06 NOTE — PROGRESS NOTES
Discharge order noted. Awaiting case management note for final details for patient's transition to rehab. 1130: Patient's wife arrived. Patient given discharge instructions and packet for [employer]. Patient's wife verbalizes understanding of discharge instructions and plan to go to rehab at [address]. Patient cleaned and dressed by nurse and tech. PIV removed. Patient out of unit in wheelchair, wife driving patient to facility.

## 2019-12-06 NOTE — PROGRESS NOTES
12/6/2019  CARE MANAGEMENT NOTE:  Pt has been accepted to the [address] of [facility] and skilled bed is available today. RN, please call report to [facility] at [phone]. Pt will go to room [address] [facility]. Wife will transport at 11:30 a.m. Transition of Care Plan to SNF/Rehab    SNF/Rehab Transition:  Patient has been accepted to [facility] and meets criteria for admission. Patient will be transported by car (wife) and expected to leave at 11:30 a.m. Communication to Patient/Family:  Met with patient and called wife (identified care giver) and they are agreeable to the transition plan. Communication to SNF/Rehab:  Bedside RN, Phyllis Wilkes, has been notified to update the transition plan to the facility and call report (phone number [phone]). Discharge information has been updated on the AVS.     Discharge instructions to be fax'd to facility at (Fax # [phone]). SNF/Rehab Transition:  Patient to follow-up with Home Health:  [facility], other ,none)  PCP/Specialist: Dr. Jaimie Swartz Management: N/A    Reviewed and confirmed with facility, [facility] they can manage the patient care needs for the following:     Margy Grace with (X) only those applicable:    Medication:  [x]  Medications will be available at the facility  []  IV Antibiotics N/A  []  Controlled Substance - hard copy to be sent with patient   []  Weekly Labs   Documents:  [] Hard RX  [x] MAR  [] Kardex  [x] AVS  []Transfer Summary  []Discharge   Equipment:  []  CPAP/BiPAP  []  Wound Vacuum  []  Hernández or Urinary Device  []  PICC/Central Line  []  Nebulizer  []  Ventilator   Treatment:  []Isolation (for MRSA, VRE, etc.)  []Surgical Drain Management  []Tracheostomy Care  []Dressing Changes  []Dialysis with transportation and chair time N/A  []PEG Care  []Oxygen  []Daily Weights for Heart Failure   Dietary:  []Any diet limitations  []Tube Feedings   []Total Parenteral Management (TPN)   Eligible for Medicaid Long Term Services and Supports  Yes:  [] Eligible for medical assistance or will become eligible within 180 days and UAI completed. [] Provider/Patient and/or support system has requested screening. [] UAI copy provided to patient or responsible party, N/A  [] UAI unavailable at discharge will send once processed to SNF provider. [] UAI unavailable at discharged mailed to patient  No:   [] Private pay and is not financially eligible for Medicaid within the next 180 days. [] Reside out-of-state. [] A residents of a state owned/operated facility that is licensed  by [employer] [address] [email or web address] or [facility]  [] Enrollment in [facility] hospice services  [] [facility]  [] Patient /Family declines to have screening completed or provide financial information for screening     Financial Resources:  Medicaid    [] Initiated and application pending   [] Full coverage     Advanced Care Plan:  []Surrogate Decision Maker of Care  []POA  []Communicated Code Status Full (DDNR\", \"Full\")    Other    CM verified patient has a qualifying hospital stay for [facility]

## 2019-12-06 NOTE — DISCHARGE SUMMARY
Physician Discharge Summary     Patient ID:    Elgin Liu  [id number]  70 y.o.  1940  Fay Mcclellan MD    Admit date: 11/29/2019    Discharge date and time: 12/6/2019    Admission Diagnoses: Traumatic rhabdomyolysis ([facility] Mich Prow. 6XXA]; Fall [W19. XXXA]    Chronic Diagnoses:    Problem List as of 12/6/2019 Date Reviewed: 10/14/2019          Codes Class Noted - Resolved    Fall ICD-10-CM: W19. Sheran Stake  ICD-9-CM: M007.9  11/29/2019 - Present        Traumatic rhabdomyolysis ([facility] ICD-10-CM: T79. 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 Blank Clarke @ [facility])  - LIMA-LAD, VG-OM, VG-PDA             Type 2 diabetes mellitus without complication ([facility] VZC-25-OO: E11.9  ICD-9-CM: 250.00  Unknown - Present        Hypertension, benign ICD-10-CM: I10  ICD-9-CM: 401.1  Unknown - Present        RESOLVED: PND (paroxysmal nocturnal dyspnea) ICD-10-CM: R06.00  ICD-9-CM: 786.09  8/13/2017 - 8/11/2018        RESOLVED: Near syncope ICD-10-CM: R55  ICD-9-CM: 780.2  10/29/2013 - 4/15/2014              Discharge Medications:   Current Discharge Medication List      START taking these medications    Details   potassium chloride SR (KLOR-CON 10) 10 mEq tablet Take 1 Tab by mouth daily. Qty: 15 Tab, Refills: 0         CONTINUE these medications which have NOT CHANGED    Details   lidocaine (LIDODERM) 5 % Apply patch to the affected area for 12 hours a day and remove for 12 hours a day. Qty: 1 Each, Refills: 0      cholecalciferol (VITAMIN D3) 1,000 unit cap Take 1,000 Units by mouth daily.       carvedilol (COREG) 12.5 mg tablet TAKE ONE (1) TABLET(S) BY MOUTH TWICE DAILY WITH FOOD  Qty: 180 Tab, Refills: 3      insulin NPH/insulin regular (NOVOLIN 70/30, HUMULIN 70/30) 100 unit/mL (70-30) injection 45 Units by SubCUTAneous route two (2) times a day. losartan (COZAAR) 25 mg tablet Take 25 mg by mouth daily. aspirin delayed-release 81 mg tablet Take 81 mg by mouth daily. Follow up Care:    1. Jloene Castillo MD with in 1 weeks  2. Neuro and Neuropsych specialists as directed. Diet:  Diabetic Diet    Disposition:  SNF.     Advanced Directive:    Discharge Exam:  [See today's progress note.]    CONSULTATIONS: Neurology    Significant Diagnostic Studies:   Recent Labs     12/06/19 0348 12/05/19  0541   WBC 8.5 8.9   HGB 14.0 13.7   HCT 41.8 40.1    258     Recent Labs     12/06/19 0348 12/05/19  0541 12/04/19  0406    138 139   K 3.9 3.8 3.6    107 108   CO2 23 22 23   BUN 16 17 11   CREA 0.73 0.71 0.72   * 122* 140*   CA 8.5 8.5 8.3*     Recent Labs     12/06/19 0348 12/05/19  0541 12/04/19  0406   SGOT 40* 34 35   ALT 46 36 33   AP 48 46 46   TBILI 1.1* 1.1* 1.1*   TP 6.9 6.6 6.8   ALB 2.7* 2.6* 2.7*   GLOB 4.2* 4.0 4.1*       Recent Labs     12/06/19 0348 12/05/19  0541 12/04/19  0406    162 188     Lab Results   Component Value Date/Time    Glucose (POC) 149 (H) 12/06/2019 06:57 AM    Glucose (POC) 266 (H) 12/05/2019 08:55 PM    Glucose (POC) 181 (H) 12/05/2019 04:16 PM    Glucose (POC) 205 (H) 12/05/2019 10:56 AM    Glucose (POC) 133 (H) 12/05/2019 07:01 AM     Lab Results   Component Value Date/Time    TSH 0.84 11/27/2019 10:58 AM       HOSPITAL COURSE & TREATMENT RENDERED:   Traumatic rhabdomyolysis  -- Resolved with fluids  -- resume statin at DC     S/p fall  -- fall precautions  -- rehab     Bilateral knee and right hip pain  -- Xray knee with OA  -- Right hip Xray neg for Fx     Type 2 DM with neuropathy  -- Humalog insulin correctional coverage  -- Restarted home insulin and adjust as needed at Rehab  -- A1c 6.8 on 12/1/19     Generalized weakness/ physical debility  -- rehab     Altered Mental status - unk cause; likely some underlying dementia  -- consulted Neuro- MRI neg  -- EEG with generalized slowing- recommended neuropsych testing as an outpatient     CAD  -- stable     Hypokalemia  -- Replete and monitor at rehab           HPI:    Sandy Willoughby Sr. is a 70 y. o. white male with past medical history of CAD, melanoma, depression, diabetic neuropathy, ED, and type 2 DM presented to the ED from home with chief complaints of fall with right hip and bilateral knee pain.  Patient is a limited historian. Stoney Browne provided limited details regarding recent ground level fall at home. Stoney Browne had poor recall; loss of consciousness was not definitively known.  Patient was found with uncertain down time on the ground. Louis Norah in right hip was severe, constant, aggravated with touch or movement with onset after fall.  On arrival in the ED, workup included CT head which was negative.  CK= 3,365.  ED requested admission to the hospitalist service. Maynard Sandifer were no reports of new onset syncope, loss of consciousness, headache, neck pain, visual disturbance, numbness, paresthesias, focal weakness, chest pain, shortness of breath, cough, palpitations, abdominal pain, nausea, vomiting, diarrhea, melena, dysuria, hematuria, calf pain, increased leg swelling/ edema, fever, chills, or rash.  (Dr Saucedo)     11/30: He is not sure what happened to bring him in. He is having right hip and knee pain. Xrays are neg. Blood sugars are good but he reports that he has not eaten. Will restart NPH but at a lower dose than at home. CK is a little better. PT consulted.      12/1: Nurses report he is max assist and quite weak. Will ask case management to see for rehab placement. CK down so will decrease IVF and replete K+. BS are stable. Continue NPH. A1c pending.  Tried calling family yesterday and this am but no answer at home number.      :  Still weak and not fully oriented at times - did not know his location this AM.  No specific complaint except \"feeling bad. \"  Will likely need rehab. CK coming down rapidly. K+ a little low - replacing.      12/3:  Continues to be confused off and on - he thinks he is at home this AM and thinks he has to clean up for his wife's \"gathering\" later today. He thinks his wife is talking to him from the [place] \"complaining about something or the other again. \"  He has no visual hallucinations - will get Neuro to see also. K+ a little low - replacing.       : Appreciate neuro consult. MRI neg. EEG pending. CK is normal. Knows month and place but not the day of the week. PM blood sugars are high so will adjust insulin. Electrolytes ok.     :  No complaints. EEG shows diffuse slowing. At this moment, he is willing to go to rehab. Accepted at rehab with bed available on .       : Bed available at [facility]. He is willing to go. No complaints this am. Neuro recommending neuropsych testing as an outpt. Review of Systems:   Review of systems not obtained due to patient factors.     Objective:   Physical Exam:   Visit Vitals  /84 (BP 1 Location: Left arm, BP Patient Position: At rest)   Pulse 75   Temp 98.8 °F (37.1 °C)   Resp 20   Ht 6' (1.829 m)   Wt 220 lb (99.8 kg)   SpO2 94%   BMI 29.84 kg/m²      O2 Device: Room air  Temp (24hrs), Av.3 °F (36.8 °C), Min:97.3 °F (36.3 °C), Max:99 °F (37.2 °C)    No intake/output data recorded.  1901 -  0700  In: -   Out: 600 [Urine:600]  General:  Alert, cooperative  no distress, appears stated age. Head:  Normocephalic, without obvious abnormality, atraumatic. Eyes:  Conjunctivae/corneas clear. EOMs intact.    Throat: Lips, mucosa, and tongue normal. Teeth and gums normal.   Neck: Supple, symmetrical, trachea midline, no adenopathy, thyroid: no enlargement/tenderness/nodules, no carotid bruit and no JVD. Lungs:   Clear to auscultation bilaterally. Chest wall:  No tenderness or deformity. Heart:  Regular rate and rhythm, S1, S2 normal, no murmur, click, rub or gallop. Abdomen:   Soft, non-tender. Bowel sounds normal. No masses,  No organomegaly. Extremities: Extremities with nearly healed abrasions of both knees. no cyanosis or edema. No calf tenderness or cords. Pulses: 2+ and symmetric all extremities. Skin:  turgor normal. No rashes    Neurologic:  Alert and oriented X 2. Derails easily at this time. No tremor or shakes.  equal.  No cogwheeling or rigidity. Gait not tested at this time. Sensation grossly normal to touch. Gross motor of extremities normal.        Data Review:       EXAM: CT HEAD WO CONT 11/29/19  INDICATION: Ground-level fall and down for a couple of hours. Generalized  weakness and confusion. COMPARISON: CT head on 11/27/2019. FINDINGS: Motion artifact obscures fine detail and limits sensitivity for  detecting pathology. The ventricles and sulci are age-appropriate without  hydrocephalus. There is no mass effect or midline shift. There is no  intracranial hemorrhage or extra-axial fluid collection. There is no abnormal  area of decreased density to suggest infarct. Pineal gland is calcified. The calvarium is intact. The visualized paranasal sinuses and mastoid air cells  are clear. IMPRESSION:   No acute intracranial abnormality on this noncontrast head CT. Limited by motion  artifact.  No significant change since 2 days ago.               Signed:  Terrence Alford MD  12/6/2019  7:20 AM

## 2019-12-06 NOTE — PROGRESS NOTES
Spiritual Care Assessment/Progress Note  [address]      NAME: João Chacon Sr. MRN: [id number]  AGE: 70 y.o.  SEX: male  Holiness Affiliation: Pentecostalism   Language: English     12/6/2019     Total Time (in minutes): 25     Spiritual Assessment begun in [facility]  MED SURG 2 through conversation with:         [x]Patient        [] Family    [] Friend(s)        Reason for Consult: Request by staff     Spiritual beliefs: (Please include comment if needed)     [x] Identifies with a justus tradition:    Dean Arreola     [] Supported by a justus community:            [] Claims no spiritual orientation:           [] Seeking spiritual identity:                [] Adheres to an individual form of spirituality:           [] Not able to assess:                           Identified resources for coping:      [] Prayer                               [] Music                  [] Guided Imagery     [] Family/friends                 [] Pet visits     [] Devotional reading                         [] Unknown     [] Other:                                              Interventions offered during this visit: (See comments for more details)    Patient Interventions: Affirmation of emotions/emotional suffering, Affirmation of justus, Iconic (affirming the presence of God/Higher Power), Initial/Spiritual assessment, patient floor, Prayer (assurance of), Prayer (actual)           Plan of Care:     [] Support spiritual and/or cultural needs    [] Support AMD and/or advance care planning process      [] Support grieving process   [] Coordinate Rites and/or Rituals    [] Coordination with community clergy   [] No spiritual needs identified at this time   [] Detailed Plan of Care below (See Comments)  [] Make referral to Music Therapy  [] Make referral to Pet Therapy     [] Make referral to Addiction services  [] Make referral to [facility]  [] Make referral to Spiritual Care Partner  [] No future visits requested        [x] Follow up visits as needed     Comments: Responded to request received by night  for a Chaplains visit with Mr. Jhonatan Calhoun on 5 Med Surg. Mr. Jhonatan Calhoun did not say much, mostly one word answers. He indicated he has a Baptism belief in God though that was not helping much right now. He appears to be struggling with why. .. as well as with way to many unknowns in life right now. His face did light up when I asked him if he had someone special in his life, he did not specify who that someone special was. Provided calming spiritual presence, spent some time in supportive silence and provided prayer. Mr. Jhonatan Calhoun indicated he would like to be kept in prayer. Provided spiritual presence and prayer. Advised of  Availability.   Visited by: Bryan Darnell., MS., [id number] [facility] ([id number])

## 2019-12-06 NOTE — PROGRESS NOTES
Daily Progress Note: 12/6/2019  Coltonryley Poet Nahun Barajas, Soniya Ren MD    Assessment/Plan:   Traumatic rhabdomyolysis  -- Resolved with fluids  -- resume statin at DC     S/p fall  -- fall precautions  -- rehab     Bilateral knee and right hip pain  -- Xray knee with OA  -- Right hip Xray neg for Fx     Type 2 DM with neuropathy  -- Humalog insulin correctional coverage  -- Restarted home insulin and adjust as needed at Rehab  -- A1c 6.8 on 12/1/19     Generalized weakness/ physical debility  -- rehab    Altered Mental status - unk cause; likely some underlying dementia  -- consulted Neuro- MRI neg  -- EEG with generalized slowing- recommended neuropsych testing as an outpatient     CAD  -- stable    Hypokalemia  -- Replete and monitor at rehab        Problem List:  Problem List as of 12/6/2019 Date Reviewed: 10/14/2019          Codes Class Noted - Resolved    Fall ICD-10-CM: W19. Everett Pulse  ICD-9-CM: V789.3  11/29/2019 - Present        Traumatic rhabdomyolysis ([facility] ICD-10-CM: T79. 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 is a 70 y.o. white male with past medical history of CAD, melanoma, depression, diabetic neuropathy, ED, and type 2 DM presented to the ED from home with chief complaints of fall with right hip and bilateral knee pain. Patient is a limited historian. He provided limited details regarding recent ground level fall at home. He had poor recall; loss of consciousness was not definitively known. Patient was found with uncertain down time on the ground. Pain in right hip was severe, constant, aggravated with touch or movement with onset after fall. On arrival in the ED, workup included CT head which was negative. CK= 3,365. ED requested admission to the hospitalist service. There were no reports of new onset syncope, loss of consciousness, headache, neck pain, visual disturbance, numbness, paresthesias, focal weakness, chest pain, shortness of breath, cough, palpitations, abdominal pain, nausea, vomiting, diarrhea, melena, dysuria, hematuria, calf pain, increased leg swelling/ edema, fever, chills, or rash. (Dr Carolyn Weinstein)    11/30: He is not sure what happened to bring him in. He is having right hip and knee pain. Xrays are neg. Blood sugars are good but he reports that he has not eaten. Will restart NPH but at a lower dose than at home. CK is a little better. PT consulted. 12/1: Nurses report he is max assist and quite weak. Will ask case management to see for rehab placement. CK down so will decrease IVF and replete K+. BS are stable. Continue NPH. A1c pending. Tried calling family yesterday and this am but no answer at home number. 12/2:  Still weak and not fully oriented at times - did not know his location this AM.  No specific complaint except \"feeling bad. \"  Will likely need rehab. CK coming down rapidly. K+ a little low - replacing. 12/3:  Continues to be confused off and on - he thinks he is at home this AM and thinks he has to clean up for his wife's \"gathering\" later today.   He thinks his wife is talking to him from the [place] \"complaining about something or the other again. \"  He has no visual hallucinations - will get Neuro to see also. K+ a little low - replacing. : Appreciate neuro consult. MRI neg. EEG pending. CK is normal. Knows month and place but not the day of the week. PM blood sugars are high so will adjust insulin. Electrolytes ok. :  No complaints. EEG shows diffuse slowing. At this moment, he is willing to go to rehab. Accepted at rehab with bed available on .      : Bed available at [facility]. He is willing to go. No complaints this am. Neuro recommending neuropsych testing as an outpt. Review of Systems:   Review of systems not obtained due to patient factors. Objective:   Physical Exam:   Visit Vitals  /84 (BP 1 Location: Left arm, BP Patient Position: At rest)   Pulse 75   Temp 98.8 °F (37.1 °C)   Resp 20   Ht 6' (1.829 m)   Wt 220 lb (99.8 kg)   SpO2 94%   BMI 29.84 kg/m²      O2 Device: Room air  Temp (24hrs), Av.3 °F (36.8 °C), Min:97.3 °F (36.3 °C), Max:99 °F (37.2 °C)    No intake/output data recorded.  1901 -  0700  In: -   Out: 600 [Urine:600]  General:  Alert, cooperative  no distress, appears stated age. Head:  Normocephalic, without obvious abnormality, atraumatic. Eyes:  Conjunctivae/corneas clear. EOMs intact. Throat: Lips, mucosa, and tongue normal. Teeth and gums normal.   Neck: Supple, symmetrical, trachea midline, no adenopathy, thyroid: no enlargement/tenderness/nodules, no carotid bruit and no JVD. Lungs:   Clear to auscultation bilaterally. Chest wall:  No tenderness or deformity. Heart:  Regular rate and rhythm, S1, S2 normal, no murmur, click, rub or gallop. Abdomen:   Soft, non-tender. Bowel sounds normal. No masses,  No organomegaly. Extremities: Extremities with nearly healed abrasions of both knees. no cyanosis or edema. No calf tenderness or cords. Pulses: 2+ and symmetric all extremities.    Skin: turgor normal. No rashes    Neurologic:  Alert and oriented X 2. Derails easily at this time. No tremor or shakes.  equal.  No cogwheeling or rigidity. Gait not tested at this time. Sensation grossly normal to touch. Gross motor of extremities normal.       Data Review:       EXAM: CT HEAD WO CONT 11/29/19  INDICATION: Ground-level fall and down for a couple of hours. Generalized  weakness and confusion. COMPARISON: CT head on 11/27/2019. FINDINGS: Motion artifact obscures fine detail and limits sensitivity for  detecting pathology. The ventricles and sulci are age-appropriate without  hydrocephalus. There is no mass effect or midline shift. There is no  intracranial hemorrhage or extra-axial fluid collection. There is no abnormal  area of decreased density to suggest infarct. Pineal gland is calcified. The calvarium is intact. The visualized paranasal sinuses and mastoid air cells  are clear. IMPRESSION:   No acute intracranial abnormality on this noncontrast head CT. Limited by motion  artifact. No significant change since 2 days ago.        Recent Days:  Recent Labs     12/06/19  0348 12/05/19  0541 12/04/19  0406   WBC 8.5 8.9 6.9   HGB 14.0 13.7 13.9   HCT 41.8 40.1 40.6    258 230     Recent Labs     12/06/19  0348 12/05/19  0541 12/04/19  0406    138 139   K 3.9 3.8 3.6    107 108   CO2 23 22 23   * 122* 140*   BUN 16 17 11   CREA 0.73 0.71 0.72   CA 8.5 8.5 8.3*   ALB 2.7* 2.6* 2.7*   TBILI 1.1* 1.1* 1.1*   SGOT 40* 34 35   ALT 46 36 33     No results for input(s): PH, PCO2, PO2, HCO3, FIO2 in the last 72 hours.     24 Hour Results:  Recent Results (from the past 24 hour(s))   GLUCOSE, POC    Collection Time: 12/05/19 10:56 AM   Result Value Ref Range    Glucose (POC) 205 (H) 65 - 100 mg/dL    Performed by Salomón Hanna, POC    Collection Time: 12/05/19  4:16 PM   Result Value Ref Range    Glucose (POC) 181 (H) 65 - 100 mg/dL    Performed by Luis Mjonathankervin Li    GLUCOSE, POC    Collection Time: 12/05/19  8:55 PM   Result Value Ref Range    Glucose (POC) 266 (H) 65 - 100 mg/dL    Performed by Tomy Chowdhury (PCT)    CK    Collection Time: 12/06/19  3:48 AM   Result Value Ref Range     39 - 308 U/L   CBC WITH AUTOMATED DIFF    Collection Time: 12/06/19  3:48 AM   Result Value Ref Range    WBC 8.5 4.1 - 11.1 K/uL    RBC 4.84 4.10 - 5.70 M/uL    HGB 14.0 12.1 - 17.0 g/dL    HCT 41.8 36.6 - 50.3 %    MCV 86.4 80.0 - 99.0 FL    MCH 28.9 26.0 - 34.0 PG    MCHC 33.5 30.0 - 36.5 g/dL    RDW 12.6 11.5 - 14.5 %    PLATELET 349 347 - 084 K/uL    MPV 9.5 8.9 - 12.9 FL    NRBC 0.0 0  WBC    ABSOLUTE NRBC 0.00 0.00 - 0.01 K/uL    NEUTROPHILS 63 32 - 75 %    LYMPHOCYTES 19 12 - 49 %    MONOCYTES 13 5 - 13 %    EOSINOPHILS 3 0 - 7 %    BASOPHILS 1 0 - 1 %    IMMATURE GRANULOCYTES 1 (H) 0.0 - 0.5 %    ABS. NEUTROPHILS 5.5 1.8 - 8.0 K/UL    ABS. LYMPHOCYTES 1.6 0.8 - 3.5 K/UL    ABS. MONOCYTES 1.1 (H) 0.0 - 1.0 K/UL    ABS. EOSINOPHILS 0.2 0.0 - 0.4 K/UL    ABS. BASOPHILS 0.0 0.0 - 0.1 K/UL    ABS. IMM. GRANS. 0.0 0.00 - 0.04 K/UL    DF AUTOMATED     METABOLIC PANEL, COMPREHENSIVE    Collection Time: 12/06/19  3:48 AM   Result Value Ref Range    Sodium 139 136 - 145 mmol/L    Potassium 3.9 3.5 - 5.1 mmol/L    Chloride 106 97 - 108 mmol/L    CO2 23 21 - 32 mmol/L    Anion gap 10 5 - 15 mmol/L    Glucose 143 (H) 65 - 100 mg/dL    BUN 16 6 - 20 MG/DL    Creatinine 0.73 0.70 - 1.30 MG/DL    BUN/Creatinine ratio 22 (H) 12 - 20      GFR est AA >60 >60 ml/min/1.73m2    GFR est non-AA >60 >60 ml/min/1.73m2    Calcium 8.5 8.5 - 10.1 MG/DL    Bilirubin, total 1.1 (H) 0.2 - 1.0 MG/DL    ALT (SGPT) 46 12 - 78 U/L    AST (SGOT) 40 (H) 15 - 37 U/L    Alk.  phosphatase 48 45 - 117 U/L    Protein, total 6.9 6.4 - 8.2 g/dL    Albumin 2.7 (L) 3.5 - 5.0 g/dL    Globulin 4.2 (H) 2.0 - 4.0 g/dL    A-G Ratio 0.6 (L) 1.1 - 2.2     GLUCOSE, POC    Collection Time: 12/06/19  6:57 AM Result Value Ref Range    Glucose (POC) 149 (H) 65 - 100 mg/dL    Performed by Jesica Jay (PCT)      Medications reviewed  Current Facility-Administered Medications   Medication Dose Route Frequency    insulin NPH (NOVOLIN N, HUMULIN N) injection 12 Units  12 Units SubCUTAneous ACB&D    potassium chloride SR (KLOR-CON 10) tablet 20 mEq  20 mEq Oral BID    aspirin chewable tablet 81 mg  81 mg Oral DAILY    carvedilol (COREG) tablet 12.5 mg  12.5 mg Oral BID WITH MEALS    cholecalciferol (VITAMIN D3) (1000 Units /25 mcg) tablet 1 Tab  1,000 Units Oral DAILY    lidocaine 4 % patch 1 Patch  1 Patch TransDERmal DAILY    losartan (COZAAR) tablet 25 mg  25 mg Oral DAILY    sodium chloride (NS) flush 5-40 mL  5-40 mL IntraVENous Q8H    sodium chloride (NS) flush 5-40 mL  5-40 mL IntraVENous PRN    heparin (porcine) injection 5,000 Units  5,000 Units SubCUTAneous Q8H     Care Plan discussed with: Patient/nurse  Total time spent with patient and review of records: 30 minutes.   Kim Calderón MD

## 2019-12-06 NOTE — PROGRESS NOTES
CMS Note  12/06/2019    Patient received and signed their 2nd IM letter. Patient was given a copy for their record.   Demarcus Russell CMS

## 2020-02-07 ENCOUNTER — HOSPITAL ENCOUNTER (INPATIENT)
Age: 80
LOS: 1 days | Discharge: HOME HEALTH | DRG: 392 | End: 2020-02-11
Attending: STUDENT IN AN ORGANIZED HEALTH CARE EDUCATION/TRAINING PROGRAM | Admitting: FAMILY MEDICINE
Payer: MEDICARE

## 2020-02-07 ENCOUNTER — APPOINTMENT (OUTPATIENT)
Dept: GENERAL RADIOLOGY | Age: 80
DRG: 392 | End: 2020-02-07
Attending: STUDENT IN AN ORGANIZED HEALTH CARE EDUCATION/TRAINING PROGRAM
Payer: MEDICARE

## 2020-02-07 DIAGNOSIS — R07.9 CHEST PAIN, UNSPECIFIED TYPE: Primary | ICD-10-CM

## 2020-02-07 DIAGNOSIS — F01.50 VASCULAR DEMENTIA WITHOUT BEHAVIORAL DISTURBANCE (HCC): ICD-10-CM

## 2020-02-07 PROBLEM — F32.A DEPRESSION: Status: ACTIVE | Noted: 2020-02-07

## 2020-02-07 LAB
ALBUMIN SERPL-MCNC: 3.3 G/DL (ref 3.5–5)
ALBUMIN/GLOB SERPL: 0.8 {RATIO} (ref 1.1–2.2)
ALP SERPL-CCNC: 67 U/L (ref 45–117)
ALT SERPL-CCNC: 23 U/L (ref 12–78)
ANION GAP SERPL CALC-SCNC: 8 MMOL/L (ref 5–15)
APPEARANCE UR: CLEAR
AST SERPL-CCNC: 27 U/L (ref 15–37)
BASOPHILS # BLD: 0.1 K/UL (ref 0–0.1)
BASOPHILS NFR BLD: 1 % (ref 0–1)
BILIRUB SERPL-MCNC: 0.9 MG/DL (ref 0.2–1)
BILIRUB UR QL: NEGATIVE
BUN SERPL-MCNC: 16 MG/DL (ref 6–20)
BUN/CREAT SERPL: 19 (ref 12–20)
CALCIUM SERPL-MCNC: 9 MG/DL (ref 8.5–10.1)
CHLORIDE SERPL-SCNC: 108 MMOL/L (ref 97–108)
CO2 SERPL-SCNC: 24 MMOL/L (ref 21–32)
COLOR UR: ABNORMAL
COMMENT, HOLDF: NORMAL
CREAT SERPL-MCNC: 0.86 MG/DL (ref 0.7–1.3)
DIFFERENTIAL METHOD BLD: ABNORMAL
EOSINOPHIL # BLD: 0.1 K/UL (ref 0–0.4)
EOSINOPHIL NFR BLD: 1 % (ref 0–7)
ERYTHROCYTE [DISTWIDTH] IN BLOOD BY AUTOMATED COUNT: 13.9 % (ref 11.5–14.5)
GLOBULIN SER CALC-MCNC: 4.2 G/DL (ref 2–4)
GLUCOSE SERPL-MCNC: 122 MG/DL (ref 65–100)
GLUCOSE UR STRIP.AUTO-MCNC: 100 MG/DL
HCT VFR BLD AUTO: 42.8 % (ref 36.6–50.3)
HGB BLD-MCNC: 14.4 G/DL (ref 12.1–17)
HGB UR QL STRIP: NEGATIVE
IMM GRANULOCYTES # BLD AUTO: 0 K/UL (ref 0–0.04)
IMM GRANULOCYTES NFR BLD AUTO: 0 % (ref 0–0.5)
KETONES UR QL STRIP.AUTO: NEGATIVE MG/DL
LEUKOCYTE ESTERASE UR QL STRIP.AUTO: NEGATIVE
LIPASE SERPL-CCNC: 70 U/L (ref 73–393)
LYMPHOCYTES # BLD: 1.4 K/UL (ref 0.8–3.5)
LYMPHOCYTES NFR BLD: 13 % (ref 12–49)
MCH RBC QN AUTO: 29.7 PG (ref 26–34)
MCHC RBC AUTO-ENTMCNC: 33.6 G/DL (ref 30–36.5)
MCV RBC AUTO: 88.2 FL (ref 80–99)
MONOCYTES # BLD: 0.7 K/UL (ref 0–1)
MONOCYTES NFR BLD: 7 % (ref 5–13)
NEUTS SEG # BLD: 8.6 K/UL (ref 1.8–8)
NEUTS SEG NFR BLD: 78 % (ref 32–75)
NITRITE UR QL STRIP.AUTO: NEGATIVE
NRBC # BLD: 0 K/UL (ref 0–0.01)
NRBC BLD-RTO: 0 PER 100 WBC
PH UR STRIP: 7.5 [PH] (ref 5–8)
PLATELET # BLD AUTO: 290 K/UL (ref 150–400)
PMV BLD AUTO: 9 FL (ref 8.9–12.9)
POTASSIUM SERPL-SCNC: 4.3 MMOL/L (ref 3.5–5.1)
PROT SERPL-MCNC: 7.5 G/DL (ref 6.4–8.2)
PROT UR STRIP-MCNC: NEGATIVE MG/DL
RBC # BLD AUTO: 4.85 M/UL (ref 4.1–5.7)
SAMPLES BEING HELD,HOLD: NORMAL
SODIUM SERPL-SCNC: 140 MMOL/L (ref 136–145)
SP GR UR REFRACTOMETRY: 1.02 (ref 1–1.03)
TROPONIN I SERPL-MCNC: <0.05 NG/ML
UROBILINOGEN UR QL STRIP.AUTO: 0.2 EU/DL (ref 0.2–1)
WBC # BLD AUTO: 10.9 K/UL (ref 4.1–11.1)

## 2020-02-07 PROCEDURE — 65660000000 HC RM CCU STEPDOWN

## 2020-02-07 PROCEDURE — 71045 X-RAY EXAM CHEST 1 VIEW: CPT

## 2020-02-07 PROCEDURE — 65390000012 HC CONDITION CODE 44 OBSERVATION

## 2020-02-07 PROCEDURE — 80053 COMPREHEN METABOLIC PANEL: CPT

## 2020-02-07 PROCEDURE — 84484 ASSAY OF TROPONIN QUANT: CPT

## 2020-02-07 PROCEDURE — 85025 COMPLETE CBC W/AUTO DIFF WBC: CPT

## 2020-02-07 PROCEDURE — 96374 THER/PROPH/DIAG INJ IV PUSH: CPT

## 2020-02-07 PROCEDURE — 36415 COLL VENOUS BLD VENIPUNCTURE: CPT

## 2020-02-07 PROCEDURE — 74011250637 HC RX REV CODE- 250/637: Performed by: STUDENT IN AN ORGANIZED HEALTH CARE EDUCATION/TRAINING PROGRAM

## 2020-02-07 PROCEDURE — 81003 URINALYSIS AUTO W/O SCOPE: CPT

## 2020-02-07 PROCEDURE — 83690 ASSAY OF LIPASE: CPT

## 2020-02-07 PROCEDURE — 74011000250 HC RX REV CODE- 250: Performed by: STUDENT IN AN ORGANIZED HEALTH CARE EDUCATION/TRAINING PROGRAM

## 2020-02-07 PROCEDURE — 93005 ELECTROCARDIOGRAM TRACING: CPT

## 2020-02-07 PROCEDURE — 99285 EMERGENCY DEPT VISIT HI MDM: CPT

## 2020-02-07 PROCEDURE — 74011250636 HC RX REV CODE- 250/636: Performed by: STUDENT IN AN ORGANIZED HEALTH CARE EDUCATION/TRAINING PROGRAM

## 2020-02-07 RX ORDER — ONDANSETRON 2 MG/ML
4 INJECTION INTRAMUSCULAR; INTRAVENOUS
Status: COMPLETED | OUTPATIENT
Start: 2020-02-07 | End: 2020-02-07

## 2020-02-07 RX ORDER — NITROGLYCERIN 0.4 MG/1
0.4 TABLET SUBLINGUAL
Status: DISCONTINUED | OUTPATIENT
Start: 2020-02-07 | End: 2020-02-11 | Stop reason: HOSPADM

## 2020-02-07 RX ADMIN — LIDOCAINE HYDROCHLORIDE 40 ML: 20 SOLUTION ORAL; TOPICAL at 22:22

## 2020-02-07 RX ADMIN — ONDANSETRON 4 MG: 2 INJECTION INTRAMUSCULAR; INTRAVENOUS at 22:54

## 2020-02-08 PROBLEM — R07.9 CHEST PAIN: Status: RESOLVED | Noted: 2020-02-07 | Resolved: 2020-02-08

## 2020-02-08 PROBLEM — R10.13 EPIGASTRIC ABDOMINAL PAIN: Status: ACTIVE | Noted: 2020-02-08

## 2020-02-08 PROBLEM — R10.13 EPIGASTRIC PAIN: Status: ACTIVE | Noted: 2020-02-08

## 2020-02-08 LAB
GLUCOSE BLD STRIP.AUTO-MCNC: 157 MG/DL (ref 65–100)
GLUCOSE BLD STRIP.AUTO-MCNC: 186 MG/DL (ref 65–100)
GLUCOSE BLD STRIP.AUTO-MCNC: 192 MG/DL (ref 65–100)
GLUCOSE BLD STRIP.AUTO-MCNC: 209 MG/DL (ref 65–100)
GLUCOSE BLD STRIP.AUTO-MCNC: 236 MG/DL (ref 65–100)
SERVICE CMNT-IMP: ABNORMAL
TROPONIN I SERPL-MCNC: <0.05 NG/ML

## 2020-02-08 PROCEDURE — 74011636637 HC RX REV CODE- 636/637: Performed by: INTERNAL MEDICINE

## 2020-02-08 PROCEDURE — 96376 TX/PRO/DX INJ SAME DRUG ADON: CPT

## 2020-02-08 PROCEDURE — 84484 ASSAY OF TROPONIN QUANT: CPT

## 2020-02-08 PROCEDURE — 74011250637 HC RX REV CODE- 250/637: Performed by: FAMILY MEDICINE

## 2020-02-08 PROCEDURE — 82962 GLUCOSE BLOOD TEST: CPT

## 2020-02-08 PROCEDURE — 99218 HC RM OBSERVATION: CPT

## 2020-02-08 PROCEDURE — 96375 TX/PRO/DX INJ NEW DRUG ADDON: CPT

## 2020-02-08 PROCEDURE — C9113 INJ PANTOPRAZOLE SODIUM, VIA: HCPCS | Performed by: INTERNAL MEDICINE

## 2020-02-08 PROCEDURE — 74011250636 HC RX REV CODE- 250/636: Performed by: INTERNAL MEDICINE

## 2020-02-08 PROCEDURE — 96372 THER/PROPH/DIAG INJ SC/IM: CPT

## 2020-02-08 PROCEDURE — 36415 COLL VENOUS BLD VENIPUNCTURE: CPT

## 2020-02-08 RX ORDER — PANTOPRAZOLE SODIUM 40 MG/10ML
40 INJECTION, POWDER, LYOPHILIZED, FOR SOLUTION INTRAVENOUS DAILY
Status: DISCONTINUED | OUTPATIENT
Start: 2020-02-08 | End: 2020-02-11 | Stop reason: HOSPADM

## 2020-02-08 RX ORDER — INSULIN LISPRO 100 [IU]/ML
INJECTION, SOLUTION INTRAVENOUS; SUBCUTANEOUS EVERY 6 HOURS
Status: DISCONTINUED | OUTPATIENT
Start: 2020-02-08 | End: 2020-02-11 | Stop reason: HOSPADM

## 2020-02-08 RX ORDER — QUETIAPINE FUMARATE 25 MG/1
25 TABLET, FILM COATED ORAL ONCE
Status: DISPENSED | OUTPATIENT
Start: 2020-02-08 | End: 2020-02-09

## 2020-02-08 RX ORDER — SERTRALINE HYDROCHLORIDE 50 MG/1
25 TABLET, FILM COATED ORAL DAILY
Status: DISCONTINUED | OUTPATIENT
Start: 2020-02-08 | End: 2020-02-11 | Stop reason: HOSPADM

## 2020-02-08 RX ORDER — ENOXAPARIN SODIUM 100 MG/ML
40 INJECTION SUBCUTANEOUS EVERY 24 HOURS
Status: DISCONTINUED | OUTPATIENT
Start: 2020-02-08 | End: 2020-02-11 | Stop reason: HOSPADM

## 2020-02-08 RX ORDER — CARVEDILOL 12.5 MG/1
12.5 TABLET ORAL 2 TIMES DAILY WITH MEALS
Status: DISCONTINUED | OUTPATIENT
Start: 2020-02-08 | End: 2020-02-11 | Stop reason: HOSPADM

## 2020-02-08 RX ORDER — SODIUM CHLORIDE 0.9 % (FLUSH) 0.9 %
5-40 SYRINGE (ML) INJECTION AS NEEDED
Status: DISCONTINUED | OUTPATIENT
Start: 2020-02-08 | End: 2020-02-11 | Stop reason: HOSPADM

## 2020-02-08 RX ORDER — MAGNESIUM SULFATE 100 %
4 CRYSTALS MISCELLANEOUS AS NEEDED
Status: DISCONTINUED | OUTPATIENT
Start: 2020-02-08 | End: 2020-02-11 | Stop reason: HOSPADM

## 2020-02-08 RX ORDER — ONDANSETRON 2 MG/ML
4 INJECTION INTRAMUSCULAR; INTRAVENOUS
Status: DISCONTINUED | OUTPATIENT
Start: 2020-02-08 | End: 2020-02-11 | Stop reason: HOSPADM

## 2020-02-08 RX ORDER — DEXTROSE MONOHYDRATE 100 MG/ML
0-250 INJECTION, SOLUTION INTRAVENOUS AS NEEDED
Status: DISCONTINUED | OUTPATIENT
Start: 2020-02-08 | End: 2020-02-11 | Stop reason: HOSPADM

## 2020-02-08 RX ORDER — SODIUM CHLORIDE 0.9 % (FLUSH) 0.9 %
5-40 SYRINGE (ML) INJECTION EVERY 8 HOURS
Status: DISCONTINUED | OUTPATIENT
Start: 2020-02-08 | End: 2020-02-11 | Stop reason: HOSPADM

## 2020-02-08 RX ORDER — ASPIRIN 81 MG/1
81 TABLET ORAL DAILY
Status: DISCONTINUED | OUTPATIENT
Start: 2020-02-08 | End: 2020-02-11 | Stop reason: HOSPADM

## 2020-02-08 RX ORDER — TAMSULOSIN HYDROCHLORIDE 0.4 MG/1
0.4 CAPSULE ORAL DAILY
Status: DISCONTINUED | OUTPATIENT
Start: 2020-02-08 | End: 2020-02-11 | Stop reason: HOSPADM

## 2020-02-08 RX ORDER — MAG HYDROX/ALUMINUM HYD/SIMETH 200-200-20
30 SUSPENSION, ORAL (FINAL DOSE FORM) ORAL
Status: DISCONTINUED | OUTPATIENT
Start: 2020-02-08 | End: 2020-02-11 | Stop reason: HOSPADM

## 2020-02-08 RX ORDER — TAMSULOSIN HYDROCHLORIDE 0.4 MG/1
0.4 CAPSULE ORAL 2 TIMES DAILY
COMMUNITY

## 2020-02-08 RX ORDER — SERTRALINE HYDROCHLORIDE 25 MG/1
25 TABLET, FILM COATED ORAL DAILY
Status: ON HOLD | COMMUNITY
End: 2021-10-01

## 2020-02-08 RX ADMIN — Medication 10 ML: at 06:00

## 2020-02-08 RX ADMIN — ONDANSETRON 4 MG: 2 INJECTION INTRAMUSCULAR; INTRAVENOUS at 02:42

## 2020-02-08 RX ADMIN — INSULIN LISPRO 3 UNITS: 100 INJECTION, SOLUTION INTRAVENOUS; SUBCUTANEOUS at 12:15

## 2020-02-08 RX ADMIN — CARVEDILOL 12.5 MG: 12.5 TABLET, FILM COATED ORAL at 15:26

## 2020-02-08 RX ADMIN — ENOXAPARIN SODIUM 40 MG: 40 INJECTION SUBCUTANEOUS at 09:11

## 2020-02-08 RX ADMIN — Medication 10 ML: at 09:12

## 2020-02-08 RX ADMIN — INSULIN LISPRO 2 UNITS: 100 INJECTION, SOLUTION INTRAVENOUS; SUBCUTANEOUS at 18:20

## 2020-02-08 RX ADMIN — ASPIRIN 81 MG: 81 TABLET, COATED ORAL at 15:26

## 2020-02-08 RX ADMIN — PANTOPRAZOLE SODIUM 40 MG: 40 INJECTION, POWDER, FOR SOLUTION INTRAVENOUS at 09:11

## 2020-02-08 RX ADMIN — TAMSULOSIN HYDROCHLORIDE 0.4 MG: 0.4 CAPSULE ORAL at 15:26

## 2020-02-08 RX ADMIN — SERTRALINE 25 MG: 50 TABLET, FILM COATED ORAL at 15:26

## 2020-02-08 NOTE — ROUTINE PROCESS
Verbal shift change report given to Nasima Morton (oncoming nurse) by Tati Collazo (offgoing nurse). Report included the following information SBAR, Kardex, MAR, Accordion and Recent Results.

## 2020-02-08 NOTE — PROGRESS NOTES
Admission Medication Reconciliation:     Information obtained from:    Patient  RxQuery data available¹:  YES    Comments/Recommendations: The patient was a vague historian but he was able to complete the interview with the pharmacist. Each medication was reviewed in detail with the patient including name, dose, frequency, indication, last fill date. The patient uses multiple pharmacies and does not list a preferred pharmacy. Updated PTA medication list  Verified preferred pharmacy  Reviewed patient's allergies     ¹RxQuery pharmacy benefit data reflects medications filled and processed through the patient's insurance, however   this data does NOT capture whether the medication was picked up or is currently being taken by the patient. Prior to Admission Medications   Prescriptions Last Dose Informant Taking?   aspirin delayed-release 81 mg tablet 2020 at Unknown time Self Yes   Sig: Take 81 mg by mouth daily. carvedilol (COREG) 12.5 mg tablet 2020 at Unknown time Self Yes   Sig: TAKE ONE (1) TABLET(S) BY MOUTH TWICE DAILY WITH FOOD   insulin NPH/insulin regular (NOVOLIN 70/30, HUMULIN 70/30) 100 unit/mL (70-30) injection 2020 at Unknown time Self Yes   Si Units by SubCUTAneous route Before breakfast and dinner. sertraline (ZOLOFT) 25 mg tablet 2020 at Unknown time Self Yes   Sig: Take 25 mg by mouth daily. tamsulosin (FLOMAX) 0.4 mg capsule 2020 at Unknown time Self Yes   Sig: Take 0.4 mg by mouth daily. Facility-Administered Medications: None         Please contact the main inpatient pharmacy with any questions or concerns at [phone] and we will direct you to the clinical pharmacist covering this patient's care while in-house.    Jenaro Gibson, PharmD, BCPS

## 2020-02-08 NOTE — CONSULTS
Bertram Arreola DO  [facility] [address] [place] [address], [address] [place] [address], [address] [place] [address]                                       Office [phone],XKR [phone]  Office [phone],HJP [phone]      Date of  Admission: 2/7/2020  9:51 PM  PCP- Sergio Levy MD    Marietta Cervantes Sr. is a 70 y.o. male admitted for Chest pain [R07.9]; Epigastric pain [R10.13]. Consult requested by Mamta Sarmiento MD    Assessment/Plan      Epigastric pain- difficult to ascertain if this is gastric or cardiac in nature. No evidence of significant ongoing ischemia, but symptoms apparently relieved with morphine    Hx of inferior mi, cad  T2DM  HTN    - recommend ischemic evaluation. Dr. Chanelle Watson recommended stress testing during his last visit note, however unclear why test was not completed. If patient is feeling better tomorrow and d/c is immanent, then we can perform outpatient Guicho Shaver. Otherwise, recommend lexiscan cardiolite on Monday. - patient is safe from CV standpoint to under EGD if necessary    - continue home CV regimen           I appreciate the opportunity to be involved in . See below note for details. Please do not hesitate to contact us with questions or concerns. Evelio Reyes DO      Subjective:  Marietta Cervantes Sr. is a 70 y.o. male with hx of CAD, HTN, T2DM presented to ED with epigastric pain, nausea and vomitting. Patient is poor historian and difficult to obtain history. He reports having \"fist like pain in the top of his abdomen. \"  He reports having some nausea and vomitting as well. No recent exertional anginal chest pain symptoms. No evidence of ongoing ischemia or acute coronary syndrome. Trop negative. Non-ischemic ecg. Normotensive.     Past Medical History:   Diagnosis Date    CAD (coronary artery disease), native coronary artery     Cancer ([facility] [address]     melanoma removed on forehead    Depression     Diabetic neuropathy ([facility] [address]     ED (erectile dysfunction)     Hypertension, benign     Ill-defined condition     states he has memory problems    Postsurgical aortocoronary bypass status     Type II or unspecified type diabetes mellitus without mention of complication, not stated as uncontrolled       Past Surgical History:   Procedure Laterality Date    ECHO 2D ADULT  12/2009    EF 50%, basal inferior akinesis, mild MR    ECHO 2D ADULT  9/21/11    LVEF 50%, inferior AK, unchanged from Dec 2009    HX CORONARY ARTERY BYPASS GRAFT  2009    HX OTHER SURGICAL  2008    melanoma removed from forehead     Allergies   Allergen Reactions    Amoxicillin Hives and Itching    Pravastatin Myalgia     Family History   Problem Relation Age of Onset    Breast Cancer Mother         w bone mets    Parkinson's Disease Maternal Aunt     Parkinson's Disease Maternal Uncle     Parkinson's Disease Maternal Grandfather       Social History     Tobacco Use    Smoking status: Never Smoker    Smokeless tobacco: Never Used   Substance Use Topics    Alcohol use: No    Drug use: No          Medications:  Medications Prior to Admission   Medication Sig    sertraline (ZOLOFT) 25 mg tablet Take 25 mg by mouth daily.  tamsulosin (FLOMAX) 0.4 mg capsule Take 0.4 mg by mouth daily.  carvedilol (COREG) 12.5 mg tablet TAKE ONE (1) TABLET(S) BY MOUTH TWICE DAILY WITH FOOD    insulin NPH/insulin regular (NOVOLIN 70/30, HUMULIN 70/30) 100 unit/mL (70-30) injection 46 Units by SubCUTAneous route Before breakfast and dinner.  aspirin delayed-release 81 mg tablet Take 81 mg by mouth daily.      Current Facility-Administered Medications   Medication Dose Route Frequency    sodium chloride (NS) flush 5-40 mL  5-40 mL IntraVENous Q8H    sodium chloride (NS) flush 5-40 mL  5-40 mL IntraVENous PRN    enoxaparin (LOVENOX) injection 40 mg  40 mg SubCUTAneous Q24H    pantoprazole (PROTONIX) injection 40 mg  40 mg IntraVENous DAILY    alum-mag hydroxide-simeth (MYLANTA) oral suspension 30 mL  30 mL Oral Q4H PRN    insulin lispro (HUMALOG) injection   SubCUTAneous Q6H    glucose chewable tablet 16 g  4 Tab Oral PRN    glucagon (GLUCAGEN) injection 1 mg  1 mg IntraMUSCular PRN    dextrose 10% infusion 0-250 mL  0-250 mL IntraVENous PRN    ondansetron (ZOFRAN) injection 4 mg  4 mg IntraVENous Q4H PRN    influenza vaccine 2019-20 (6 mos+)(PF) (FLUARIX/FLULAVAL/FLUZONE QUAD) injection 0.5 mL  0.5 mL IntraMUSCular PRIOR TO DISCHARGE    aspirin delayed-release tablet 81 mg  81 mg Oral DAILY    carvediloL (COREG) tablet 12.5 mg  12.5 mg Oral BID WITH MEALS    sertraline (ZOLOFT) tablet 25 mg  25 mg Oral DAILY    tamsulosin (FLOMAX) capsule 0.4 mg  0.4 mg Oral DAILY    nitroglycerin (NITROSTAT) tablet 0.4 mg  0.4 mg SubLINGual Q5MIN PRN         Review of Systems:  Pertinent Positive: epigastric pain, n/v  Pertinent Negative:No dyspnea on exertion, shortness of breath, orthopnea, PND    All Other systems reviewed and are negative for a Comprehensive ROS (10+)        Physical Exam:  Visit Vitals  /74 (BP 1 Location: Right arm, BP Patient Position: At rest)   Pulse 87   Temp 98.6 °F (37 °C)   Resp 18   Ht 6' (1.829 m)   Wt 225 lb (102.1 kg)   SpO2 98%   BMI 30.52 kg/m²           Gen: Well-developed, well-nourished, in no acute distress  HEENT:  Pink conjunctivae, hearing intact to voice, moist mucous membranes  Neck: Supple,No JVD, No Carotid Bruit  Resp: No accessory muscle use, Clear breath sounds, No rales or rhonchi  Card: Normal Rate,Regular Rythm,Normal S1, S2, No murmurs, rubs or gallop. No thrills.    Abd:  Soft, non-tender, non-distended, normoactive bowel sounds are present   MSK: No cyanosis or clubbing  Skin: No rashes or ulcers  Neuro:  Cranial nerves are grossly intact, moving all four extremities, no focal deficit, follows commands appropriately  Psych:  Good insight, oriented to person, place and time, alert, Nml Affect  LE: No edema  Vascular:Distal Pulses 2+ and symmetric          EKG: NSR, inferior infarct, no significant ST ischemic changes    Echo 2d adult 12/2009  EF 50%, basal inferior akinesis, mild MR  Echo 9/21/11 - LVEF 50%, inferior AK, unchanged from Dec 2009    Stress echo March 2012 - 3 min, resting EF 50%, inferior AK, no ischemia    Echo 10/29/13 - EF 55 % to 60 %. There was akinesis of the basal-mid inferior wall(s). There was akinesis of the basal inferolateral wall(s). Paradoxical ventricular septal motion, no PFO, no significant Change from previous study. Echo 8/23/17 - EF 55-60%. Inferior AK. Mild LVH. Mild MAC with no MR. AoV sclerosis without stenosis. No change compared to study 2013.     LABS:    Lab Results   Component Value Date/Time    WBC 10.9 02/07/2020 10:26 PM    HGB 14.4 02/07/2020 10:26 PM    HCT 42.8 02/07/2020 10:26 PM    PLATELET 755 88/27/5494 10:26 PM    MCV 88.2 02/07/2020 10:26 PM     Lab Results   Component Value Date/Time    Sodium 140 02/07/2020 10:26 PM    Potassium 4.3 02/07/2020 10:26 PM    Chloride 108 02/07/2020 10:26 PM    CO2 24 02/07/2020 10:26 PM    Anion gap 8 02/07/2020 10:26 PM    Glucose 122 (H) 02/07/2020 10:26 PM    BUN 16 02/07/2020 10:26 PM    Creatinine 0.86 02/07/2020 10:26 PM    BUN/Creatinine ratio 19 02/07/2020 10:26 PM    GFR est AA >60 02/07/2020 10:26 PM    GFR est non-AA >60 02/07/2020 10:26 PM    Calcium 9.0 02/07/2020 10:26 PM     Lab Results   Component Value Date/Time     12/06/2019 03:48 AM    CK-MB Index 1.7 07/25/2009 11:55 AM    Troponin-I, Qt. <0.05 02/08/2020 02:48 AM     Lab Results   Component Value Date/Time    aPTT 48.0 (H) 07/08/2009 12:45 PM     Lab Results   Component Value Date/Time    INR 1.4 (H) 07/08/2009 12:45 PM    INR 1.6 (H) 07/07/2009 01:14 PM    INR 1.1 07/06/2009 04:20 PM    Prothrombin time 13.7 (H) 07/08/2009 12:45 PM    Prothrombin time 14.8 (H) 07/07/2009 01:14 PM    Prothrombin time 10.7 07/06/2009 04:20 PM           Emile SALMON Scott Merlos

## 2020-02-08 NOTE — H&P
[facility]  [address] [facility], [facility] [address]  [phone]    Admission History and Physical      NAME:  Luis Daniel Parsons Sr.   :   1940   MRN:  [id number]     PCP:  Russell Gillette MD     Date of service:  2020         Subjective:     CHIEF COMPLAINT: Epigastric pain, nausea, vomiting    HISTORY OF PRESENT ILLNESS:     Mr. Jen Lainez is a 70 y.o.  male who is admitted with epigastric pain. Mr. Jen Lainez with past medical history of CAD, depression, diabetes mellitus, hypertension presented to ER complaining of sudden onset of epigastric abdominal pain associated with nausea and vomiting. Patient described the pain as dull, constant, moderate intensity. No radiation. Has been having nausea and vomited few times. Denies chest pain.       Past Medical History:   Diagnosis Date    CAD (coronary artery disease), native coronary artery     Cancer ([address]     melanoma removed on forehead    Depression     Diabetic neuropathy ([facility] [address]     ED (erectile dysfunction)     Hypertension, benign     Ill-defined condition     states he has memory problems    Postsurgical aortocoronary bypass status     Type II or unspecified type diabetes mellitus without mention of complication, not stated as uncontrolled         Past Surgical History:   Procedure Laterality Date    ECHO 2D ADULT  2009    EF 50%, basal inferior akinesis, mild MR    ECHO 2D ADULT  11    LVEF 50%, inferior AK, unchanged from Dec 2009    HX CORONARY ARTERY BYPASS GRAFT      HX OTHER SURGICAL      melanoma removed from forehead       Social History     Tobacco Use    Smoking status: Never Smoker    Smokeless tobacco: Never Used   Substance Use Topics    Alcohol use: No        Family History   Problem Relation Age of Onset    Breast Cancer Mother         w bone mets    Parkinson's Disease Maternal Aunt     Parkinson's Disease Maternal Uncle     Parkinson's Disease Maternal Grandfather         Allergies   Allergen Reactions    Amoxicillin Hives and Itching    Pravastatin Myalgia        Prior to Admission medications    Medication Sig Start Date End Date Taking? Authorizing Provider   potassium chloride SR (KLOR-CON 10) 10 mEq tablet Take 1 Tab by mouth daily. 12/5/19   Clifton Montano MD   lidocaine (LIDODERM) 5 % Apply patch to the affected area for 12 hours a day and remove for 12 hours a day. 11/27/19   Bahman Call, DO   cholecalciferol (VITAMIN D3) 1,000 unit cap Take 1,000 Units by mouth daily. Provider, Historical   carvedilol (COREG) 12.5 mg tablet TAKE ONE (1) TABLET(S) BY MOUTH TWICE DAILY WITH FOOD 10/14/19   Ai Patricio NP   insulin NPH/insulin regular (NOVOLIN 70/30, HUMULIN 70/30) 100 unit/mL (70-30) injection 45 Units by SubCUTAneous route two (2) times a day. Provider, Historical   losartan (COZAAR) 25 mg tablet Take 25 mg by mouth daily. Provider, Historical   aspirin delayed-release 81 mg tablet Take 81 mg by mouth daily.     Provider, Historical         Review of Systems:  (bold if positive, if negative)    Gen:  Eyes:  ENT:  CVS:  Pulm:  GI:  Abdominal pain, nausea, emesisGU:  MS:  Skin:  Psych:  Endo:  Hem:  Renal:  Neuro:            Objective:      VITALS:    Vital signs reviewed; most recent are:    Visit Vitals  /66 (BP 1 Location: Right arm, BP Patient Position: At rest)   Pulse 66   Temp 97.7 °F (36.5 °C)   Resp 17   Ht 6' (1.829 m)   Wt 102.1 kg (225 lb)   SpO2 100%   BMI 30.52 kg/m²     SpO2 Readings from Last 6 Encounters:   02/07/20 100%   12/06/19 94%   11/27/19 99%   10/14/19 98%   06/12/19 98%   05/03/19 97%        No intake or output data in the 24 hours ending 02/08/20 0007         Exam:     Physical Exam:    Gen:  Well-developed, well-nourished, in no acute distress  HEENT:  Pink conjunctivae, PERRL, hearing intact to voice, moist mucous membranes  Neck:  Supple, without masses, thyroid non-tender  Resp:  No accessory muscle use, clear breath sounds without wheezes rales or rhonchi  Card:  No murmurs, normal S1, S2 without thrills, bruits or peripheral edema  Abd:  Soft, non-tender, non-distended, normoactive bowel sounds are present, no palpable organomegaly and no detectable hernias  Lymph:  No cervical or inguinal adenopathy  Musc:  No cyanosis or clubbing  Skin:  No rashes or ulcers, skin turgor is good  Neuro:  Cranial nerves are grossly intact, no focal motor weakness, follows commands appropriately  Psych:  Good insight, oriented to person, place and time, alert       Labs:    Recent Labs     02/07/20  2226   WBC 10.9   HGB 14.4   HCT 42.8        Recent Labs     02/07/20  2226      K 4.3      CO2 24   *   BUN 16   CREA 0.86   CA 9.0   ALB 3.3*   TBILI 0.9   SGOT 27   ALT 23     Lab Results   Component Value Date/Time    Glucose (POC) 149 (H) 12/06/2019 06:57 AM    Glucose (POC) 266 (H) 12/05/2019 08:55 PM     No results for input(s): PH, PCO2, PO2, HCO3, FIO2 in the last 72 hours. No results for input(s): INR, INREXT in the last 72 hours. Telemetry reviewed:   normal sinus rhythm       Assessment/Plan:    1. Epigastric abdominal pain (2/8/2020)/nausea/vomiting. Admit under observation. Start Protonix IV. Clear liquid diet for now and consult GI.     2.  CAD (coronary artery disease), native coronary artery/history of CABG. Continue aspirin     3. Type 2 diabetes mellitus without complication ([facility] [address] Diabetic diet when he eats. For now sliding scale insulin    4. Hypertension, benign. Continue losartan and Coreg    5. Dyslipidemia (10/3/2012). Do not see a statin on his medication list    6. Depression (2/7/2020).   Not on medication              Previous medical records reviewed     Risk of deterioration: medium      Total time spent with patient: 48 [address] discussed with: Patient, Nursing Staff and >50% of time spent in counseling and coordination of care    Discussed:  Care Plan    Prophylaxis:  Lovenox    Probable Disposition:  Home w/Family           ___________________________________________________    Attending Physician: Emilie Leung MD

## 2020-02-08 NOTE — PROGRESS NOTES
Daily Progress Note: 2/8/2020  Radha Powell MD    Assessment/Plan:   Epigastric abdominal pain (2/8/2020)/nausea/vomiting.     - Start Protonix IV.    - Clear liquid diet for now   - consult GI.     CAD (coronary artery disease), native coronary artery/history of CABG. - Continue aspirin  - Consult Dr Kenrick Herrera     Type 2 diabetes mellitus without complication ([facility] [address] - Diabetic diet when he eats. - For now sliding scale insulin     Hypertension, benign.    - Continue losartan and Coreg    Dyslipidemia (10/3/2012). - Do not see a statin on his medication list    Depression (2/7/2020). - Not on medication       Problem List:  Problem List as of 2/8/2020 Date Reviewed: 10/14/2019          Codes Class Noted - Resolved    * (Principal) Epigastric abdominal pain ICD-10-CM: R10.13  ICD-9-CM: 789.06  2/8/2020 - Present        Epigastric pain ICD-10-CM: R10.13  ICD-9-CM: 789.06  2/8/2020 - Present        Depression ICD-10-CM: F32.9  ICD-9-CM: 664  2/7/2020 - Present        Fall ICD-10-CM: W19. Jonard Plan  ICD-9-CM: F356.6  11/29/2019 - Present        Traumatic rhabdomyolysis ([facility] ICD-10-CM: T79. 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  - PCI RCA with PTCA, significant LAD disease  - suspected reocclusion several hrs later with VF, IABP placed  3v CABG July 2009 Shreya Carr @ [facility])  - LIMA-LAD, VG-OM, VG-PDA             Type 2 diabetes mellitus without complication ([facility] [address] KDE-32-AR: E11.9  ICD-9-CM: 250.00  Unknown - Present        Hypertension, benign ICD-10-CM: I10  ICD-9-CM: 401.1  Unknown - Present        RESOLVED: Chest pain ICD-10-CM: R07.9  ICD-9-CM: 786.50  2/7/2020 - 2/8/2020        RESOLVED: PND (paroxysmal nocturnal dyspnea) ICD-10-CM: R06.00  ICD-9-CM: 786.09  2017 - 2018        RESOLVED: Near syncope ICD-10-CM: R55  ICD-9-CM: 780.2  10/29/2013 - 4/15/2014              HPI:   Mr. Sarina Rea is a 70 y.o.  male who is admitted with epigastric pain. Mr. Sarina Rea with past medical history of CAD, depression, diabetes mellitus, hypertension presented to ER complaining of sudden onset of epigastric abdominal pain associated with nausea and vomiting. Patient described the pain as dull, constant, moderate intensity. No radiation. Has been having nausea and vomited few times. Denies chest pain. (Dr Joel Rawls)    : Still with chest discomfort and some nausea. No radiation of pain. GI consult pending and will ask Cardiology to see. Review of Systems:   A comprehensive review of systems was negative except for that written in the HPI. Objective:   Physical Exam:     Visit Vitals  /76 (BP 1 Location: Right arm, BP Patient Position: At rest)   Pulse 88   Temp 98.6 °F (37 °C)   Resp 16   Ht 6' (1.829 m)   Wt 225 lb (102.1 kg)   SpO2 96%   BMI 30.52 kg/m²      O2 Device: Room air    Temp (24hrs), Av.3 °F (36.8 °C), Min:97.7 °F (36.5 °C), Max:98.9 °F (37.2 °C)     0701 -  1900  In: -   Out: 110 [Urine:110]   No intake/output data recorded. General:  Alert, cooperative, no distress, appears stated age. Head:  Normocephalic, without obvious abnormality, atraumatic. Eyes:  Conjunctivae/corneas clear. PERRL, EOMs intact. Nose: Nares normal. Septum midline. Mucosa normal. No drainage or sinus tenderness. Throat: Lips, mucosa, and tongue normal. Teeth and gums normal.   Neck: Supple, symmetrical, trachea midline, no adenopathy, thyroid: no enlargement/tenderness/nodules, no carotid bruit and no JVD. Back:   Symmetric, no curvature. ROM normal. No CVA tenderness. Lungs:   Clear to auscultation bilaterally. Chest wall:  No tenderness or deformity.    Heart:  Regular rate and rhythm, S1, S2 normal, no murmur, click, rub or gallop. Abdomen:   Soft, non-tender. Bowel sounds normal. No masses,  No organomegaly. Extremities: Extremities normal, atraumatic, no cyanosis or edema. No calf tenderness or cords. Pulses: 2+ and symmetric all extremities. Skin: Skin color, texture, turgor normal. No rashes or lesions   Neurologic: CNII-XII intact. Alert and oriented X 3. Fine motor of hands and fingers normal.   equal.  No cogwheeling or rigidity. Gait not tested at this time. Sensation grossly normal to touch. Gross motor of extremities normal.       Data Review:       Recent Days:  Recent Labs     02/07/20  2226   WBC 10.9   HGB 14.4   HCT 42.8        Recent Labs     02/07/20  2226      K 4.3      CO2 24   *   BUN 16   CREA 0.86   CA 9.0   ALB 3.3*   TBILI 0.9   SGOT 27   ALT 23       24 Hour Results:  Recent Results (from the past 24 hour(s))   CBC WITH AUTOMATED DIFF    Collection Time: 02/07/20 10:26 PM   Result Value Ref Range    WBC 10.9 4.1 - 11.1 K/uL    RBC 4.85 4.10 - 5.70 M/uL    HGB 14.4 12.1 - 17.0 g/dL    HCT 42.8 36.6 - 50.3 %    MCV 88.2 80.0 - 99.0 FL    MCH 29.7 26.0 - 34.0 PG    MCHC 33.6 30.0 - 36.5 g/dL    RDW 13.9 11.5 - 14.5 %    PLATELET 633 793 - 369 K/uL    MPV 9.0 8.9 - 12.9 FL    NRBC 0.0 0  WBC    ABSOLUTE NRBC 0.00 0.00 - 0.01 K/uL    NEUTROPHILS 78 (H) 32 - 75 %    LYMPHOCYTES 13 12 - 49 %    MONOCYTES 7 5 - 13 %    EOSINOPHILS 1 0 - 7 %    BASOPHILS 1 0 - 1 %    IMMATURE GRANULOCYTES 0 0.0 - 0.5 %    ABS. NEUTROPHILS 8.6 (H) 1.8 - 8.0 K/UL    ABS. LYMPHOCYTES 1.4 0.8 - 3.5 K/UL    ABS. MONOCYTES 0.7 0.0 - 1.0 K/UL    ABS. EOSINOPHILS 0.1 0.0 - 0.4 K/UL    ABS. BASOPHILS 0.1 0.0 - 0.1 K/UL    ABS. IMM.  GRANS. 0.0 0.00 - 0.04 K/UL    DF AUTOMATED     METABOLIC PANEL, COMPREHENSIVE    Collection Time: 02/07/20 10:26 PM   Result Value Ref Range    Sodium 140 136 - 145 mmol/L    Potassium 4.3 3.5 - 5.1 mmol/L Chloride 108 97 - 108 mmol/L    CO2 24 21 - 32 mmol/L    Anion gap 8 5 - 15 mmol/L    Glucose 122 (H) 65 - 100 mg/dL    BUN 16 6 - 20 MG/DL    Creatinine 0.86 0.70 - 1.30 MG/DL    BUN/Creatinine ratio 19 12 - 20      GFR est AA >60 >60 ml/min/1.73m2    GFR est non-AA >60 >60 ml/min/1.73m2    Calcium 9.0 8.5 - 10.1 MG/DL    Bilirubin, total 0.9 0.2 - 1.0 MG/DL    ALT (SGPT) 23 12 - 78 U/L    AST (SGOT) 27 15 - 37 U/L    Alk. phosphatase 67 45 - 117 U/L    Protein, total 7.5 6.4 - 8.2 g/dL    Albumin 3.3 (L) 3.5 - 5.0 g/dL    Globulin 4.2 (H) 2.0 - 4.0 g/dL    A-G Ratio 0.8 (L) 1.1 - 2.2     TROPONIN I    Collection Time: 02/07/20 10:26 PM   Result Value Ref Range    Troponin-I, Qt. <0.05 <0.05 ng/mL   LIPASE    Collection Time: 02/07/20 10:26 PM   Result Value Ref Range    Lipase 70 (L) 73 - 393 U/L   SAMPLES BEING HELD    Collection Time: 02/07/20 10:26 PM   Result Value Ref Range    SAMPLES BEING HELD GRN. RADHA     COMMENT        Add-on orders for these samples will be processed based on acceptable specimen integrity and analyte stability, which may vary by analyte.    URINALYSIS W/ RFLX MICROSCOPIC    Collection Time: 02/07/20 11:20 PM   Result Value Ref Range    Color YELLOW/STRAW      Appearance CLEAR CLEAR      Specific gravity 1.017 1.003 - 1.030      pH (UA) 7.5 5.0 - 8.0      Protein NEGATIVE  NEG mg/dL    Glucose 100 (A) NEG mg/dL    Ketone NEGATIVE  NEG mg/dL    Bilirubin NEGATIVE  NEG      Blood NEGATIVE  NEG      Urobilinogen 0.2 0.2 - 1.0 EU/dL    Nitrites NEGATIVE  NEG      Leukocyte Esterase NEGATIVE  NEG     GLUCOSE, POC    Collection Time: 02/08/20 12:19 AM   Result Value Ref Range    Glucose (POC) 157 (H) 65 - 100 mg/dL    Performed by [facility]    TROPONIN I    Collection Time: 02/08/20  2:48 AM   Result Value Ref Range    Troponin-I, Qt. <0.05 <0.05 ng/mL   GLUCOSE, POC    Collection Time: 02/08/20  6:08 AM   Result Value Ref Range    Glucose (POC) 186 (H) 65 - 100 mg/dL    Performed by [employer] [facility]        Medications reviewed  Current Facility-Administered Medications   Medication Dose Route Frequency    sodium chloride (NS) flush 5-40 mL  5-40 mL IntraVENous Q8H    sodium chloride (NS) flush 5-40 mL  5-40 mL IntraVENous PRN    enoxaparin (LOVENOX) injection 40 mg  40 mg SubCUTAneous Q24H    pantoprazole (PROTONIX) injection 40 mg  40 mg IntraVENous DAILY    alum-mag hydroxide-simeth (MYLANTA) oral suspension 30 mL  30 mL Oral Q4H PRN    insulin lispro (HUMALOG) injection   SubCUTAneous Q6H    glucose chewable tablet 16 g  4 Tab Oral PRN    glucagon (GLUCAGEN) injection 1 mg  1 mg IntraMUSCular PRN    dextrose 10% infusion 0-250 mL  0-250 mL IntraVENous PRN    ondansetron (ZOFRAN) injection 4 mg  4 mg IntraVENous Q4H PRN    influenza vaccine 2019-20 (6 mos+)(PF) (FLUARIX/FLULAVAL/FLUZONE QUAD) injection 0.5 mL  0.5 mL IntraMUSCular PRIOR TO DISCHARGE    nitroglycerin (NITROSTAT) tablet 0.4 mg  0.4 mg SubLINGual Q5MIN PRN       Care Plan discussed with: Patient/Family    Total time spent with patient and review of records: 30 minutes.     Pérez Green MD

## 2020-02-08 NOTE — ED TRIAGE NOTES
Pt. Brought in by EMS for chest pain x3 hours, pt. Took 4 baby aspirin today, was given 1 nitro subl in route by EMS, stated made it a little better.

## 2020-02-08 NOTE — CONSULTS
[address] [facility] [address], [address] [facility]  [phone]          GI CONSULTATION NOTE      NAME:  Rico Francis Sr.   :   1940   MRN:   [id number]       Referring Physician: Dr Barney Hughes    Consult Date: 2020     Chief Complaint: abdominal pain    History of Present Illness:  Patient is a 70 y.o. who is seen in consultation at the request of Dr. Gian Bowers.  Mr. Lopez with past medical history of CAD, depression, diabetes mellitus, hypertension presented to ER complaining of sudden onset of epigastric/periumbilical abdominal pain associated with nausea and vomiting.  Patient described the pain as dull, constant, moderate intensity.  No radiation.  Has been having nausea and vomited few times. Mikie Erwin was admitted for further evaluation. HE gave hx of chest pain this am and abdominal pain has resolved along with N/V. He denies any changes in bowel or hx GI bleeding. Pt denies any use of NSAIDs. Cardiology eval is pending. PMH:  Past Medical History:   Diagnosis Date    CAD (coronary artery disease), native coronary artery     Cancer ([facility] [address]     melanoma removed on forehead    Depression     Diabetic neuropathy ([facility] [address]     ED (erectile dysfunction)     Hypertension, benign     Ill-defined condition     states he has memory problems    Postsurgical aortocoronary bypass status     Type II or unspecified type diabetes mellitus without mention of complication, not stated as uncontrolled        PSH:  Past Surgical History:   Procedure Laterality Date    ECHO 2D ADULT  2009    EF 50%, basal inferior akinesis, mild MR    ECHO 2D ADULT  11    LVEF 50%, inferior AK, unchanged from Dec 2009    HX CORONARY ARTERY BYPASS GRAFT      HX OTHER SURGICAL      melanoma removed from forehead       Allergies:   Allergies   Allergen Reactions    Amoxicillin Hives and Itching    Pravastatin Myalgia       Home Medications:  Prior to Admission Medications   Prescriptions Last Dose Informant Patient Reported? Taking?   aspirin delayed-release 81 mg tablet 2020 at Unknown time Self Yes Yes   Sig: Take 81 mg by mouth daily. carvedilol (COREG) 12.5 mg tablet 2020 at Unknown time Self No Yes   Sig: TAKE ONE (1) TABLET(S) BY MOUTH TWICE DAILY WITH FOOD   insulin NPH/insulin regular (NOVOLIN 70/30, HUMULIN 70/30) 100 unit/mL (70-30) injection 2020 at Unknown time Self Yes Yes   Si Units by SubCUTAneous route Before breakfast and dinner. sertraline (ZOLOFT) 25 mg tablet 2020 at Unknown time Self Yes Yes   Sig: Take 25 mg by mouth daily. tamsulosin (FLOMAX) 0.4 mg capsule 2020 at Unknown time Self Yes Yes   Sig: Take 0.4 mg by mouth daily.       Facility-Administered Medications: None       Hospital Medications:  Current Facility-Administered Medications   Medication Dose Route Frequency    sodium chloride (NS) flush 5-40 mL  5-40 mL IntraVENous Q8H    sodium chloride (NS) flush 5-40 mL  5-40 mL IntraVENous PRN    enoxaparin (LOVENOX) injection 40 mg  40 mg SubCUTAneous Q24H    pantoprazole (PROTONIX) injection 40 mg  40 mg IntraVENous DAILY    alum-mag hydroxide-simeth (MYLANTA) oral suspension 30 mL  30 mL Oral Q4H PRN    insulin lispro (HUMALOG) injection   SubCUTAneous Q6H    glucose chewable tablet 16 g  4 Tab Oral PRN    glucagon (GLUCAGEN) injection 1 mg  1 mg IntraMUSCular PRN    dextrose 10% infusion 0-250 mL  0-250 mL IntraVENous PRN    ondansetron (ZOFRAN) injection 4 mg  4 mg IntraVENous Q4H PRN    influenza vaccine - (6 mos+)(PF) (FLUARIX/FLULAVAL/FLUZONE QUAD) injection 0.5 mL  0.5 mL IntraMUSCular PRIOR TO DISCHARGE    aspirin delayed-release tablet 81 mg  81 mg Oral DAILY    carvediloL (COREG) tablet 12.5 mg  12.5 mg Oral BID WITH MEALS    sertraline (ZOLOFT) tablet 25 mg  25 mg Oral DAILY    tamsulosin (FLOMAX) capsule 0.4 mg  0.4 mg Oral DAILY    nitroglycerin (NITROSTAT) tablet 0.4 mg  0.4 mg SubLINGual Q5MIN PRN       Social History:  Social History     Tobacco Use    Smoking status: Never Smoker    Smokeless tobacco: Never Used   Substance Use Topics    Alcohol use: No       Family History:  Family History   Problem Relation Age of Onset    Breast Cancer Mother         w bone mets    Parkinson's Disease Maternal Aunt     Parkinson's Disease Maternal Uncle     Parkinson's Disease Maternal Grandfather        Review of Systems:  A detailed 10 system ROS is obtained, with pertinent positives as listed in the HPI and PMH. All others are negative. Objective:     Patient Vitals for the past 8 hrs:   BP Temp Pulse Resp SpO2   02/08/20 1150 139/71 98.1 °F (36.7 °C) 91 20 97 %   02/08/20 0707 121/76 98.6 °F (37 °C) 88 16 96 %     02/08 0701 - 02/08 1900  In: -   Out: 310 [Urine:310]  No intake/output data recorded. PHYSICAL EXAM:  General: WD, WN. Alert, cooperative, no acute distress    HEENT: NC, Atraumatic. PERRLA, EOMI. Anicteric sclerae. Lungs:  CTA Bilaterally. No Wheezing/Rhonchi/Rales. Heart:  Regular  rhythm,  No murmur (), No Rubs, No Gallops  Abdomen: Soft, Non distended, Non tender.  +Bowel sounds, no HSM  Extremities: No c/c/e  Neurologic:  CN 2-12 gi, Alert and oriented X 3. No acute neurological distress   Psych:   Good insight. Not anxious nor agitated. Data Review     Recent Labs     02/07/20  2226   WBC 10.9   HGB 14.4   HCT 42.8        Recent Labs     02/07/20  2226      K 4.3      CO2 24   BUN 16   CREA 0.86   *   CA 9.0     Recent Labs     02/07/20  2226   SGOT 27   AP 67   TP 7.5   ALB 3.3*   GLOB 4.2*   LPSE 70*     No results for input(s): INR, PTP, APTT, INREXT in the last 72 hours. Imaging studies reviewed          Assessment:   Abdominal pain- acute onset with n/v that has resolved within 24 hrs. Pt is limited hx. Pt has vague  reports of chest pain as well. Cards eval is pending.    Normal colonoscopy except 2 small polyps removed by 2016 per Dr Robel Mosley. Pt reports never had EGD. No anemia or GI bleeding. LFT's and lipase are normal       Plan:   Supportive tx  Diet as tolerated  Consider elective EGD once cleared from cardiology standpoint  Following along.        Rosalba Ferrell MD

## 2020-02-08 NOTE — ED NOTES
TRANSFER - OUT REPORT:    Verbal report given to ondina(name) on Germán Lopez Sr.  being transferred to [facility](unit) for routine progression of care       Report consisted of patients Situation, Background, Assessment and   Recommendations(SBAR). Information from the following report(s) SBAR, ED Summary, [facility] and Recent Results was reviewed with the receiving nurse. Lines:   Peripheral IV 02/07/20 Left Forearm (Active)   Site Assessment Clean, dry, & intact 2/7/2020 10:02 PM   Phlebitis Assessment 0 2/7/2020 10:02 PM   Infiltration Assessment 0 2/7/2020 10:02 PM   Dressing Status Clean, dry, & intact 2/7/2020 10:02 PM        Opportunity for questions and clarification was provided.       Patient transported with:   Registered Nurse  Tech

## 2020-02-08 NOTE — PROGRESS NOTES
CMS Note  2/8/2020    Patient received the Observation, Moon and Code 44 letters. Patient was not able to sign the letters for CMS. Patient was given copies for their record.   Gómez Rdz CMS

## 2020-02-08 NOTE — ED PROVIDER NOTES
70 y.o. male with past medical history significant for melanoma, T2 DM, diabetic neuropathy, HTN who presents from home via EMS with chief complaint of chest pain. Pt presents to the ED and reports non radiating mid sternal chest pain which began tonight 2/7/2020, just a few hours PTA. Wife states that earlier tonight the pt described the sensation to her stating it \"felt like a knot in his stomach\". Pt also reports N/V and decreased appetite today. EMS administered 1 dosage of nitroglycerin PTA  However the pt states that his pain has gotten worse since arriving to ED. Pt denies any back pain, SOB, abdominal pain. There are no other acute medical concerns at this time. Surgical hx - CABG (2009), melanoma removal from forehead (2008)  Social hx - Tobacco use: non smoker, Alcohol Use: non drinker, Drug Use: denies    PCP: Tori Brock MD    Note written by Pennie Rodriguez, as dictated by Stuart Colunga DO 10:17PM.      The history is provided by the patient and the spouse. No  was used.         Past Medical History:   Diagnosis Date    CAD (coronary artery disease), native coronary artery     Cancer ([address]     melanoma removed on forehead    Depression     Diabetic neuropathy ([facility] [address]     ED (erectile dysfunction)     Hypertension, benign     Ill-defined condition     states he has memory problems    Postsurgical aortocoronary bypass status     Type II or unspecified type diabetes mellitus without mention of complication, not stated as uncontrolled        Past Surgical History:   Procedure Laterality Date    ECHO 2D ADULT  12/2009    EF 50%, basal inferior akinesis, mild MR    ECHO 2D ADULT  9/21/11    LVEF 50%, inferior AK, unchanged from Dec 2009    HX CORONARY ARTERY BYPASS GRAFT  2009    HX OTHER SURGICAL  2008    melanoma removed from forehead         Family History:   Problem Relation Age of Onset    Breast Cancer Mother         w bone mets  Parkinson's Disease Maternal Aunt     Parkinson's Disease Maternal Uncle     Parkinson's Disease Maternal Grandfather        Social History     Socioeconomic History    Marital status:      Spouse name: Not on file    Number of children: Not on file    Years of education: Not on file    Highest education level: Not on file   Occupational History    Not on file   Social Needs    Financial resource strain: Not on file    Food insecurity:     Worry: Not on file     Inability: Not on file    Transportation needs:     Medical: Not on file     Non-medical: Not on file   Tobacco Use    Smoking status: Never Smoker    Smokeless tobacco: Never Used   Substance and Sexual Activity    Alcohol use: No    Drug use: No    Sexual activity: Not on file   Lifestyle    Physical activity:     Days per week: Not on file     Minutes per session: Not on file    Stress: Not on file   Relationships    Social connections:     Talks on phone: Not on file     Gets together: Not on file     Attends Jewish service: Not on file     Active member of club or organization: Not on file     Attends meetings of clubs or organizations: Not on file     Relationship status: Not on file    Intimate partner violence:     Fear of current or ex partner: Not on file     Emotionally abused: Not on file     Physically abused: Not on file     Forced sexual activity: Not on file   Other Topics Concern    Not on file   Social History Narrative    Not on file         ALLERGIES: Amoxicillin and Pravastatin    Review of Systems   Constitutional: Positive for appetite change. Negative for chills and fever. HENT: Negative for congestion and sore throat. Eyes: Negative for pain and redness. Respiratory: Negative for cough and shortness of breath. Cardiovascular: Positive for chest pain. Negative for palpitations. Gastrointestinal: Positive for nausea and vomiting. Negative for abdominal pain and diarrhea.    Genitourinary: Negative for frequency and hematuria. Musculoskeletal: Negative for back pain and neck pain. Skin: Negative for rash and wound. Neurological: Negative for dizziness and headaches. Hematological: Does not bruise/bleed easily. All other systems reviewed and are negative. Vitals:    02/07/20 2202   BP: 143/66   Pulse: 66   Resp: 17   Temp: 97.7 °F (36.5 °C)   SpO2: 100%   Weight: 102.1 kg (225 lb)   Height: 6' (1.829 m)            Physical Exam  Vitals signs and nursing note reviewed. Constitutional:       General: He is not in acute distress. Appearance: He is well-developed. HENT:      Head: Normocephalic and atraumatic. Eyes:      Conjunctiva/sclera: Conjunctivae normal.      Pupils: Pupils are equal, round, and reactive to light. Neck:      Musculoskeletal: Normal range of motion and neck supple. Cardiovascular:      Rate and Rhythm: Normal rate and regular rhythm. Heart sounds: Normal heart sounds. No murmur. No friction rub. No gallop. Pulmonary:      Effort: Pulmonary effort is normal. No respiratory distress. Breath sounds: Normal breath sounds. No wheezing or rales. Abdominal:      General: Bowel sounds are normal. There is no distension. Palpations: Abdomen is soft. Tenderness: There is no abdominal tenderness. There is no guarding or rebound. Musculoskeletal: Normal range of motion. Skin:     General: Skin is warm and dry. Capillary Refill: Capillary refill takes less than 2 seconds. Findings: No rash. Neurological:      Mental Status: He is alert and oriented to person, place, and time. Note written by Pennie Fuentes, as dictated by Thong Serrano DO 10:17 PM        EKG Interpretation:   ED Physician interpretation  Rhythm: normal sinus rhythm; and regular . Rate (approx.): 65; Axis: normal; ST/T wave: non-specific changes;      Note written by Pennie Fuentes, as dictated by Kala Meadows Melvin MURPHY DO 9:57 PM      Labs Reviewed:   Trop neg  Lipase not c/w pancreatitis  No leukocytosis or anemia    Imaging Reviewed:   CXR neg for acute process      Course:  Received zofran + GI Cocktail    11:46 PM re-evaluated. Feeling worse. Describes vague discomfort in epigastrium/lower chest.     Hospitalist Rosy Serve for Admission  11:46 PM    ED Room Number: IG17/25  Patient Name and age:  Tatiana Chavarria. 70 y.o.  male  Working Diagnosis:   1. Chest pain, unspecified type      Readmission: no  Isolation Requirements:  no  Recommended Level of Care:  telemetry  Code Status:  Full Code  Department:[facility] ED - [phone]  Other:  70 y.o. male hx of significant CAD s/p CABG here with chest pain. EKG and first trop ok. MDM:  32-year-old male history of coronary disease status post CABG here today with chest pain/epigastric discomfort. Started several hours ago. No tenderness in the epigastrium. LFTs and lipase are within normal limits. EKG and troponin negative. Chest x-ray without acute process. Low suspicion for PE given lack of tachycardia, tachypnea or hypoxia. No leg swelling or leg pain. Low suspicion for acute CHF at this time. Given his significant cardiac history with persistent discomfort while in the emergency department I will admit the patient. ASA given prior to arrival.             Clinical Impression:     ICD-10-CM ICD-9-CM    1.  Chest pain, unspecified type R07.9 786.50            Disposition: Admit  Melvin Botello DO

## 2020-02-09 LAB
GLUCOSE BLD STRIP.AUTO-MCNC: 159 MG/DL (ref 65–100)
GLUCOSE BLD STRIP.AUTO-MCNC: 189 MG/DL (ref 65–100)
GLUCOSE BLD STRIP.AUTO-MCNC: 240 MG/DL (ref 65–100)
GLUCOSE BLD STRIP.AUTO-MCNC: 393 MG/DL (ref 65–100)
SERVICE CMNT-IMP: ABNORMAL

## 2020-02-09 PROCEDURE — 99218 HC RM OBSERVATION: CPT

## 2020-02-09 PROCEDURE — 96376 TX/PRO/DX INJ SAME DRUG ADON: CPT

## 2020-02-09 PROCEDURE — 74011250637 HC RX REV CODE- 250/637: Performed by: FAMILY MEDICINE

## 2020-02-09 PROCEDURE — 74011250636 HC RX REV CODE- 250/636: Performed by: INTERNAL MEDICINE

## 2020-02-09 PROCEDURE — 74011636637 HC RX REV CODE- 636/637: Performed by: INTERNAL MEDICINE

## 2020-02-09 PROCEDURE — 82962 GLUCOSE BLOOD TEST: CPT

## 2020-02-09 PROCEDURE — C9113 INJ PANTOPRAZOLE SODIUM, VIA: HCPCS | Performed by: INTERNAL MEDICINE

## 2020-02-09 RX ADMIN — PANTOPRAZOLE SODIUM 40 MG: 40 INJECTION, POWDER, FOR SOLUTION INTRAVENOUS at 09:34

## 2020-02-09 RX ADMIN — TAMSULOSIN HYDROCHLORIDE 0.4 MG: 0.4 CAPSULE ORAL at 09:34

## 2020-02-09 RX ADMIN — INSULIN LISPRO 2 UNITS: 100 INJECTION, SOLUTION INTRAVENOUS; SUBCUTANEOUS at 18:31

## 2020-02-09 RX ADMIN — CARVEDILOL 12.5 MG: 12.5 TABLET, FILM COATED ORAL at 16:39

## 2020-02-09 RX ADMIN — ASPIRIN 81 MG: 81 TABLET, COATED ORAL at 09:34

## 2020-02-09 RX ADMIN — SERTRALINE 25 MG: 50 TABLET, FILM COATED ORAL at 09:34

## 2020-02-09 RX ADMIN — CARVEDILOL 12.5 MG: 12.5 TABLET, FILM COATED ORAL at 09:34

## 2020-02-09 RX ADMIN — Medication 10 ML: at 22:00

## 2020-02-09 RX ADMIN — INSULIN LISPRO 10 UNITS: 100 INJECTION, SOLUTION INTRAVENOUS; SUBCUTANEOUS at 13:17

## 2020-02-09 RX ADMIN — Medication 10 ML: at 14:00

## 2020-02-09 NOTE — CONSULTS
NEUROLOGY CONSULT NOTE    Patient ID:  Jaida Platt.  [id number]  70 y.o.  1940    Date of Consultation:  February 9, 2020    Referring Physician: Dr. Mallorie Bean    Reason for Consultation:  dementia    History of Present Illness:     Patient Active Problem List    Diagnosis Date Noted    Epigastric abdominal pain 02/08/2020    Epigastric pain 02/08/2020    Depression 02/07/2020    Fall 11/29/2019    Traumatic rhabdomyolysis ([facility] [address] 11/29/2019    Dyslipidemia 10/03/2012    CAD (coronary artery disease), native coronary artery     Type 2 diabetes mellitus without complication ([address]     Hypertension, benign      Past Medical History:   Diagnosis Date    CAD (coronary artery disease), native coronary artery     Cancer ([address]     melanoma removed on forehead    Depression     Diabetic neuropathy ([address]     ED (erectile dysfunction)     Hypertension, benign     Ill-defined condition     states he has memory problems    Postsurgical aortocoronary bypass status     Type II or unspecified type diabetes mellitus without mention of complication, not stated as uncontrolled       Past Surgical History:   Procedure Laterality Date    ECHO 2D ADULT  12/2009    EF 50%, basal inferior akinesis, mild MR    ECHO 2D ADULT  9/21/11    LVEF 50%, inferior AK, unchanged from Dec 2009    HX CORONARY ARTERY BYPASS GRAFT  2009    HX OTHER SURGICAL  2008    melanoma removed from forehead      Prior to Admission medications    Medication Sig Start Date End Date Taking? Authorizing Provider   sertraline (ZOLOFT) 25 mg tablet Take 25 mg by mouth daily. Yes Provider, Historical   tamsulosin (FLOMAX) 0.4 mg capsule Take 0.4 mg by mouth daily.    Yes Provider, Historical   carvedilol (COREG) 12.5 mg tablet TAKE ONE (1) TABLET(S) BY MOUTH TWICE DAILY WITH FOOD 10/14/19  Yes Otelia Members, NP   insulin NPH/insulin regular (NOVOLIN 70/30, HUMULIN 70/30) 100 unit/mL (70-30) injection 46 Units by SubCUTAneous route Before breakfast and dinner. Yes Provider, Historical   aspirin delayed-release 81 mg tablet Take 81 mg by mouth daily. Yes Provider, Historical     Allergies   Allergen Reactions    Amoxicillin Hives and Itching    Pravastatin Myalgia      Social History     Tobacco Use    Smoking status: Never Smoker    Smokeless tobacco: Never Used   Substance Use Topics    Alcohol use: No      Family History   Problem Relation Age of Onset    Breast Cancer Mother         w bone mets    Parkinson's Disease Maternal Aunt     Parkinson's Disease Maternal Uncle     Parkinson's Disease Maternal Grandfather         Subjective:      Raymund Schilder Sr. is a 70 y.o. RHWM with history of memory loss, CAD, diabetes, depression, hypertension and dyslipidemia who was admitted to 2/7/2020 for abdominal pain with nausea vomiting and decreased appetite. When primary team was rounding today they noted patient was confused. Hence the referral to neurology. Review of his records reveal patient was seen by neurology on a previous admission 12/3/2019 for cognitive impairment. Brain MRI without contrast done then revealed no acute stroke. Mild to moderate periventricular white matter disease. EEG done revealed mild generalized slowing. Impression was likely mild dementia and recommended outpatient neuropsychological testing. When seen today, patient did inform me that he told the primary team the wrong information as to where he was but knew that he is at [facility]. He does admit to some short-term memory lapses. Chest x-ray done here was unremarkable. Laboratory work-up only revealed decreased albumin. Per patient at baseline he uses a walker. Outside reports reviewed: ER records, radiology reports, lab reports, historical medical records. Review of Systems:    Pertinent items are noted in HPI.     Objective:     Patient Vitals for the past 8 hrs:   BP Temp Pulse Resp SpO2   02/09/20 0852 125/68 98.3 °F (36.8 °C) 88 17 96 %   02/09/20 0411 119/71 98.6 °F (37 °C) 94 18 94 %     PHYSICAL EXAM:    NEUROLOGICAL EXAM:    Appearance: The patient is well developed, well nourished, provides a coherent history and is in no acute distress. Mental Status: Oriented to time except exact date and year. Oriented to place and person. Fluent, no aphasia or dysarthria. Poor short term memory, attention and concentration. Good naming and repetition. Follows simple commands. Mood and affect appropriate. Cranial Nerves:   Intact visual fields. DOMINIQUE, EOM's full, no nystagmus, no ptosis. Facial sensation is normal. Corneal reflexes are intact. Facial movement is symmetric. Hearing is normal bilaterally. Palate is midline with normal elevation. Sternocleidomastoid and trapezius muscles are normal. Tongue is midline. Motor:  5/5 strength in upper and lower proximal and distal muscles. Normal bulk and tone. No pronator drift. Reflexes:   Deep tendon reflexes 1+/4 and symmetrical. Downgoing toes. Sensory:   Normal to cold. Gait:  Not tested. Tremor:   No tremor noted. Cerebellar:  Intact FTN/DIVINA/HTS. Imaging  Brain MRI: reviewed    Lab Review    Recent Results (from the past 24 hour(s))   GLUCOSE, POC    Collection Time: 02/08/20 11:57 AM   Result Value Ref Range    Glucose (POC) 209 (H) 65 - 100 mg/dL    Performed by Nancy Marina (PCT)    GLUCOSE, POC    Collection Time: 02/08/20  5:59 PM   Result Value Ref Range    Glucose (POC) 192 (H) 65 - 100 mg/dL    Performed by Nancy Marina (PCT)    GLUCOSE, POC    Collection Time: 02/08/20 11:36 PM   Result Value Ref Range    Glucose (POC) 236 (H) 65 - 100 mg/dL    Performed by Jyoti Light    GLUCOSE, POC    Collection Time: 02/09/20  5:52 AM   Result Value Ref Range    Glucose (POC) 240 (H) 65 - 100 mg/dL    Performed by Jyoti Light          Assessment:   Dementia    Plan:   Neurological examination reveals no focal deficit.   Cognitive testing was done and patient does have significant short-term memory issues and problems with attention and concentration. Findings consistent with a mild dementia. Previous brain MRI does show periventricular white matter disease. Etiology could be vascular dementia. Agree with previous recommendation of obtaining an outpatient neuropsychological testing to further assess extent and rule out presence of progressive dementia. This will help decide whether patient needs to be on medication. Advised patient to do routine mental exercises and structured physical exercises. Do reminder systems and use pillbox. No further recommendations from a neurological standpoint. Thank you for the consult. This note was created using voice recognition software. Despite editing, there may be syntax errors.

## 2020-02-09 NOTE — PROGRESS NOTES
Chu Davis MD  [phone]           GI PROGRESS NOTE      NAME: Dano Stanton Sr.   :  1940   MRN:  [id number]       Subjective:   No new events. Poor hx reports pain now is in lower abdomen. No n/v or Gi bleeding since admitted. Pt is tolerating po. VITALS:   Last 24hrs VS reviewed since prior progress note. Most recent are:  Visit Vitals  /77 (BP 1 Location: Right arm, BP Patient Position: At rest)   Pulse 71   Temp 98.2 °F (36.8 °C)   Resp 18   Ht 6' (1.829 m)   Wt 102.1 kg (225 lb)   SpO2 96%   BMI 30.52 kg/m²       Intake/Output Summary (Last 24 hours) at 2020 1513  Last data filed at 2020 1525  Gross per 24 hour   Intake    Output 90 ml   Net -90 ml     PHYSICAL EXAM:  General: Alert, in no acute distress    HEENT: Anicteric sclerae. Lungs:  CTA Bilaterally. Heart:  Regular  rhythm,    Abdomen: Soft, Non distended, Non tender.  (+)Bowel sounds, no HSM  Extremities: No c/c/e  Neurologic:  CN 2-12 gi, Alert and oriented X 3. No acute neurological distress   Psych:   Good insight. Not anxious nor agitated. Lab Data Reviewed:   Recent Labs     20  2226   WBC 10.9   HGB 14.4   HCT 42.8        Recent Labs     20  2226      K 4.3      CO2 24   BUN 16   CREA 0.86   *   CA 9.0     Recent Labs     20  2226   SGOT 27   AP 67   TP 7.5   ALB 3.3*   GLOB 4.2*   LPSE 70*       ________________________________________________________________________  Patient Active Problem List   Diagnosis Code    CAD (coronary artery disease), native coronary artery I25.10    Type 2 diabetes mellitus without complication (HCC) S39.6    Hypertension, benign I10    Dyslipidemia E78.5    Fall W19. XXXA    Traumatic rhabdomyolysis ([address] T79. 6XXA    Depression F32.9    Epigastric abdominal pain R10.13    Epigastric pain R10.13        Assessment:   · Abdominal pain- vague hx . Also has chest pain.  Pt scheduled for napoleon scan in am or as outpt.  Ok for EGD per cards   Plan:   · Elective EGD after stress test   · Dr Alexis Carmen or Dr David Garcia to follow in am     Signed By: Ernestina Jackson MD     2/9/2020  3:13 PM

## 2020-02-09 NOTE — PROGRESS NOTES
7119: Patient insisted that he use walker to urinate in the bathroom vs. the urinal.  Assisted patient slowly with walker to bathroom. After urination, patient was c/o dizziness and weakness and was too weak to get back to the bed. It took 3 nurses to pivot patient to a rolling chair to keep him from falling. Rolled patient in chair to bedside and 2 nurses assisted patient back to bed. Patient agrees that he is too weak at this time to get up again, even with assistance. Patient still unwilling to take any medications or have labs drawn. 0200: Patient refused AM labs. 0000: Patient allowed me to check his BG, but is refusing insulin; states he is not taking any medications from us. 2120: Patient refusing to take any medications; stating he wants to be left alone until his wife comes to get him. Patient states that he is calling his wife to come and pick him up because he is leaving. 2057: Contacted Dr. iLnda Lomeli regarding patient's condition. Pt is currently disoriented to time and situation; very agitated and stating that he is leaving the hospital.  Order received for prn sitter and one time dose of seroquel 25 mg.

## 2020-02-09 NOTE — PROGRESS NOTES
Daily Progress Note: 2/9/2020  Jorge L Larios MD    Assessment/Plan:   Epigastric abdominal pain (2/8/2020)/nausea/vomiting.     - Protonix IV switched to po    - Clear liquid diet for now   - consulted GI.     CAD (coronary artery disease), native coronary artery/history of CABG. - Continue aspirin  - Consulted Card - poss Stress test     Type 2 diabetes mellitus without complication ([facility] [address] - Diabetic diet when he eats. - For now sliding scale insulin     Hypertension, benign.    - Continue losartan and Coreg    Dyslipidemia (10/3/2012). Depression (2/7/2020). - Not on medication    Dementia  - monitor and treat as needed  - consult Neuro       Problem List:  Problem List as of 2/9/2020 Date Reviewed: 10/14/2019          Codes Class Noted - Resolved    * (Principal) Epigastric abdominal pain ICD-10-CM: R10.13  ICD-9-CM: 789.06  2/8/2020 - Present        Epigastric pain ICD-10-CM: R10.13  ICD-9-CM: 789.06  2/8/2020 - Present        Depression ICD-10-CM: F32.9  ICD-9-CM: 012  2/7/2020 - Present        Fall ICD-10-CM: W19. Reena Fluke  ICD-9-CM: G381.4  11/29/2019 - Present        Traumatic rhabdomyolysis ([facility] ICD-10-CM: T79. 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  - PCI RCA with PTCA, significant LAD disease  - suspected reocclusion several hrs later with VF, IABP placed  3v CABG July 2009 Charissa Frank @ [facility])  - LIMA-LAD, VG-OM, VG-PDA             Type 2 diabetes mellitus without complication ([facility] IZP-26-QC: E11.9  ICD-9-CM: 250.00  Unknown - Present        Hypertension, benign ICD-10-CM: I10  ICD-9-CM: 401.1  Unknown - Present        RESOLVED: Chest pain ICD-10-CM: R07.9  ICD-9-CM: 786.50  2/7/2020 - 2020        RESOLVED: PND (paroxysmal nocturnal dyspnea) ICD-10-CM: R06.00  ICD-9-CM: 786.09  2017 - 2018        RESOLVED: Near syncope ICD-10-CM: R55  ICD-9-CM: 780.2  10/29/2013 - 4/15/2014            HPI:   Mr. Payal Garcia is a 70 y.o.  male who is admitted with epigastric pain. Mr. Payal Garcia with past medical history of CAD, depression, diabetes mellitus, hypertension presented to ER complaining of sudden onset of epigastric abdominal pain associated with nausea and vomiting. Patient described the pain as dull, constant, moderate intensity. No radiation. Has been having nausea and vomited few times. Denies chest pain. (Dr Lenore Baez)    : Still with chest discomfort and some nausea. No radiation of pain. GI consult pending and will ask Cardiology to see. :  Epigastric discomfort continues. No appetite. Very poor historian. Cardiology has cleared for EGD if GI still wants to do it. Stable for stress per Cards. He does not know day/date/location. He thinks he is at [facility]. Review of Systems:   A comprehensive review of systems was negative except for that written in the HPI. Objective:   Physical Exam:   Visit Vitals  /71 (BP 1 Location: Right arm, BP Patient Position: At rest)   Pulse 94   Temp 98.6 °F (37 °C)   Resp 18   Ht 6' (1.829 m)   Wt 102.1 kg (225 lb)   SpO2 94%   BMI 30.52 kg/m²      O2 Device: Room air  Temp (24hrs), Av.6 °F (37 °C), Min:98.1 °F (36.7 °C), Max:99.1 °F (37.3 °C)    No intake/output data recorded.  1901 -  0700  In: -   Out: 400 [Urine:400]  General:  Alert, cooperative, no distress, appears stated age. Head:  Normocephalic, without obvious abnormality, atraumatic. Eyes:  Conjunctivae/corneas clear. PERRL, EOMs intact. Nose: Nares normal. Septum midline. Mucosa normal. No drainage or sinus tenderness.    Throat: Lips, mucosa, and tongue normal. Teeth and gums normal.   Neck: Supple, symmetrical, trachea midline, no adenopathy, thyroid: no enlargement/tenderness/nodules, no carotid bruit and no JVD. Back:   Symmetric, no curvature. ROM normal. No CVA tenderness. Lungs:   Clear to auscultation bilaterally. Chest wall:  No tenderness or deformity. Heart:  Regular rate and rhythm, S1, S2 normal, no murmur, click, rub or gallop. Abdomen:   Soft, non-tender. Bowel sounds normal. No masses,  No organomegaly. Extremities: Extremities normal, atraumatic, no cyanosis or edema. No calf tenderness or cords. Pulses: 2+ and symmetric all extremities. Skin: Skin color, texture, turgor normal. No rashes or lesions   Neurologic:  Alert and oriented X 1-2. Poor short term memory. Insight is poor.  equal.  No cogwheeling or rigidity. Gait not tested at this time. Sensation grossly normal to touch.   Gross motor of extremities normal.       Data Review:       Recent Days:  Recent Labs     02/07/20  2226   WBC 10.9   HGB 14.4   HCT 42.8        Recent Labs     02/07/20  2226      K 4.3      CO2 24   *   BUN 16   CREA 0.86   CA 9.0   ALB 3.3*   TBILI 0.9   SGOT 27   ALT 23       24 Hour Results:  Recent Results (from the past 24 hour(s))   GLUCOSE, POC    Collection Time: 02/08/20 11:57 AM   Result Value Ref Range    Glucose (POC) 209 (H) 65 - 100 mg/dL    Performed by Yordan Keller (PCT)    GLUCOSE, POC    Collection Time: 02/08/20  5:59 PM   Result Value Ref Range    Glucose (POC) 192 (H) 65 - 100 mg/dL    Performed by Yordan Keller (PCT)    GLUCOSE, POC    Collection Time: 02/08/20 11:36 PM   Result Value Ref Range    Glucose (POC) 236 (H) 65 - 100 mg/dL    Performed by Clemencia Gutierrez, POC    Collection Time: 02/09/20  5:52 AM   Result Value Ref Range    Glucose (POC) 240 (H) 65 - 100 mg/dL    Performed by Hilario Silva        Medications reviewed  Current Facility-Administered Medications   Medication Dose Route Frequency    sodium chloride (NS) flush 5-40 mL  5-40 mL IntraVENous Q8H  sodium chloride (NS) flush 5-40 mL  5-40 mL IntraVENous PRN    enoxaparin (LOVENOX) injection 40 mg  40 mg SubCUTAneous Q24H    pantoprazole (PROTONIX) injection 40 mg  40 mg IntraVENous DAILY    alum-mag hydroxide-simeth (MYLANTA) oral suspension 30 mL  30 mL Oral Q4H PRN    insulin lispro (HUMALOG) injection   SubCUTAneous Q6H    glucose chewable tablet 16 g  4 Tab Oral PRN    glucagon (GLUCAGEN) injection 1 mg  1 mg IntraMUSCular PRN    dextrose 10% infusion 0-250 mL  0-250 mL IntraVENous PRN    ondansetron (ZOFRAN) injection 4 mg  4 mg IntraVENous Q4H PRN    influenza vaccine 2019-20 (6 mos+)(PF) (FLUARIX/FLULAVAL/FLUZONE QUAD) injection 0.5 mL  0.5 mL IntraMUSCular PRIOR TO DISCHARGE    aspirin delayed-release tablet 81 mg  81 mg Oral DAILY    carvediloL (COREG) tablet 12.5 mg  12.5 mg Oral BID WITH MEALS    sertraline (ZOLOFT) tablet 25 mg  25 mg Oral DAILY    tamsulosin (FLOMAX) capsule 0.4 mg  0.4 mg Oral DAILY    QUEtiapine (SEROquel) tablet 25 mg  25 mg Oral ONCE    nitroglycerin (NITROSTAT) tablet 0.4 mg  0.4 mg SubLINGual Q5MIN PRN     Care Plan discussed with: Patient/nurse  Total time spent with patient and review of records: 30 minutes.   Avi Ramsay MD

## 2020-02-09 NOTE — PROGRESS NOTES
Notified Dr. Juwan Mehta of blood sugar. Orders received to administer 10 units of Humalog. 1930  Bedside and Verbal shift change report given to Sahil Plascencia (oncoming nurse) by Lidia Rivera (offgoing nurse). Report included the following information SBAR, Kardex, ED Summary and Recent Results.

## 2020-02-10 ENCOUNTER — APPOINTMENT (OUTPATIENT)
Dept: NUCLEAR MEDICINE | Age: 80
DRG: 392 | End: 2020-02-10
Attending: STUDENT IN AN ORGANIZED HEALTH CARE EDUCATION/TRAINING PROGRAM
Payer: MEDICARE

## 2020-02-10 ENCOUNTER — HOSPITAL ENCOUNTER (OUTPATIENT)
Dept: NON INVASIVE DIAGNOSTICS | Age: 80
Discharge: HOME | DRG: 392 | End: 2020-02-10
Attending: STUDENT IN AN ORGANIZED HEALTH CARE EDUCATION/TRAINING PROGRAM
Payer: MEDICARE

## 2020-02-10 VITALS
BODY MASS INDEX: 27.36 KG/M2 | HEIGHT: 72 IN | WEIGHT: 202 LBS | SYSTOLIC BLOOD PRESSURE: 126 MMHG | DIASTOLIC BLOOD PRESSURE: 56 MMHG

## 2020-02-10 LAB
ATRIAL RATE: 65 BPM
CALCULATED P AXIS, ECG09: 40 DEGREES
CALCULATED R AXIS, ECG10: -29 DEGREES
DIAGNOSIS, 93000: NORMAL
GLUCOSE BLD STRIP.AUTO-MCNC: 178 MG/DL (ref 65–100)
GLUCOSE BLD STRIP.AUTO-MCNC: 193 MG/DL (ref 65–100)
GLUCOSE BLD STRIP.AUTO-MCNC: 253 MG/DL (ref 65–100)
GLUCOSE BLD STRIP.AUTO-MCNC: 273 MG/DL (ref 65–100)
P-R INTERVAL, ECG05: 134 MS
Q-T INTERVAL, ECG07: 444 MS
QRS DURATION, ECG06: 98 MS
QTC CALCULATION (BEZET), ECG08: 461 MS
SERVICE CMNT-IMP: ABNORMAL
VENTRICULAR RATE, ECG03: 65 BPM

## 2020-02-10 PROCEDURE — 99218 HC RM OBSERVATION: CPT

## 2020-02-10 PROCEDURE — 74011250637 HC RX REV CODE- 250/637: Performed by: FAMILY MEDICINE

## 2020-02-10 PROCEDURE — 97530 THERAPEUTIC ACTIVITIES: CPT

## 2020-02-10 PROCEDURE — 65270000029 HC RM PRIVATE

## 2020-02-10 PROCEDURE — 96376 TX/PRO/DX INJ SAME DRUG ADON: CPT

## 2020-02-10 PROCEDURE — 82962 GLUCOSE BLOOD TEST: CPT

## 2020-02-10 PROCEDURE — 97535 SELF CARE MNGMENT TRAINING: CPT

## 2020-02-10 PROCEDURE — 74011636637 HC RX REV CODE- 636/637: Performed by: INTERNAL MEDICINE

## 2020-02-10 PROCEDURE — C9113 INJ PANTOPRAZOLE SODIUM, VIA: HCPCS | Performed by: INTERNAL MEDICINE

## 2020-02-10 PROCEDURE — 74011250636 HC RX REV CODE- 250/636: Performed by: SPECIALIST

## 2020-02-10 PROCEDURE — A9500 TC99M SESTAMIBI: HCPCS

## 2020-02-10 PROCEDURE — 74011250636 HC RX REV CODE- 250/636: Performed by: INTERNAL MEDICINE

## 2020-02-10 PROCEDURE — 97161 PT EVAL LOW COMPLEX 20 MIN: CPT

## 2020-02-10 PROCEDURE — 97165 OT EVAL LOW COMPLEX 30 MIN: CPT

## 2020-02-10 RX ORDER — ISOSORBIDE MONONITRATE 30 MG/1
30 TABLET, EXTENDED RELEASE ORAL DAILY
Status: DISCONTINUED | OUTPATIENT
Start: 2020-02-11 | End: 2020-02-11 | Stop reason: HOSPADM

## 2020-02-10 RX ADMIN — INSULIN LISPRO 2 UNITS: 100 INJECTION, SOLUTION INTRAVENOUS; SUBCUTANEOUS at 13:01

## 2020-02-10 RX ADMIN — Medication 10 ML: at 14:22

## 2020-02-10 RX ADMIN — INSULIN LISPRO 2 UNITS: 100 INJECTION, SOLUTION INTRAVENOUS; SUBCUTANEOUS at 06:57

## 2020-02-10 RX ADMIN — CARVEDILOL 12.5 MG: 12.5 TABLET, FILM COATED ORAL at 17:38

## 2020-02-10 RX ADMIN — INSULIN LISPRO 5 UNITS: 100 INJECTION, SOLUTION INTRAVENOUS; SUBCUTANEOUS at 17:38

## 2020-02-10 RX ADMIN — REGADENOSON 0.4 MG: 0.08 INJECTION, SOLUTION INTRAVENOUS at 11:15

## 2020-02-10 RX ADMIN — PANTOPRAZOLE SODIUM 40 MG: 40 INJECTION, POWDER, FOR SOLUTION INTRAVENOUS at 08:55

## 2020-02-10 RX ADMIN — Medication 10 ML: at 22:00

## 2020-02-10 NOTE — PROGRESS NOTES
Problem: Self Care Deficits Care Plan (Adult)  Goal: *Acute Goals and Plan of Care (Insert Text)  Description    FUNCTIONAL STATUS PRIOR TO ADMISSION: Patient did not require assistance for basic and instrumental ADLs. HOME SUPPORT: The patient lived with spouse but did not require assist.    Occupational Therapy Goals  Initiated 2/10/2020  1. Patient will perform lower body dressing with supervision/set-up within 7 day(s). 2.  Patient will perform grooming with supervision/set-up within 7 day(s). 3.  Patient will perform bathing with supervision/set-up within 7 day(s). 4.  Patient will perform toilet transfers with supervision/set-up within 7 day(s). 5.  Patient will participate in upper extremity therapeutic exercise/activities with supervision/set-up for 10 minutes within 7 day(s). Outcome: Progressing Towards Goal   OCCUPATIONAL THERAPY EVALUATION  Patient: Jaime Martinez Sr. (81 y.o. male)  Date: 2/10/2020  Primary Diagnosis: Chest pain [R07.9]  Epigastric pain [R10.13]  Epigastric pain [R10.13]        Precautions: fall       ASSESSMENT  Based on the objective data described below, the patient presents with hospital admission secondary to epigastric and abdominal pain, associated with nausea and vomiting. Patient received in bathroom with nursing tech. Patient able to completing toileting with stand by assist and manages standing at sink for hand hygiene with CGA and verbal cues for soap dispenser. Patient using RW for ambulation and reports using rollator at home. Upon completion of activity he returns to bed and declines sitting in chair secondary to \"busy day\". Patient able to return from sitting to supine with SBA. Patient reluctant to follow up therapy services, but reports multiple falls at home. He recently at [email or web address] for therapy following a fall and feels he needs no further therapy. Patient will benefit from continued OT services.     Current Level of Function Impacting Discharge (ADLs/self-care): currently CGA for transfers, and up to min assist for ADLs    Functional Outcome Measure: The patient scored 55/100 on the Barthel Index  outcome measure. Other factors to consider for discharge     Patient will benefit from skilled therapy intervention to address the above noted impairments. PLAN :  Recommendations and Planned Interventions: self care training, functional mobility training, therapeutic exercise, balance training, therapeutic activities, endurance activities, neuromuscular re-education, patient education, home safety training, and family training/education    Frequency/Duration: Patient will be followed by occupational therapy 3 times a week to address goals. Recommendation for discharge: (in order for the patient to meet his/her long term goals)  Occupational therapy at least 2 days/week in the home AND ensure assist and/or supervision for safety with ADLs and transfers     This discharge recommendation:  Has not yet been discussed the attending provider and/or case management    IF patient discharges home will need the following DME: none       SUBJECTIVE:   Patient stated I think I am done with therapy. They cant do it for me, I have to do it myself.     OBJECTIVE DATA SUMMARY:   HISTORY:   Past Medical History:   Diagnosis Date    CAD (coronary artery disease), native coronary artery     Cancer ([facility] [address]     melanoma removed on forehead    Depression     Diabetic neuropathy ([facility] [address]     ED (erectile dysfunction)     Hypertension, benign     Ill-defined condition     states he has memory problems    Postsurgical aortocoronary bypass status     Type II or unspecified type diabetes mellitus without mention of complication, not stated as uncontrolled      Past Surgical History:   Procedure Laterality Date    ECHO 2D ADULT  12/2009    EF 50%, basal inferior akinesis, mild MR    ECHO 2D ADULT  9/21/11    LVEF 50%, inferior AK, unchanged from Dec 2009 HX CORONARY ARTERY BYPASS GRAFT  2009    HX OTHER SURGICAL  2008    melanoma removed from forehead       Expanded or extensive additional review of patient history:     Home Situation  Home Environment: Private residence  # Steps to Enter: 1  One/Two Story Residence: Other (Comment)(quad level)  # of Interior Steps: 7  Interior Rails: Right  Living Alone: No  Support Systems: Spouse/Significant Other/Partner, Child(ana laura)  Patient Expects to be Discharged to[de-identified] Private residence  Current DME Used/Available at Home: Vasiliy Olivier, florentin, [address] [facility] [employer] [facility]  Tub or Shower Type: Shower    Hand dominance: Right    EXAMINATION OF PERFORMANCE DEFICITS:  Cognitive/Behavioral Status:  Neurologic State: Alert  Orientation Level: Oriented to person;Oriented to place;Oriented to time;Disoriented to situation  Cognition: Follows commands  Perception: Appears intact  Perseveration: No perseveration noted  Safety/Judgement: Decreased insight into deficits    Skin: intact     Edema: none noted     Hearing:   Auditory  Auditory Impairment: None    Vision/Perceptual:                                     Range of Motion:  AROM: Within functional limits                         Strength:  Strength: Generally decreased, functional                Coordination:  Coordination: Within functional limits            Tone & Sensation:  Tone: Normal  Sensation: Intact                      Balance:  Sitting: Intact  Standing: With support    Functional Mobility and Transfers for ADLs:  Bed Mobility:  Rolling: Supervision  Sit to Supine: Stand-by assistance  Scooting: Stand-by assistance    Transfers:  Sit to Stand: Stand-by assistance  Stand to Sit: Minimum assistance  Bathroom Mobility: Contact guard assistance  Toilet Transfer : Contact guard assistance  Assistive Device : Walker, rolling    ADL Assessment:  Feeding: Supervision    Oral Facial Hygiene/Grooming: Contact guard assistance(standing at sink)    Bathing: Minimum assistance    Upper Body Dressing: Stand-by assistance    Lower Body Dressing: Minimum assistance    Toileting: Minimum assistance                ADL Intervention and task modifications:                                     Cognitive Retraining  Safety/Judgement: Decreased insight into deficits    Therapeutic Exercise:     Functional Measure:  Barthel Index:    Bathin  Bladder: 10  Bowels: 10  Groomin  Dressin  Feeding: 10  Mobility: 0  Stairs: 0  Toilet Use: 10  Transfer (Bed to Chair and Back): 5  Total: 55/100        The Barthel ADL Index: Guidelines  1. The index should be used as a record of what a patient does, not as a record of what a patient could do. 2. The main aim is to establish degree of independence from any help, physical or verbal, however minor and for whatever reason. 3. The need for supervision renders the patient not independent. 4. A patient's performance should be established using the best available evidence. Asking the patient, friends/relatives and nurses are the usual sources, but direct observation and common sense are also important. However direct testing is not needed. 5. Usually the patient's performance over the preceding 24-48 hours is important, but occasionally longer periods will be relevant. 6. Middle categories imply that the patient supplies over 50 per cent of the effort. 7. Use of aids to be independent is allowed. Link ., Barthel, D.W. (2526). Functional evaluation: the Barthel Index. [address] [facility] (14)2. JEROD Del Cid, Sam Smith., Eliseo Ellis., Ananda Hoyt, [address] [place] [address] (). Measuring the change indisability after inpatient rehabilitation; comparison of the responsiveness of the Barthel Index and Functional [place] Measure. Journal of Neurology, Neurosurgery, and Psychiatry, 66(4), 104-461. Apple Malave, N.J.A, ANDRIY Aquino, & Libra Rodríguez, M.A. (2004.) Assessment of post-stroke quality of life in cost-effectiveness studies:  The usefulness of the Barthel Index and the EuroQoL-5D. Quality of Life Research, 15, 047-88         Occupational Therapy Evaluation Charge Determination   History Examination Decision-Making   LOW Complexity : Brief history review  LOW Complexity : 1-3 performance deficits relating to physical, cognitive , or psychosocial skils that result in activity limitations and / or participation restrictions  LOW Complexity : No comorbidities that affect functional and no verbal or physical assistance needed to complete eval tasks       Based on the above components, the patient evaluation is determined to be of the following complexity level: LOW   Pain Rating:      Activity Tolerance:   Good  Please refer to the flowsheet for vital signs taken during this treatment. After treatment patient left in no apparent distress:    Supine in bed, Call bell within reach, Bed / chair alarm activated, and Side rails x 3    COMMUNICATION/EDUCATION:   The patients plan of care was discussed with: Physical Therapist and Registered Nurse. Home safety education was provided and the patient/caregiver indicated understanding., Patient/family have participated as able in goal setting and plan of care. , and Patient/family agree to work toward stated goals and plan of care. This patients plan of care is appropriate for delegation to [facility].     Thank you for this referral.  Christianne Christina OTR/L  Time Calculation: 23 mins

## 2020-02-10 NOTE — PROGRESS NOTES
Cardiology Progress Note         NAME:  Yajaira Trevino Sr.   :   1940   MRN:   [id number]     Assessment/Plan:   Epigastric pain- difficult to ascertain if this is gastric or cardiac in nature. No evidence of significant ongoing ischemia, but symptoms apparently relieved with morphine  Hx of inferior mi, cad  T2DM  HTN     - Trop negative. Non-ischemic ecg. Normotensive  - recommend ischemic evaluation with Lexiscan cardiolite; scheduled today     - continue home CV regimen: on asa and coreg.    - intolerant to statins 2/2 myalgias; will check updated lipids         Subjective:     Yajaira Trevino Sr. is a 70 y.o. male with hx of CAD, HTN, T2DM presented to ED with epigastric pain, nausea and vomitting. Patient is noted to be a poor historian. Pt is out of the room for stress testing; will follow-up this afternoon. .      Previous Cardiac Eval:   EKG: NSR, inferior infarct, no significant ST ischemic changes     Echo 2d adult 2009  EF 50%, basal inferior akinesis, mild MR  Echo 11 - LVEF 50%, inferior AK, unchanged from Dec 2009    Stress echo 2012 - 3 min, resting EF 50%, inferior AK, no ischemia    Echo 10/29/13 - EF 55 % to 60 %. There was akinesis of the basal-mid inferior wall(s). There was akinesis of the basal inferolateral wall(s). Paradoxical ventricular septal motion, no PFO, no significant Change from previous study. Echo 17 - EF 55-60%. Inferior AK. Mild LVH. Mild MAC with no MR. AoV sclerosis without stenosis. No change compared to study . Objective:     Visit Vitals  /65 (BP 1 Location: Right arm, BP Patient Position: At rest)   Pulse 74   Temp 98.3 °F (36.8 °C)   Resp 16   Ht 6' (1.829 m)   Wt 91.6 kg (202 lb)   SpO2 93%   BMI 27.40 kg/m²      O2 Device: Room air    Temp (24hrs), Av.1 °F (36.7 °C), Min:97.7 °F (36.5 °C), Max:98.4 °F (36.9 °C)    No intake/output data recorded.      1901 - 02/10 0700  In: -   Out: 550 [Urine:550]     TELE: not on telemetry    PE - pending; will f/u post stress testing    Additional comments: None    Care Plan discussed with:    Comments   Patient     Family      RN x    Care Manager                    Consultant:          Data Review:     No lab exists for component: ITNL   Recent Labs     02/08/20  0248 02/07/20  2226   TROIQ <0.05 <0.05     Recent Labs     02/07/20  2226      K 4.3      CO2 24   BUN 16   CREA 0.86   *   ALB 3.3*   WBC 10.9   HGB 14.4   HCT 42.8        No results found for: CHOL, CHOLPOCT, CHOLX, CHLST, CHOLV, HDL, HDLPOC, HDLP, LDL, LDLCPOC, LDLC, DLDLP, VLDLC, VLDL, TGLX, TRIGL, TRIGP, TGLPOCT, CHHD, CHHDX  Lab Results   Component Value Date/Time    Hemoglobin A1c 6.8 (H) 12/01/2019 03:03 AM    Hemoglobin A1c, External 6.9 10/28/2015     Lab Results   Component Value Date/Time    TSH 0.84 11/27/2019 10:58 AM     No results for input(s): INR, PTP, APTT, INREXT in the last 72 hours.     Medications reviewed  Current Facility-Administered Medications   Medication Dose Route Frequency    sodium chloride (NS) flush 5-40 mL  5-40 mL IntraVENous Q8H    sodium chloride (NS) flush 5-40 mL  5-40 mL IntraVENous PRN    enoxaparin (LOVENOX) injection 40 mg  40 mg SubCUTAneous Q24H    pantoprazole (PROTONIX) injection 40 mg  40 mg IntraVENous DAILY    alum-mag hydroxide-simeth (MYLANTA) oral suspension 30 mL  30 mL Oral Q4H PRN    insulin lispro (HUMALOG) injection   SubCUTAneous Q6H    glucose chewable tablet 16 g  4 Tab Oral PRN    glucagon (GLUCAGEN) injection 1 mg  1 mg IntraMUSCular PRN    dextrose 10% infusion 0-250 mL  0-250 mL IntraVENous PRN    ondansetron (ZOFRAN) injection 4 mg  4 mg IntraVENous Q4H PRN    influenza vaccine 2019-20 (6 mos+)(PF) (FLUARIX/FLULAVAL/FLUZONE QUAD) injection 0.5 mL  0.5 mL IntraMUSCular PRIOR TO DISCHARGE    aspirin delayed-release tablet 81 mg  81 mg Oral DAILY    carvediloL (COREG) tablet 12.5 mg  12.5 mg Oral BID WITH MEALS    sertraline (ZOLOFT) tablet 25 mg  25 mg Oral DAILY    tamsulosin (FLOMAX) capsule 0.4 mg  0.4 mg Oral DAILY    nitroglycerin (NITROSTAT) tablet 0.4 mg  0.4 mg SubLINGual Q5MIN PRN         nIga Gill NP

## 2020-02-10 NOTE — PROGRESS NOTES
Addendum to progress note 2/10/20:     Pt seen s/p Chio stress. No abd pain noted today. Feels overall weak. Doesn't want to work with PT. Patient denies any chest pain, palpitations, syncope, orthopnea, edema, or paroxysmal nocturnal dyspnea. Easily winded with activity. Had SOB with Chio medication; now resolved. General - well developed well nourished  Neck - JVP normal, neck supple  Cardiac - normal S1, S2, RRR, no murmurs, rubs or gallops.  No clicks  Vascular - radials and pedal pulses equal bilateral  Lungs - clear to auscultation bilaterals, no rales, wheezing or rhonchi  Abd - soft nontender, non-distended, +BS  Extremities - no edema, warm, well perfused  Neuro - nonfocal  Psych - normal mood and affect    Tomie Call, ANP

## 2020-02-10 NOTE — PROGRESS NOTES
Daily Progress Note: 2/10/2020  Rafael George MD    Assessment/Plan:   Epigastric abdominal pain (2/8/2020)/nausea/vomiting.     - Protonix IV switched to po    - Clear liquid diet for now   - consulted GI.     CAD (coronary artery disease), native coronary artery/history of CABG. - Continue aspirin  - Consulted Card - poss Stress test     Type 2 diabetes mellitus without complication ([facility] [address] - Diabetic diet when he eats. - For now sliding scale insulin     Hypertension, benign.    - Continue losartan and Coreg    Dyslipidemia (10/3/2012). Depression (2/7/2020). - Not on medication    Dementia  - monitor and treat as needed  - consult Neuro       Problem List:  Problem List as of 2/10/2020 Date Reviewed: 10/14/2019          Codes Class Noted - Resolved    * (Principal) Epigastric abdominal pain ICD-10-CM: R10.13  ICD-9-CM: 789.06  2/8/2020 - Present        Epigastric pain ICD-10-CM: R10.13  ICD-9-CM: 789.06  2/8/2020 - Present        Depression ICD-10-CM: F32.9  ICD-9-CM: 716  2/7/2020 - Present        Fall ICD-10-CM: W19. Wendy Schneidercock  ICD-9-CM: G850.3  11/29/2019 - Present        Traumatic rhabdomyolysis ([facility] [address] ICD-10-CM: T79. 6XXA  ICD-9-CM: 958.6  11/29/2019 - Present        Dyslipidemia ICD-10-CM: E78.5  ICD-9-CM: 272.4  10/3/2012 - Present        CAD (coronary artery disease), native coronary artery ICD-10-CM: I25.10  ICD-9-CM: 414.01  Unknown - Present    Overview Signed 3/22/2011  3:04 PM by Ramses Engle MD     Inferior MI July 2009  - PCI RCA with PTCA, significant LAD disease  - suspected reocclusion several hrs later with VF, IABP placed  3v CABG July 2009 Elissa Perez @ [facility])  - LIMA-LAD, VG-OM, VG-PDA             Type 2 diabetes mellitus without complication ([facility] [address] BVX-73-UA: E11.9  ICD-9-CM: 250.00  Unknown - Present        Hypertension, benign ICD-10-CM: I10  ICD-9-CM: 401.1  Unknown - Present        RESOLVED: Chest pain ICD-10-CM: R07.9  ICD-9-CM: 786.50  2/7/2020 - 2020        RESOLVED: PND (paroxysmal nocturnal dyspnea) ICD-10-CM: R06.00  ICD-9-CM: 786.09  2017 - 2018        RESOLVED: Near syncope ICD-10-CM: R55  ICD-9-CM: 780.2  10/29/2013 - 4/15/2014            HPI:   Mr. Michael Patterson is a 70 y.o.  male who is admitted with epigastric pain. Mr. Michael Patterson with past medical history of CAD, depression, diabetes mellitus, hypertension presented to ER complaining of sudden onset of epigastric abdominal pain associated with nausea and vomiting. Patient described the pain as dull, constant, moderate intensity. No radiation. Has been having nausea and vomited few times. Denies chest pain. (Dr Jennifer Villafuerte)    : Still with chest discomfort and some nausea. No radiation of pain. GI consult pending and will ask Cardiology to see. :  Epigastric discomfort continues. No appetite. Very poor historian. Cardiology has cleared for EGD if GI still wants to do it. Stable for stress per Cards. He does not know day/date/location. He thinks he is at the [facility]. 2/10:  No further epigastric or chest discomfort. Nurses report he was more cooperative and oriented over the last 24 hrs and did not refuse meds as previously. He knows he is in [address] [place] [address] this AM.  His insight is a little better at this moment. Stress test and EGD pending. Review of Systems:   A comprehensive review of systems was negative except for that written in the HPI. Objective:   Physical Exam:   Visit Vitals  /70 (BP 1 Location: Right arm, BP Patient Position: At rest)   Pulse 75   Temp 98.1 °F (36.7 °C)   Resp 16   Ht 6' (1.829 m)   Wt 102.1 kg (225 lb)   SpO2 96%   BMI 30.52 kg/m²      O2 Device: Room air  Temp (24hrs), Av.1 °F (36.7 °C), Min:97.7 °F (36.5 °C), Max:98.4 °F (36.9 °C)    1901 - 02/10 0700  In: -   Out: 550 [Urine:550]   02/08 07  In: -   Out: 400 [Urine:400]  General:  Alert, cooperative, no distress, appears stated age.    Head: Normocephalic, without obvious abnormality, atraumatic. Eyes:  Conjunctivae/corneas clear. EOMs intact. Throat: Lips, mucosa, and tongue normal. Teeth and gums normal.   Neck: Supple, symmetrical, trachea midline, no adenopathy, thyroid: no enlargement/tenderness/nodules, no carotid bruit and no JVD. Back:   Symmetric, no curvature. ROM normal. No CVA tenderness. Lungs:   Clear to auscultation bilaterally. Chest wall:  No tenderness or deformity. Heart:  Regular rate and rhythm, S1, S2 normal, no murmur, click, rub or gallop. Abdomen:   Soft, non-tender. Bowel sounds normal. No masses,  No organomegaly. Extremities: Extremities normal, atraumatic, no cyanosis or edema. No calf tenderness or cords. Pulses: 2+ and symmetric all extremities. Skin:  turgor normal. No rashes    Neurologic:  Alert and oriented X 2-3.   better short term memory this AM.  Insight is poor.  equal.  No cogwheeling or rigidity. Gait not tested at this time. Sensation grossly normal to touch.   Gross motor of extremities normal.       Data Review:       Recent Days:  Recent Labs     02/07/20  2226   WBC 10.9   HGB 14.4   HCT 42.8        Recent Labs     02/07/20  2226      K 4.3      CO2 24   *   BUN 16   CREA 0.86   CA 9.0   ALB 3.3*   TBILI 0.9   SGOT 27   ALT 23       24 Hour Results:  Recent Results (from the past 24 hour(s))   GLUCOSE, POC    Collection Time: 02/09/20 12:26 PM   Result Value Ref Range    Glucose (POC) 393 (H) 65 - 100 mg/dL    Performed by Dino Mathew  (PCT)    GLUCOSE, POC    Collection Time: 02/09/20  5:53 PM   Result Value Ref Range    Glucose (POC) 159 (H) 65 - 100 mg/dL    Performed by Dino Mathew  (PCT)    GLUCOSE, POC    Collection Time: 02/09/20 11:56 PM   Result Value Ref Range    Glucose (POC) 189 (H) 65 - 100 mg/dL    Performed by Yazan Chahal (PCT)        Medications reviewed  Current Facility-Administered Medications   Medication Dose Route Frequency    sodium chloride (NS) flush 5-40 mL  5-40 mL IntraVENous Q8H    sodium chloride (NS) flush 5-40 mL  5-40 mL IntraVENous PRN    enoxaparin (LOVENOX) injection 40 mg  40 mg SubCUTAneous Q24H    pantoprazole (PROTONIX) injection 40 mg  40 mg IntraVENous DAILY    alum-mag hydroxide-simeth (MYLANTA) oral suspension 30 mL  30 mL Oral Q4H PRN    insulin lispro (HUMALOG) injection   SubCUTAneous Q6H    glucose chewable tablet 16 g  4 Tab Oral PRN    glucagon (GLUCAGEN) injection 1 mg  1 mg IntraMUSCular PRN    dextrose 10% infusion 0-250 mL  0-250 mL IntraVENous PRN    ondansetron (ZOFRAN) injection 4 mg  4 mg IntraVENous Q4H PRN    influenza vaccine 2019-20 (6 mos+)(PF) (FLUARIX/FLULAVAL/FLUZONE QUAD) injection 0.5 mL  0.5 mL IntraMUSCular PRIOR TO DISCHARGE    aspirin delayed-release tablet 81 mg  81 mg Oral DAILY    carvediloL (COREG) tablet 12.5 mg  12.5 mg Oral BID WITH MEALS    sertraline (ZOLOFT) tablet 25 mg  25 mg Oral DAILY    tamsulosin (FLOMAX) capsule 0.4 mg  0.4 mg Oral DAILY    nitroglycerin (NITROSTAT) tablet 0.4 mg  0.4 mg SubLINGual Q5MIN PRN     Care Plan discussed with: Patient/nurse  Total time spent with patient and review of records: 30 minutes.   Jessie Del Cid MD

## 2020-02-10 NOTE — PHYSICIAN ADVISORY
Letter of Status Determination:  
Recommend hospitalization status upgraded from OBSERVATION  to INPATIENT  Status Pt Name:  Devon Mendoza  
MR#  
[facility] # [id number] / 
[id number] Payor: Cholo Solders / Plan: [address] / Product Type: Medicare /   
TORSTEN#  [id number] Room and Hospital  523/01  @ [facility] Hospitalization date  2/7/2020  9:51 PM  
Current Attending Physician  Annel Benjamin MD  
Principal diagnosis  Chest pain [R07.9] Epigastric pain [R10.13] Clinicals  70 y.o. y.o  male hospitalized with above diagnosis This pt suffers from known CAD, DM2, HTN, hyperlipidemia etc.  
It is now noted that the pt is scheduled for stress test for evaluation of chest pain/epigastric pain, while plan for endoscopy is post-poned at this time. Due to appropriate and necessary medical care, this pt's hospitalization has now exceeded two midnights . [facility] criteria Does  NOT apply STATUS DETERMINATION  This patient is at above high risk of deterioration based on documented presenting clinical data, comorbid conditions, high risk of adverse events and current acute care course. Mr. Devon Mendoza. now meets Inpatient Admission status criteria in accordance with CMS regulation Section 43 .3. Specifically, due to medical necessity the patient's stay now exceeds Two Midnights. It is our recommendation that this patient's hospitalization status should be upgraded from  OBSERVATION to INPATIENT status. The final decision of the patient's hospitalization status depends on the attending physician's judgment Additional comments Payor: VA MEDICARE / Plan: [address] / Product Type: Medicare / The information in this document is a recommendation to be used for utilization review and utilization management purposes only. This recommendation is not an order. The recommendation is made based on the information reviewed at the time of the referral, is pursuant to the [place] [facility] Conditions of Participation (42 CFR Part 482), and is neither a judgment nor an assessment with regard to the appropriateness or quality of clinical care. For all Managed Care patients: The Criteria are intended solely for use as screening guidelines with respect to the medical appropriateness of healthcare services and not for final clinical or payment determinations concerning the type or level of medical care provided, or proposed to be provided, to a patient. They help the reviewers determine whether a patient is appropriate for observation or inpatient admission at the time a decision to admit the patient is being made. All efforts are made to apply the pertinent payor criteria ([employer] or [employer]) as well as the clinical judgements based on the information reviewed at the time of the referral.\" Nothing in this document may be used to limit, alter, or affect clinical services provided to the patient named below. Sherri Romero MD MPH FACP Cell: [phone] Physician Advisor [address]  
   
Cell  [phone]   
 
 
[phone] Kelvin Goff

## 2020-02-10 NOTE — PROGRESS NOTES
2/10/20   CARE MANAGEMENT NOTE:  Reason for Admission:   Chest pain                   RUR Score: 12%; Low/green                    Plan for utilizing home health:    No                      Current Advanced Directive/Advance Care Plan: Full Code    2/10/2020   CARE MANAGEMENT NOTE:  CM is following pt for initial discharge planning. EMR reviewed. CM met with pt's wife to obtain hx as pt was off the floor for testing/procedure. Reportedly, pt resides with his wife, Jose Laureano ([phone]) in a two story home with bedroom on the second floor. There are 3 entry steps to the landing and they 4 additional steps to the bedroom. PTA, pt was ambulatory with a rolling walker indoors and indepn with ADLs, and he doesn't drive. He has been to "[facility] in the past. Pt does not have home healthcare currently. DME in the home includes a rolling walker, and shower chair. He obtains medications from [place] [id number]. PCP is Dr. Juliet Morrell. Transition of Care Plan:    1. Plan is to return home without any anticipated post discharge needs at this writing. 2.  Outpt f/u  3. Wife will provide transportation home. CM will continue to follow pt until discharged.   Antonette

## 2020-02-10 NOTE — PROGRESS NOTES
Problem: Mobility Impaired (Adult and Pediatric)  Goal: *Acute Goals and Plan of Care (Insert Text)  Description  FUNCTIONAL STATUS PRIOR TO ADMISSION: Patient was modified independent using a rollator for functional mobility. HOME SUPPORT PRIOR TO ADMISSION: The patient lived with wife and disabled son. Pt reports multiple falls at home. Was recently at the [facility] and does not like therapy. Physical Therapy Goals  Initiated 2/10/2020  1. Patient will move from supine to sit and sit to supine  in bed with independence within 7 day(s). 2.  Patient will transfer from bed to chair and chair to bed with modified independence using the least restrictive device within 7 day(s). 3.  Patient will perform sit to stand with modified independence within 7 day(s). 4.  Patient will ambulate with modified independence for 150 feet with the least restrictive device within 7 day(s). 5.  Patient will ascend/descend 7 stairs with 1 handrail(s) with supervision/set-up within 7 day(s). Outcome: Progressing Towards Goal   PHYSICAL THERAPY EVALUATION  Patient: Suha Medeiros SrJanet (81 y.o. male)  Date: 2/10/2020  Primary Diagnosis: Chest pain [R07.9]  Epigastric pain [R10.13]  Epigastric pain [R10.13]        Precautions: Fall         ASSESSMENT  Based on the objective data described below, the patient presents with hospital admission secondary to epigastric and abdominal pain, associated with nausea and vomiting. Patient received in bathroom with nursing tech. Pt at a SBA for toileting. Pt using RW for ambulation and reports using rollator at home. Pt ambulated a short distance within the room with SBA and close follow for safety with RW management and occasional lateral leaning. Patient reluctant to follow up therapy services, but reports multiple falls at home with legs giving out.   He was recently at [email or web address] for therapy following a fall and does not want to return to anything to do with therapy. Would benefit from HHPT/OT follow up for safety. Current Level of Function Impacting Discharge (mobility/balance): SBA for safety. PT declined ambulation out of room    Functional Outcome Measure: The patient scored 55/100 on the Barthel outcome measure. Other factors to consider for discharge: dementia, safety awareness, insight into deficits, 10 falls reported     Patient will benefit from skilled therapy intervention to address the above noted impairments. PLAN :  Recommendations and Planned Interventions: bed mobility training, transfer training, gait training, therapeutic exercises, neuromuscular re-education, patient and family training/education, and therapeutic activities      Frequency/Duration: Patient will be followed by physical therapy:  5 times a week to address goals. Recommendation for discharge: (in order for the patient to meet his/her long term goals)  Physical therapy at least 2 days/week in the home AND ensure assist and/or supervision for safety with mobility and ADLs     This discharge recommendation:  Has been made in collaboration with the attending provider and/or case management    IF patient discharges home will need the following DME: patient owns DME required for discharge         SUBJECTIVE:   Patient stated Amish Holland is a bad word.     OBJECTIVE DATA SUMMARY:   HISTORY:    Past Medical History:   Diagnosis Date    CAD (coronary artery disease), native coronary artery     Cancer ([address]     melanoma removed on forehead    Depression     Diabetic neuropathy ([facility]     ED (erectile dysfunction)     Hypertension, benign     Ill-defined condition     states he has memory problems    Postsurgical aortocoronary bypass status     Type II or unspecified type diabetes mellitus without mention of complication, not stated as uncontrolled      Past Surgical History:   Procedure Laterality Date    ECHO 2D ADULT  12/2009    EF 50%, basal inferior akinesis, mild MR    ECHO 2D ADULT  9/21/11    LVEF 50%, inferior AK, unchanged from Dec 2009    HX CORONARY ARTERY BYPASS GRAFT  2009    HX OTHER SURGICAL  2008    melanoma removed from forehead       Personal factors and/or comorbidities impacting plan of care: CAD, cancer, depression, falls    Home Situation  Home Environment: Private residence  # Steps to Enter: 1  One/Two Story Residence: Other (Comment)(quad level)  # of Interior Steps: 7  Interior Rails: Right  Living Alone: No  Support Systems: Spouse/Significant Other/Partner, Child(ana laura)  Patient Expects to be Discharged to[de-identified] Private residence  Current DME Used/Available at Home: Braden Canavan, rollator, [address] chair  Tub or Shower Type: Shower    EXAMINATION/PRESENTATION/DECISION MAKING:   Critical Behavior:  Neurologic State: Alert  Orientation Level: Oriented to person, Oriented to place, Oriented to time, Disoriented to situation  Cognition: Follows commands  Safety/Judgement: Decreased insight into deficits  Hearing: Auditory  Auditory Impairment: None  Skin:    Edema:   Range Of Motion:  AROM: Within functional limits                       Strength:    Strength: Generally decreased, functional                    Tone & Sensation:   Tone: Normal              Sensation: Intact               Coordination:  Coordination: Within functional limits  Vision:      Functional Mobility:  Bed Mobility:  Rolling: Supervision     Sit to Supine: Stand-by assistance  Scooting: Stand-by assistance  Transfers:  Sit to Stand: Stand-by assistance  Stand to Sit: Minimum assistance                       Balance:   Sitting: Intact  Standing: Impaired; Without support  Standing - Static: Good;Constant support  Standing - Dynamic : Fair  Ambulation/Gait Training:  Distance (ft): 20 Feet (ft)  Assistive Device: Walker, rolling;Gait belt  Ambulation - Level of Assistance: Contact guard assistance        Gait Abnormalities: Decreased step clearance;Shuffling gait; Step to gait        Base of Support: Widened Speed/Brooke: Pace decreased (<100 feet/min); Shuffled  Step Length: Right shortened;Left shortened                    Functional Measure:  Barthel Index:    Bathin  Bladder: 10  Bowels: 10  Groomin  Dressin  Feeding: 10  Mobility: 0  Stairs: 0  Toilet Use: 10  Transfer (Bed to Chair and Back): 5  Total: 55/100       The Barthel ADL Index: Guidelines  1. The index should be used as a record of what a patient does, not as a record of what a patient could do. 2. The main aim is to establish degree of independence from any help, physical or verbal, however minor and for whatever reason. 3. The need for supervision renders the patient not independent. 4. A patient's performance should be established using the best available evidence. Asking the patient, friends/relatives and nurses are the usual sources, but direct observation and common sense are also important. However direct testing is not needed. 5. Usually the patient's performance over the preceding 24-48 hours is important, but occasionally longer periods will be relevant. 6. Middle categories imply that the patient supplies over 50 per cent of the effort. 7. Use of aids to be independent is allowed. Mona Hair., Barthel, D.W. ([id number]). Functional evaluation: the Barthel Index. [address] [facility] (14)2. JEROD White, Edgar Florence., Simon Ocampo., [place], [address] [place] [address] (). Measuring the change indisability after inpatient rehabilitation; comparison of the responsiveness of the Barthel Index and Functional [place] Measure. Journal of Neurology, Neurosurgery, and Psychiatry, 66(4), 004-621. Jurgen Gibbs N.URSZULA.A, ANDRIY Aquino, & Pinky Benitez MJanetA. (2004.) Assessment of post-stroke quality of life in cost-effectiveness studies: The usefulness of the Barthel Index and the EuroQoL-5D.  Quality of Life Research, 15, 189-37            Physical Therapy Evaluation Charge Determination   History Examination Presentation Decision-Making MEDIUM  Complexity : 1-2 comorbidities / personal factors will impact the outcome/ POC  MEDIUM Complexity : 3 Standardized tests and measures addressing body structure, function, activity limitation and / or participation in recreation  LOW Complexity : Stable, uncomplicated  Other outcome measures Barthel  LOW       Based on the above components, the patient evaluation is determined to be of the following complexity level: LOW       Activity Tolerance:   Fair and requires rest breaks  Please refer to the flowsheet for vital signs taken during this treatment. After treatment patient left in no apparent distress:   Supine in bed, Call bell within reach, Bed / chair alarm activated, and Side rails x 3    COMMUNICATION/EDUCATION:   The patients plan of care was discussed with: Registered Nurse. Fall prevention education was provided and the patient/caregiver indicated understanding., Patient/family have participated as able in goal setting and plan of care. , and Patient/family agree to work toward stated goals and plan of care.     Thank you for this referral.  Coty Ritchie, PT   Time Calculation: 23 mins

## 2020-02-11 VITALS
BODY MASS INDEX: 27.36 KG/M2 | SYSTOLIC BLOOD PRESSURE: 138 MMHG | HEIGHT: 72 IN | OXYGEN SATURATION: 95 % | TEMPERATURE: 98.1 F | WEIGHT: 202 LBS | HEART RATE: 68 BPM | DIASTOLIC BLOOD PRESSURE: 71 MMHG | RESPIRATION RATE: 18 BRPM

## 2020-02-11 LAB
CHOLEST SERPL-MCNC: 174 MG/DL
GLUCOSE BLD STRIP.AUTO-MCNC: 182 MG/DL (ref 65–100)
GLUCOSE BLD STRIP.AUTO-MCNC: 256 MG/DL (ref 65–100)
HDLC SERPL-MCNC: 40 MG/DL
HDLC SERPL: 4.4 {RATIO} (ref 0–5)
LDLC SERPL CALC-MCNC: 110.2 MG/DL (ref 0–100)
LIPID PROFILE,FLP: ABNORMAL
SERVICE CMNT-IMP: ABNORMAL
SERVICE CMNT-IMP: ABNORMAL
TRIGL SERPL-MCNC: 119 MG/DL (ref ?–150)
VLDLC SERPL CALC-MCNC: 23.8 MG/DL

## 2020-02-11 PROCEDURE — 74011250636 HC RX REV CODE- 250/636: Performed by: INTERNAL MEDICINE

## 2020-02-11 PROCEDURE — 74011636637 HC RX REV CODE- 636/637: Performed by: INTERNAL MEDICINE

## 2020-02-11 PROCEDURE — 74011250637 HC RX REV CODE- 250/637: Performed by: FAMILY MEDICINE

## 2020-02-11 PROCEDURE — 80061 LIPID PANEL: CPT

## 2020-02-11 PROCEDURE — 36415 COLL VENOUS BLD VENIPUNCTURE: CPT

## 2020-02-11 PROCEDURE — C9113 INJ PANTOPRAZOLE SODIUM, VIA: HCPCS | Performed by: INTERNAL MEDICINE

## 2020-02-11 PROCEDURE — 74011250637 HC RX REV CODE- 250/637: Performed by: STUDENT IN AN ORGANIZED HEALTH CARE EDUCATION/TRAINING PROGRAM

## 2020-02-11 PROCEDURE — 82962 GLUCOSE BLOOD TEST: CPT

## 2020-02-11 RX ORDER — PANTOPRAZOLE SODIUM 40 MG/1
40 TABLET, DELAYED RELEASE ORAL DAILY
Qty: 30 TAB | Refills: 0 | Status: SHIPPED | OUTPATIENT
Start: 2020-02-11 | End: 2020-09-28 | Stop reason: ALTCHOICE

## 2020-02-11 RX ORDER — ISOSORBIDE MONONITRATE 30 MG/1
30 TABLET, EXTENDED RELEASE ORAL DAILY
Qty: 30 TAB | Refills: 0 | Status: SHIPPED | OUTPATIENT
Start: 2020-02-12 | End: 2020-09-28 | Stop reason: ALTCHOICE

## 2020-02-11 RX ADMIN — INSULIN LISPRO 2 UNITS: 100 INJECTION, SOLUTION INTRAVENOUS; SUBCUTANEOUS at 06:00

## 2020-02-11 RX ADMIN — INSULIN LISPRO 5 UNITS: 100 INJECTION, SOLUTION INTRAVENOUS; SUBCUTANEOUS at 12:48

## 2020-02-11 RX ADMIN — TAMSULOSIN HYDROCHLORIDE 0.4 MG: 0.4 CAPSULE ORAL at 08:14

## 2020-02-11 RX ADMIN — SERTRALINE 25 MG: 50 TABLET, FILM COATED ORAL at 08:13

## 2020-02-11 RX ADMIN — INSULIN LISPRO 5 UNITS: 100 INJECTION, SOLUTION INTRAVENOUS; SUBCUTANEOUS at 00:15

## 2020-02-11 RX ADMIN — CARVEDILOL 12.5 MG: 12.5 TABLET, FILM COATED ORAL at 08:14

## 2020-02-11 RX ADMIN — ISOSORBIDE MONONITRATE 30 MG: 30 TABLET, EXTENDED RELEASE ORAL at 08:14

## 2020-02-11 RX ADMIN — Medication 10 ML: at 06:00

## 2020-02-11 RX ADMIN — ASPIRIN 81 MG: 81 TABLET, COATED ORAL at 08:13

## 2020-02-11 NOTE — ROUTINE PROCESS
Bedside shift change report given to JES Urena (oncoming nurse) by [address], RN (offgoing nurse). Report included the following information SBAR, Kardex and MAR.

## 2020-02-11 NOTE — PROGRESS NOTES
Spiritual Care Partner Volunteer visited patient on the [facility] Surgical  unit on 2/10/20. Charted 2/11/20  Documented by:  Yossi Antoine. Nilesh Cadena.      Paging Service: Julia-NACHO ([phone])

## 2020-02-11 NOTE — PROGRESS NOTES
Daily Progress Note and Discharge Note: 2/11/2020  Susan Scott MD    Assessment/Plan:   Epigastric abdominal pain (2/8/2020)/nausea/vomiting.     - Protonix IV switched to po and symptoms resolved  - diabetic diet  - consulted GI - cont PPI and outpt follow up     CAD (coronary artery disease), native coronary artery/history of CABG. - Continue aspirin  - Consulted Card - Stress test with fixed defect - follow up with Cards as per his usual     Type 2 diabetes mellitus without complication ([facility] - Diabetic diet and resume home treatment     Hypertension, benign.    - Continue losartan and Coreg    Dyslipidemia (10/3/2012). Depression (2/7/2020). - Not on medication    Dementia  - monitor and treat as needed - may need trial of Namenda  - consulted Neuro and follow up with them outpt as they suggest - pts wife reports he has appt. Problem List:  Problem List as of 2/11/2020 Date Reviewed: 10/14/2019          Codes Class Noted - Resolved    * (Principal) Epigastric abdominal pain ICD-10-CM: R10.13  ICD-9-CM: 789.06  2/8/2020 - Present        Epigastric pain ICD-10-CM: R10.13  ICD-9-CM: 789.06  2/8/2020 - Present        Depression ICD-10-CM: F32.9  ICD-9-CM: 084  2/7/2020 - Present        Fall ICD-10-CM: W19. Fritzi Deonna  ICD-9-CM: P422.0  11/29/2019 - Present        Traumatic rhabdomyolysis ([facility] ICD-10-CM: T79. 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  - PCI RCA with PTCA, significant LAD disease  - suspected reocclusion several hrs later with VF, IABP placed  3v CABG July 2009 Chelsi Eason @ [facility])  - LIMA-LAD, VG-OM, VG-PDA             Type 2 diabetes mellitus without complication ([facility] QBI-94-SF: E11.9  ICD-9-CM: 250.00  Unknown - Present        Hypertension, benign ICD-10-CM: I10  ICD-9-CM: 401.1  Unknown - Present        RESOLVED: Chest pain ICD-10-CM: R07.9  ICD-9-CM: 786.50  2/7/2020 - 2/8/2020        RESOLVED: PND (paroxysmal nocturnal dyspnea) ICD-10-CM: R06.00  ICD-9-CM: 786.09  8/13/2017 - 8/11/2018        RESOLVED: Near syncope ICD-10-CM: R55  ICD-9-CM: 780.2  10/29/2013 - 4/15/2014            HPI:   Mr. Parker Hamilton is a 70 y.o.  male who is admitted with epigastric pain. Mr. Parker Hamilton with past medical history of CAD, depression, diabetes mellitus, hypertension presented to ER complaining of sudden onset of epigastric abdominal pain associated with nausea and vomiting. Patient described the pain as dull, constant, moderate intensity. No radiation. Has been having nausea and vomited few times. Denies chest pain. (Dr Ethan Raya)    2/8: Still with chest discomfort and some nausea. No radiation of pain. GI consult pending and will ask Cardiology to see. 2/9:  Epigastric discomfort continues. No appetite. Very poor historian. Cardiology has cleared for EGD if GI still wants to do it. Stable for stress per Cards. He does not know day/date/location. He thinks he is at the [facility]. 2/10:  No further epigastric or chest discomfort. Nurses report he was more cooperative and oriented over the last 24 hrs and did not refuse meds as previously. He knows he is in [facility] this AM.  His insight is a little better at this moment. Stress test and EGD pending. 2/11:  No ab pain today. He reports he is not going to have any more tests. Much more irritable today - refusing blood glucose checks and labs per nurses. Reports \"don't want any [place] in here. \" Insight worse this AM.  Stress test neg with fixed defect only. Not working much with PT/OT - often declines. 1130am:  Declines further work up or testing. Will discharge home with Everett Raines.   Advised follow up with GI if needed for ab pain/further w/u and with Neuro for dementia work up. Discussed with pts wife. Follow up with PCP later this wk. Review of Systems:   A comprehensive review of systems was negative except for that written in the HPI. Objective:   Physical Exam:   Visit Vitals  /60 (BP 1 Location: Right arm, BP Patient Position: At rest)   Pulse 68   Temp 98.6 °F (37 °C)   Resp 18   Ht 6' (1.829 m)   Wt 91.6 kg (202 lb)   SpO2 94%   BMI 27.40 kg/m²      O2 Device: Room air  Temp (24hrs), Av.3 °F (36.8 °C), Min:97.7 °F (36.5 °C), Max:98.6 °F (37 °C)    02/10 1901 -  07  In: -   Out: 100 [Urine:100]   701 - 02/10 1900  In: -   Out: 550 [Urine:550]  General:  Alert, cooperative, no distress, appears stated age. Head:  Normocephalic, without obvious abnormality, atraumatic. Eyes:  Conjunctivae/corneas clear. EOMs intact. Throat: Lips, mucosa, and tongue normal. Teeth and gums normal.   Neck: Supple, symmetrical, trachea midline, no adenopathy, thyroid: no enlargement/tenderness/nodules, no carotid bruit and no JVD. Back:   Symmetric, no curvature. ROM normal. No CVA tenderness. Lungs:   Clear to auscultation bilaterally. Chest wall:  No tenderness or deformity. Heart:  Regular rate and rhythm, S1, S2 normal, no murmur, click, rub or gallop. Abdomen:   Soft, non-tender. Bowel sounds normal. No masses,  No organomegaly. Extremities: Extremities normal, atraumatic, no cyanosis or edema. No calf tenderness or cords. Pulses: 2+ and symmetric all extremities. Skin:  turgor normal. No rashes    Neurologic:  Alert and oriented X 2-3.   acceptable short term memory this AM.  Insight is poor.  equal.  No cogwheeling or rigidity. Gait not tested at this time. Sensation grossly normal to touch.   Gross motor of extremities normal.       Data Review:       Recent Days:  No results for input(s): WBC, HGB, HCT, PLT, HGBEXT, HCTEXT, PLTEXT, HGBEXT, HCTEXT, PLTEXT in the last 72 hours. No results for input(s): NA, K, CL, CO2, GLU, BUN, CREA, CA, MG, PHOS, ALB, TBIL, TBILI, SGOT, ALT, INR, INREXT, INREXT in the last 72 hours.     24 Hour Results:  Recent Results (from the past 24 hour(s))   GLUCOSE, POC    Collection Time: 02/10/20  6:48 AM   Result Value Ref Range    Glucose (POC) 178 (H) 65 - 100 mg/dL    Performed by Aracely STRESS TEST    Collection Time: 02/10/20 12:45 PM   Result Value Ref Range    Target  bpm    Exercise duration time 00:03:00     Stress Base Systolic  mmHg    Stress Base Diastolic BP 53 mmHg    Post peak HR 94 BPM    Baseline HR 72 BPM    Estimated workload 1.0 METS    Baseline  mmHg    Percent HR 66 %    Stress Base Diastolic BP 59 mmHg    Stress Rate Pressure Product 12,502 BPM*mmHg   GLUCOSE, POC    Collection Time: 02/10/20 12:57 PM   Result Value Ref Range    Glucose (POC) 193 (H) 65 - 100 mg/dL    Performed by Tiffany EDGAR    GLUCOSE, POC    Collection Time: 02/10/20  5:17 PM   Result Value Ref Range    Glucose (POC) 273 (H) 65 - 100 mg/dL    Performed by Marisa [place] (PCT)    GLUCOSE, POC    Collection Time: 02/10/20 11:38 PM   Result Value Ref Range    Glucose (POC) 253 (H) 65 - 100 mg/dL    Performed by Venus Bowden (PCT)    GLUCOSE, POC    Collection Time: 02/11/20  5:58 AM   Result Value Ref Range    Glucose (POC) 182 (H) 65 - 100 mg/dL    Performed by Venus Bowden (PCT)        Medications reviewed  Current Facility-Administered Medications   Medication Dose Route Frequency    isosorbide mononitrate ER (IMDUR) tablet 30 mg  30 mg Oral DAILY    sodium chloride (NS) flush 5-40 mL  5-40 mL IntraVENous Q8H    sodium chloride (NS) flush 5-40 mL  5-40 mL IntraVENous PRN    enoxaparin (LOVENOX) injection 40 mg  40 mg SubCUTAneous Q24H    pantoprazole (PROTONIX) injection 40 mg  40 mg IntraVENous DAILY    alum-mag hydroxide-simeth (MYLANTA) oral suspension 30 mL  30 mL Oral Q4H PRN    insulin lispro (HUMALOG) injection   SubCUTAneous Q6H    glucose chewable tablet 16 g  4 Tab Oral PRN    glucagon (GLUCAGEN) injection 1 mg  1 mg IntraMUSCular PRN    dextrose 10% infusion 0-250 mL  0-250 mL IntraVENous PRN    ondansetron (ZOFRAN) injection 4 mg  4 mg IntraVENous Q4H PRN    influenza vaccine 2019-20 (6 mos+)(PF) (FLUARIX/FLULAVAL/FLUZONE QUAD) injection 0.5 mL  0.5 mL IntraMUSCular PRIOR TO DISCHARGE    aspirin delayed-release tablet 81 mg  81 mg Oral DAILY    carvediloL (COREG) tablet 12.5 mg  12.5 mg Oral BID WITH MEALS    sertraline (ZOLOFT) tablet 25 mg  25 mg Oral DAILY    tamsulosin (FLOMAX) capsule 0.4 mg  0.4 mg Oral DAILY    nitroglycerin (NITROSTAT) tablet 0.4 mg  0.4 mg SubLINGual Q5MIN PRN     Care Plan discussed with: Patient/nurse  Total time spent with patient and review of records: 30 minutes.   Samantha Ayoub MD

## 2020-02-11 NOTE — PROGRESS NOTES
2/11/2020  CARE MANAGEMENT NOTE:  CM reviewed EMR for clinical updates. Pt was admitted with CP, and also CAD, HTN. Reportedly, pt resides with his wife, Napoleon Little ([phone]). Transition of Care Plan:    1. PT eval complete; pt ambulated 20 feet and home health was recommended. Orders received re: skilled nursing, PT/OT. A choice letter was signed and a referral was made to [facility]. 2.  Outpt f/u  3. Wife will provide transportation home.     No further post discharge needs voiced or identified.   Antonette

## 2020-02-11 NOTE — PROGRESS NOTES
Bedside and Verbal shift change report given to Carolyn Hussein RN (oncoming nurse) by Jyoti Guevara RN (offgoing nurse). Report included the following information SBAR, Kardex, MAR, Accordion and Recent Results.

## 2020-02-11 NOTE — DISCHARGE INSTRUCTIONS
Patient Discharge Instructions    Ching Potts / [id number] : 1940    Admitted 2020 Discharged: 2020 12:37 PM     ACUTE DIAGNOSES:  Chest pain [R07.9]  Epigastric pain [R10.13]  Epigastric pain [R10.13]    CHRONIC MEDICAL DIAGNOSES:  Problem List as of 2020 Date Reviewed: 10/14/2019          Codes Class Noted - Resolved    * (Principal) Epigastric abdominal pain ICD-10-CM: R10.13  ICD-9-CM: 789.06  2020 - Present        Epigastric pain ICD-10-CM: R10.13  ICD-9-CM: 789.06  2020 - Present        Depression ICD-10-CM: F32.9  ICD-9-CM: 801  2020 - Present        Fall ICD-10-CM: W19. Kelsy Pereiras  ICD-9-CM: X335.9  2019 - Present        Traumatic rhabdomyolysis ([facility] ICD-10-CM: T79. 6XXA  ICD-9-CM: 958.6  2019 - Present        Dyslipidemia ICD-10-CM: E78.5  ICD-9-CM: 272.4  10/3/2012 - Present        CAD (coronary artery disease), native coronary artery ICD-10-CM: I25.10  ICD-9-CM: 414.01  Unknown - Present    Overview Signed 3/22/2011  3:04 PM by Genevieev Bolden MD     Inferior MI 2009  - PCI RCA with PTCA, significant LAD disease  - suspected reocclusion several hrs later with VF, IABP placed  3v CABG 2009 Bill Trejo @ [facility])  - LIMA-LAD, VG-OM, VG-PDA             Type 2 diabetes mellitus without complication ([facility] SGV-34-JG: E11.9  ICD-9-CM: 250.00  Unknown - Present        Hypertension, benign ICD-10-CM: I10  ICD-9-CM: 401.1  Unknown - Present        RESOLVED: Chest pain ICD-10-CM: R07.9  ICD-9-CM: 786.50  2020 - 2020        RESOLVED: PND (paroxysmal nocturnal dyspnea) ICD-10-CM: R06.00  ICD-9-CM: 786.09  2017 - 2018        RESOLVED: Near syncope ICD-10-CM: R55  ICD-9-CM: 780.2  10/29/2013 - 4/15/2014              DISCHARGE MEDICATIONS:         · It is important that you take the medication exactly as they are prescribed.    · Keep your medication in the bottles provided by the pharmacist and keep a list of the medication names, dosages, and times to be taken in your wallet. · Do not take other medications without consulting your doctor. DIET:  Diabetic Diet    ACTIVITY: Activity as tolerated    ADDITIONAL INFORMATION: If you experience any of the following symptoms then please call your primary care physician or return to the emergency room if you cannot get hold of your doctor: Fever, chills, nausea, vomiting, diarrhea, change in mentation, falling, bleeding, shortness of breath. FOLLOW UP CARE:  Dr. Conchita Herbert MD  you are to call and set up an appointment to see them with in 1 week. Follow-up with specialists at directed by them      Information obtained by :  I understand that if any problems occur once I am at home I am to contact my physician. I understand and acknowledge receipt of the instructions indicated above.                                                                                                                                            Physician's or R.N.'s Signature                                                                  Date/Time                                                                                                                                              Patient or Representative Signature                                                          Date/Time

## 2020-02-11 NOTE — PROGRESS NOTES
Gastrointestinal Progress Note    2/11/2020    Admit Date: 2/7/2020    Subjective:     Refusing his medications; implying the reason for his medications to be financial gain. States wishes go home. Advised medication likely reason for his improved symptoms; likely recurrence symptoms without medications    Current Facility-Administered Medications   Medication Dose Route Frequency    isosorbide mononitrate ER (IMDUR) tablet 30 mg  30 mg Oral DAILY    sodium chloride (NS) flush 5-40 mL  5-40 mL IntraVENous Q8H    sodium chloride (NS) flush 5-40 mL  5-40 mL IntraVENous PRN    enoxaparin (LOVENOX) injection 40 mg  40 mg SubCUTAneous Q24H    pantoprazole (PROTONIX) injection 40 mg  40 mg IntraVENous DAILY    alum-mag hydroxide-simeth (MYLANTA) oral suspension 30 mL  30 mL Oral Q4H PRN    insulin lispro (HUMALOG) injection   SubCUTAneous Q6H    glucose chewable tablet 16 g  4 Tab Oral PRN    glucagon (GLUCAGEN) injection 1 mg  1 mg IntraMUSCular PRN    dextrose 10% infusion 0-250 mL  0-250 mL IntraVENous PRN    ondansetron (ZOFRAN) injection 4 mg  4 mg IntraVENous Q4H PRN    influenza vaccine 2019-20 (6 mos+)(PF) (FLUARIX/FLULAVAL/FLUZONE QUAD) injection 0.5 mL  0.5 mL IntraMUSCular PRIOR TO DISCHARGE    aspirin delayed-release tablet 81 mg  81 mg Oral DAILY    carvediloL (COREG) tablet 12.5 mg  12.5 mg Oral BID WITH MEALS    sertraline (ZOLOFT) tablet 25 mg  25 mg Oral DAILY    tamsulosin (FLOMAX) capsule 0.4 mg  0.4 mg Oral DAILY    nitroglycerin (NITROSTAT) tablet 0.4 mg  0.4 mg SubLINGual Q5MIN PRN        Objective:     Blood pressure 118/60, pulse 68, temperature 98.6 °F (37 °C), resp. rate 18, height 6' (1.829 m), weight 91.6 kg (202 lb), SpO2 94 %. No intake/output data recorded.     02/09 1901 - 02/11 0700  In: -   Out: 650 [Urine:650]    EXAM:  GENERAL: alert, uncooperative, no distress, HEART: regular rate and rhythm, LUNGS: chest clear, no wheezing, rales, normal symmetric air entry, ABDOMEN:  Bowel sounds are normal, liver is not enlarged, spleen is not enlarged and EXTREMITY: extremities normal, atraumatic, no cyanosis or edema, no edema      Data Review    Recent Results (from the past 24 hour(s))   NUCLEAR CARDIAC STRESS TEST    Collection Time: 02/10/20 12:45 PM   Result Value Ref Range    Target  bpm    Exercise duration time 00:03:00     Stress Base Systolic  mmHg    Stress Base Diastolic BP 53 mmHg    Post peak HR 94 BPM    Baseline HR 72 BPM    Estimated workload 1.0 METS    Baseline  mmHg    Percent HR 66 %    Stress Base Diastolic BP 59 mmHg    Stress Rate Pressure Product 12,502 BPM*mmHg   GLUCOSE, POC    Collection Time: 02/10/20 12:57 PM   Result Value Ref Range    Glucose (POC) 193 (H) 65 - 100 mg/dL    Performed by [address] [place] [address], POC    Collection Time: 02/10/20  5:17 PM   Result Value Ref Range    Glucose (POC) 273 (H) 65 - 100 mg/dL    Performed by Shima Kitchen (PCT)    GLUCOSE, POC    Collection Time: 02/10/20 11:38 PM   Result Value Ref Range    Glucose (POC) 253 (H) 65 - 100 mg/dL    Performed by Mary Wilkinson (PCT)    GLUCOSE, POC    Collection Time: 02/11/20  5:58 AM   Result Value Ref Range    Glucose (POC) 182 (H) 65 - 100 mg/dL    Performed by Mary Wilkinson (PCT)        Assessment:     Principal Problem:    Epigastric abdominal pain (2/8/2020)    Active Problems:    CAD (coronary artery disease), native coronary artery ()      Overview: Inferior MI July 2009      - PCI RCA with PTCA, significant LAD disease      - suspected reocclusion several hrs later with VF, IABP placed      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)      - LIMA-LAD, VG-OM, VG-PDA      Type 2 diabetes mellitus without complication ([facility] ()      Hypertension, benign ()      Dyslipidemia (10/3/2012)      Depression (2/7/2020)      Epigastric pain (2/8/2020)        Plan:     1.   From my point of view can discharge daily pantoprazole

## 2020-02-11 NOTE — PROGRESS NOTES
Pt refusing all morning medication. Attempted to give patient IV protonix and he said \"I wouldn't get any closer if I were you. \"     [id number] Dr. Renée Zheng notified of patient's noncompliance. Spoke to patient's wife. She will be here this afternoon to transport patient home. Discharge instructions reviewed with patient and his wife. No questions or concerns at this time. IV removed. Pt dressed in own clothes. E sign utilized. I offered pt a flu shot and he declined, stating that he never gets them. Morning meds that patient refused were given to patient at time of discharge. Pt discharged off unit via wheelchair.

## 2020-02-11 NOTE — DISCHARGE SUMMARY
Physician Discharge Summary     Patient ID:    Renee Osman  [id number]  [address] [facility] y.o.  1940  Samanta Samuel MD    Admit date: 2/7/2020  Discharge date and time: 2/11/2020  Admission Diagnoses: Chest pain [R07.9]; Epigastric pain [R10.13]; Epigastric pain [R10.13]     Discharge Medications:   Current Discharge Medication List      START taking these medications    Details   isosorbide mononitrate ER (IMDUR) 30 mg tablet Take 1 Tab by mouth daily. Qty: 30 Tab, Refills: 0      pantoprazole (PROTONIX) 40 mg tablet Take 1 Tab by mouth daily. Qty: 30 Tab, Refills: 0         CONTINUE these medications which have NOT CHANGED    Details   sertraline (ZOLOFT) 25 mg tablet Take 25 mg by mouth daily. tamsulosin (FLOMAX) 0.4 mg capsule Take 0.4 mg by mouth daily. carvedilol (COREG) 12.5 mg tablet TAKE ONE (1) TABLET(S) BY MOUTH TWICE DAILY WITH FOOD  Qty: 180 Tab, Refills: 3      insulin NPH/insulin regular (NOVOLIN 70/30, HUMULIN 70/30) 100 unit/mL (70-30) injection 46 Units by SubCUTAneous route Before breakfast and dinner. aspirin delayed-release 81 mg tablet Take 81 mg by mouth daily. Follow up Care:    1. Samanta Samuel MD with in 1 weeks  2.  specialists as directed. Diet:  Diabetic Diet  Disposition:  Home. Advanced Directive:  Discharge Exam:  [See today's progress note.]  CONSULTATIONS: Cardiology, GI and Neurology    Significant Diagnostic Studies:     Lab Results   Component Value Date/Time    Glucose (POC) 256 (H) 02/11/2020 12:21 PM    Glucose (POC) 182 (H) 02/11/2020 05:58 AM    Glucose (POC) 253 (H) 02/10/2020 11:38 PM    Glucose (POC) 273 (H) 02/10/2020 05:17 PM    Glucose (POC) 193 (H) 02/10/2020 12:57 PM     Lab Results   Component Value Date/Time    TSH 0.84 11/27/2019 10:58 AM     HOSPITAL COURSE & TREATMENT RENDERED:   1.  See today's progress note:  Daily Progress Note and Discharge Note: 2/11/2020  Germán Pak MD    Assessment/Plan:   Epigastric abdominal pain (2/8/2020)/nausea/vomiting.     - Protonix IV switched to po and symptoms resolved  - diabetic diet  - consulted GI - cont PPI and outpt follow up     CAD (coronary artery disease), native coronary artery/history of CABG. - Continue aspirin  - Consulted Card - Stress test with fixed defect - follow up with Cards as per his usual     Type 2 diabetes mellitus without complication ([facility] - Diabetic diet and resume home treatment     Hypertension, benign.    - Continue losartan and Coreg    Dyslipidemia (10/3/2012). Depression (2/7/2020). - Not on medication    Dementia  - monitor and treat as needed - may need trial of Namenda  - consulted Neuro and follow up with them outpt as they suggest - pts wife reports he has appt. Problem List:  Problem List as of 2/11/2020 Date Reviewed: 10/14/2019          Codes Class Noted - Resolved    * (Principal) Epigastric abdominal pain ICD-10-CM: R10.13  ICD-9-CM: 789.06  2/8/2020 - Present        Epigastric pain ICD-10-CM: R10.13  ICD-9-CM: 789.06  2/8/2020 - Present        Depression ICD-10-CM: F32.9  ICD-9-CM: 640  2/7/2020 - Present        Fall ICD-10-CM: W19. Alison Shayalejandro  ICD-9-CM: O754.3  11/29/2019 - Present        Traumatic rhabdomyolysis ([facility] ICD-10-CM: T79. 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 Payal Moore @ [address] [place] [address])  - LIMA-LAD, VG-OM, VG-PDA             Type 2 diabetes mellitus without complication ([facility] FOJ-96-XP: E11.9  ICD-9-CM: 250.00  Unknown - Present        Hypertension, benign ICD-10-CM: I10  ICD-9-CM: 401.1  Unknown - Present        RESOLVED: Chest pain ICD-10-CM: R07.9  ICD-9-CM: 786.50  2/7/2020 - 2/8/2020        RESOLVED: PND (paroxysmal nocturnal dyspnea) ICD-10-CM: R06.00  ICD-9-CM: 786.09  8/13/2017 - 8/11/2018        RESOLVED: Near syncope ICD-10-CM: R55  ICD-9-CM: 780.2  10/29/2013 - 4/15/2014            HPI:   Mr. Parker Hamilton is a 70 y.o.  male who is admitted with epigastric pain. Mr. Parker Hamilton with past medical history of CAD, depression, diabetes mellitus, hypertension presented to ER complaining of sudden onset of epigastric abdominal pain associated with nausea and vomiting. Patient described the pain as dull, constant, moderate intensity. No radiation. Has been having nausea and vomited few times. Denies chest pain. (Dr Ethan Raya)    2/8: Still with chest discomfort and some nausea. No radiation of pain. GI consult pending and will ask Cardiology to see. 2/9:  Epigastric discomfort continues. No appetite. Very poor historian. Cardiology has cleared for EGD if GI still wants to do it. Stable for stress per Cards. He does not know day/date/location. [email or web address] 2/10:  No further epigastric or chest discomfort. Nurses report he was more cooperative and oriented over the last 24 hrs and did not refuse meds as previously. He knows he is in [facility] this AM.  His insight is a little better at this moment. Stress test and EGD pending. 2/11:  No ab pain today. He reports he is not going to have any more tests. Much more irritable today - refusing blood glucose checks and labs per nurses. Reports \"don't want any [place] in here. \" Insight worse this AM.  Stress test neg with fixed defect only. Not working much with PT/OT - often declines. 1130am:  Declines further work up or testing. [facility]. Advised follow up with GI if needed for ab pain/further w/u and with Neuro for dementia work up. Discussed with pts wife.    Follow up with PCP later this wk. Review of Systems:   A comprehensive review of systems was negative except for that written in the HPI. Objective:   Physical Exam:   Visit Vitals  /60 (BP 1 Location: Right arm, BP Patient Position: At rest)   Pulse 68   Temp 98.6 °F (37 °C)   Resp 18   Ht 6' (1.829 m)   Wt 91.6 kg (202 lb)   SpO2 94%   BMI 27.40 kg/m²      O2 Device: Room air  Temp (24hrs), Av.3 °F (36.8 °C), Min:97.7 °F (36.5 °C), Max:98.6 °F (37 °C)    02/10 1901 - 700  In: -   Out: 100 [Urine:100]   701 - 02/10 1900  In: -   Out: 550 [Urine:550]  General:  Alert, cooperative, no distress, appears stated age. Head:  Normocephalic, without obvious abnormality, atraumatic. Eyes:  Conjunctivae/corneas clear. EOMs intact. Throat: Lips, mucosa, and tongue normal. Teeth and gums normal.   Neck: Supple, symmetrical, trachea midline, no adenopathy, thyroid: no enlargement/tenderness/nodules, no carotid bruit and no JVD. Back:   Symmetric, no curvature. ROM normal. No CVA tenderness. Lungs:   Clear to auscultation bilaterally. Chest wall:  No tenderness or deformity. Heart:  Regular rate and rhythm, S1, S2 normal, no murmur, click, rub or gallop. Abdomen:   Soft, non-tender. Bowel sounds normal. No masses,  No organomegaly. Extremities: Extremities normal, atraumatic, no cyanosis or edema. No calf tenderness or cords. Pulses: 2+ and symmetric all extremities. Skin:  turgor normal. No rashes    Neurologic:  Alert and oriented X 2-3.   acceptable short term memory this AM.  Insight is poor.  equal.  No cogwheeling or rigidity. Gait not tested at this time. Sensation grossly normal to touch.   Gross motor of extremities normal.         Signed:  Alan Villagomez MD  2020  12:38 PM

## 2020-02-11 NOTE — PROGRESS NOTES
Reviewed chart and spoke with nursing. Attempted Physical Therapy. Patient in his bed trying to make a phone call. States he just got back to bed after walking to the bathroom with nursing. He states he does not want to get up again now.   Will continue to attempt Physical Therapy

## 2020-02-11 NOTE — ROUTINE PROCESS
During change of shift, patient reports not receiving his tray. Dietary reports that patient received tray but patient stated he did not like anything on his tray and asked for them to take his tray from his room. Pt asked for pudding cup back from tray. This RN asked patient which TV dinner tray he would like and he asked how this RN knew he did not like what was on his tray. Pt is asking who is the gentleman with red pants on. This RN did not visualize anyone with red pants on. Pt seems dissatisfied with answer. Pt is confused of surroundings. Reoriented patient. Will continue to monitor patient.

## 2020-02-12 LAB
STRESS BASELINE DIAS BP: 59 MMHG
STRESS BASELINE HR: 72 BPM
STRESS BASELINE SYS BP: 116 MMHG
STRESS ESTIMATED WORKLOAD: 1 METS
STRESS EXERCISE DUR MIN: NORMAL
STRESS PEAK DIAS BP: 53 MMHG
STRESS PEAK SYS BP: 133 MMHG
STRESS PERCENT HR ACHIEVED: 66 %
STRESS POST PEAK HR: 94 BPM
STRESS RATE PRESSURE PRODUCT: NORMAL BPM*MMHG
STRESS TARGET HR: 143 BPM

## 2020-09-11 ENCOUNTER — TELEPHONE (OUTPATIENT)
Dept: CARDIOLOGY CLINIC | Age: 80
End: 2020-09-11

## 2020-09-11 NOTE — TELEPHONE ENCOUNTER
Pt's spouse states the pt was taking carvedilol but was switch to metoprolol. Received a call from the pharmacy to  metoprolol. Would like to talk to Dr. Neelam Pritchett about the medication before she go to the pharmacy to  medication.  Please advise        CTBUI:[phone] or [phone]

## 2020-09-14 NOTE — TELEPHONE ENCOUNTER
PTIDx2 wife stated patient recently had hospital admission at [facility]. /min and disoriented. Had UTI and discharged 9/18. BP medication changed.      appt made with Josefina Ruiz 9/23/20 at [address] [facility] records requested

## 2020-09-23 ENCOUNTER — OFFICE VISIT (OUTPATIENT)
Dept: CARDIOLOGY CLINIC | Age: 80
End: 2020-09-23
Payer: MEDICARE

## 2020-09-23 VITALS
BODY MASS INDEX: 27.22 KG/M2 | HEART RATE: 78 BPM | DIASTOLIC BLOOD PRESSURE: 80 MMHG | RESPIRATION RATE: 20 BRPM | WEIGHT: 201 LBS | SYSTOLIC BLOOD PRESSURE: 168 MMHG | OXYGEN SATURATION: 99 % | HEIGHT: 72 IN

## 2020-09-23 DIAGNOSIS — I25.10 CORONARY ARTERY DISEASE INVOLVING NATIVE CORONARY ARTERY OF NATIVE HEART WITHOUT ANGINA PECTORIS: Primary | ICD-10-CM

## 2020-09-23 DIAGNOSIS — F32.A DEPRESSION, UNSPECIFIED DEPRESSION TYPE: ICD-10-CM

## 2020-09-23 DIAGNOSIS — E11.9 TYPE 2 DIABETES MELLITUS WITHOUT COMPLICATION, WITH LONG-TERM CURRENT USE OF INSULIN (HCC): ICD-10-CM

## 2020-09-23 DIAGNOSIS — I10 HYPERTENSION, BENIGN: ICD-10-CM
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
## 2020-09-23 DIAGNOSIS — E78.5 DYSLIPIDEMIA: ICD-10-CM

## 2020-09-23 DIAGNOSIS — Z79.4 TYPE 2 DIABETES MELLITUS WITHOUT COMPLICATION, WITH LONG-TERM CURRENT USE OF INSULIN (HCC): ICD-10-CM

## 2020-09-23 PROCEDURE — G8753 SYS BP > OR = 140: HCPCS | Performed by: NURSE PRACTITIONER

## 2020-09-23 PROCEDURE — 3288F FALL RISK ASSESSMENT DOCD: CPT | Performed by: NURSE PRACTITIONER

## 2020-09-23 PROCEDURE — 1100F PTFALLS ASSESS-DOCD GE2>/YR: CPT | Performed by: NURSE PRACTITIONER

## 2020-09-23 PROCEDURE — G9717 DOC PT DX DEP/BP F/U NT REQ: HCPCS | Performed by: NURSE PRACTITIONER

## 2020-09-23 PROCEDURE — 99214 OFFICE O/P EST MOD 30 MIN: CPT | Performed by: NURSE PRACTITIONER

## 2020-09-23 PROCEDURE — G0463 HOSPITAL OUTPT CLINIC VISIT: HCPCS | Performed by: NURSE PRACTITIONER

## 2020-09-23 PROCEDURE — G8754 DIAS BP LESS 90: HCPCS | Performed by: NURSE PRACTITIONER

## 2020-09-23 PROCEDURE — G8419 CALC BMI OUT NRM PARAM NOF/U: HCPCS | Performed by: NURSE PRACTITIONER

## 2020-09-23 PROCEDURE — G8536 NO DOC ELDER MAL SCRN: HCPCS | Performed by: NURSE PRACTITIONER

## 2020-09-23 PROCEDURE — G8427 DOCREV CUR MEDS BY ELIG CLIN: HCPCS | Performed by: NURSE PRACTITIONER

## 2020-09-23 RX ORDER — METOPROLOL SUCCINATE 25 MG/1
TABLET, EXTENDED RELEASE ORAL DAILY
COMMUNITY
End: 2020-10-07 | Stop reason: SDUPTHER

## 2020-09-23 NOTE — PROGRESS NOTES
Visit Vitals  BP (!) 168/80   Pulse 78   Resp 20   Ht 6' (1.829 m)   Wt 201 lb (91.2 kg)   SpO2 99%   BMI 27.26 kg/m²     Denies CP,SOB or edema  Recent hospitalization at [address] [place] [address]. 9/11 discharged

## 2020-09-23 NOTE — PROGRESS NOTES
[id number] [place], [id number] [facility]    Office [phone]  Fax [phone]      Elaina Guy is a 68 y.o. male. Last seen approximately 1 year ago in clinic. Admitted to [facility] 2/10/20 - 2/17/20 with epigastric/abdominal pain - Cardiology consult obtained and stress test performed. Fixed defect noted. Admitted to [address] [place] [address] 9/6/20 - 9/18/20 with malaise, nausea, vomiting and severe sepsis. Etiology of sepsis was unclear. Discharged home on ABX. Review of discharge summary indicates that troponin was abnormal and stress test was performed. Recommend to follow up with Urology, PCP and Cardiology. Assessment  Encounter Diagnoses   Name Primary?  Coronary artery disease involving native coronary artery of native heart without angina pectoris Yes    Hypertension, benign     Type 2 diabetes mellitus without complication, with long-term current use of insulin ([address]     Dyslipidemia     Fall, sequela     Depression, unspecified depression type         Recommendations:  1. 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 Stress echo 2012 demonstrated no evidence of ischemia. Lexiscan Cardiolite 2/2020 with fixed defect, EF 50%. Repeat stress again 9/11/2020 at [address] [place] [address] with report of small residual inferior wall ischemia (full nuclear report and images are not available for review/comparison). EF by Echo, during same admission, 40-45%. He has no exertional symptoms, although at a significantly reduced functional capacity. - Continue ASA   - Not currently on statin therapy due to hx of intolerance. Also, with recent transaminitis. - Elevated troponin during recent admission likely 2/2 demand ischemia in the setting of sepsis. Stress results seem consistent with post MI scar, as reflected by our stress test in February.    - No additional cardiac testing or medications needed at this time.  If he does have a recurrence of angina, we could resume previously prescribed oral nitrates. 2. HTN - BP elevated in the office today. Improved on recheck - 138/88  - Continue current medication regimen - note previous Rx for Coreg was changed since his last visit, now on Toprol XL 25 mg daily. They report it was changed due to concern for memory issues. - Encouraged increased home monitoring. Discussed goal parameters. - Low sodium diet     Urinary urgency/ recent UTI/sepsis - encouraged compliance with Flomax, as prescribed by Urologist at [facility]. Encouraged follow up with Urology and PCP ASAP to discus ongoing sxs. Depression - encouraged compliance with Zoloft and follow up for ongoing discussion about mood with PCP. DM - on insulin. followed by Dr. Pedro Maxwell. Return to the clinic again in 4-6 weeks to see me. He was encouraged to continue current medications, stay as active as safely possibly, follow a heart healthy diabetic diet to promote weight loss and to call with any new complaints or concerns. Subjective:  Mr. Leamon Sacks remains mostly sedentary due to balance issues and frequent falls. He present today in a wheelchair. He denies any recent falls. No positional dizziness. No syncope or near syncope. He denies any exertional chest pain or dyspnea with limited ADL's. He notes a pain in his left axilla that occurs at rest.     He continues to experience pain in both legs/feet due to DM neuropathy. He reports ongoing urinary discomfort since recent hospital discharge. He reports not taking Flomax very often because, \"I'm peeing enough\". He denies any fever ,chills or flank pain concerning for recurrent urinary sepsis. He has yet to follow up with his PCP or arrange for Urology follow up, as his [address] [place] [address] discharge summary recommended. He is no longer taking Imdur or Protonix, which were prescribed following admission to [facility] 2/2020; after evaluation for epigastric pain/CP.      He does not take Zoloft consistently although he aknowledges a bad mood and underlying depression. He is here today with his wife and son. Cardiac risk factors   HTN yes  DM yes   Smoking no    Cardiac testing  Echo 2d adult 12/2009  EF 50%, basal inferior akinesis, mild MR  Echo 9/21/11 - LVEF 50%, inferior AK, unchanged from Dec 2009    Stress echo March 2012 - 3 min, resting EF 50%, inferior AK, no ischemia    Echo 10/29/13 - EF 55 % to 60 %. There was akinesis of the basal-mid inferior wall(s). There was akinesis of the basal inferolateral wall(s). Paradoxical ventricular septal motion, no PFO, no significant Change from previous study. Echo 8/23/17 - EF 55-60%. Inferior AK. Mild LVH. Mild MAC with no MR. AoV sclerosis without stenosis. No change compared to study 2013. Lexiscan Cardiolite 2/2020 - Abnormal Lexiscan gated SPECT myocardial perfusion study demonstrating RCA territory infarction. No stress induced ischemia  LVEF 50%    [facility] Lexiscan Cardiolite 9/10/20 - evidence of nontransmural myocardial infarction of the inferior wall with mild residual ischemia. LVEF 44% with reduced wall motion and thickening of the inferior wall. [facility] Echo 9/9/20 - EF 40-45%. Mild LVH. HK of basal mid inferior and basal mid inferolateral myocardium. Past Medical History:   Diagnosis Date    CAD (coronary artery disease), native coronary artery     Cancer ([facility]     melanoma removed on forehead    Depression     Diabetic neuropathy ([facility]     ED (erectile dysfunction)     Hypertension, benign     Ill-defined condition     states he has memory problems    Postsurgical aortocoronary bypass status     Type II or unspecified type diabetes mellitus without mention of complication, not stated as uncontrolled         Current Outpatient Medications   Medication Sig Dispense Refill    metoprolol succinate (TOPROL-XL) 25 mg XL tablet Take  by mouth daily.  tamsulosin (FLOMAX) 0.4 mg capsule Take 0.4 mg by mouth daily.       insulin NPH/insulin regular (NOVOLIN 70/30, HUMULIN 70/30) 100 unit/mL (70-30) injection 46 Units by SubCUTAneous route Before breakfast and dinner.  aspirin delayed-release 81 mg tablet Take 81 mg by mouth daily.  sertraline (ZOLOFT) 25 mg tablet Take 25 mg by mouth daily. Allergies   Allergen Reactions    Amoxicillin Hives and Itching    Pravastatin Myalgia      Review of Systems  Constitutional: Negative for fever, chills,and diaphoresis. +fatigue  Respiratory: Negative for cough, hemoptysis, sputum production, shortness of breath and wheezing. Cardiovascular: Negative for chest pain, palpitations, orthopnea, claudication, leg swelling and PND. Gastrointestinal: Negative for heartburn, nausea, vomiting, blood in stool and melena. Genitourinary: Negative for dysuria and flank pain. Musculoskeletal: Negative for back pain. Skin: Negative for rash. Neurological: Negative for focal weakness, seizures, loss of consciousness, weakness and headaches. +LE numbness, gait instability. Endo/Heme/Allergies: Does not bruise/bleed easily. Psychiatric/Behavioral: Negative for memory loss. The patient does not have insomnia. Physical Exam    Visit Vitals  BP (!) 168/80   Pulse 78   Resp 20   Ht 6' (1.829 m)   Wt 201 lb (91.2 kg)   SpO2 99%   BMI 27.26 kg/m²        Wt Readings from Last 3 Encounters:   09/23/20 201 lb (91.2 kg)   02/10/20 202 lb (91.6 kg)   02/10/20 202 lb (91.6 kg)      General - well developed well nourished  Neck - JVP normal, thyroid nl  Cardiac - normal S1,S2, no murmurs, rubs or gallops.  No clicks  Vascular - carotids without bruits, radials, femorals and pedal pulses equal bilateral  Lungs - clear to auscultation bilaterals, no rales, wheezing or rhonchi  Abd - soft nontender, no HSM, no abd bruits  Extremities - no edema  Skin - no rash  Neuro - nonfocal  Psych - normal mood and affect    Cardiographics  EKG 8/8/16 - SR 79, old inferior MI, unchanged from 10/29/13  EKG 8/8/17 - SR, old inferior MI, unchanged from 8/8/16  EKG 8/10/18 - Sinus 73, nonspecific T wave flattening, old inferior MI, no significant change from 8/8/17  EKG 10/14/19 - SR 76      Luis Miguel Jones, NP

## 2020-09-23 NOTE — PATIENT INSTRUCTIONS
I would recommend that you schedule to see Dr. Idris Spence within the week to review your recent hospitalization and ongoing symptoms. A Urologist is recommended as well, but that may take longer to establish a new patient appointment. I would encourage you to take the medications prescribed at your recent discharge; including Flomax, which will help with your urination. Dr. Anderson Mccloud [phone] or [phone] Regarding your heart: 
Please continue to take Toprol XL 25 mg daily and Aspirin 81 mg daily 
- Start checking your blood pressure daily and let me know how things look via phone call in 1-2 weeks. We may need to adjust the Toprol dose. Regarding your mental health, I would recommend that you try to be more consistent with your Zoloft medication. This may help how you feel, but it doesn't work if you take is sporadically. I will look forward to seeing you again in 4-6 weeks, but encourage you to call me back prior to that with any questions or concerns.

## 2020-10-08 RX ORDER — METOPROLOL SUCCINATE 25 MG/1
25 TABLET, EXTENDED RELEASE ORAL DAILY
Qty: 90 TAB | Refills: 1 | Status: SHIPPED | OUTPATIENT
Start: 2020-10-08 | End: 2021-03-25

## 2021-07-18 ENCOUNTER — HOSPITAL ENCOUNTER (EMERGENCY)
Age: 81
Discharge: LEFT BEFORE DISCHARGE | End: 2021-07-18
Payer: MEDICARE

## 2021-07-18 ENCOUNTER — HOSPITAL ENCOUNTER (EMERGENCY)
Age: 81
Discharge: HOME | End: 2021-07-18
Attending: EMERGENCY MEDICINE
Payer: MEDICARE

## 2021-07-18 VITALS
DIASTOLIC BLOOD PRESSURE: 105 MMHG | TEMPERATURE: 98.2 F | OXYGEN SATURATION: 96 % | SYSTOLIC BLOOD PRESSURE: 175 MMHG | WEIGHT: 189.38 LBS | BODY MASS INDEX: 25.65 KG/M2 | HEIGHT: 72 IN | HEART RATE: 93 BPM | RESPIRATION RATE: 16 BRPM

## 2021-07-18 DIAGNOSIS — R30.0 DYSURIA: Primary | ICD-10-CM

## 2021-07-18 LAB
APPEARANCE UR: CLEAR
BACTERIA URNS QL MICRO: NEGATIVE /HPF
BILIRUB UR QL: NEGATIVE
COLOR UR: ABNORMAL
EPITH CASTS URNS QL MICRO: ABNORMAL /LPF
GLUCOSE UR STRIP.AUTO-MCNC: 100 MG/DL
HGB UR QL STRIP: NEGATIVE
KETONES UR QL STRIP.AUTO: NEGATIVE MG/DL
LEUKOCYTE ESTERASE UR QL STRIP.AUTO: ABNORMAL
NITRITE UR QL STRIP.AUTO: NEGATIVE
PH UR STRIP: 5.5 [PH] (ref 5–8)
PROT UR STRIP-MCNC: NEGATIVE MG/DL
RBC #/AREA URNS HPF: ABNORMAL /HPF (ref 0–5)
SP GR UR REFRACTOMETRY: 1.02 (ref 1–1.03)
UA: UC IF INDICATED,UAUC: ABNORMAL
UROBILINOGEN UR QL STRIP.AUTO: 0.2 EU/DL (ref 0.2–1)
WBC URNS QL MICRO: ABNORMAL /HPF (ref 0–4)

## 2021-07-18 PROCEDURE — 75810000275 HC EMERGENCY DEPT VISIT NO LEVEL OF CARE

## 2021-07-18 PROCEDURE — 99282 EMERGENCY DEPT VISIT SF MDM: CPT

## 2021-07-18 PROCEDURE — 81001 URINALYSIS AUTO W/SCOPE: CPT

## 2021-07-18 RX ORDER — OMEPRAZOLE 40 MG/1
40 CAPSULE, DELAYED RELEASE ORAL DAILY
Status: ON HOLD | COMMUNITY
End: 2021-10-01

## 2021-07-18 RX ORDER — SULFAMETHOXAZOLE AND TRIMETHOPRIM 800; 160 MG/1; MG/1
1 TABLET ORAL 2 TIMES DAILY
Status: ON HOLD | COMMUNITY
End: 2021-10-01

## 2021-07-18 RX ORDER — BISMUTH SUBSALICYLATE 262 MG
1 TABLET,CHEWABLE ORAL DAILY
COMMUNITY

## 2021-07-18 NOTE — ED NOTES
Patient discharged by provider. D/C instructions given. Patient educated to take r medications as instructed for management at home. Patien verbalized understanding, verbalized no questions. Patient ambulated out of ER without difficulty, NAD.   Patient Vitals for the past 4 hrs:   Temp Pulse Resp BP SpO2   07/18/21 1328 98.2 °F (36.8 °C) 93 16 (!) 175/105 96 %

## 2021-07-18 NOTE — ED PROVIDER NOTES
The history is provided by the patient and the spouse. Urinary Pain   This is a new problem. The current episode started 2 days ago. The problem occurs every urination. The problem has not changed since onset. The quality of the pain is described as burning. The pain is moderate. There has been no fever. Pertinent negatives include no chills, no sweats, no nausea, no vomiting, no discharge, no frequency, no hematuria, no hesitancy, no urgency, no flank pain, no penile discharge, no abdominal pain and no back pain. He has tried antibiotics for the symptoms. The treatment provided no relief.         Past Medical History:   Diagnosis Date    CAD (coronary artery disease), native coronary artery     [facility] ([facility]     melanoma removed on forehead    Depression     Diabetic neuropathy ([facility]     ED (erectile dysfunction)     Hypertension, benign     Ill-defined condition     states he has memory problems    Postsurgical aortocoronary bypass status     Type II or unspecified type diabetes mellitus without mention of complication, not stated as uncontrolled        Past Surgical History:   Procedure Laterality Date    ECHO 2D ADULT  12/2009    EF 50%, basal inferior akinesis, mild MR    ECHO 2D ADULT  9/21/11    LVEF 50%, inferior AK, unchanged from Dec 2009    HX CORONARY ARTERY BYPASS GRAFT  2009    HX OTHER SURGICAL  2008    melanoma removed from forehead         Family History:   Problem Relation Age of Onset    Breast Cancer Mother         w bone mets    Parkinson's Disease Maternal Aunt     Parkinson's Disease Maternal Uncle     Parkinson's Disease Maternal Grandfather        Social History     Socioeconomic History    Marital status:      Spouse name: Not on file    Number of children: Not on file    Years of education: Not on file    Highest education level: Not on file   Occupational History    Not on file   Tobacco Use    Smoking status: Never Smoker    Smokeless tobacco: Never Used Substance and Sexual Activity    Alcohol use: No    Drug use: No    Sexual activity: Not on file   Other Topics Concern    Not on file   Social History Narrative    Not on file     Social Determinants of Health     Financial Resource Strain:     Difficulty of Paying Living Expenses:    Food Insecurity:     Worried About Running Out of Food in the Last Year:     [address] in the Last Year:    Transportation Needs:     Lack of Transportation (Medical):  Lack of Transportation (Non-Medical):    Physical Activity:     Days of Exercise per Week:     Minutes of Exercise per Session:    Stress:     Feeling of Stress :    Social Connections:     Frequency of Communication with Friends and Family:     Frequency of Social Gatherings with Friends and Family:     Attends Ca[employer] Services:     Active Member of Clubs or Organizations:     Attends Club or Organization Meetings:     Marital Status:    Intimate Partner Violence:     Fear of Current or Ex-Partner:     Emotionally Abused:     Physically Abused:     Sexually Abused: ALLERGIES: Amoxicillin and Pravastatin    Review of Systems   Constitutional: Negative for activity change, chills and fever. HENT: Negative for nosebleeds, sore throat, trouble swallowing and voice change. Eyes: Negative for visual disturbance. Respiratory: Negative for shortness of breath. Cardiovascular: Negative for chest pain and palpitations. Gastrointestinal: Positive for constipation. Negative for abdominal pain, diarrhea, nausea and vomiting. Genitourinary: Positive for dysuria and penile pain. Negative for difficulty urinating, flank pain, frequency, hematuria, hesitancy, penile discharge and urgency. Musculoskeletal: Negative for back pain, neck pain and neck stiffness. Skin: Negative for color change. Allergic/Immunologic: Negative for immunocompromised state.    Neurological: Negative for dizziness, seizures, syncope, weakness, light-headedness, numbness and headaches. Psychiatric/Behavioral: Negative for behavioral problems, confusion, hallucinations, self-injury and suicidal ideas. Vitals:    07/18/21 1328   BP: (!) 175/105   Pulse: 93   Resp: 16   Temp: 98.2 °F (36.8 °C)   SpO2: 96%   Weight: 85.9 kg (189 lb 6 oz)   Height: 6' (1.829 m)            Physical Exam  Vitals and nursing note reviewed. Constitutional:       General: He is not in acute distress. Appearance: He is well-developed. He is not diaphoretic. HENT:      Head: Atraumatic. Neck:      Trachea: No tracheal deviation. Cardiovascular:      Comments: Warm and well perfused  Pulmonary:      Effort: Pulmonary effort is normal. No respiratory distress. Abdominal:      Tenderness: There is no abdominal tenderness. Musculoskeletal:         General: Normal range of motion. Skin:     General: Skin is warm and dry. Neurological:      Mental Status: He is alert. Coordination: Coordination normal.   Psychiatric:         Behavior: Behavior normal.         Thought Content: Thought content normal.         Judgment: Judgment normal.          MDM     This is a 24-year-old male with past medical history, review of systems, physical exam as above, presenting with complaints of dysuria.  with wife at bedside states he was experiencing foul-smelling urine with confusion last week, provided Bactrim by their primary care physician. Wife states confusion improved, however developed dysuria with burning upon urination 2 days ago. Patient also endorses chronic constipation. They deny fevers or chills, nausea or vomiting. Physical exam is remarkable for a well-appearing elderly male, in no acute distress noted to be hypertensive, afebrile without tachycardia, satting well on room air. He has soft nontender abdomen. Urine at bedside. Plan to obtain UA, bladder check, consider lab work and imaging, disposition pending.     Procedures    3:10 PM  Bedside bladder scan with approximately 200 cc of urine, UA within normal limits. Discussed with patient and wife at bedside, possibility of constipation being a contributing factor, patient previously taking MiraLAX. Nonacute abdomen on exam.   exam, also without acute abnormality. Will discharge home with primary care follow-up, recommend continuation of their course of Bactrim, return precautions given.

## 2021-07-18 NOTE — ED NOTES
RN informed by registration that pt stated that they were going to go to [facility].

## 2021-07-18 NOTE — ED TRIAGE NOTES
Patient reports dysuria x 1 week went to PCP Thursday, states she was given bacrtim and has had 5 doses. Per patient  pain has gotten worse, adds pain on tip of penis.

## 2021-07-19 ENCOUNTER — APPOINTMENT (OUTPATIENT)
Dept: CT IMAGING | Age: 81
End: 2021-07-19
Attending: EMERGENCY MEDICINE
Payer: MEDICARE

## 2021-07-19 ENCOUNTER — HOSPITAL ENCOUNTER (EMERGENCY)
Age: 81
Discharge: HOME | End: 2021-07-20
Attending: EMERGENCY MEDICINE
Payer: MEDICARE

## 2021-07-19 DIAGNOSIS — R33.9 URINARY RETENTION: Primary | ICD-10-CM

## 2021-07-19 DIAGNOSIS — R10.819 SUPRAPUBIC TENDERNESS: ICD-10-CM

## 2021-07-19 LAB
ALBUMIN SERPL-MCNC: 3.6 G/DL (ref 3.5–5)
ALBUMIN/GLOB SERPL: 0.9 {RATIO} (ref 1.1–2.2)
ALP SERPL-CCNC: 99 U/L (ref 45–117)
ALT SERPL-CCNC: 120 U/L (ref 12–78)
ANION GAP SERPL CALC-SCNC: 6 MMOL/L (ref 5–15)
AST SERPL-CCNC: 37 U/L (ref 15–37)
BASOPHILS # BLD: 0 K/UL (ref 0–0.1)
BASOPHILS NFR BLD: 1 % (ref 0–1)
BILIRUB SERPL-MCNC: 1 MG/DL (ref 0.2–1)
BUN SERPL-MCNC: 16 MG/DL (ref 6–20)
BUN/CREAT SERPL: 14 (ref 12–20)
CALCIUM SERPL-MCNC: 9.4 MG/DL (ref 8.5–10.1)
CHLORIDE SERPL-SCNC: 105 MMOL/L (ref 97–108)
CO2 SERPL-SCNC: 24 MMOL/L (ref 21–32)
CREAT SERPL-MCNC: 1.14 MG/DL (ref 0.7–1.3)
DIFFERENTIAL METHOD BLD: NORMAL
EOSINOPHIL # BLD: 0.1 K/UL (ref 0–0.4)
EOSINOPHIL NFR BLD: 1 % (ref 0–7)
ERYTHROCYTE [DISTWIDTH] IN BLOOD BY AUTOMATED COUNT: 13.2 % (ref 11.5–14.5)
GLOBULIN SER CALC-MCNC: 4.2 G/DL (ref 2–4)
GLUCOSE SERPL-MCNC: 233 MG/DL (ref 65–100)
HCT VFR BLD AUTO: 44.3 % (ref 36.6–50.3)
HGB BLD-MCNC: 14.6 G/DL (ref 12.1–17)
IMM GRANULOCYTES # BLD AUTO: 0 K/UL (ref 0–0.04)
IMM GRANULOCYTES NFR BLD AUTO: 0 % (ref 0–0.5)
LIPASE SERPL-CCNC: 101 U/L (ref 73–393)
LYMPHOCYTES # BLD: 1.9 K/UL (ref 0.8–3.5)
LYMPHOCYTES NFR BLD: 29 % (ref 12–49)
MCH RBC QN AUTO: 29.3 PG (ref 26–34)
MCHC RBC AUTO-ENTMCNC: 33 G/DL (ref 30–36.5)
MCV RBC AUTO: 89 FL (ref 80–99)
MONOCYTES # BLD: 0.8 K/UL (ref 0–1)
MONOCYTES NFR BLD: 12 % (ref 5–13)
NEUTS SEG # BLD: 3.8 K/UL (ref 1.8–8)
NEUTS SEG NFR BLD: 57 % (ref 32–75)
NRBC # BLD: 0 K/UL (ref 0–0.01)
NRBC BLD-RTO: 0 PER 100 WBC
PLATELET # BLD AUTO: 350 K/UL (ref 150–400)
PMV BLD AUTO: 9.2 FL (ref 8.9–12.9)
POTASSIUM SERPL-SCNC: 4 MMOL/L (ref 3.5–5.1)
PROT SERPL-MCNC: 7.8 G/DL (ref 6.4–8.2)
RBC # BLD AUTO: 4.98 M/UL (ref 4.1–5.7)
SODIUM SERPL-SCNC: 135 MMOL/L (ref 136–145)
WBC # BLD AUTO: 6.6 K/UL (ref 4.1–11.1)

## 2021-07-19 PROCEDURE — 96375 TX/PRO/DX INJ NEW DRUG ADDON: CPT

## 2021-07-19 PROCEDURE — 81001 URINALYSIS AUTO W/SCOPE: CPT

## 2021-07-19 PROCEDURE — 83690 ASSAY OF LIPASE: CPT

## 2021-07-19 PROCEDURE — 74011250636 HC RX REV CODE- 250/636: Performed by: EMERGENCY MEDICINE

## 2021-07-19 PROCEDURE — 80053 COMPREHEN METABOLIC PANEL: CPT

## 2021-07-19 PROCEDURE — 74177 CT ABD & PELVIS W/CONTRAST: CPT

## 2021-07-19 PROCEDURE — 51702 INSERT TEMP BLADDER CATH: CPT

## 2021-07-19 PROCEDURE — 99284 EMERGENCY DEPT VISIT MOD MDM: CPT

## 2021-07-19 PROCEDURE — 74011000250 HC RX REV CODE- 250: Performed by: EMERGENCY MEDICINE

## 2021-07-19 PROCEDURE — 36415 COLL VENOUS BLD VENIPUNCTURE: CPT

## 2021-07-19 PROCEDURE — 77030040830 HC CATH URETH FOL MDII -A

## 2021-07-19 PROCEDURE — 77030005513 HC CATH URETH FOL11 MDII -B

## 2021-07-19 PROCEDURE — 85025 COMPLETE CBC W/AUTO DIFF WBC: CPT

## 2021-07-19 PROCEDURE — 74011000636 HC RX REV CODE- 636: Performed by: RADIOLOGY

## 2021-07-19 PROCEDURE — 96374 THER/PROPH/DIAG INJ IV PUSH: CPT

## 2021-07-19 PROCEDURE — 87086 URINE CULTURE/COLONY COUNT: CPT

## 2021-07-19 RX ORDER — LIDOCAINE HYDROCHLORIDE 20 MG/ML
JELLY TOPICAL
Status: COMPLETED | OUTPATIENT
Start: 2021-07-19 | End: 2021-07-19

## 2021-07-19 RX ORDER — ONDANSETRON 2 MG/ML
4 INJECTION INTRAMUSCULAR; INTRAVENOUS
Status: DISCONTINUED | OUTPATIENT
Start: 2021-07-19 | End: 2021-07-20 | Stop reason: HOSPADM

## 2021-07-19 RX ORDER — KETOROLAC TROMETHAMINE 30 MG/ML
15 INJECTION, SOLUTION INTRAMUSCULAR; INTRAVENOUS ONCE
Status: COMPLETED | OUTPATIENT
Start: 2021-07-19 | End: 2021-07-19

## 2021-07-19 RX ORDER — MORPHINE SULFATE 4 MG/ML
4 INJECTION INTRAVENOUS AS NEEDED
Status: DISCONTINUED | OUTPATIENT
Start: 2021-07-19 | End: 2021-07-20 | Stop reason: HOSPADM

## 2021-07-19 RX ORDER — MORPHINE SULFATE 4 MG/ML
4 INJECTION INTRAVENOUS ONCE
Status: COMPLETED | OUTPATIENT
Start: 2021-07-19 | End: 2021-07-19

## 2021-07-19 RX ADMIN — LIDOCAINE HYDROCHLORIDE: 20 JELLY TOPICAL at 21:42

## 2021-07-19 RX ADMIN — SODIUM CHLORIDE 1000 ML: 9 INJECTION, SOLUTION INTRAVENOUS at 18:55

## 2021-07-19 RX ADMIN — KETOROLAC TROMETHAMINE 15 MG: 30 INJECTION, SOLUTION INTRAMUSCULAR; INTRAVENOUS at 18:59

## 2021-07-19 RX ADMIN — IOPAMIDOL 100 ML: 755 INJECTION, SOLUTION INTRAVENOUS at 19:59

## 2021-07-19 RX ADMIN — MORPHINE SULFATE 4 MG: 4 INJECTION, SOLUTION INTRAMUSCULAR; INTRAVENOUS at 23:02

## 2021-07-19 NOTE — ED TRIAGE NOTES
Triage: per EMS pt went to [place] yesterday for fever. Today pt was given results that he has a UTI.

## 2021-07-19 NOTE — ED PROVIDER NOTES
53-year-old male history of coronary disease, depression, diabetic neuropathy, hypertension who was seen yesterday at [facility] for dysuria presents to the emergency department with continued fatigue and difficulty urinating. When he was seen yesterday he had dysuria and mildly positive UA. He had previously been placed on Bactrim by his primary care doctor. He was advised to continue that course of antibiotics. He denies fevers or nausea or vomiting. The history is provided by the patient and medical records. Urinary Pain   This is a new problem. The current episode started more than 2 days ago. The problem occurs every urination. The problem has not changed since onset. The pain is severe. There has been no fever. Associated symptoms include frequency, hematuria, hesitancy, urgency and abdominal pain. Pertinent negatives include no chills, no nausea, no vomiting and no flank pain. The patient is not pregnant.        Past Medical History:   Diagnosis Date    CAD (coronary artery disease), native coronary artery     [facility] [address] [address]     melanoma removed on forehead    Depression     Diabetic neuropathy ([facility]     ED (erectile dysfunction)     Hypertension, benign     Ill-defined condition     states he has memory problems    Postsurgical aortocoronary bypass status     Type II or unspecified type diabetes mellitus without mention of complication, not stated as uncontrolled        Past Surgical History:   Procedure Laterality Date    ECHO 2D ADULT  12/2009    EF 50%, basal inferior akinesis, mild MR    ECHO 2D ADULT  9/21/11    LVEF 50%, inferior AK, unchanged from Dec 2009    HX CORONARY ARTERY BYPASS GRAFT  2009    HX OTHER SURGICAL  2008    melanoma removed from forehead         Family History:   Problem Relation Age of Onset    Breast Cancer Mother         w bone mets    Parkinson's Disease Maternal Aunt     Parkinson's Disease Maternal Uncle     Parkinson's Disease Maternal Grandfather Social History     Socioeconomic History    Marital status:      Spouse name: Not on file    Number of children: Not on file    Years of education: Not on file    Highest education level: Not on file   Occupational History    Not on file   Tobacco Use    Smoking status: Never Smoker    Smokeless tobacco: Never Used   Substance and Sexual Activity    Alcohol use: No    Drug use: No    Sexual activity: Not on file   Other Topics Concern    Not on file   Social History Narrative    Not on file     Social Determinants of Health     Financial Resource Strain:     Difficulty of Paying Living Expenses:    Food Insecurity:     Worried About Running Out of Food in the Last Year:     [address] in the Last Year:    Transportation Needs:     Lack of Transportation (Medical):  Lack of Transportation (Non-Medical):    Physical Activity:     Days of Exercise per Week:     Minutes of Exercise per Session:    Stress:     Feeling of Stress :    Social Connections:     Frequency of Communication with Friends and Family:     Frequency of Social Gatherings with Friends and Family:     Attends Mormonism Services:     Active Member of Clubs or Organizations:     Attends Club or Organization Meetings:     Marital Status:    Intimate Partner Violence:     Fear of Current or Ex-Partner:     Emotionally Abused:     Physically Abused:     Sexually Abused: ALLERGIES: Amoxicillin and Pravastatin    Review of Systems   Constitutional: Negative for chills, fatigue and fever. HENT: Negative for sneezing and sore throat. Respiratory: Negative for cough and shortness of breath. Cardiovascular: Negative for chest pain and leg swelling. Gastrointestinal: Positive for abdominal pain. Negative for diarrhea, nausea and vomiting. Genitourinary: Positive for difficulty urinating, dysuria, frequency, hematuria, hesitancy and urgency. Negative for flank pain.    Musculoskeletal: Negative for arthralgias and myalgias. Skin: Negative for color change and rash. Neurological: Negative for weakness and headaches. Psychiatric/Behavioral: Negative for agitation and behavioral problems. There were no vitals filed for this visit. Physical Exam  Vitals and nursing note reviewed. Constitutional:       General: He is not in acute distress. Appearance: Normal appearance. He is well-developed. He is not ill-appearing, toxic-appearing or diaphoretic. HENT:      Head: Normocephalic and atraumatic. Nose: Nose normal.      Mouth/Throat:      Mouth: Mucous membranes are moist.      Pharynx: Oropharynx is clear. Eyes:      Extraocular Movements: Extraocular movements intact. Conjunctiva/sclera: Conjunctivae normal.      Pupils: Pupils are equal, round, and reactive to light. Cardiovascular:      Rate and Rhythm: Normal rate and regular rhythm. Pulses: Normal pulses. Heart sounds: No murmur heard. Pulmonary:      Effort: Pulmonary effort is normal. No respiratory distress. Breath sounds: Normal breath sounds. No wheezing. Chest:      Chest wall: No mass or tenderness. Abdominal:      General: There is no distension. Palpations: Abdomen is soft. Tenderness: There is abdominal tenderness in the suprapubic area. There is no right CVA tenderness, left CVA tenderness, guarding or rebound. Musculoskeletal:         General: No swelling, tenderness, deformity or signs of injury. Normal range of motion. Cervical back: Normal range of motion and neck supple. No rigidity. No muscular tenderness. Right lower leg: No tenderness. No edema. Left lower leg: No tenderness. No edema. Skin:     General: Skin is warm and dry. Capillary Refill: Capillary refill takes less than 2 seconds. Neurological:      General: No focal deficit present. Mental Status: He is alert and oriented to person, place, and time.    Psychiatric:         Mood and Affect: Mood normal.         Behavior: Behavior normal.          MDM  Number of Diagnoses or Management Options     Amount and/or Complexity of Data Reviewed  Clinical lab tests: reviewed  Tests in the radiology section of CPT®: reviewed  Decide to obtain previous medical records or to obtain history from someone other than the patient: yes           Bedside US    Date/Time: 7/19/2021 10:37 PM  Performed by: Tung Leong MD  Authorized by: Tung Leong MD     Written consent obtained: No    Verbal consent obtained: Yes    Given by:  Patient  Performed by: Attending  Type of procedure: Focused renal/urinary tract  Indications:  Urinary retention  Right kidney long axis (coronal): Not obtained  Right kidney short axis:  Not obtained  Left kidney long axis (coronal):   Not obtained  Left kidney short axis:  Not obtained  Transverse bladder:  Adequate  Sagittal bladder:  Adequate  Bladder volume (ml):  1200  Bladder size:  Distended

## 2021-07-20 VITALS
TEMPERATURE: 98.1 F | OXYGEN SATURATION: 95 % | DIASTOLIC BLOOD PRESSURE: 105 MMHG | RESPIRATION RATE: 16 BRPM | HEART RATE: 70 BPM | SYSTOLIC BLOOD PRESSURE: 126 MMHG

## 2021-07-20 LAB
APPEARANCE UR: ABNORMAL
BACTERIA URNS QL MICRO: NEGATIVE /HPF
BILIRUB UR QL: NEGATIVE
COLOR UR: ABNORMAL
EPITH CASTS URNS QL MICRO: ABNORMAL /LPF
GLUCOSE UR STRIP.AUTO-MCNC: 500 MG/DL
HGB UR QL STRIP: ABNORMAL
HYALINE CASTS URNS QL MICRO: ABNORMAL /LPF (ref 0–5)
KETONES UR QL STRIP.AUTO: ABNORMAL MG/DL
LEUKOCYTE ESTERASE UR QL STRIP.AUTO: ABNORMAL
NITRITE UR QL STRIP.AUTO: NEGATIVE
PH UR STRIP: 6 [PH] (ref 5–8)
PROT UR STRIP-MCNC: 30 MG/DL
RBC #/AREA URNS HPF: >100 /HPF (ref 0–5)
SP GR UR REFRACTOMETRY: 1.02 (ref 1–1.03)
UA: UC IF INDICATED,UAUC: ABNORMAL
UROBILINOGEN UR QL STRIP.AUTO: 0.2 EU/DL (ref 0.2–1)
WBC URNS QL MICRO: ABNORMAL /HPF (ref 0–4)

## 2021-07-20 NOTE — PROGRESS NOTES
Pt with AUR    Diff pederson placement failed attempts by ER team  Pt with out of proportion pain  Required lido jelly and 4 mg Morphine  Sterile technique  Filiform placed in bladder - large prostate no stricture  16 fro [place] cath placed   1 L effluux , 1200 PVR prior  Scr 1.1  UA neg  CT with enlarged prostate    recc pederson in 5-7 days  Home with tamsulosin  Void trial in office    Pt intolerant of intervention and pain was out of proportion to what we did and may provide office challenges for mgt

## 2021-07-20 NOTE — DISCHARGE INSTRUCTIONS
Thank you for allowing us to provide you with medical care today. We realize that you have many choices for your emergency care needs. We thank you for choosing [facility]. Please choose us in the future for any continued health care needs. We hope we addressed all of your medical concerns. We strive to provide excellent quality care in the Emergency Department. Anything less than excellent does not meet our expectations. The exam and treatment you received in the Emergency Department were for an emergent problem and are not intended as complete care. It is important that you follow up with a doctor, nurse practitioner, or physician's assistant for ongoing care. If your symptoms worsen or you do not improve as expected and you are unable to reach your usual health care provider, you should return to the Emergency Department. We are available 24 hours a day. Take this sheet with you when you go to your follow-up visit. If you have any problem arranging the follow-up visit, contact the Emergency Department immediately. Make an appointment your family doctor for follow up of this visit. Return to the ER if you are unable to be seen in a timely manner.

## 2021-07-21 LAB
BACTERIA SPEC CULT: NORMAL
SERVICE CMNT-IMP: NORMAL

## 2021-09-20 ENCOUNTER — HOSPITAL ENCOUNTER (EMERGENCY)
Age: 81
Discharge: HOME | End: 2021-09-20
Attending: EMERGENCY MEDICINE
Payer: MEDICARE

## 2021-09-20 VITALS
OXYGEN SATURATION: 98 % | RESPIRATION RATE: 16 BRPM | HEART RATE: 67 BPM | TEMPERATURE: 98 F | SYSTOLIC BLOOD PRESSURE: 152 MMHG | DIASTOLIC BLOOD PRESSURE: 67 MMHG

## 2021-09-20 DIAGNOSIS — N39.0 COMPLICATED UTI (URINARY TRACT INFECTION): Primary | ICD-10-CM

## 2021-09-20 LAB
AMORPH CRY URNS QL MICRO: ABNORMAL
ANION GAP SERPL CALC-SCNC: 10 MMOL/L (ref 5–15)
APPEARANCE UR: ABNORMAL
BACTERIA URNS QL MICRO: ABNORMAL /HPF
BILIRUB UR QL: NEGATIVE
BUN SERPL-MCNC: 19 MG/DL (ref 6–20)
BUN/CREAT SERPL: 18 (ref 12–20)
CALCIUM SERPL-MCNC: 9.1 MG/DL (ref 8.5–10.1)
CHLORIDE SERPL-SCNC: 106 MMOL/L (ref 97–108)
CO2 SERPL-SCNC: 24 MMOL/L (ref 21–32)
COLOR UR: ABNORMAL
COMMENT, HOLDF: NORMAL
CREAT SERPL-MCNC: 1.07 MG/DL (ref 0.7–1.3)
EPITH CASTS URNS QL MICRO: ABNORMAL /LPF
GLUCOSE SERPL-MCNC: 111 MG/DL (ref 65–100)
GLUCOSE UR STRIP.AUTO-MCNC: 250 MG/DL
HGB UR QL STRIP: ABNORMAL
KETONES UR QL STRIP.AUTO: NEGATIVE MG/DL
LEUKOCYTE ESTERASE UR QL STRIP.AUTO: ABNORMAL
NITRITE UR QL STRIP.AUTO: NEGATIVE
PH UR STRIP: 8.5 [PH] (ref 5–8)
POTASSIUM SERPL-SCNC: 3.6 MMOL/L (ref 3.5–5.1)
PROT UR STRIP-MCNC: 30 MG/DL
RBC #/AREA URNS HPF: ABNORMAL /HPF (ref 0–5)
SAMPLES BEING HELD,HOLD: NORMAL
SODIUM SERPL-SCNC: 140 MMOL/L (ref 136–145)
SP GR UR REFRACTOMETRY: 1.01 (ref 1–1.03)
UR CULT HOLD, URHOLD: NORMAL
UROBILINOGEN UR QL STRIP.AUTO: 0.2 EU/DL (ref 0.2–1)
WBC URNS QL MICRO: >100 /HPF (ref 0–4)

## 2021-09-20 PROCEDURE — 87186 SC STD MICRODIL/AGAR DIL: CPT

## 2021-09-20 PROCEDURE — 87077 CULTURE AEROBIC IDENTIFY: CPT

## 2021-09-20 PROCEDURE — 87086 URINE CULTURE/COLONY COUNT: CPT

## 2021-09-20 PROCEDURE — 74011250637 HC RX REV CODE- 250/637: Performed by: EMERGENCY MEDICINE

## 2021-09-20 PROCEDURE — 80048 BASIC METABOLIC PNL TOTAL CA: CPT

## 2021-09-20 PROCEDURE — 81001 URINALYSIS AUTO W/SCOPE: CPT

## 2021-09-20 PROCEDURE — 36415 COLL VENOUS BLD VENIPUNCTURE: CPT

## 2021-09-20 PROCEDURE — 99283 EMERGENCY DEPT VISIT LOW MDM: CPT

## 2021-09-20 PROCEDURE — 74011000250 HC RX REV CODE- 250: Performed by: EMERGENCY MEDICINE

## 2021-09-20 RX ORDER — CEPHALEXIN 250 MG/1
500 CAPSULE ORAL
Status: COMPLETED | OUTPATIENT
Start: 2021-09-20 | End: 2021-09-20

## 2021-09-20 RX ORDER — LIDOCAINE HYDROCHLORIDE 20 MG/ML
JELLY TOPICAL ONCE
Status: COMPLETED | OUTPATIENT
Start: 2021-09-20 | End: 2021-09-20

## 2021-09-20 RX ORDER — CEPHALEXIN 500 MG/1
500 CAPSULE ORAL 4 TIMES DAILY
Qty: 28 CAPSULE | Refills: 0 | Status: SHIPPED | OUTPATIENT
Start: 2021-09-20 | End: 2021-09-27

## 2021-09-20 RX ADMIN — LIDOCAINE HYDROCHLORIDE: 20 JELLY TOPICAL at 21:20

## 2021-09-20 RX ADMIN — CEPHALEXIN 500 MG: 250 CAPSULE ORAL at 22:54

## 2021-09-21 NOTE — ED NOTES
Patient discharged by provider. D/C instructions given. Patient educated to take all medications as instructed for management at home. Patien verbalized understanding, verbalized no questions. Patient ambulated out of ER without difficulty, NAD.   Patient Vitals for the past 4 hrs:   Temp Pulse Resp BP SpO2   09/20/21 2050 98 °F (36.7 °C) 67 16 (!) 152/67 98 %

## 2021-09-21 NOTE — ED PROVIDER NOTES
79-year-old male with a history of enlarged prostate presents with Hernández malfunction. States that the Hernández has been draining since 330 this afternoon. He rates his pain 8 out of 10 to his penis and pelvis. He denies any fevers or chills. Denies any nausea or vomiting. He had the Hernández placed on July 19 for inability to urinate. He is followed up with the urologist last week and failed a voiding trial.  He supposed to go back in the early part of October for another voiding trial.      Urinary Retention     Urinary Catheter Problem   His past medical history is significant for urinary catheter problem.         Past Medical History:   Diagnosis Date    CAD (coronary artery disease), native coronary artery     Cancer ([facility] [address]     melanoma removed on forehead    Depression     Diabetic neuropathy ([facility] [address] [facility]     ED (erectile dysfunction)     Hypertension, benign     Ill-defined condition     states he has memory problems    Postsurgical aortocoronary bypass status     Type II or unspecified type diabetes mellitus without mention of complication, not stated as uncontrolled        Past Surgical History:   Procedure Laterality Date    COLONOSCOPY N/A 5/3/2016    COLONOSCOPY performed by Chapito Reyna MD at [facility] ECHO 2D ADULT  12/2009    EF 50%, basal inferior akinesis, mild MR    ECHO 2D ADULT  9/21/11    LVEF 50%, inferior AK, unchanged from Dec 2009    HX CORONARY ARTERY BYPASS GRAFT  2009    HX OTHER SURGICAL  2008    melanoma removed from forehead         Family History:   Problem Relation Age of Onset    Breast Cancer Mother         w bone mets    Parkinson's Disease Maternal Aunt     Parkinson's Disease Maternal Uncle     Parkinson's Disease Maternal Grandfather        Social History     Socioeconomic History    Marital status:      Spouse name: Not on file    Number of children: Not on file    Years of education: Not on file    Highest education level: Not on file Occupational History    Not on file   Tobacco Use    Smoking status: Never Smoker    Smokeless tobacco: Never Used   Substance and Sexual Activity    Alcohol use: No    Drug use: No    Sexual activity: Not on file   Other Topics Concern    Not on file   Social History Narrative    Not on file     Social Determinants of Health     Financial Resource Strain:     Difficulty of Paying Living Expenses:    Food Insecurity:     Worried About Running Out of Food in the Last Year:     [address] in the Last Year:    Transportation Needs:     Lack of Transportation (Medical):  Lack of Transportation (Non-Medical):    Physical Activity:     Days of Exercise per Week:     Minutes of Exercise per Session:    Stress:     Feeling of Stress :    Social Connections:     Frequency of Communication with Friends and Family:     Frequency of Social Gatherings with Friends and Family:     Attends [employer] Services:     Active Member of Clubs or Organizations:     Attends Club or Organization Meetings:     Marital Status:    Intimate Partner Violence:     Fear of Current or Ex-Partner:     Emotionally Abused:     Physically Abused:     Sexually Abused: ALLERGIES: Amoxicillin, Pravastatin, and Sulfa (sulfonamide antibiotics)    Review of Systems   All other systems reviewed and are negative. Vitals:    09/20/21 2050   BP: (!) 152/67   Pulse: 67   Resp: 16   Temp: 98 °F (36.7 °C)   SpO2: 98%            Physical Exam  Vitals and nursing note reviewed. Constitutional:       General: He is not in acute distress. HENT:      Head: Normocephalic and atraumatic. Eyes:      General: No scleral icterus. Conjunctiva/sclera: Conjunctivae normal.      Pupils: Pupils are equal, round, and reactive to light. Neck:      Trachea: No tracheal deviation. Cardiovascular:      Rate and Rhythm: Normal rate and regular rhythm.    Pulmonary:      Effort: Pulmonary effort is normal. No respiratory distress. Breath sounds: Normal breath sounds. No stridor. Abdominal:      General: There is no distension. Palpations: Abdomen is soft. Tenderness: There is no abdominal tenderness. There is no guarding or rebound. Genitourinary:     Comments: Hernández catheter in place  Musculoskeletal:         General: No deformity. Cervical back: No rigidity. Skin:     General: Skin is warm and dry. Neurological:      General: No focal deficit present. Mental Status: He is alert. Psychiatric:         Mood and Affect: Mood normal.         Behavior: Behavior normal.          MDM       Procedures          10:45 PM  Patient re-evaluated. All questions answered. Patient appropriate for discharge. Given return precautions and follow up instructions. LABORATORY TESTS:  Labs Reviewed   URINALYSIS W/MICROSCOPIC - Abnormal; Notable for the following components:       Result Value    Appearance CLOUDY (*)     pH (UA) 8.5 (*)     Protein 30 (*)     Glucose 250 (*)     Blood MODERATE (*)     Leukocyte Esterase LARGE (*)     WBC >100 (*)     Bacteria 4+ (*)     Amorphous Crystals FEW (*)     All other components within normal limits   METABOLIC PANEL, BASIC - Abnormal; Notable for the following components:    Glucose 111 (*)     All other components within normal limits   URINE CULTURE HOLD SAMPLE   CULTURE, URINE   SAMPLES BEING HELD       IMAGING RESULTS:  No orders to display       MEDICATIONS GIVEN:  Medications   cephALEXin (KEFLEX) capsule 500 mg (has no administration in time range)   lidocaine (URO-JET/ GLYDO) 2 % jelly ( Urethral Given 9/20/21 2120)       IMPRESSION:  1. Complicated UTI (urinary tract infection)        PLAN:  1. Current Discharge Medication List      START taking these medications    Details   cephALEXin (Keflex) 500 mg capsule Take 1 Capsule by mouth four (4) times daily for 7 days. Qty: 28 Capsule, Refills: 0  Start date: 9/20/2021, End date: 9/27/2021           2. Follow-up Information     Follow up With Specialties Details Why Contact Info    Kiana Remy MD Family Medicine Schedule an appointment as soon as possible for a visit   [address] [place] [address] [phone]      [address] [place] [address] DEPT Emergency Medicine  If symptoms worsen or new concerns [facility] [address] [id number]  [phone]        3. Return to ED for new or worsening symptoms       Blanca Raza. Roger Combs MD        Please note that this dictation was completed with [employer], the [employer] voice recognition software. Quite often unanticipated grammatical, syntax, homophones, and other interpretive errors are inadvertently transcribed by the computer software. Please disregard these errors. Please excuse any errors that have escaped final proofreading.

## 2021-09-24 LAB
BACTERIA SPEC CULT: ABNORMAL
CC UR VC: ABNORMAL
SERVICE CMNT-IMP: ABNORMAL

## 2021-09-30 ENCOUNTER — HOSPITAL ENCOUNTER (INPATIENT)
Age: 81
LOS: 5 days | Discharge: HOME HEALTH | DRG: 698 | End: 2021-10-06
Attending: EMERGENCY MEDICINE | Admitting: HOSPITALIST
Payer: MEDICARE

## 2021-09-30 ENCOUNTER — APPOINTMENT (OUTPATIENT)
Dept: GENERAL RADIOLOGY | Age: 81
DRG: 698 | End: 2021-09-30
Attending: EMERGENCY MEDICINE
Payer: MEDICARE

## 2021-09-30 DIAGNOSIS — A49.8 CLOSTRIDIUM DIFFICILE INFECTION: ICD-10-CM

## 2021-09-30 DIAGNOSIS — A41.9 SEPSIS, DUE TO UNSPECIFIED ORGANISM, UNSPECIFIED WHETHER ACUTE ORGAN DYSFUNCTION PRESENT (HCC): Primary | ICD-10-CM

## 2021-09-30 DIAGNOSIS — Z79.4 TYPE 2 DIABETES MELLITUS WITHOUT COMPLICATION, WITH LONG-TERM CURRENT USE OF INSULIN (HCC): ICD-10-CM

## 2021-09-30 DIAGNOSIS — R19.7 DIARRHEA, UNSPECIFIED TYPE: ICD-10-CM

## 2021-09-30 DIAGNOSIS — G93.40 ACUTE ENCEPHALOPATHY: ICD-10-CM

## 2021-09-30 DIAGNOSIS — E11.9 TYPE 2 DIABETES MELLITUS WITHOUT COMPLICATION, WITH LONG-TERM CURRENT USE OF INSULIN (HCC): ICD-10-CM

## 2021-09-30 LAB
ALBUMIN SERPL-MCNC: 3 G/DL (ref 3.5–5)
ALBUMIN/GLOB SERPL: 0.7 {RATIO} (ref 1.1–2.2)
ALP SERPL-CCNC: 63 U/L (ref 45–117)
ALT SERPL-CCNC: 18 U/L (ref 12–78)
ANION GAP SERPL CALC-SCNC: 9 MMOL/L (ref 5–15)
AST SERPL-CCNC: 13 U/L (ref 15–37)
BILIRUB SERPL-MCNC: 1.3 MG/DL (ref 0.2–1)
BUN SERPL-MCNC: 19 MG/DL (ref 6–20)
BUN/CREAT SERPL: 17 (ref 12–20)
CALCIUM SERPL-MCNC: 8.4 MG/DL (ref 8.5–10.1)
CHLORIDE SERPL-SCNC: 104 MMOL/L (ref 97–108)
CO2 SERPL-SCNC: 21 MMOL/L (ref 21–32)
COMMENT, HOLDF: NORMAL
COVID-19 RAPID TEST, COVR: NOT DETECTED
CREAT SERPL-MCNC: 1.15 MG/DL (ref 0.7–1.3)
GLOBULIN SER CALC-MCNC: 4.2 G/DL (ref 2–4)
GLUCOSE SERPL-MCNC: 297 MG/DL (ref 65–100)
LACTATE BLD-SCNC: 1.21 MMOL/L (ref 0.4–2)
POTASSIUM SERPL-SCNC: 4.2 MMOL/L (ref 3.5–5.1)
PROCALCITONIN SERPL-MCNC: <0.05 NG/ML
PROT SERPL-MCNC: 7.2 G/DL (ref 6.4–8.2)
SAMPLES BEING HELD,HOLD: NORMAL
SODIUM SERPL-SCNC: 134 MMOL/L (ref 136–145)
SOURCE, COVRS: NORMAL

## 2021-09-30 PROCEDURE — 87040 BLOOD CULTURE FOR BACTERIA: CPT

## 2021-09-30 PROCEDURE — 99285 EMERGENCY DEPT VISIT HI MDM: CPT

## 2021-09-30 PROCEDURE — 93005 ELECTROCARDIOGRAM TRACING: CPT

## 2021-09-30 PROCEDURE — 84145 PROCALCITONIN (PCT): CPT

## 2021-09-30 PROCEDURE — 74011250637 HC RX REV CODE- 250/637: Performed by: EMERGENCY MEDICINE

## 2021-09-30 PROCEDURE — 80053 COMPREHEN METABOLIC PANEL: CPT

## 2021-09-30 PROCEDURE — 83036 HEMOGLOBIN GLYCOSYLATED A1C: CPT

## 2021-09-30 PROCEDURE — 74011250636 HC RX REV CODE- 250/636: Performed by: EMERGENCY MEDICINE

## 2021-09-30 PROCEDURE — 87635 SARS-COV-2 COVID-19 AMP PRB: CPT

## 2021-09-30 PROCEDURE — 83605 ASSAY OF LACTIC ACID: CPT

## 2021-09-30 PROCEDURE — 85025 COMPLETE CBC W/AUTO DIFF WBC: CPT

## 2021-09-30 PROCEDURE — 71045 X-RAY EXAM CHEST 1 VIEW: CPT

## 2021-09-30 PROCEDURE — 36415 COLL VENOUS BLD VENIPUNCTURE: CPT

## 2021-09-30 RX ORDER — ACETAMINOPHEN 500 MG
1000 TABLET ORAL
Status: COMPLETED | OUTPATIENT
Start: 2021-09-30 | End: 2021-09-30

## 2021-09-30 RX ORDER — SODIUM CHLORIDE 0.9 % (FLUSH) 0.9 %
5-10 SYRINGE (ML) INJECTION AS NEEDED
Status: DISCONTINUED | OUTPATIENT
Start: 2021-09-30 | End: 2021-10-06 | Stop reason: HOSPADM

## 2021-09-30 RX ADMIN — SODIUM CHLORIDE 1000 ML: 9 INJECTION, SOLUTION INTRAVENOUS at 22:44

## 2021-09-30 RX ADMIN — ACETAMINOPHEN 1000 MG: 500 TABLET ORAL at 22:44

## 2021-10-01 ENCOUNTER — APPOINTMENT (OUTPATIENT)
Dept: CT IMAGING | Age: 81
DRG: 698 | End: 2021-10-01
Attending: EMERGENCY MEDICINE
Payer: MEDICARE

## 2021-10-01 ENCOUNTER — TELEPHONE (OUTPATIENT)
Dept: CARDIOLOGY CLINIC | Age: 81
End: 2021-10-01

## 2021-10-01 PROBLEM — N39.0 UTI (URINARY TRACT INFECTION) DUE TO URINARY INDWELLING CATHETER (HCC): Status: ACTIVE | Noted: 2021-10-01

## 2021-10-01 PROBLEM — R19.7 DIARRHEA: Status: ACTIVE | Noted: 2021-10-01

## 2021-10-01 PROBLEM — T83.511A UTI (URINARY TRACT INFECTION) DUE TO URINARY INDWELLING CATHETER (HCC): Status: ACTIVE | Noted: 2021-10-01

## 2021-10-01 PROBLEM — J98.11 ATELECTASIS, LEFT: Status: ACTIVE | Noted: 2021-10-01

## 2021-10-01 PROBLEM — A41.9 SEPSIS (HCC): Status: ACTIVE | Noted: 2021-10-01

## 2021-10-01 LAB
APPEARANCE UR: CLEAR
ATRIAL RATE: 98 BPM
BACTERIA URNS QL MICRO: NEGATIVE /HPF
BASOPHILS # BLD: 0 K/UL (ref 0–0.1)
BASOPHILS NFR BLD: 0 % (ref 0–1)
BILIRUB UR QL: NEGATIVE
CALCULATED P AXIS, ECG09: 40 DEGREES
CALCULATED R AXIS, ECG10: -43 DEGREES
CALCULATED T AXIS, ECG11: 4 DEGREES
COLOR UR: ABNORMAL
DIAGNOSIS, 93000: NORMAL
DIFFERENTIAL METHOD BLD: ABNORMAL
EOSINOPHIL # BLD: 0 K/UL (ref 0–0.4)
EOSINOPHIL NFR BLD: 0 % (ref 0–7)
EPITH CASTS URNS QL MICRO: ABNORMAL /LPF
ERYTHROCYTE [DISTWIDTH] IN BLOOD BY AUTOMATED COUNT: 13.2 % (ref 11.5–14.5)
GLUCOSE BLD STRIP.AUTO-MCNC: 187 MG/DL (ref 65–117)
GLUCOSE BLD STRIP.AUTO-MCNC: 196 MG/DL (ref 65–117)
GLUCOSE BLD STRIP.AUTO-MCNC: 86 MG/DL (ref 65–117)
GLUCOSE UR STRIP.AUTO-MCNC: 250 MG/DL
HCT VFR BLD AUTO: 39.4 % (ref 36.6–50.3)
HGB BLD-MCNC: 13.3 G/DL (ref 12.1–17)
HGB UR QL STRIP: ABNORMAL
HYALINE CASTS URNS QL MICRO: ABNORMAL /LPF (ref 0–5)
IMM GRANULOCYTES # BLD AUTO: 0 K/UL (ref 0–0.04)
IMM GRANULOCYTES NFR BLD AUTO: 0 % (ref 0–0.5)
KETONES UR QL STRIP.AUTO: NEGATIVE MG/DL
LEUKOCYTE ESTERASE UR QL STRIP.AUTO: ABNORMAL
LYMPHOCYTES # BLD: 0.7 K/UL (ref 0.8–3.5)
LYMPHOCYTES NFR BLD: 4 % (ref 12–49)
MCH RBC QN AUTO: 29.8 PG (ref 26–34)
MCHC RBC AUTO-ENTMCNC: 33.8 G/DL (ref 30–36.5)
MCV RBC AUTO: 88.3 FL (ref 80–99)
MONOCYTES # BLD: 1.2 K/UL (ref 0–1)
MONOCYTES NFR BLD: 7 % (ref 5–13)
NEUTS SEG # BLD: 15 K/UL (ref 1.8–8)
NEUTS SEG NFR BLD: 89 % (ref 32–75)
NITRITE UR QL STRIP.AUTO: NEGATIVE
NRBC # BLD: 0 K/UL (ref 0–0.01)
NRBC BLD-RTO: 0 PER 100 WBC
P-R INTERVAL, ECG05: 210 MS
PH UR STRIP: 6.5 [PH] (ref 5–8)
PLATELET # BLD AUTO: 291 K/UL (ref 150–400)
PMV BLD AUTO: 9.2 FL (ref 8.9–12.9)
PROT UR STRIP-MCNC: NEGATIVE MG/DL
Q-T INTERVAL, ECG07: 370 MS
QRS DURATION, ECG06: 100 MS
QTC CALCULATION (BEZET), ECG08: 472 MS
RBC # BLD AUTO: 4.46 M/UL (ref 4.1–5.7)
RBC #/AREA URNS HPF: ABNORMAL /HPF (ref 0–5)
RBC MORPH BLD: ABNORMAL
SERVICE CMNT-IMP: ABNORMAL
SERVICE CMNT-IMP: ABNORMAL
SERVICE CMNT-IMP: NORMAL
SP GR UR REFRACTOMETRY: 1.02 (ref 1–1.03)
UA: UC IF INDICATED,UAUC: ABNORMAL
UROBILINOGEN UR QL STRIP.AUTO: 0.2 EU/DL (ref 0.2–1)
VENTRICULAR RATE, ECG03: 98 BPM
WBC # BLD AUTO: 16.9 K/UL (ref 4.1–11.1)
WBC URNS QL MICRO: ABNORMAL /HPF (ref 0–4)

## 2021-10-01 PROCEDURE — 82962 GLUCOSE BLOOD TEST: CPT

## 2021-10-01 PROCEDURE — 74011000636 HC RX REV CODE- 636: Performed by: RADIOLOGY

## 2021-10-01 PROCEDURE — 77030040831 HC BAG URINE DRNG MDII -A

## 2021-10-01 PROCEDURE — 87086 URINE CULTURE/COLONY COUNT: CPT

## 2021-10-01 PROCEDURE — 65270000029 HC RM PRIVATE

## 2021-10-01 PROCEDURE — 36415 COLL VENOUS BLD VENIPUNCTURE: CPT

## 2021-10-01 PROCEDURE — 99232 SBSQ HOSP IP/OBS MODERATE 35: CPT | Performed by: CLINICAL NURSE SPECIALIST

## 2021-10-01 PROCEDURE — 74011000250 HC RX REV CODE- 250: Performed by: HOSPITALIST

## 2021-10-01 PROCEDURE — 74011250636 HC RX REV CODE- 250/636: Performed by: HOSPITALIST

## 2021-10-01 PROCEDURE — 81001 URINALYSIS AUTO W/SCOPE: CPT

## 2021-10-01 PROCEDURE — 87493 C DIFF AMPLIFIED PROBE: CPT

## 2021-10-01 PROCEDURE — 87040 BLOOD CULTURE FOR BACTERIA: CPT

## 2021-10-01 PROCEDURE — 74011636637 HC RX REV CODE- 636/637: Performed by: FAMILY MEDICINE

## 2021-10-01 PROCEDURE — 87324 CLOSTRIDIUM AG IA: CPT

## 2021-10-01 PROCEDURE — 74011250636 HC RX REV CODE- 250/636: Performed by: EMERGENCY MEDICINE

## 2021-10-01 PROCEDURE — 74177 CT ABD & PELVIS W/CONTRAST: CPT

## 2021-10-01 PROCEDURE — 74011250637 HC RX REV CODE- 250/637: Performed by: FAMILY MEDICINE

## 2021-10-01 RX ORDER — ACETAMINOPHEN 325 MG/1
650 TABLET ORAL
Status: DISCONTINUED | OUTPATIENT
Start: 2021-10-01 | End: 2021-10-06 | Stop reason: HOSPADM

## 2021-10-01 RX ORDER — SODIUM CHLORIDE 0.9 % (FLUSH) 0.9 %
5-40 SYRINGE (ML) INJECTION EVERY 8 HOURS
Status: DISCONTINUED | OUTPATIENT
Start: 2021-10-01 | End: 2021-10-06 | Stop reason: HOSPADM

## 2021-10-01 RX ORDER — DUTASTERIDE 0.5 MG/1
0.5 CAPSULE, LIQUID FILLED ORAL DAILY
Status: DISCONTINUED | OUTPATIENT
Start: 2021-10-01 | End: 2021-10-06 | Stop reason: HOSPADM

## 2021-10-01 RX ORDER — ACETAMINOPHEN 650 MG/1
650 SUPPOSITORY RECTAL
Status: DISCONTINUED | OUTPATIENT
Start: 2021-10-01 | End: 2021-10-06 | Stop reason: HOSPADM

## 2021-10-01 RX ORDER — TAMSULOSIN HYDROCHLORIDE 0.4 MG/1
0.4 CAPSULE ORAL 2 TIMES DAILY
Status: DISCONTINUED | OUTPATIENT
Start: 2021-10-01 | End: 2021-10-06 | Stop reason: HOSPADM

## 2021-10-01 RX ORDER — SODIUM CHLORIDE 9 MG/ML
50 INJECTION, SOLUTION INTRAVENOUS CONTINUOUS
Status: DISCONTINUED | OUTPATIENT
Start: 2021-10-01 | End: 2021-10-06 | Stop reason: HOSPADM

## 2021-10-01 RX ORDER — ONDANSETRON 2 MG/ML
4 INJECTION INTRAMUSCULAR; INTRAVENOUS
Status: DISCONTINUED | OUTPATIENT
Start: 2021-10-01 | End: 2021-10-06 | Stop reason: HOSPADM

## 2021-10-01 RX ORDER — PANTOPRAZOLE SODIUM 40 MG/1
40 TABLET, DELAYED RELEASE ORAL
Status: DISCONTINUED | OUTPATIENT
Start: 2021-10-01 | End: 2021-10-05

## 2021-10-01 RX ORDER — METOPROLOL SUCCINATE 25 MG/1
25 TABLET, EXTENDED RELEASE ORAL DAILY
Status: DISCONTINUED | OUTPATIENT
Start: 2021-10-01 | End: 2021-10-06 | Stop reason: HOSPADM

## 2021-10-01 RX ORDER — SODIUM CHLORIDE 0.9 % (FLUSH) 0.9 %
5-40 SYRINGE (ML) INJECTION AS NEEDED
Status: DISCONTINUED | OUTPATIENT
Start: 2021-10-01 | End: 2021-10-06 | Stop reason: HOSPADM

## 2021-10-01 RX ORDER — FACIAL-BODY WIPES
10 EACH TOPICAL DAILY PRN
Status: DISCONTINUED | OUTPATIENT
Start: 2021-10-01 | End: 2021-10-06 | Stop reason: HOSPADM

## 2021-10-01 RX ORDER — INSULIN LISPRO 100 [IU]/ML
INJECTION, SOLUTION INTRAVENOUS; SUBCUTANEOUS
Status: DISCONTINUED | OUTPATIENT
Start: 2021-10-01 | End: 2021-10-06 | Stop reason: HOSPADM

## 2021-10-01 RX ORDER — DEXTROSE 50 % IN WATER (D50W) INTRAVENOUS SYRINGE
25-50 AS NEEDED
Status: DISCONTINUED | OUTPATIENT
Start: 2021-10-01 | End: 2021-10-06 | Stop reason: HOSPADM

## 2021-10-01 RX ORDER — SERTRALINE HYDROCHLORIDE 25 MG/1
25 TABLET, FILM COATED ORAL DAILY
Status: DISCONTINUED | OUTPATIENT
Start: 2021-10-01 | End: 2021-10-01

## 2021-10-01 RX ORDER — POLYETHYLENE GLYCOL 3350 17 G/17G
17 POWDER, FOR SOLUTION ORAL DAILY PRN
Status: DISCONTINUED | OUTPATIENT
Start: 2021-10-01 | End: 2021-10-06 | Stop reason: HOSPADM

## 2021-10-01 RX ORDER — MELATONIN
1000 DAILY
COMMUNITY

## 2021-10-01 RX ORDER — MAGNESIUM SULFATE 100 %
4 CRYSTALS MISCELLANEOUS AS NEEDED
Status: DISCONTINUED | OUTPATIENT
Start: 2021-10-01 | End: 2021-10-06 | Stop reason: HOSPADM

## 2021-10-01 RX ORDER — DUTASTERIDE 0.5 MG/1
0.5 CAPSULE, LIQUID FILLED ORAL DAILY
COMMUNITY

## 2021-10-01 RX ADMIN — INSULIN LISPRO 2 UNITS: 100 INJECTION, SOLUTION INTRAVENOUS; SUBCUTANEOUS at 12:21

## 2021-10-01 RX ADMIN — HUMAN INSULIN 25 UNITS: 100 INJECTION, SUSPENSION SUBCUTANEOUS at 17:23

## 2021-10-01 RX ADMIN — PANTOPRAZOLE SODIUM 40 MG: 40 TABLET, DELAYED RELEASE ORAL at 10:09

## 2021-10-01 RX ADMIN — INSULIN LISPRO 2 UNITS: 100 INJECTION, SOLUTION INTRAVENOUS; SUBCUTANEOUS at 17:23

## 2021-10-01 RX ADMIN — Medication 10 ML: at 12:34

## 2021-10-01 RX ADMIN — SODIUM CHLORIDE 100 ML/HR: 9 INJECTION, SOLUTION INTRAVENOUS at 04:51

## 2021-10-01 RX ADMIN — TAMSULOSIN HYDROCHLORIDE 0.4 MG: 0.4 CAPSULE ORAL at 17:24

## 2021-10-01 RX ADMIN — Medication 10 ML: at 13:21

## 2021-10-01 RX ADMIN — Medication 10 ML: at 22:00

## 2021-10-01 RX ADMIN — SODIUM CHLORIDE 709 ML: 9 INJECTION, SOLUTION INTRAVENOUS at 04:21

## 2021-10-01 RX ADMIN — CEFTRIAXONE 1 G: 1 INJECTION, POWDER, FOR SOLUTION INTRAMUSCULAR; INTRAVENOUS at 03:34

## 2021-10-01 RX ADMIN — SODIUM CHLORIDE 1000 ML: 9 INJECTION, SOLUTION INTRAVENOUS at 01:34

## 2021-10-01 RX ADMIN — TAMSULOSIN HYDROCHLORIDE 0.4 MG: 0.4 CAPSULE ORAL at 12:20

## 2021-10-01 RX ADMIN — IOPAMIDOL 100 ML: 755 INJECTION, SOLUTION INTRAVENOUS at 00:43

## 2021-10-01 RX ADMIN — METOPROLOL SUCCINATE 25 MG: 25 TABLET, EXTENDED RELEASE ORAL at 10:09

## 2021-10-01 RX ADMIN — DUTASTERIDE 0.5 MG: 0.5 CAPSULE, LIQUID FILLED ORAL at 12:20

## 2021-10-01 NOTE — PROGRESS NOTES
Daily Progress Note: 10/1/2021  Marie Rodriguez MD    Assessment/Plan:   Sepsis: Meets criteria based on HR, RR, WBC, and UTI. -IVFs, supportive care, IV ABX with Rocephin IV. -Cultures pending and will adjust antibiotics accordingly.    -Lactic acid level. -ID consult.     UTI (urinary tract infection) due to urinary indwelling catheter: Recently treated for pansensitive Proteus with Keflex. But in the setting of a chronic indwelling catheter will treat with Rocephin.    -UA and urine culture pending.     Diarrhea: No history of C. difficile but in the setting of recent antibiotics will do stool studies to include C. difficile and enteric panel.    -Hold off on antidiarrheal agents for now.     Type 2 diabetes mellitus without complication:    -Monitor blood glucose levels with insulin sliding scale coverage along with basal insulin. -Monitor for complications. -A1C.    -Diabetic diet.     Hypertension, benign:   -Resume home meds   -Hydralazine as needed.     Atelectasis, left:   -Deep breath cough and incentive spirometry. Problem List:  Problem List as of 10/1/2021 Date Reviewed: 10/1/2021        Codes Class Noted - Resolved    Diarrhea ICD-10-CM: R19.7  ICD-9-CM: 787.91  10/1/2021 - Present        Sepsis ([facility] ICD-10-CM: A41.9  ICD-9-CM: 038.9, 995.91  10/1/2021 - Present        UTI (urinary tract infection) due to urinary indwelling catheter ([facility] [address] ICD-10-CM: T83.511A, N39.0  ICD-9-CM: 996.64, 599.0  10/1/2021 - Present        Atelectasis, left ICD-10-CM: J98.11  ICD-9-CM: 518.0  10/1/2021 - Present        Epigastric abdominal pain ICD-10-CM: R10.13  ICD-9-CM: 789.06  2/8/2020 - Present        Epigastric pain ICD-10-CM: R10.13  ICD-9-CM: 789.06  2/8/2020 - Present        Depression ICD-10-CM: F32. A  ICD-9-CM: 612  2/7/2020 - Present        Fall ICD-10-CM: W19. Denis Brock  ICD-9-CM: Y996.8  11/29/2019 - Present        Traumatic rhabdomyolysis ([facility] [address] ICD-10-CM: T79. 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)  - LIMA-LAD, VG-OM, VG-PDA             Type 2 diabetes mellitus without complication ([facility] [address] [facility]: E11.9  ICD-9-CM: 250.00  Unknown - Present        Hypertension, benign ICD-10-CM: I10  ICD-9-CM: 401.1  Unknown - Present        RESOLVED: Chest pain ICD-10-CM: R07.9  ICD-9-CM: 786.50  2/7/2020 - 2/8/2020        RESOLVED: PND (paroxysmal nocturnal dyspnea) ICD-10-CM: R06.00  ICD-9-CM: 786.09  8/13/2017 - 8/11/2018        RESOLVED: Near syncope ICD-10-CM: R55  ICD-9-CM: 780.2  10/29/2013 - 4/15/2014              Subjective:    80 y.o. male with history that includes HTN, CAD, diabetes with neuropathy, and dementia who just completed Keflex for Proteus UTI presents fever, per chart worsening UTI symptoms, and diarrhea. This associated with fatigue and malaise. Due to his dementia he is not able to provide much of a history but is aware that he has had \"bad\" diarrhea but is unable to describe the consistency or color but does deny blood in it. Has not had a BM since he has been here. He did not remember that he had a UTI and was on antibiotics. In ER he was found to meet criteria for sepsis based on heart rate respiratory rate and leukocytosis in the setting of UTI and possible enteric infection. Chest x-ray showed minimal atelectasis in the left base but no chest pain or shortness of breath per patient. (Dr Ashley Rucker)    10/1:  Still with loose watery stools. Cultures pending. Tmax 100. AM labs pending.  Cultures pending.        Review of Systems:   A comprehensive review of systems was negative except for that written in the HPI. Objective:   Physical Exam:   Visit Vitals  /75   Pulse 82   Temp 98.3 °F (36.8 °C)   Resp 19   Wt 90.3 kg (199 lb)   SpO2 100%   BMI 26.99 kg/m²      O2 Device: None (Room air)  Temp (24hrs), Av.2 °F (37.3 °C), Min:98.3 °F (36.8 °C), Max:100 °F (37.8 °C)    1901 - 10/01 0700  In: -   Out: 1000 [Urine:1000]   No intake/output data recorded. General:  Alert, cooperative, no distress, appears stated age. Head:  Normocephalic, without obvious abnormality, atraumatic. Eyes:  Conjunctivae/corneas clear. PERRL, EOMs intact. Nose: Nares normal. Septum midline. Mucosa normal. No drainage or sinus tenderness. Throat: Lips, mucosa, and tongue moist..   Neck: Supple, symmetrical, trachea midline, no adenopathy, thyroid: no enlargement/tenderness/nodules, no carotid bruit and no JVD. Back:   Symmetric, no curvature. ROM normal. No CVA tenderness. Lungs:   Clear to auscultation bilaterally. Chest wall:  No tenderness or deformity. Heart:  Regular rate and rhythm, S1, S2 normal, no murmur, click, rub or gallop. Abdomen:   Soft, non-tender. Bowel sounds normal. No masses,  No organomegaly. Extremities: no cyanosis or edema. No calf tenderness or cords. Pulses: 2+ and symmetric all extremities. Skin: Skin color, texture, turgor normal. No rashes or lesions   Neurologic: CNII-XII intact. Alert and oriented X 2. Fine motor of hands and fingers normal.   equal.  No cogwheeling or rigidity. Gait not tested at this time. Sensation grossly normal to touch. Gross motor of extremities normal.       Data Review:     CT CHEST ABD PELV W CONT  Result Date: 10/1/2021  No acute findings.  Incidentals as above including coronary artery disease status post CABG, cholecystolithiasis and prostamegaly with indwelling Hernández catheter.     XR CHEST PORT  Result Date: 2021  Minimal left basilar subsegmental atelectasis. Recent Days:  Recent Labs     09/30/21 2235   WBC 16.9*   HGB 13.3   HCT 39.4        Recent Labs     09/30/21  2235   *   K 4.2      CO2 21   *   BUN 19   CREA 1.15   CA 8.4*   ALB 3.0*   TBILI 1.3*   ALT 18     No results for input(s): PH, PCO2, PO2, HCO3, FIO2 in the last 72 hours. 24 Hour Results:  Recent Results (from the past 24 hour(s))   CBC WITH AUTOMATED DIFF    Collection Time: 09/30/21 10:35 PM   Result Value Ref Range    WBC 16.9 (H) 4.1 - 11.1 K/uL    RBC 4.46 4.10 - 5.70 M/uL    HGB 13.3 12.1 - 17.0 g/dL    HCT 39.4 36.6 - 50.3 %    MCV 88.3 80.0 - 99.0 FL    MCH 29.8 26.0 - 34.0 PG    MCHC 33.8 30.0 - 36.5 g/dL    RDW 13.2 11.5 - 14.5 %    PLATELET 493 679 - 507 K/uL    MPV 9.2 8.9 - 12.9 FL    NRBC 0.0 0  WBC    ABSOLUTE NRBC 0.00 0.00 - 0.01 K/uL    NEUTROPHILS 89 (H) 32 - 75 %    LYMPHOCYTES 4 (L) 12 - 49 %    MONOCYTES 7 5 - 13 %    EOSINOPHILS 0 0 - 7 %    BASOPHILS 0 0 - 1 %    IMMATURE GRANULOCYTES 0 0.0 - 0.5 %    ABS. NEUTROPHILS 15.0 (H) 1.8 - 8.0 K/UL    ABS. LYMPHOCYTES 0.7 (L) 0.8 - 3.5 K/UL    ABS. MONOCYTES 1.2 (H) 0.0 - 1.0 K/UL    ABS. EOSINOPHILS 0.0 0.0 - 0.4 K/UL    ABS. BASOPHILS 0.0 0.0 - 0.1 K/UL    ABS. IMM. GRANS. 0.0 0.00 - 0.04 K/UL    DF SMEAR SCANNED      RBC COMMENTS NORMOCYTIC, NORMOCHROMIC     METABOLIC PANEL, COMPREHENSIVE    Collection Time: 09/30/21 10:35 PM   Result Value Ref Range    Sodium 134 (L) 136 - 145 mmol/L    Potassium 4.2 3.5 - 5.1 mmol/L    Chloride 104 97 - 108 mmol/L    CO2 21 21 - 32 mmol/L    Anion gap 9 5 - 15 mmol/L    Glucose 297 (H) 65 - 100 mg/dL    BUN 19 6 - 20 MG/DL    Creatinine 1.15 0.70 - 1.30 MG/DL    BUN/Creatinine ratio 17 12 - 20      GFR est AA >60 >60 ml/min/1.73m2    GFR est non-AA >60 >60 ml/min/1.73m2    Calcium 8.4 (L) 8.5 - 10.1 MG/DL    Bilirubin, total 1.3 (H) 0.2 - 1.0 MG/DL    ALT (SGPT) 18 12 - 78 U/L    AST (SGOT) 13 (L) 15 - 37 U/L    Alk.  phosphatase 63 45 - 117 U/L    Protein, total 7.2 6.4 - 8.2 g/dL    Albumin 3.0 (L) 3.5 - 5.0 g/dL    Globulin 4.2 (H) 2.0 - 4.0 g/dL    A-G Ratio 0.7 (L) 1.1 - 2.2     SAMPLES BEING HELD    Collection Time: 09/30/21 10:35 PM   Result Value Ref Range    SAMPLES BEING HELD GRY ON ICE     COMMENT        Add-on orders for these samples will be processed based on acceptable specimen integrity and analyte stability, which may vary by analyte.    PROCALCITONIN    Collection Time: 09/30/21 10:35 PM   Result Value Ref Range    Procalcitonin <0.05 ng/mL   COVID-19 RAPID TEST    Collection Time: 09/30/21 10:57 PM   Result Value Ref Range    Specimen source Nasopharyngeal      COVID-19 rapid test Not detected NOTD     POC LACTIC ACID    Collection Time: 09/30/21 11:58 PM   Result Value Ref Range    Lactic Acid (POC) 1.21 0.40 - 2.00 mmol/L   URINALYSIS W/ REFLEX CULTURE    Collection Time: 10/01/21  4:36 AM    Specimen: Urine   Result Value Ref Range    Color YELLOW/STRAW      Appearance CLEAR CLEAR      Specific gravity 1.017 1.003 - 1.030      pH (UA) 6.5 5.0 - 8.0      Protein Negative NEG mg/dL    Glucose 250 (A) NEG mg/dL    Ketone Negative NEG mg/dL    Bilirubin Negative NEG      Blood MODERATE (A) NEG      Urobilinogen 0.2 0.2 - 1.0 EU/dL    Nitrites Negative NEG      Leukocyte Esterase SMALL (A) NEG      WBC 0-4 0 - 4 /hpf    RBC 10-20 0 - 5 /hpf    Epithelial cells FEW FEW /lpf    Bacteria Negative NEG /hpf    UA:UC IF INDICATED CULTURE NOT INDICATED BY UA RESULT CNI      Hyaline cast 0-2 0 - 5 /lpf       Medications reviewed  Current Facility-Administered Medications   Medication Dose Route Frequency    cefTRIAXone (ROCEPHIN) 1 g in sterile water (preservative free) 10 mL IV syringe  1 g IntraVENous Q24H    0.9% sodium chloride infusion  100 mL/hr IntraVENous CONTINUOUS    sodium chloride (NS) flush 5-40 mL  5-40 mL IntraVENous Q8H    sodium chloride (NS) flush 5-40 mL  5-40 mL IntraVENous PRN    acetaminophen (TYLENOL) tablet 650 mg  650 mg Oral Q6H PRN    Or    acetaminophen (TYLENOL) suppository 650 mg  650 mg Rectal Q6H PRN    polyethylene glycol (MIRALAX) packet 17 g  17 g Oral DAILY PRN    bisacodyL (DULCOLAX) suppository 10 mg  10 mg Rectal DAILY PRN    ondansetron (ZOFRAN) injection 4 mg  4 mg IntraVENous Q6H PRN    sodium chloride (NS) flush 5-10 mL  5-10 mL IntraVENous PRN     Current Outpatient Medications   Medication Sig    multivitamin (ONE A DAY) tablet Take 1 Tablet by mouth daily.  trimethoprim-sulfamethoxazole (BACTRIM DS, SEPTRA DS) 160-800 mg per tablet Take 1 Tablet by mouth two (2) times a day.  omeprazole (PRILOSEC) 40 mg capsule Take 40 mg by mouth daily.  metoprolol succinate (TOPROL-XL) 25 mg XL tablet TAKE 1 TABLET BY MOUTH EVERY DAY    sertraline (ZOLOFT) 25 mg tablet Take 25 mg by mouth daily.  tamsulosin (FLOMAX) 0.4 mg capsule Take 0.4 mg by mouth daily.  insulin NPH/insulin regular (NOVOLIN 70/30, HUMULIN 70/30) 100 unit/mL (70-30) injection 40 Units by SubCUTAneous route Before breakfast and dinner.  aspirin delayed-release 81 mg tablet Take 81 mg by mouth daily. Care Plan discussed with: Patient/Family    Total time spent with patient: 30 minutes.     Kalani Santizo MD

## 2021-10-01 NOTE — DIABETES MGMT
[facility]    CLINICAL NURSE SPECIALIST CONSULT     INITIAL NOTE  Initial Presentation   Prince Eli Sr. is a 80 y.o. male admitted  with sepsis in Ed. Recently treated for UTI- Reports worsening symptoms of UTI and fever/fatigue/ and malaise. PMH dementia so poor historian. Now has +diarrhea. Bernardo Shin HX:   Past Medical History:   Diagnosis Date    CAD (coronary artery disease), native coronary artery     Cancer ([facility]     melanoma removed on forehead    Depression     Diabetic neuropathy ([facility]     ED (erectile dysfunction)     Hypertension, benign     Ill-defined condition     states he has memory problems    Postsurgical aortocoronary bypass status     Type II or unspecified type diabetes mellitus without mention of complication, not stated as uncontrolled         INITIAL DX: Sepsis/ UTI/ diarrhea-cdiff/     Current Treatment     TX: abx/ IVF/ cdiff precautions/     Consulted by Provider for advanced diabetes nursing assessment and care for:   [] Transitioning off Jenphoenix Riis   [x] Inpatient management strategy  [] Home management assessment  [] Survival skill education    Hospital Course   Clinical progress has been complicated by cdiff/sepsis/ UTI/. Recently treated for UTI-has chronic indwelling catheter     Diabetes History   DM2 -A1C pending.   PTA takes 70/30 insulin/     Diabetes-related Medical History  Acute complications  none  Neurological complications  Impotence and Peripheral neuropathy  Microvascular disease  NONE  Macrovascular disease  NONE  Other associated conditions     Depression/HTN    Diabetes Medication History  Drug class Currently in use Discontinued Never used   Biguanide      DDP-4 inhibitor       Sulfonylurea      Thiazolidinedione      GLP-1 RA      SGLT-2 inhibitors      Basal insulin 70/30 Novolin  45units BID     Bolus insulin      Fixed Dose  Combinations        Subjective   I slack off on my diet\"    Lives with wife and son  Reports compliance with taking insulin    Patient reports the following home diabetes self-management practices:   Eating pattern-likes sweets-cakes/ eats consistent meals  Wife cooks-anything/ steak/potato/spaghetti     Physical activity pattern-limited/dementia     Monitoring pattern- couldn't recall     Taking medications pattern  [x] Consistent administration  [x] Affordable    Social determinants of health impacting diabetes self-management practices   None voiced    Overall evaluation:    [x] ? Achieving A1c target with drug therapy & self-care practices     Objective   Physical exam  General Normal weight male in no acute distress. Conversant and cooperative  Neuro  Alert, oriented -  Vital Signs   Visit Vitals  /75   Pulse 82   Temp 98.3 °F (36.8 °C)   Resp 19   Ht 6' 0.01\" (1.829 m)   Wt 90.3 kg (199 lb)   SpO2 100%   BMI 26.98 kg/m²     Skin  Warm and dry. Heart   Regular rate and rhythm.  No murmurs, rubs or gallops  Lungs  Clear to auscultation without rales or rhonchi  Extremities No foot wounds        Laboratory  BMP:   Lab Results   Component Value Date/Time     (L) 09/30/2021 10:35 PM    K 4.2 09/30/2021 10:35 PM     09/30/2021 10:35 PM    CO2 21 09/30/2021 10:35 PM    AGAP 9 09/30/2021 10:35 PM     (H) 09/30/2021 10:35 PM    BUN 19 09/30/2021 10:35 PM    CREA 1.15 09/30/2021 10:35 PM    GFRAA >60 09/30/2021 10:35 PM    GFRNA >60 09/30/2021 10:35 PM     CBC:   Lab Results   Component Value Date/Time    WBC 16.9 (H) 09/30/2021 10:35 PM    HGB 13.3 09/30/2021 10:35 PM    HCT 39.4 09/30/2021 10:35 PM     09/30/2021 10:35 PM     Recent Glucose Results:   Lab Results   Component Value Date/Time     (H) 09/30/2021 10:35 PM     Factors impacting BG management  Factor Dose Comments   Nutrition:  Standard meals     60 grams/meal      Pain     Infection UTI/c-diff    Other:   Kidney function  Liver function   WNL  WNL        Blood glucose pattern        Assessment and Plan   Nursing Diagnosis Risk for unstable blood glucose pattern   Nursing Intervention Domain 4672 Decision-making Support   Nursing Interventions Examined current inpatient diabetes control   Explored factors facilitating and impeding inpatient management  Identified self-management practices impeding diabetes control  Explored corrective strategies with patient and responsible inpatient provider   Informed patient of rational for insulin strategy while hospitalized     Evaluation   This male  with Type 2 diabetes is currently admitted with recurrent UTI with new cdiff infection. Has PMH of dementia so poor historian per notes. Verbalizes compliance with taking his daily insulin and could recall what type of insulin but could not recall the exact dosage. He could not recall if he checks his BG. Reports he eats what he shouldn't, like cakes, but is a consistent eater at home. During this hospitalization, the patient has not achieved inpatient blood glucose target of 100-180mg/dl. Several factors have played a role in blood glucose management including:  [x] Critical nature of illness state  [x]  Changing nutritive sources & needs   [x] Compromised insulin absorption or delivery      The Subcutaneous Insulin Order set (8910) has not been in use. Hence, the next step in optimizing blood glucose control would be    [x] Implement the Subcutaneous Insulin order set  [x]  Optimize basal insulin dosing   [x]  Add mealtime insulin    Of note, this patient would also benefit from diabetes self-management education and support [facility]) after discharge. MInimal informaion on BG trends- on 70/30 45units BID which equates to 60 units of basal a day. Would err on the side of caution until more information  Is gathered, including current A1C  - Noted NPH BID ordered for basal-agree with dosing. IF has pre prandial hyperglycemia would also consider adding pre meal basal per recs below. Recommendations   1.  Since BG >200 since admission-follow recs below. [] Use of Subcutaneous Insulin Order set (8272)  Insulin Dosing Specific recommendation   START Basal                                   (Based on weight, BMI & GFR) NPH 20units BID     If taking adequate PO at >50%, ADD Nutritional                                      (Based on CHO/dextrose load) 5units Humalog TID     CONTINUE Corrective                                        (Useful in adjusting insulin dosing) Normal sensitivity        Billing Code(s)   00072    Before making these care recommendations, I personally reviewed the hospitalization record, including notes, laboratory & diagnostic data and current medications, and examined the patient at the bedside (circumstances permitting) before making care recommendations.      Total minutes: 509 NJanet Cleary, CNS  Diabetes Clinical Nurse Specialist  [facility]  Access via [address] [place] [address]

## 2021-10-01 NOTE — PROGRESS NOTES
10/1/2021  4:04 PM  Reason for Admission: Emergency - C Diff r/o. UTI      ICD-10-CM ICD-9-CM    1. Sepsis, due to unspecified organism, unspecified whether acute organ dysfunction present ([facility]  A41.9 038.9      995.91    2. Diarrhea, unspecified type  R19.7 787.91            Assessment:   []In person with pt   []Via p/c with pt   []With family member in person. Who/Relation:     [x]With family member via p/c. Who/Relation:  Emerita marshall  []Chart Review    RUR: 15%  Risk Level: [x]Low []Moderate []High  Value-based purchasing: [] Yes [x] No  Bundle patient: [] Yes [x] No   Specify:     Advance Directive: Full Code. [x] No AD on file. [] AD on file. [] Current AD not on file. Copy requested. [] Requests AD, and referral submitted to [facility]. Healthcare Decision Maker:  Emerita marshall      Assessment:    Age: 80    Sex: [x] Male []Female     Residency: [x]Private residence []Apartment []Assisted Living []LTC []Other:   Exterior Steps: 0  Interior Steps: 1 flight    Lives With: [x]With spouse []Other family members []Underage children []Alone []Care provider []Other:    Prior functioning:  []Independent with ADLs and iADLS []Dependent with ADLs and iADLs [x]Partial dependence, Specify: Pt requires some assistance with bathing, dressing. Prior DME required:  []None [x]RW []Cane []Crutches []Bedside commode []CPAP []Home O2 (Liter/Provider: ) []Nebulizer   []Shower Chair [x]Wheelchair (long distances)  []Hospital Bed []Miri []Stair lift []Rollator []Other:    Rehab history: [x]None []Outpatient PT []Home Health (Provider/Date: ) []SNF (Provider/Date: ) []IPR (Provider/Date: ) []LTC (Provider/Date: ) []Hospice (Provider/Date: )  []Other:     Discharge Concerns: []Yes [x]No []Unknown   Describe:    Comments:      Insurer:   Mahesh Lopez [address] [employer] Phone: [phone]    Subscriber: Adri Kowalski. Subscriber#: [id number]    Group#: Precert#:         [employer] [address] MEDICARE Phone:     Subscriber: Miles Coyle Subscriber#: [id number]    Group#:  Precert#:           PCP: Ketty Tavera   Address: [address] / [address] [facility]   Phone number: [phone]   Current patient: [x]Yes []No   Approximate date of last visit: within 1 month   Access to virtual PCP visits: []Yes [x]No    *Pt is followed by Dr. Rachel Frances with [facility]. Pharmacy: [id number] [place] [address] Transport: Family       Transition of care plan:    [x]Unable to determine at this time. Awaiting clinical progress, and disposition recommendations. [] Home with outpatient follow-up    [] Home with Outpatient PT and outpatient follow-up   Pt aware of OP appt? []Yes, Provider:   []Not scheduled   Transport provider:     [] Home with family assistance as needed and outpatient follow-up   Family able to assist:    Schedule:  Transport provider:      [] Home with Home Health   - Provider:     []SNF/IPR   -[]Preferences given:   []Listing provided and preferences requested   -Status: []Pending []Accepted:    -Auth required: []Yes []No    -Auth initiated date:   -3 midnight stay required: []Yes []No  Date satisfied:     [] Home with Hospice   -Provider:     [] Dis[facility] information provided. [] Other:     Roby Dorman MA    Care Management Interventions  PCP Verified by CM: Yes Lindy Sen)  Mode of Transport at Discharge:  Other (see comment) (Family)  MyChart Signup: No  Discharge Durable Medical Equipment: No  Physical Therapy Consult: No  Occupational Therapy Consult: No  Speech Therapy Consult: No  Support Systems: Spouse/Significant Other  Confirm Follow Up Transport: Family  Discharge Location  Discharge Placement: Unable to determine at this time

## 2021-10-01 NOTE — ED PROVIDER NOTES
62-year-old male history of dementia and diabetes, CAD, urinary retention and recent UTI treated with Keflex. Urine culture showed Proteus pansensitive. Patient completed his course of antibiotics however while taking the antibiotic started having multiple episodes of diarrhea and some stomach upset with nausea and decreased p.o. intake. Patient's wife reports that he had a fever today. Has had increased fatigue and malaise and not wanting to take his other medications.   No known history of C. difficile in the past.            Past Medical History:   Diagnosis Date    CAD (coronary artery disease), native coronary artery     Cancer ([facility]     melanoma removed on forehead    Depression     Diabetic neuropathy ([facility]     ED (erectile dysfunction)     Hypertension, benign     Ill-defined condition     states he has memory problems    Postsurgical aortocoronary bypass status     Type II or unspecified type diabetes mellitus without mention of complication, not stated as uncontrolled        Past Surgical History:   Procedure Laterality Date    COLONOSCOPY N/A 5/3/2016    COLONOSCOPY performed by Breann Rabago MD at [address] [place] [address] ECHO 2D ADULT  12/2009    EF 50%, basal inferior akinesis, mild MR    ECHO 2D ADULT  9/21/11    LVEF 50%, inferior AK, unchanged from Dec 2009    HX CORONARY ARTERY BYPASS GRAFT  2009    HX OTHER SURGICAL  2008    melanoma removed from forehead         Family History:   Problem Relation Age of Onset    Breast Cancer Mother         w bone mets    Parkinson's Disease Maternal Aunt     Parkinson's Disease Maternal Uncle     Parkinson's Disease Maternal Grandfather        Social History     Socioeconomic History    Marital status:      Spouse name: Not on file    Number of children: Not on file    Years of education: Not on file    Highest education level: Not on file   Occupational History    Not on file   Tobacco Use    Smoking status: Never Smoker    Smokeless tobacco: Never Used   Substance and Sexual Activity    Alcohol use: No    Drug use: No    Sexual activity: Not on file   Other Topics Concern    Not on file   Social History Narrative    Not on file     Social Determinants of Health     Financial Resource Strain:     Difficulty of Paying Living Expenses:    Food Insecurity:     Worried About Running Out of Food in the Last Year:     [address] in the Last Year:    Transportation Needs:     Lack of Transportation (Medical):  Lack of Transportation (Non-Medical):    Physical Activity:     Days of Exercise per Week:     Minutes of Exercise per Session:    Stress:     Feeling of Stress :    Social Connections:     Frequency of Communication with Friends and Family:     Frequency of Social Gatherings with Friends and Family:     Attends Congregation Services:     Active Member of Clubs or Organizations:     Attends Club or Organization Meetings:     Marital Status:    Intimate Partner Violence:     Fear of Current or Ex-Partner:     Emotionally Abused:     Physically Abused:     Sexually Abused: ALLERGIES: Amoxicillin, Pravastatin, and Sulfa (sulfonamide antibiotics)    Review of Systems   Unable to perform ROS: Dementia   Constitutional: Positive for chills, fatigue and fever. HENT: Negative for congestion. Respiratory: Negative for cough and shortness of breath. Cardiovascular: Negative for chest pain. Gastrointestinal: Positive for diarrhea, nausea and vomiting. Vitals:    09/30/21 2211   BP: (!) 161/73   Pulse: (!) 104   Resp: 30   Temp: 100 °F (37.8 °C)   SpO2: 96%   Weight: 90.3 kg (199 lb)            Physical Exam  Constitutional:       Appearance: He is well-developed. HENT:      Head: Normocephalic. Eyes:      Conjunctiva/sclera: Conjunctivae normal.   Cardiovascular:      Rate and Rhythm: Regular rhythm. Tachycardia present.    Pulmonary:      Effort: Pulmonary effort is normal. No respiratory distress. Breath sounds: Normal breath sounds. Abdominal:      General: Bowel sounds are normal.      Palpations: Abdomen is soft. Tenderness: There is no abdominal tenderness. Genitourinary:     Comments: Hernández    Musculoskeletal:         General: Normal range of motion. Cervical back: Normal range of motion and neck supple. Skin:     General: Skin is warm. Capillary Refill: Capillary refill takes less than 2 seconds. Findings: No rash. Neurological:      General: No focal deficit present. Mental Status: He is alert and oriented to person, place, and time. He is confused. Cranial Nerves: Cranial nerves are intact. Sensory: Sensation is intact. Motor: Motor function is intact. Coordination: Coordination is intact. Comments: No gross motor or sensory deficits          MDM  Number of Diagnoses or Management Options  Diagnosis management comments: Patient presenting ER with fever and elevated heart rate meeting SIRS criteria. Patient has leukocytosis of 17. Covid negative, no signs of pneumonia CAT scan abdomen not showing any significant findings other than decompressed bladder. Family reports that he has been having multiple episodes of diarrhea at home and in light of recent antibiotics will rule out C. Difficile. Procalcitonin is negative.     Code Sepsis Reassessment & Plan    - Sepsis order set entered: YES  - Broad Spectrum Antibiotics given: Ceftriaxone  - Repeat lactic acid ordered for time N/A versus normal  - Re-assessment performed at time 150am  and clinical condition improving.    - Actions taken: IVF and abx, Cdiff.  - Hypotension or Lactic Acidosis present (SBP<90, MAP<65, Lactate >4): NO   - IVF: 30 cc/kg actual Body Weight  - Persistent Septic Shock present (Hypotension despite IVF resuscitation): NO  Vasopressors: Not indicated due to septic shock not present  - Disposition: Admit to Telemetry    Total critical care time spent exclusive of procedures:  35min                     Amount and/or Complexity of Data Reviewed  Clinical lab tests: reviewed  Tests in the radiology section of CPT®: reviewed      ED Course as of Oct 01 0154   Thu Sep 30, 2021   2341 WBC(!): 16.9 [ZD]   2341 COVID-19 rapid test: Not detected [ZD]   2341 Glucose(!): 297 [ZD]   Fri Oct 01, 2021   0013 WBC(!): 16.9 [ZD]   0016 EKG sinus rhythm with first-degree AV block with a rate of 98 bpm with normal QRS and QT intervals. No ST elevation or depressions. Normal axis. EKG interpreted by Maury Brady MD      [ZD]      ED Course User Index  [ZD] Eliud Davidson MD       Procedures      Perfect Serve Consult for Admission  2:00 AM    ED Room Number: ER15/15  Patient Name and age:  Ra Palumbo. 80 y.o.  male  Working Diagnosis:   1. Sepsis, due to unspecified organism, unspecified whether acute organ dysfunction present ([facility]    2. Diarrhea, unspecified type        COVID-19 Suspicion:  no  Sepsis present:  yes  Reassessment needed: no  Code Status:  Full Code  Readmission: no  Isolation Requirements:  yes  Recommended Level of Care:  med/surg  Department:[facility] - [phone]  Other:  R/o Cdiff reported multiple episodes of diarrhea at home light of recent antibiotics. Chronic indwelling Hernández. Recently treated for urinary tract infection with Keflex.   Urine culture showed Proteus pansensitive

## 2021-10-01 NOTE — PROGRESS NOTES
Admission Medication Reconciliation:     Information obtained from:    Phone call with spouse   RxQuery data available¹:  YES    Comments/Recommendations:    Patient's spouse is able to confirm name, , allergies, and preferred pharmacy   Updated PTA medication list    Addendum 11:15 10/1  Spouse called. The patient was prescribed a 7 day course of cephalexin. He should have completed the course yesterday but the patient refused to take the last two pills. ¹RxQuery pharmacy benefit data reflects medications filled and processed through the patient's insurance, however   this data does NOT capture whether the medication was picked up or is currently being taken by the patient. Prior to Admission Medications   Prescriptions Last Dose Informant Taking?   aspirin delayed-release 81 mg tablet 2021 at Unknown time Self Yes   Sig: Take 81 mg by mouth daily. cholecalciferol (Vitamin D3) (1000 Units /25 mcg) tablet 2021 at Unknown time Self Yes   Sig: Take 1,000 Units by mouth daily. dutasteride (AVODART) 0.5 mg capsule 2021 at Unknown time Self Yes   Sig: Take 0.5 mg by mouth daily. insulin NPH/insulin regular (NOVOLIN 70/30, HUMULIN 70/30) 100 unit/mL (70-30) injection 2021 Self Yes   Si Units by SubCUTAneous route Before breakfast and dinner. metoprolol succinate (TOPROL-XL) 25 mg XL tablet 2021 at Unknown time Self Yes   Sig: TAKE 1 TABLET BY MOUTH EVERY DAY   multivitamin (ONE A DAY) tablet 2021 at Unknown time Self Yes   Sig: Take 1 Tablet by mouth daily. tamsulosin (FLOMAX) 0.4 mg capsule 2021 at Unknown time Self Yes   Sig: Take 0.4 mg by mouth two (2) times a day. Facility-Administered Medications: None         Please contact the main inpatient pharmacy with any questions or concerns at [phone] and we will direct you to the clinical pharmacist covering this patient's care while in-house.    Katelyn Sheehan, Claire, BCPS

## 2021-10-01 NOTE — ED NOTES
Pt comes to ED for fever and fatigue. Pt is poor historian, unable to recall the situation that brings him here and just states \"Ask my wife, she knows all about that. \" pt was diagnosed with aUTI and urinary retention with a pederson placed. Denies pain, placed on cardiac monitor.  In NAD

## 2021-10-01 NOTE — ED NOTES
Bedside and Verbal shift change report given to Rubens Gandhi (oncoming nurse) by Lucrecia Smith (offgoing nurse). Report included the following information SBAR, ED Summary, MAR and Recent Results.

## 2021-10-01 NOTE — H&P
[facility]  [address] [facility], [facility]  [phone]     Hospitalist Admission Note      NAME: Sha Koch Sr.   :  1940   MRN:  [id number]     Date/Time:  10/1/2021     Patient PCP: Dolly Goodpasture, MD    Emergency Contact:    Extended Emergency Contact Information  Primary Emergency Contact: Selene Lema, [address] Phone: [phone]  Relation: Spouse      Code: Full Code     Isolation Precautions: Enteric Contact        Subjective:     CHIEF COMPLAINT: Fever, UTI, diarrhea     HISTORY OF PRESENT ILLNESS:     Mr. Melisa Carlton is a 80 y.o. male with history that includes HTN, CAD, diabetes with neuropathy, and dementia who just completed Keflex for Proteus UTI presents fever, per chart worsening UTI symptoms, and diarrhea. This associated with fatigue and malaise. Due to his dementia he is not able to provide much of a history but is aware that he has had \"bad\" diarrhea but is unable to describe the consistency or color but does deny blood in it. Has not had a BM since he has been here. He did not remember that he had a UTI and was on antibiotics. In ER he was found to meet criteria for sepsis based on heart rate respiratory rate and leukocytosis in the setting of UTI and possible enteric infection. Chest x-ray showed minimal atelectasis in the left base but no chest pain or shortness of breath per patient. Allergies   Allergen Reactions    Amoxicillin Hives and Itching    Pravastatin Myalgia    Sulfa (Sulfonamide Antibiotics) Rash       Prior to Admission medications    Medication Sig Start Date End Date Taking? Authorizing Provider   multivitamin (ONE A DAY) tablet Take 1 Tablet by mouth daily. Other, MD Sandi   trimethoprim-sulfamethoxazole (BACTRIM DS, SEPTRA DS) 160-800 mg per tablet Take 1 Tablet by mouth two (2) times a day.     Other, MD Sandi   omeprazole (PRILOSEC) 40 mg capsule Take 40 mg by mouth daily. Other, MD Sandi   metoprolol succinate (TOPROL-XL) 25 mg XL tablet TAKE 1 TABLET BY MOUTH EVERY DAY 3/25/21   Kayla Ayon NP   sertraline (ZOLOFT) 25 mg tablet Take 25 mg by mouth daily. Provider, Historical   tamsulosin (FLOMAX) 0.4 mg capsule Take 0.4 mg by mouth daily. Provider, Historical   insulin NPH/insulin regular (NOVOLIN 70/30, HUMULIN 70/30) 100 unit/mL (70-30) injection 40 Units by SubCUTAneous route Before breakfast and dinner. Provider, Historical   aspirin delayed-release 81 mg tablet Take 81 mg by mouth daily.     Provider, Historical       Past Medical History:   Diagnosis Date    CAD (coronary artery disease), native coronary artery     Cancer ([facility]     melanoma removed on forehead    Depression     Diabetic neuropathy ([facility]     ED (erectile dysfunction)     Hypertension, benign     Ill-defined condition     states he has memory problems    Postsurgical aortocoronary bypass status     Type II or unspecified type diabetes mellitus without mention of complication, not stated as uncontrolled         Past Surgical History:   Procedure Laterality Date    COLONOSCOPY N/A 5/3/2016    COLONOSCOPY performed by Brina Shin MD at [address] [facility] ECHO 2D ADULT  12/2009    EF 50%, basal inferior akinesis, mild MR    ECHO 2D ADULT  9/21/11    LVEF 50%, inferior AK, unchanged from Dec 2009    HX CORONARY ARTERY BYPASS GRAFT  2009    HX OTHER SURGICAL  2008    melanoma removed from forehead       Social History     Tobacco Use    Smoking status: Never Smoker    Smokeless tobacco: Never Used   Substance Use Topics    Alcohol use: No        Family History   Problem Relation Age of Onset    Breast Cancer Mother         w bone mets    Parkinson's Disease Maternal Aunt     Parkinson's Disease Maternal Uncle     Parkinson's Disease Maternal Grandfather         Review of Systems (14 point ROS):  ROS limited and possibly unreliable due to dementia. Gen:   fatigue and Supposedly fevers and chills but cannot remember. Eyes:  negative  ENT:    negative  Resp:  negative  CVS:   negative  GI:       diarrhea, denies abdominal pain, denies melena and denies hematochezia  :     denies dysuria and Catheter in place  Skin:   negative  Hem:   negative  MS:     negative  Rafa:    negative   Psy:    negative  Endo:  negative  ALL:   negative         Objective:      Visit Vitals  BP (!) 143/68   Pulse 95   Temp 100 °F (37.8 °C)   Resp 24   Wt 90.3 kg (199 lb)   SpO2 96%   BMI 26.99 kg/m²       Exam:     Physical Exam:    General: confused and no distress  Head: Normocephalic, without obvious abnormality, atraumatic  Eyes: PERRL, EOMI, anicteric sclerae and conjuntiva clear  ENT: Lips, mucosa, and tongue normal.   Neck: normal, supple and no tenderness  Lungs: clear to auscultation with good breath sounds and normal respiratory effort  Heart: S1, S2 normal, regular rate and regular rhythm  Abd: not distended, soft, nontender, BS present and normactive  Ext: no cyanosis and no edema  Pulses: present 1+ and symmetric  Skin: normal skin color, no rashes and texture normal  Neuro: Alert and oriented to self and location but not situation or date/time. Cranial nerves II through XII grossly intact no focal deficits on cursory exam.  Psych: not anxious, cooperative, appropriate affect    Labs:    Recent Labs     09/30/21  2235   WBC 16.9*   HGB 13.3   HCT 39.4        Recent Labs     09/30/21  2235   *   K 4.2      CO2 21   *   BUN 19   CREA 1.15   CA 8.4*   ALB 3.0*   TBILI 1.3*   ALT 18         Radiology:    CT CHEST ABD PELV W CONT    Result Date: 10/1/2021  No acute findings. Incidentals as above including coronary artery disease status post CABG, cholecystolithiasis and prostamegaly with indwelling Hernández catheter. XR CHEST PORT    Result Date: 9/30/2021  Minimal left basilar subsegmental atelectasis.     I personally reviewed and interpreted the imaging studies and agree with official reading. The chart, ER course, the above PMSFH, lab work, and radiological studies was reviewed by me on: October 1, 2021         Assessment/Plan:      Sepsis: Meets criteria based on HR, RR, WBC, and UTI. Admit for further workup and treatment of sepsis. IVFs, supportive care, IV ABX with Rocephin IV. Cultures pending and will adjust antibiotics accordingly. Sepsis bundle. Lactic acid level. Cultures pending. Consider ID consult. UTI (urinary tract infection) due to urinary indwelling catheter: Recently treated for pansensitive Proteus with Keflex. But in the setting of a chronic indwelling catheter will treat with Rocephin. UA and urine culture pending. Diarrhea: No history of C. difficile but in the setting of recent antibiotics will do stool studies to include C. difficile and enteric panel. We will hold off on antidiarrheal agents for now. Type 2 diabetes mellitus without complication:  Monitor blood glucose levels with insulin sliding scale coverage along with basal insulin. Monitor for complications. A1C. Diabetic diet. Hypertension, benign: We will need med rec done in order continue home medications the meantime will use as needed hydralazine as needed. Atelectasis, left: Deep breath cough and incentive spirometry.      Body mass index is 26.99 kg/m².:  25.0 - 29.9 Overweight      Risk of deterioration: high       Discussed:  Care Plan discussed with: Patient, Spouse at bedside , ED Provider and RN    Prophylaxis:  SCD's    Probable disposition:  Home with family    Date of service:    10/1/2021                ___________________________________________________    Admitting Physician: Montana Erickson MD       CC: Bruce Oliva MD

## 2021-10-01 NOTE — TELEPHONE ENCOUNTER
Patient's wife called because the patient is in the hospital.Please give a call back his wife has some questions and concerns she would like to discuss with the provider.       [phone] cell phone

## 2021-10-01 NOTE — PROGRESS NOTES
Bedside shift change report given to Stephie Robertson RN (oncoming nurse) by Ang Fernandes RN (offgoing nurse). Report included the following information SBAR and Kardex.

## 2021-10-01 NOTE — PROGRESS NOTES
Bedside and Verbal shift change report given to Roni Mittal vis telephone (oncoming nurse) by Charla Key   (offgoing nurse). Report included the following information SBAR, Kardex, ED Summary, Procedure Summary, Intake/Output and MAR.

## 2021-10-01 NOTE — TELEPHONE ENCOUNTER
Spoke with the pt's spouse, Evangelista Vazquez, identified the pt with name and . Evangelista Vazquez stated that Rin Serrano is in the hospital for a fever and urinary problem and won't be able to make his appointment on Monday for CC for prostate surgery. I told Evangelista Vazquez that unfortunately Dr Andree Rizo has not sen in for a year and so he would need to be seen for the CC. I suggested that when she knows he's going to be discharged from the hospital she should call our office and tell the call center he needs a hospital f/u appointment within 2 weeks, then if the girls can't find one they will send me a message and I will find one. Evangelista Vazquez expressed understanding and agreement. Pt has no further questions or concerns at this time.

## 2021-10-01 NOTE — ED TRIAGE NOTES
Pt. Suzi Rendon in by EMS for malaise fever, increasing symptoms of UTI. Pt. Dx with UTI this past weekend, has fever noted today with darkening urine.

## 2021-10-02 LAB
ALBUMIN SERPL-MCNC: 2.6 G/DL (ref 3.5–5)
ALBUMIN/GLOB SERPL: 0.7 {RATIO} (ref 1.1–2.2)
ALP SERPL-CCNC: 57 U/L (ref 45–117)
ALT SERPL-CCNC: 13 U/L (ref 12–78)
ANION GAP SERPL CALC-SCNC: 8 MMOL/L (ref 5–15)
AST SERPL-CCNC: 12 U/L (ref 15–37)
BACTERIA SPEC CULT: NORMAL
BASOPHILS # BLD: 0 K/UL (ref 0–0.1)
BASOPHILS NFR BLD: 0 % (ref 0–1)
BILIRUB SERPL-MCNC: 0.9 MG/DL (ref 0.2–1)
BUN SERPL-MCNC: 12 MG/DL (ref 6–20)
BUN/CREAT SERPL: 16 (ref 12–20)
CALCIUM SERPL-MCNC: 8.1 MG/DL (ref 8.5–10.1)
CHLORIDE SERPL-SCNC: 108 MMOL/L (ref 97–108)
CO2 SERPL-SCNC: 23 MMOL/L (ref 21–32)
CREAT SERPL-MCNC: 0.76 MG/DL (ref 0.7–1.3)
DIFFERENTIAL METHOD BLD: ABNORMAL
EOSINOPHIL # BLD: 0.1 K/UL (ref 0–0.4)
EOSINOPHIL NFR BLD: 1 % (ref 0–7)
ERYTHROCYTE [DISTWIDTH] IN BLOOD BY AUTOMATED COUNT: 13.1 % (ref 11.5–14.5)
EST. AVERAGE GLUCOSE BLD GHB EST-MCNC: 137 MG/DL
GLOBULIN SER CALC-MCNC: 3.8 G/DL (ref 2–4)
GLUCOSE BLD STRIP.AUTO-MCNC: 104 MG/DL (ref 65–117)
GLUCOSE BLD STRIP.AUTO-MCNC: 117 MG/DL (ref 65–117)
GLUCOSE BLD STRIP.AUTO-MCNC: 148 MG/DL (ref 65–117)
GLUCOSE BLD STRIP.AUTO-MCNC: 95 MG/DL (ref 65–117)
GLUCOSE SERPL-MCNC: 83 MG/DL (ref 65–100)
HBA1C MFR BLD: 6.4 % (ref 4–5.6)
HCT VFR BLD AUTO: 37.4 % (ref 36.6–50.3)
HGB BLD-MCNC: 12.6 G/DL (ref 12.1–17)
IMM GRANULOCYTES # BLD AUTO: 0.1 K/UL (ref 0–0.04)
IMM GRANULOCYTES NFR BLD AUTO: 1 % (ref 0–0.5)
LYMPHOCYTES # BLD: 1.9 K/UL (ref 0.8–3.5)
LYMPHOCYTES NFR BLD: 14 % (ref 12–49)
MCH RBC QN AUTO: 30.1 PG (ref 26–34)
MCHC RBC AUTO-ENTMCNC: 33.7 G/DL (ref 30–36.5)
MCV RBC AUTO: 89.3 FL (ref 80–99)
MONOCYTES # BLD: 1.1 K/UL (ref 0–1)
MONOCYTES NFR BLD: 8 % (ref 5–13)
NEUTS SEG # BLD: 9.9 K/UL (ref 1.8–8)
NEUTS SEG NFR BLD: 76 % (ref 32–75)
NRBC # BLD: 0 K/UL (ref 0–0.01)
NRBC BLD-RTO: 0 PER 100 WBC
PLATELET # BLD AUTO: 252 K/UL (ref 150–400)
PMV BLD AUTO: 9.3 FL (ref 8.9–12.9)
POTASSIUM SERPL-SCNC: 3.3 MMOL/L (ref 3.5–5.1)
PROT SERPL-MCNC: 6.4 G/DL (ref 6.4–8.2)
RBC # BLD AUTO: 4.19 M/UL (ref 4.1–5.7)
SERVICE CMNT-IMP: ABNORMAL
SERVICE CMNT-IMP: NORMAL
SODIUM SERPL-SCNC: 139 MMOL/L (ref 136–145)
WBC # BLD AUTO: 13 K/UL (ref 4.1–11.1)

## 2021-10-02 PROCEDURE — 85025 COMPLETE CBC W/AUTO DIFF WBC: CPT

## 2021-10-02 PROCEDURE — 89055 LEUKOCYTE ASSESSMENT FECAL: CPT

## 2021-10-02 PROCEDURE — 94760 N-INVAS EAR/PLS OXIMETRY 1: CPT

## 2021-10-02 PROCEDURE — 82962 GLUCOSE BLOOD TEST: CPT

## 2021-10-02 PROCEDURE — 74011000250 HC RX REV CODE- 250: Performed by: HOSPITALIST

## 2021-10-02 PROCEDURE — 65270000029 HC RM PRIVATE

## 2021-10-02 PROCEDURE — 36415 COLL VENOUS BLD VENIPUNCTURE: CPT

## 2021-10-02 PROCEDURE — 80053 COMPREHEN METABOLIC PANEL: CPT

## 2021-10-02 PROCEDURE — 74011250637 HC RX REV CODE- 250/637: Performed by: FAMILY MEDICINE

## 2021-10-02 PROCEDURE — 74011250636 HC RX REV CODE- 250/636: Performed by: HOSPITALIST

## 2021-10-02 PROCEDURE — 83631 LACTOFERRIN FECAL (QUANT): CPT

## 2021-10-02 PROCEDURE — 87506 IADNA-DNA/RNA PROBE TQ 6-11: CPT

## 2021-10-02 PROCEDURE — 74011636637 HC RX REV CODE- 636/637: Performed by: FAMILY MEDICINE

## 2021-10-02 PROCEDURE — 87177 OVA AND PARASITES SMEARS: CPT

## 2021-10-02 RX ORDER — POTASSIUM CHLORIDE 750 MG/1
10 TABLET, FILM COATED, EXTENDED RELEASE ORAL 2 TIMES DAILY
Status: DISCONTINUED | OUTPATIENT
Start: 2021-10-02 | End: 2021-10-03

## 2021-10-02 RX ADMIN — CEFTRIAXONE 1 G: 1 INJECTION, POWDER, FOR SOLUTION INTRAMUSCULAR; INTRAVENOUS at 02:24

## 2021-10-02 RX ADMIN — PANTOPRAZOLE SODIUM 40 MG: 40 TABLET, DELAYED RELEASE ORAL at 06:38

## 2021-10-02 RX ADMIN — Medication 10 ML: at 06:00

## 2021-10-02 RX ADMIN — HUMAN INSULIN 25 UNITS: 100 INJECTION, SUSPENSION SUBCUTANEOUS at 17:34

## 2021-10-02 RX ADMIN — Medication 10 ML: at 22:00

## 2021-10-02 RX ADMIN — HUMAN INSULIN 25 UNITS: 100 INJECTION, SUSPENSION SUBCUTANEOUS at 08:56

## 2021-10-02 RX ADMIN — TAMSULOSIN HYDROCHLORIDE 0.4 MG: 0.4 CAPSULE ORAL at 17:34

## 2021-10-02 RX ADMIN — POTASSIUM CHLORIDE 10 MEQ: 750 TABLET, FILM COATED, EXTENDED RELEASE ORAL at 12:38

## 2021-10-02 RX ADMIN — METOPROLOL SUCCINATE 25 MG: 25 TABLET, EXTENDED RELEASE ORAL at 08:58

## 2021-10-02 RX ADMIN — TAMSULOSIN HYDROCHLORIDE 0.4 MG: 0.4 CAPSULE ORAL at 08:58

## 2021-10-02 RX ADMIN — DUTASTERIDE 0.5 MG: 0.5 CAPSULE, LIQUID FILLED ORAL at 08:58

## 2021-10-02 RX ADMIN — Medication 10 ML: at 17:33

## 2021-10-02 RX ADMIN — POTASSIUM CHLORIDE 10 MEQ: 750 TABLET, FILM COATED, EXTENDED RELEASE ORAL at 17:34

## 2021-10-02 NOTE — PROGRESS NOTES
Problem: Falls - Risk of  Goal: *Absence of Falls  Description: Document [place] Flow Fall Risk and appropriate interventions in the flowsheet.   Outcome: Progressing Towards Goal  Note: Fall Risk Interventions:  Mobility Interventions: Communicate number of staff needed for ambulation/transfer    Mentation Interventions: Adequate sleep, hydration, pain control    Medication Interventions: Bed/chair exit alarm    Elimination Interventions: Call light in reach    History of Falls Interventions: Bed/chair exit alarm

## 2021-10-02 NOTE — PROGRESS NOTES
Daily Progress Note: 10/2/2021  Renetta Zaidi MD    Assessment/Plan:   Sepsis: Meets criteria based on HR, RR, WBC, and UTI. -IVFs, supportive care, IV ABX with Rocephin IV. -Cultures pending and will adjust antibiotics accordingly. -ID consult.     UTI (urinary tract infection) due to urinary indwelling catheter: Recently treated for pansensitive Proteus with Keflex. But in the setting of a chronic indwelling catheter will treat with Rocephin.    -urine culture from 10/1 neg so far.     Diarrhea: No history of C. difficile but in the setting of recent antibiotics will do stool studies to include C. difficile and enteric panel.    -Hold off on antidiarrheal agents for now.     Type 2 diabetes mellitus without complication:    -Monitor blood glucose levels with insulin sliding scale coverage along with basal insulin. -Monitor for complications. -A1C.    -Diabetic diet.     Hypertension, benign:   -Resume home meds   -Hydralazine as needed.     Atelectasis, left:   -Deep breath cough and incentive spirometry. Problem List:  Problem List as of 10/2/2021 Date Reviewed: 10/1/2021        Codes Class Noted - Resolved    Diarrhea ICD-10-CM: R19.7  ICD-9-CM: 787.91  10/1/2021 - Present        Sepsis ([facility] ICD-10-CM: A41.9  ICD-9-CM: 038.9, 995.91  10/1/2021 - Present        UTI (urinary tract infection) due to urinary indwelling catheter ([facility] ICD-10-CM: T83.511A, N39.0  ICD-9-CM: 996.64, 599.0  10/1/2021 - Present        Atelectasis, left ICD-10-CM: J98.11  ICD-9-CM: 518.0  10/1/2021 - Present        Epigastric abdominal pain ICD-10-CM: R10.13  ICD-9-CM: 789.06  2/8/2020 - Present        Epigastric pain ICD-10-CM: R10.13  ICD-9-CM: 789.06  2/8/2020 - Present        Depression ICD-10-CM: F32. A  ICD-9-CM: 975  2/7/2020 - Present        Fall ICD-10-CM: W19. Tami Gottron  ICD-9-CM: V497.8  11/29/2019 - Present        Traumatic rhabdomyolysis ([facility] ICD-10-CM: T79. 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)  - LIMA-LAD, VG-OM, VG-PDA             Type 2 diabetes mellitus without complication ([facility] HJG-47-AA: E11.9  ICD-9-CM: 250.00  Unknown - Present        Hypertension, benign ICD-10-CM: I10  ICD-9-CM: 401.1  Unknown - Present        RESOLVED: Chest pain ICD-10-CM: R07.9  ICD-9-CM: 786.50  2/7/2020 - 2/8/2020        RESOLVED: PND (paroxysmal nocturnal dyspnea) ICD-10-CM: R06.00  ICD-9-CM: 786.09  8/13/2017 - 8/11/2018        RESOLVED: Near syncope ICD-10-CM: R55  ICD-9-CM: 780.2  10/29/2013 - 4/15/2014              Subjective:    80 y.o. male with history that includes HTN, CAD, diabetes with neuropathy, and dementia who just completed Keflex for Proteus UTI presents fever, per chart worsening UTI symptoms, and diarrhea. This associated with fatigue and malaise. Due to his dementia he is not able to provide much of a history but is aware that he has had \"bad\" diarrhea but is unable to describe the consistency or color but does deny blood in it. Has not had a BM since he has been here. He did not remember that he had a UTI and was on antibiotics. In ER he was found to meet criteria for sepsis based on heart rate respiratory rate and leukocytosis in the setting of UTI and possible enteric infection. Chest x-ray showed minimal atelectasis in the left base but no chest pain or shortness of breath per patient. (Dr Efren Chicas)    10/1:  Still with loose watery stools. Cultures pending. Tmax 100. AM labs pending. Cultures pending.      10/2:  He reports fewer loose stools and no ab pain. Nurses reports 3 loose stools and 1 semisolid overnight. Tmax 98. ID consult pending. K+ sl low - replacing.      Review of Systems:   A comprehensive review of systems was negative except for that written in the HPI. Objective:   Physical Exam:   Visit Vitals  /67 (BP 1 Location: Left arm, BP Patient Position: Lying)   Pulse 82   Temp 98.1 °F (36.7 °C)   Resp 18   Ht 6' 0.01\" (1.829 m)   Wt 90.3 kg (199 lb)   SpO2 94%   BMI 26.98 kg/m²      O2 Device: None (Room air)  Temp (24hrs), Av.1 °F (36.7 °C), Min:97.8 °F (36.6 °C), Max:98.6 °F (37 °C)    10/01 1901 - 10/02 07  In: 20 [I.V.:20]  Out: 450 [Urine:450]   701 - 10/01 1900  In: -   Out: 1000 [Urine:1000]  General:  Alert, cooperative, no distress, appears stated age. Head:  Normocephalic, without obvious abnormality, atraumatic. Eyes:  Conjunctivae/corneas clear. PERRL, EOMs intact. Throat: Lips, mucosa, and tongue moist..   Neck: Supple, symmetrical, trachea midline, no adenopathy, thyroid: no enlargement/tenderness/nodules, no carotid bruit and no JVD. Back:    No CVA tenderness. Lungs:   Clear to auscultation bilaterally. Chest wall:  No tenderness or deformity. Heart:  Regular rate and rhythm, S1, S2 normal, no murmur, click, rub or gallop. Abdomen:   Soft, non-tender. Bowel sounds normal. No masses,  No organomegaly. Extremities: no cyanosis or edema. No calf tenderness or cords. Pulses: 2+ and symmetric all extremities. Skin: Skin color, texture, turgor normal. No rashes or lesions   Neurologic: CNII-XII intact. Alert and oriented X 3 at this moment. Fine motor of hands and fingers normal.   equal.  No cogwheeling or rigidity. Gait not tested at this time. Sensation grossly normal to touch. Gross motor of extremities normal.       Data Review:     CT CHEST ABD PELV W CONT  Result Date: 10/1/2021  No acute findings.  Incidentals as above including coronary artery disease status post CABG, cholecystolithiasis and prostamegaly with indwelling Hernández catheter.     XR CHEST PORT  Result Date: 9/30/2021  Minimal left basilar subsegmental atelectasis. Recent Days:  Recent Labs     10/02/21  0233 09/30/21  2235   WBC 13.0* 16.9*   HGB 12.6 13.3   HCT 37.4 39.4    291     Recent Labs     10/02/21  0233 09/30/21  2235    134*   K 3.3* 4.2    104   CO2 23 21   GLU 83 297*   BUN 12 19   CREA 0.76 1.15   CA 8.1* 8.4*   ALB 2.6* 3.0*   TBILI 0.9 1.3*   ALT 13 18     No results for input(s): PH, PCO2, PO2, HCO3, FIO2 in the last 72 hours. 24 Hour Results:  Recent Results (from the past 24 hour(s))   GLUCOSE, POC    Collection Time: 10/01/21 11:55 AM   Result Value Ref Range    Glucose (POC) 196 (H) 65 - 117 mg/dL    Performed by Adelaide Cisneros (PCT)    GLUCOSE, POC    Collection Time: 10/01/21  4:58 PM   Result Value Ref Range    Glucose (POC) 187 (H) 65 - 117 mg/dL    Performed by Pretty kingsley RN    GLUCOSE, POC    Collection Time: 10/01/21 10:11 PM   Result Value Ref Range    Glucose (POC) 86 65 - 117 mg/dL    Performed by [id number] [place] [address], COMPREHENSIVE    Collection Time: 10/02/21  2:33 AM   Result Value Ref Range    Sodium 139 136 - 145 mmol/L    Potassium 3.3 (L) 3.5 - 5.1 mmol/L    Chloride 108 97 - 108 mmol/L    CO2 23 21 - 32 mmol/L    Anion gap 8 5 - 15 mmol/L    Glucose 83 65 - 100 mg/dL    BUN 12 6 - 20 MG/DL    Creatinine 0.76 0.70 - 1.30 MG/DL    BUN/Creatinine ratio 16 12 - 20      GFR est AA >60 >60 ml/min/1.73m2    GFR est non-AA >60 >60 ml/min/1.73m2    Calcium 8.1 (L) 8.5 - 10.1 MG/DL    Bilirubin, total 0.9 0.2 - 1.0 MG/DL    ALT (SGPT) 13 12 - 78 U/L    AST (SGOT) 12 (L) 15 - 37 U/L    Alk.  phosphatase 57 45 - 117 U/L    Protein, total 6.4 6.4 - 8.2 g/dL    Albumin 2.6 (L) 3.5 - 5.0 g/dL    Globulin 3.8 2.0 - 4.0 g/dL    A-G Ratio 0.7 (L) 1.1 - 2.2     CBC WITH AUTOMATED DIFF    Collection Time: 10/02/21  2:33 AM   Result Value Ref Range    WBC 13.0 (H) 4.1 - 11.1 K/uL    RBC 4.19 4. 10 - 5.70 M/uL    HGB 12.6 12.1 - 17.0 g/dL    HCT 37.4 36.6 - 50.3 %    MCV 89.3 80.0 - 99.0 FL    MCH 30.1 26.0 - 34.0 PG    MCHC 33.7 30.0 - 36.5 g/dL    RDW 13.1 11.5 - 14.5 %    PLATELET 877 088 - 055 K/uL    MPV 9.3 8.9 - 12.9 FL    NRBC 0.0 0  WBC    ABSOLUTE NRBC 0.00 0.00 - 0.01 K/uL    NEUTROPHILS 76 (H) 32 - 75 %    LYMPHOCYTES 14 12 - 49 %    MONOCYTES 8 5 - 13 %    EOSINOPHILS 1 0 - 7 %    BASOPHILS 0 0 - 1 %    IMMATURE GRANULOCYTES 1 (H) 0.0 - 0.5 %    ABS. NEUTROPHILS 9.9 (H) 1.8 - 8.0 K/UL    ABS. LYMPHOCYTES 1.9 0.8 - 3.5 K/UL    ABS. MONOCYTES 1.1 (H) 0.0 - 1.0 K/UL    ABS. EOSINOPHILS 0.1 0.0 - 0.4 K/UL    ABS. BASOPHILS 0.0 0.0 - 0.1 K/UL    ABS. IMM.  GRANS. 0.1 (H) 0.00 - 0.04 K/UL    DF AUTOMATED     GLUCOSE, POC    Collection Time: 10/02/21  6:16 AM   Result Value Ref Range    Glucose (POC) 95 65 - 117 mg/dL    Performed by Jesse Ocampo        Medications reviewed  Current Facility-Administered Medications   Medication Dose Route Frequency    cefTRIAXone (ROCEPHIN) 1 g in sterile water (preservative free) 10 mL IV syringe  1 g IntraVENous Q24H    0.9% sodium chloride infusion  100 mL/hr IntraVENous CONTINUOUS    sodium chloride (NS) flush 5-40 mL  5-40 mL IntraVENous Q8H    sodium chloride (NS) flush 5-40 mL  5-40 mL IntraVENous PRN    acetaminophen (TYLENOL) tablet 650 mg  650 mg Oral Q6H PRN    Or    acetaminophen (TYLENOL) suppository 650 mg  650 mg Rectal Q6H PRN    polyethylene glycol (MIRALAX) packet 17 g  17 g Oral DAILY PRN    bisacodyL (DULCOLAX) suppository 10 mg  10 mg Rectal DAILY PRN    ondansetron (ZOFRAN) injection 4 mg  4 mg IntraVENous Q6H PRN    tamsulosin (FLOMAX) capsule 0.4 mg  0.4 mg Oral BID    pantoprazole (PROTONIX) tablet 40 mg  40 mg Oral ACB    metoprolol succinate (TOPROL-XL) XL tablet 25 mg  25 mg Oral DAILY    glucose chewable tablet 16 g  4 Tablet Oral PRN    dextrose (D50W) injection syrg 12.5-25 g  25-50 mL IntraVENous PRN    glucagon (GLUCAGEN) injection 1 mg  1 mg IntraMUSCular PRN    insulin lispro (HUMALOG) injection   SubCUTAneous TIDAC    insulin NPH (NOVOLIN N, HUMULIN N) injection 25 Units  25 Units SubCUTAneous ACB&D    dutasteride (AVODART) capsule 0.5 mg  0.5 mg Oral DAILY    sodium chloride (NS) flush 5-10 mL  5-10 mL IntraVENous PRN       Care Plan discussed with: Patient/Family    Total time spent with patient: 30 minutes.     Mray Sanders MD

## 2021-10-02 NOTE — PROGRESS NOTES
Bedside and Verbal shift change report given to Ramon Rodriguez (oncoming nurse) by Andrey Aase (offgoing nurse). Report included the following information SBAR, Kardex, ED Summary, Procedure Summary and Intake/Output.

## 2021-10-03 LAB
ALBUMIN SERPL-MCNC: 2.9 G/DL (ref 3.5–5)
ALBUMIN/GLOB SERPL: 0.7 {RATIO} (ref 1.1–2.2)
ALP SERPL-CCNC: 48 U/L (ref 45–117)
ALT SERPL-CCNC: 19 U/L (ref 12–78)
ANION GAP SERPL CALC-SCNC: 7 MMOL/L (ref 5–15)
AST SERPL-CCNC: 16 U/L (ref 15–37)
BASOPHILS # BLD: 0 K/UL (ref 0–0.1)
BASOPHILS NFR BLD: 1 % (ref 0–1)
BILIRUB SERPL-MCNC: 1 MG/DL (ref 0.2–1)
BUN SERPL-MCNC: 7 MG/DL (ref 6–20)
BUN/CREAT SERPL: 9 (ref 12–20)
C DIFF TOX GENS STL QL NAA+PROBE: POSITIVE
CALCIUM SERPL-MCNC: 8.6 MG/DL (ref 8.5–10.1)
CAMPYLOBACTER SPECIES, DNA: NEGATIVE
CHLORIDE SERPL-SCNC: 110 MMOL/L (ref 97–108)
CO2 SERPL-SCNC: 25 MMOL/L (ref 21–32)
CREAT SERPL-MCNC: 0.82 MG/DL (ref 0.7–1.3)
DIFFERENTIAL METHOD BLD: NORMAL
ENTEROTOXIGEN E COLI, DNA: NEGATIVE
EOSINOPHIL # BLD: 0.3 K/UL (ref 0–0.4)
EOSINOPHIL NFR BLD: 4 % (ref 0–7)
ERYTHROCYTE [DISTWIDTH] IN BLOOD BY AUTOMATED COUNT: 12.8 % (ref 11.5–14.5)
GLOBULIN SER CALC-MCNC: 4.2 G/DL (ref 2–4)
GLUCOSE BLD STRIP.AUTO-MCNC: 122 MG/DL (ref 65–117)
GLUCOSE BLD STRIP.AUTO-MCNC: 137 MG/DL (ref 65–117)
GLUCOSE BLD STRIP.AUTO-MCNC: 155 MG/DL (ref 65–117)
GLUCOSE BLD STRIP.AUTO-MCNC: 76 MG/DL (ref 65–117)
GLUCOSE SERPL-MCNC: 71 MG/DL (ref 65–100)
HCT VFR BLD AUTO: 41 % (ref 36.6–50.3)
HEMOCCULT STL QL: POSITIVE
HGB BLD-MCNC: 13.7 G/DL (ref 12.1–17)
IMM GRANULOCYTES # BLD AUTO: 0 K/UL (ref 0–0.04)
IMM GRANULOCYTES NFR BLD AUTO: 0 % (ref 0–0.5)
INTERPRETATION: ABNORMAL
LYMPHOCYTES # BLD: 1.9 K/UL (ref 0.8–3.5)
LYMPHOCYTES NFR BLD: 28 % (ref 12–49)
MCH RBC QN AUTO: 30 PG (ref 26–34)
MCHC RBC AUTO-ENTMCNC: 33.4 G/DL (ref 30–36.5)
MCV RBC AUTO: 89.7 FL (ref 80–99)
MONOCYTES # BLD: 0.7 K/UL (ref 0–1)
MONOCYTES NFR BLD: 10 % (ref 5–13)
NEUTS SEG # BLD: 3.8 K/UL (ref 1.8–8)
NEUTS SEG NFR BLD: 57 % (ref 32–75)
NRBC # BLD: 0 K/UL (ref 0–0.01)
NRBC BLD-RTO: 0 PER 100 WBC
P SHIGELLOIDES DNA STL QL NAA+PROBE: NEGATIVE
PCR REFLEX: ABNORMAL
PLATELET # BLD AUTO: 306 K/UL (ref 150–400)
PMV BLD AUTO: 9 FL (ref 8.9–12.9)
POTASSIUM SERPL-SCNC: 3.3 MMOL/L (ref 3.5–5.1)
PROT SERPL-MCNC: 7.1 G/DL (ref 6.4–8.2)
RBC # BLD AUTO: 4.57 M/UL (ref 4.1–5.7)
SALMONELLA SPECIES, DNA: NEGATIVE
SERVICE CMNT-IMP: ABNORMAL
SERVICE CMNT-IMP: NORMAL
SHIGA TOXIN PRODUCING, DNA: NEGATIVE
SHIGELLA SP+EIEC IPAH STL QL NAA+PROBE: NEGATIVE
SODIUM SERPL-SCNC: 142 MMOL/L (ref 136–145)
VIBRIO SPECIES, DNA: NEGATIVE
WBC # BLD AUTO: 6.6 K/UL (ref 4.1–11.1)
WBC #/AREA STL HPF: NORMAL /HPF (ref 0–4)
Y. ENTEROCOLITICA, DNA: NEGATIVE

## 2021-10-03 PROCEDURE — 65270000029 HC RM PRIVATE

## 2021-10-03 PROCEDURE — 74011250637 HC RX REV CODE- 250/637: Performed by: FAMILY MEDICINE

## 2021-10-03 PROCEDURE — 85025 COMPLETE CBC W/AUTO DIFF WBC: CPT

## 2021-10-03 PROCEDURE — 94760 N-INVAS EAR/PLS OXIMETRY 1: CPT

## 2021-10-03 PROCEDURE — 80053 COMPREHEN METABOLIC PANEL: CPT

## 2021-10-03 PROCEDURE — 82272 OCCULT BLD FECES 1-3 TESTS: CPT

## 2021-10-03 PROCEDURE — 74011250636 HC RX REV CODE- 250/636: Performed by: HOSPITALIST

## 2021-10-03 PROCEDURE — 82962 GLUCOSE BLOOD TEST: CPT

## 2021-10-03 PROCEDURE — 74011000250 HC RX REV CODE- 250: Performed by: HOSPITALIST

## 2021-10-03 PROCEDURE — 74011636637 HC RX REV CODE- 636/637: Performed by: FAMILY MEDICINE

## 2021-10-03 PROCEDURE — 36415 COLL VENOUS BLD VENIPUNCTURE: CPT

## 2021-10-03 RX ORDER — POTASSIUM CHLORIDE 750 MG/1
20 TABLET, FILM COATED, EXTENDED RELEASE ORAL 2 TIMES DAILY
Status: DISCONTINUED | OUTPATIENT
Start: 2021-10-03 | End: 2021-10-06 | Stop reason: HOSPADM

## 2021-10-03 RX ADMIN — TAMSULOSIN HYDROCHLORIDE 0.4 MG: 0.4 CAPSULE ORAL at 17:38

## 2021-10-03 RX ADMIN — SODIUM CHLORIDE 100 ML/HR: 9 INJECTION, SOLUTION INTRAVENOUS at 19:31

## 2021-10-03 RX ADMIN — Medication 10 ML: at 22:35

## 2021-10-03 RX ADMIN — Medication 10 ML: at 06:36

## 2021-10-03 RX ADMIN — Medication 10 ML: at 14:07

## 2021-10-03 RX ADMIN — PANTOPRAZOLE SODIUM 40 MG: 40 TABLET, DELAYED RELEASE ORAL at 06:35

## 2021-10-03 RX ADMIN — HUMAN INSULIN 25 UNITS: 100 INJECTION, SUSPENSION SUBCUTANEOUS at 08:42

## 2021-10-03 RX ADMIN — SODIUM CHLORIDE 100 ML/HR: 9 INJECTION, SOLUTION INTRAVENOUS at 11:31

## 2021-10-03 RX ADMIN — HUMAN INSULIN 25 UNITS: 100 INJECTION, SUSPENSION SUBCUTANEOUS at 16:21

## 2021-10-03 RX ADMIN — DUTASTERIDE 0.5 MG: 0.5 CAPSULE, LIQUID FILLED ORAL at 08:43

## 2021-10-03 RX ADMIN — INSULIN LISPRO 2 UNITS: 100 INJECTION, SOLUTION INTRAVENOUS; SUBCUTANEOUS at 16:21

## 2021-10-03 RX ADMIN — METOPROLOL SUCCINATE 25 MG: 25 TABLET, EXTENDED RELEASE ORAL at 08:43

## 2021-10-03 RX ADMIN — POTASSIUM CHLORIDE 10 MEQ: 750 TABLET, FILM COATED, EXTENDED RELEASE ORAL at 08:43

## 2021-10-03 RX ADMIN — CEFTRIAXONE 1 G: 1 INJECTION, POWDER, FOR SOLUTION INTRAMUSCULAR; INTRAVENOUS at 04:07

## 2021-10-03 RX ADMIN — SODIUM CHLORIDE 100 ML/HR: 9 INJECTION, SOLUTION INTRAVENOUS at 01:21

## 2021-10-03 RX ADMIN — POTASSIUM CHLORIDE 20 MEQ: 750 TABLET, FILM COATED, EXTENDED RELEASE ORAL at 17:38

## 2021-10-03 RX ADMIN — TAMSULOSIN HYDROCHLORIDE 0.4 MG: 0.4 CAPSULE ORAL at 08:44

## 2021-10-03 NOTE — PROGRESS NOTES
Daily Progress Note: 10/3/2021  Tamiko Guo MD    Assessment/Plan:   Sepsis: Meets criteria based on HR, RR, WBC, and UTI. -IVFs, supportive care, IV ABX with Rocephin IV. -Cultures pending and will adjust antibiotics accordingly. -ID consult.     UTI (urinary tract infection) due to urinary indwelling catheter: Recently treated for pansensitive Proteus with Keflex. But in the setting of a chronic indwelling catheter will treat with Rocephin.    -urine culture from 10/1 neg so far.     Diarrhea: No history of C. difficile but in the setting of recent antibiotics will do stool studies to include C. difficile and enteric panel.    -Hold off on antidiarrheal agents for now.     Type 2 diabetes mellitus without complication:    -Monitor blood glucose levels with insulin sliding scale coverage along with basal insulin. -Monitor for complications. -A1C.    -Diabetic diet.     Hypertension, benign:   -Resume home meds   -Hydralazine as needed.     Atelectasis, left:   -Deep breath cough and incentive spirometry. Problem List:  Problem List as of 10/3/2021 Date Reviewed: 10/1/2021        Codes Class Noted - Resolved    Diarrhea ICD-10-CM: R19.7  ICD-9-CM: 787.91  10/1/2021 - Present        Sepsis ([facility] ICD-10-CM: A41.9  ICD-9-CM: 038.9, 995.91  10/1/2021 - Present        UTI (urinary tract infection) due to urinary indwelling catheter ([facility] ICD-10-CM: T83.511A, N39.0  ICD-9-CM: 996.64, 599.0  10/1/2021 - Present        Atelectasis, left ICD-10-CM: J98.11  ICD-9-CM: 518.0  10/1/2021 - Present        Epigastric abdominal pain ICD-10-CM: R10.13  ICD-9-CM: 789.06  2/8/2020 - Present        Epigastric pain ICD-10-CM: R10.13  ICD-9-CM: 789.06  2/8/2020 - Present        Depression ICD-10-CM: F32. A  ICD-9-CM: 478  2/7/2020 - Present        Fall ICD-10-CM: W19. Monalisa Chavira  ICD-9-CM: K575.8  11/29/2019 - Present        Traumatic rhabdomyolysis ([facility] ICD-10-CM: T79. 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  - PCI RCA with PTCA, significant LAD disease  - suspected reocclusion several hrs later with VF, IABP placed  3v CABG July 2009 Deyanirafatemeh Thrasher @ [facility])  - LIMA-LAD, VG-OM, VG-PDA             Type 2 diabetes mellitus without complication ([facility] BZB-58-BF: E11.9  ICD-9-CM: 250.00  Unknown - Present        Hypertension, benign ICD-10-CM: I10  ICD-9-CM: 401.1  Unknown - Present        RESOLVED: Chest pain ICD-10-CM: R07.9  ICD-9-CM: 786.50  2/7/2020 - 2/8/2020        RESOLVED: PND (paroxysmal nocturnal dyspnea) ICD-10-CM: R06.00  ICD-9-CM: 786.09  8/13/2017 - 8/11/2018        RESOLVED: Near syncope ICD-10-CM: R55  ICD-9-CM: 780.2  10/29/2013 - 4/15/2014              Subjective:    80 y.o. male with history that includes HTN, CAD, diabetes with neuropathy, and dementia who just completed Keflex for Proteus UTI presents fever, per chart worsening UTI symptoms, and diarrhea. This associated with fatigue and malaise. Due to his dementia he is not able to provide much of a history but is aware that he has had \"bad\" diarrhea but is unable to describe the consistency or color but does deny blood in it. Has not had a BM since he has been here. He did not remember that he had a UTI and was on antibiotics. In ER he was found to meet criteria for sepsis based on heart rate respiratory rate and leukocytosis in the setting of UTI and possible enteric infection. Chest x-ray showed minimal atelectasis in the left base but no chest pain or shortness of breath per patient. (Dr Tristian Mas)    10/1:  Still with loose watery stools. Cultures pending. Tmax 100. AM labs pending. Cultures pending.      10/2:  He reports fewer loose stools and no ab pain. Nurses reports 3 loose stools and 1 semisolid overnight. Tmax 98. ID consult pending. K+ sl low - replacing. 10/3:  Only 2 loose stools in last 12 hrs he reports. No ab pain. He reports \"starting to feel a little better. \"  WBC back to normal range. K+ still low - increased dose.      Review of Systems:   A comprehensive review of systems was negative except for that written in the HPI. Objective:   Physical Exam:   Visit Vitals  /61 (BP 1 Location: Right arm, BP Patient Position: At rest)   Pulse 70   Temp 98.2 °F (36.8 °C)   Resp 18   Ht 6' 0.01\" (1.829 m)   Wt 91.4 kg (201 lb 8 oz)   SpO2 100%   BMI 27.32 kg/m²      O2 Device: None (Room air)  Temp (24hrs), Av.1 °F (36.7 °C), Min:97.8 °F (36.6 °C), Max:98.4 °F (36.9 °C)    No intake/output data recorded. 10/01 1901 - 10/03 0700  In: 311.7 [I.V.:311.7]  Out: 2343 [Urine:3450]  General:  Alert, cooperative, no distress, appears stated age. Head:  Normocephalic, without obvious abnormality, atraumatic. Eyes:  Conjunctivae/corneas clear. PERRL, EOMs intact. Throat: Lips, mucosa, and tongue moist..   Neck: Supple, symmetrical, trachea midline, no adenopathy, thyroid: no enlargement/tenderness/nodules, no carotid bruit and no JVD. Back:    No CVA tenderness. Lungs:   Clear to auscultation bilaterally. Chest wall:  No tenderness or deformity. Heart:  Regular rate and rhythm, S1, S2 normal, no murmur, click, rub or gallop. Abdomen:   Soft, non-tender. Bowel sounds normal. No masses,  No organomegaly. Extremities: no cyanosis or edema. No calf tenderness or cords. Pulses: 2+ and symmetric all extremities. Skin: Skin color, texture, turgor normal. No rashes or lesions   Neurologic: CNII-XII intact. Alert and oriented X 3 at this moment. Fine motor of hands and fingers normal.   equal.  No cogwheeling or rigidity. Gait not tested at this time. Sensation grossly normal to touch.   Gross motor of extremities normal.       Data Review:     CT CHEST ABD PELV W CONT  Result Date: 10/1/2021  No acute findings. Incidentals as above including coronary artery disease status post CABG, cholecystolithiasis and prostamegaly with indwelling Hernández catheter.     XR CHEST PORT  Result Date: 9/30/2021  Minimal left basilar subsegmental atelectasis. Recent Days:  Recent Labs     10/03/21  0001 10/02/21  0233 09/30/21  2235   WBC 6.6 13.0* 16.9*   HGB 13.7 12.6 13.3   HCT 41.0 37.4 39.4    252 291     Recent Labs     10/03/21  0001 10/02/21  0233 09/30/21  2235    139 134*   K 3.3* 3.3* 4.2   * 108 104   CO2 25 23 21   GLU 71 83 297*   BUN 7 12 19   CREA 0.82 0.76 1.15   CA 8.6 8.1* 8.4*   ALB 2.9* 2.6* 3.0*   TBILI 1.0 0.9 1.3*   ALT 19 13 18     No results for input(s): PH, PCO2, PO2, HCO3, FIO2 in the last 72 hours. 24 Hour Results:  Recent Results (from the past 24 hour(s))   GLUCOSE, POC    Collection Time: 10/02/21 12:07 PM   Result Value Ref Range    Glucose (POC) 117 65 - 117 mg/dL    Performed by Lee, POC    Collection Time: 10/02/21  3:43 PM   Result Value Ref Range    Glucose (POC) 104 65 - 117 mg/dL    Performed by Lee, POC    Collection Time: 10/02/21  9:29 PM   Result Value Ref Range    Glucose (POC) 148 (H) 65 - 117 mg/dL    Performed by [employer]     METABOLIC PANEL, COMPREHENSIVE    Collection Time: 10/03/21 12:01 AM   Result Value Ref Range    Sodium 142 136 - 145 mmol/L    Potassium 3.3 (L) 3.5 - 5.1 mmol/L    Chloride 110 (H) 97 - 108 mmol/L    CO2 25 21 - 32 mmol/L    Anion gap 7 5 - 15 mmol/L    Glucose 71 65 - 100 mg/dL    BUN 7 6 - 20 MG/DL    Creatinine 0.82 0.70 - 1.30 MG/DL    BUN/Creatinine ratio 9 (L) 12 - 20      GFR est AA >60 >60 ml/min/1.73m2    GFR est non-AA >60 >60 ml/min/1.73m2    Calcium 8.6 8.5 - 10.1 MG/DL    Bilirubin, total 1.0 0.2 - 1.0 MG/DL    ALT (SGPT) 19 12 - 78 U/L    AST (SGOT) 16 15 - 37 U/L    Alk. phosphatase 48 45 - 117 U/L    Protein, total 7.1 6.4 - 8.2 g/dL    Albumin 2.9 (L) 3.5 - 5.0 g/dL    Globulin 4.2 (H) 2.0 - 4.0 g/dL    A-G Ratio 0.7 (L) 1.1 - 2.2     CBC WITH AUTOMATED DIFF    Collection Time: 10/03/21 12:01 AM   Result Value Ref Range    WBC 6.6 4.1 - 11.1 K/uL    RBC 4.57 4.10 - 5.70 M/uL    HGB 13.7 12.1 - 17.0 g/dL    HCT 41.0 36.6 - 50.3 %    MCV 89.7 80.0 - 99.0 FL    MCH 30.0 26.0 - 34.0 PG    MCHC 33.4 30.0 - 36.5 g/dL    RDW 12.8 11.5 - 14.5 %    PLATELET 851 476 - 583 K/uL    MPV 9.0 8.9 - 12.9 FL    NRBC 0.0 0  WBC    ABSOLUTE NRBC 0.00 0.00 - 0.01 K/uL    NEUTROPHILS 57 32 - 75 %    LYMPHOCYTES 28 12 - 49 %    MONOCYTES 10 5 - 13 %    EOSINOPHILS 4 0 - 7 %    BASOPHILS 1 0 - 1 %    IMMATURE GRANULOCYTES 0 0.0 - 0.5 %    ABS. NEUTROPHILS 3.8 1.8 - 8.0 K/UL    ABS. LYMPHOCYTES 1.9 0.8 - 3.5 K/UL    ABS. MONOCYTES 0.7 0.0 - 1.0 K/UL    ABS. EOSINOPHILS 0.3 0.0 - 0.4 K/UL    ABS. BASOPHILS 0.0 0.0 - 0.1 K/UL    ABS. IMM.  GRANS. 0.0 0.00 - 0.04 K/UL    DF AUTOMATED     OCCULT BLOOD, STOOL    Collection Time: 10/03/21 12:02 AM   Result Value Ref Range    Occult blood, stool Positive (A) NEG     GLUCOSE, POC    Collection Time: 10/03/21  6:28 AM   Result Value Ref Range    Glucose (POC) 76 65 - 117 mg/dL    Performed by Veronica Partida (PCT)        Medications reviewed  Current Facility-Administered Medications   Medication Dose Route Frequency    potassium chloride SR (KLOR-CON 10) tablet 10 mEq  10 mEq Oral BID    cefTRIAXone (ROCEPHIN) 1 g in sterile water (preservative free) 10 mL IV syringe  1 g IntraVENous Q24H    0.9% sodium chloride infusion  100 mL/hr IntraVENous CONTINUOUS    sodium chloride (NS) flush 5-40 mL  5-40 mL IntraVENous Q8H    sodium chloride (NS) flush 5-40 mL  5-40 mL IntraVENous PRN    acetaminophen (TYLENOL) tablet 650 mg  650 mg Oral Q6H PRN    Or    acetaminophen (TYLENOL) suppository 650 mg  650 mg Rectal Q6H PRN    polyethylene glycol (MIRALAX) packet 17 g  17 g Oral DAILY PRN    bisacodyL (DULCOLAX) suppository 10 mg  10 mg Rectal DAILY PRN    ondansetron (ZOFRAN) injection 4 mg  4 mg IntraVENous Q6H PRN    tamsulosin (FLOMAX) capsule 0.4 mg  0.4 mg Oral BID    pantoprazole (PROTONIX) tablet 40 mg  40 mg Oral ACB    metoprolol succinate (TOPROL-XL) XL tablet 25 mg  25 mg Oral DAILY    glucose chewable tablet 16 g  4 Tablet Oral PRN    dextrose (D50W) injection syrg 12.5-25 g  25-50 mL IntraVENous PRN    glucagon (GLUCAGEN) injection 1 mg  1 mg IntraMUSCular PRN    insulin lispro (HUMALOG) injection   SubCUTAneous TIDAC    insulin NPH (NOVOLIN N, HUMULIN N) injection 25 Units  25 Units SubCUTAneous ACB&D    dutasteride (AVODART) capsule 0.5 mg  0.5 mg Oral DAILY    sodium chloride (NS) flush 5-10 mL  5-10 mL IntraVENous PRN       Care Plan discussed with: Patient/Family  Total time spent with patient: 30 minutes.   Jazmine Singh MD

## 2021-10-03 NOTE — PROGRESS NOTES
Bedside and Verbal shift change report given to Romilda Romberg (oncoming nurse) by iCnda Castro (offgoing nurse). Report included the following information SBAR, Kardex, ED Summary, Procedure Summary, Intake/Output and MAR.

## 2021-10-03 NOTE — PROGRESS NOTES
Bedside shift change report given to [employer] Stockdrift (oncoming nurse) by Peter Pearce (offgoing nurse). Report included the following information SBAR, Kardex, Intake/Output and Recent Results.

## 2021-10-04 ENCOUNTER — APPOINTMENT (OUTPATIENT)
Dept: CT IMAGING | Age: 81
DRG: 698 | End: 2021-10-04
Attending: PSYCHIATRY & NEUROLOGY
Payer: MEDICARE

## 2021-10-04 LAB
ALBUMIN SERPL-MCNC: 3 G/DL (ref 3.5–5)
ALBUMIN/GLOB SERPL: 0.7 {RATIO} (ref 1.1–2.2)
ALP SERPL-CCNC: 53 U/L (ref 45–117)
ALT SERPL-CCNC: 18 U/L (ref 12–78)
ANION GAP SERPL CALC-SCNC: 9 MMOL/L (ref 5–15)
AST SERPL-CCNC: 12 U/L (ref 15–37)
BASOPHILS # BLD: 0 K/UL (ref 0–0.1)
BASOPHILS NFR BLD: 1 % (ref 0–1)
BILIRUB SERPL-MCNC: 0.9 MG/DL (ref 0.2–1)
BUN SERPL-MCNC: 7 MG/DL (ref 6–20)
BUN/CREAT SERPL: 6 (ref 12–20)
CALCIUM SERPL-MCNC: 8.6 MG/DL (ref 8.5–10.1)
CHLORIDE SERPL-SCNC: 110 MMOL/L (ref 97–108)
CO2 SERPL-SCNC: 22 MMOL/L (ref 21–32)
CREAT SERPL-MCNC: 1.11 MG/DL (ref 0.7–1.3)
DIFFERENTIAL METHOD BLD: ABNORMAL
EOSINOPHIL # BLD: 0.1 K/UL (ref 0–0.4)
EOSINOPHIL NFR BLD: 2 % (ref 0–7)
ERYTHROCYTE [DISTWIDTH] IN BLOOD BY AUTOMATED COUNT: 12.9 % (ref 11.5–14.5)
GLOBULIN SER CALC-MCNC: 4.4 G/DL (ref 2–4)
GLUCOSE BLD STRIP.AUTO-MCNC: 72 MG/DL (ref 65–117)
GLUCOSE BLD STRIP.AUTO-MCNC: 78 MG/DL (ref 65–117)
GLUCOSE BLD STRIP.AUTO-MCNC: 91 MG/DL (ref 65–117)
GLUCOSE SERPL-MCNC: 166 MG/DL (ref 65–100)
HCT VFR BLD AUTO: 40.5 % (ref 36.6–50.3)
HGB BLD-MCNC: 14 G/DL (ref 12.1–17)
IMM GRANULOCYTES # BLD AUTO: 0 K/UL (ref 0–0.04)
IMM GRANULOCYTES NFR BLD AUTO: 1 % (ref 0–0.5)
LYMPHOCYTES # BLD: 1.2 K/UL (ref 0.8–3.5)
LYMPHOCYTES NFR BLD: 18 % (ref 12–49)
MCH RBC QN AUTO: 30.7 PG (ref 26–34)
MCHC RBC AUTO-ENTMCNC: 34.6 G/DL (ref 30–36.5)
MCV RBC AUTO: 88.8 FL (ref 80–99)
MONOCYTES # BLD: 0.6 K/UL (ref 0–1)
MONOCYTES NFR BLD: 10 % (ref 5–13)
NEUTS SEG # BLD: 4.6 K/UL (ref 1.8–8)
NEUTS SEG NFR BLD: 68 % (ref 32–75)
NRBC # BLD: 0 K/UL (ref 0–0.01)
NRBC BLD-RTO: 0 PER 100 WBC
PLATELET # BLD AUTO: 307 K/UL (ref 150–400)
PMV BLD AUTO: 9.3 FL (ref 8.9–12.9)
POTASSIUM SERPL-SCNC: 3.6 MMOL/L (ref 3.5–5.1)
PROT SERPL-MCNC: 7.4 G/DL (ref 6.4–8.2)
RBC # BLD AUTO: 4.56 M/UL (ref 4.1–5.7)
SERVICE CMNT-IMP: NORMAL
SODIUM SERPL-SCNC: 141 MMOL/L (ref 136–145)
WBC # BLD AUTO: 6.6 K/UL (ref 4.1–11.1)

## 2021-10-04 PROCEDURE — 99222 1ST HOSP IP/OBS MODERATE 55: CPT | Performed by: PSYCHIATRY & NEUROLOGY

## 2021-10-04 PROCEDURE — 94760 N-INVAS EAR/PLS OXIMETRY 1: CPT

## 2021-10-04 PROCEDURE — 74011250637 HC RX REV CODE- 250/637: Performed by: FAMILY MEDICINE

## 2021-10-04 PROCEDURE — 80053 COMPREHEN METABOLIC PANEL: CPT

## 2021-10-04 PROCEDURE — 74011636637 HC RX REV CODE- 636/637: Performed by: FAMILY MEDICINE

## 2021-10-04 PROCEDURE — 82962 GLUCOSE BLOOD TEST: CPT

## 2021-10-04 PROCEDURE — 36415 COLL VENOUS BLD VENIPUNCTURE: CPT

## 2021-10-04 PROCEDURE — 74011250636 HC RX REV CODE- 250/636: Performed by: FAMILY MEDICINE

## 2021-10-04 PROCEDURE — 70450 CT HEAD/BRAIN W/O DYE: CPT

## 2021-10-04 PROCEDURE — 74011000250 HC RX REV CODE- 250: Performed by: HOSPITALIST

## 2021-10-04 PROCEDURE — 74011250637 HC RX REV CODE- 250/637: Performed by: HOSPITALIST

## 2021-10-04 PROCEDURE — 65270000029 HC RM PRIVATE

## 2021-10-04 PROCEDURE — 2709999900 HC NON-CHARGEABLE SUPPLY

## 2021-10-04 PROCEDURE — 77030027138 HC INCENT SPIROMETER -A

## 2021-10-04 PROCEDURE — 77030040361 HC SLV COMPR DVT MDII -B

## 2021-10-04 PROCEDURE — 74011250636 HC RX REV CODE- 250/636: Performed by: HOSPITALIST

## 2021-10-04 PROCEDURE — 85025 COMPLETE CBC W/AUTO DIFF WBC: CPT

## 2021-10-04 RX ORDER — VANCOMYCIN HYDROCHLORIDE 250 MG/5ML
125 POWDER, FOR SOLUTION ORAL EVERY 6 HOURS
Status: DISCONTINUED | OUTPATIENT
Start: 2021-10-04 | End: 2021-10-06 | Stop reason: HOSPADM

## 2021-10-04 RX ORDER — PHENAZOPYRIDINE HYDROCHLORIDE 100 MG/1
100 TABLET, FILM COATED ORAL
Status: COMPLETED | OUTPATIENT
Start: 2021-10-04 | End: 2021-10-05

## 2021-10-04 RX ADMIN — Medication 10 ML: at 08:29

## 2021-10-04 RX ADMIN — TAMSULOSIN HYDROCHLORIDE 0.4 MG: 0.4 CAPSULE ORAL at 18:10

## 2021-10-04 RX ADMIN — Medication 10 ML: at 22:42

## 2021-10-04 RX ADMIN — DUTASTERIDE 0.5 MG: 0.5 CAPSULE, LIQUID FILLED ORAL at 08:28

## 2021-10-04 RX ADMIN — Medication 10 ML: at 13:31

## 2021-10-04 RX ADMIN — CEFTRIAXONE 1 G: 1 INJECTION, POWDER, FOR SOLUTION INTRAMUSCULAR; INTRAVENOUS at 03:07

## 2021-10-04 RX ADMIN — METOPROLOL SUCCINATE 25 MG: 25 TABLET, EXTENDED RELEASE ORAL at 08:28

## 2021-10-04 RX ADMIN — PHENAZOPYRIDINE HYDROCHLORIDE 100 MG: 100 TABLET ORAL at 12:40

## 2021-10-04 RX ADMIN — TAMSULOSIN HYDROCHLORIDE 0.4 MG: 0.4 CAPSULE ORAL at 08:28

## 2021-10-04 RX ADMIN — PANTOPRAZOLE SODIUM 40 MG: 40 TABLET, DELAYED RELEASE ORAL at 08:28

## 2021-10-04 RX ADMIN — VANCOMYCIN HYDROCHLORIDE 125 MG: 250 POWDER, FOR SOLUTION ORAL at 08:31

## 2021-10-04 RX ADMIN — HUMAN INSULIN 25 UNITS: 100 INJECTION, SUSPENSION SUBCUTANEOUS at 16:46

## 2021-10-04 RX ADMIN — POTASSIUM CHLORIDE 20 MEQ: 750 TABLET, FILM COATED, EXTENDED RELEASE ORAL at 18:11

## 2021-10-04 RX ADMIN — POTASSIUM CHLORIDE 20 MEQ: 750 TABLET, FILM COATED, EXTENDED RELEASE ORAL at 08:28

## 2021-10-04 RX ADMIN — PHENAZOPYRIDINE HYDROCHLORIDE 100 MG: 100 TABLET ORAL at 18:10

## 2021-10-04 RX ADMIN — INSULIN LISPRO 2 UNITS: 100 INJECTION, SOLUTION INTRAVENOUS; SUBCUTANEOUS at 08:27

## 2021-10-04 RX ADMIN — VANCOMYCIN HYDROCHLORIDE 125 MG: 250 POWDER, FOR SOLUTION ORAL at 18:52

## 2021-10-04 RX ADMIN — HUMAN INSULIN 25 UNITS: 100 INJECTION, SUSPENSION SUBCUTANEOUS at 08:27

## 2021-10-04 RX ADMIN — SODIUM CHLORIDE 50 ML/HR: 9 INJECTION, SOLUTION INTRAVENOUS at 12:55

## 2021-10-04 RX ADMIN — PHENAZOPYRIDINE HYDROCHLORIDE 100 MG: 100 TABLET ORAL at 08:28

## 2021-10-04 RX ADMIN — ACETAMINOPHEN 650 MG: 325 TABLET ORAL at 16:59

## 2021-10-04 RX ADMIN — VANCOMYCIN HYDROCHLORIDE 125 MG: 250 POWDER, FOR SOLUTION ORAL at 13:30

## 2021-10-04 NOTE — PROGRESS NOTES
2202  Received a positive C diff result from Rand Balderrama at [facility] microbiology lab.  Notified Provider pt currently on C diff precautions no new orders received

## 2021-10-04 NOTE — PROGRESS NOTES
Problem: Falls - Risk of  Goal: *Absence of Falls  Description: Document Bard  Fall Risk and appropriate interventions in the flowsheet.   Outcome: Progressing Towards Goal  Note: Fall Risk Interventions:  Mobility Interventions: Communicate number of staff needed for ambulation/transfer    Mentation Interventions: Bed/chair exit alarm    Medication Interventions: Bed/chair exit alarm    Elimination Interventions: Call light in reach    History of Falls Interventions: Bed/chair exit alarm

## 2021-10-04 NOTE — PROGRESS NOTES
Bedside and Verbal shift change report given to Bartolo Contreras (oncoming nurse) by Sumi Crawford (offgoing nurse). Report included the following information SBAR, Kardex, ED Summary, Procedure Summary, Intake/Output and MAR.

## 2021-10-04 NOTE — CONSULTS
Robert Alva NP-C  [phone] cell     Gastroenterology Consultation Note      Admit Date: 9/30/2021  Consult Date: 10/4/2021   I greatly appreciate your asking me to see Keenan Flores., thank you very much for the opportunity to participate in his care. Narrative Assessment and Plan   79 y/o male with GI consult for C. Diff positive diarrhea. History is difficult due to patient's history of dementia. Apparently has history of recent abx use and developed diarrhea which resulted in his coming to ER. His diarrhea is better now that Vanc has started. He denies abdominal pain, nausea, vomiting, or rectal bleeding. CT without colitis. Last colonoscopy 5/2016 for screening with 2 adenomatous polyps, erythema SC which was thought to be prep related, and medium internal hemorrhoids. · Continue vanc     Subjective:     Chief Complaint: C. Diff diarrhea    History of Present Illness: 79 y/o male with GI consult for C. Diff positive diarrhea. Admitted with sepsis. History is difficult due to patient's history of dementia. Apparently has history of recent abx use and developed diarrhea which resulted in his coming to ER. His diarrhea is better now that Vanc has started. He denies abdominal pain, nausea, vomiting, or rectal bleeding. CT abd/pelvis with contrast without colitis. Last colonoscopy 5/2016 for screening with 2 adenomatous polyps, erythema SC which was thought to be prep related, and medium internal hemorrhoids. No family history of CRC or colon polyps. Pertinent labs: WBC 6.6, hgb 14, hct 40.5, MCV 88.8, platelets 095, sodium 141, potassium 3.6, BUN 7, creatinine 1.11, stool heme+.       PCP:  Ra Healy MD    Past Medical History:   Diagnosis Date    CAD (coronary artery disease), native coronary artery     Cancer ([facility]     melanoma removed on forehead    Depression     Diabetic neuropathy ([facility]     ED (erectile dysfunction)     Hypertension, benign     Ill-defined condition     states he has memory problems    Postsurgical aortocoronary bypass status     Type II or unspecified type diabetes mellitus without mention of complication, not stated as uncontrolled         Past Surgical History:   Procedure Laterality Date    COLONOSCOPY N/A 5/3/2016    COLONOSCOPY performed by Surendra Crabtree MD at [address] ECHO 2D ADULT  2009    EF 50%, basal inferior akinesis, mild MR    ECHO 2D ADULT  11    LVEF 50%, inferior AK, unchanged from Dec 2009    HX CORONARY ARTERY BYPASS GRAFT      HX OTHER SURGICAL      melanoma removed from forehead       Social History     Tobacco Use    Smoking status: Never Smoker    Smokeless tobacco: Never Used   Substance Use Topics    Alcohol use: No        Family History   Problem Relation Age of Onset    Breast Cancer Mother         w bone mets    Parkinson's Disease Maternal Aunt     Parkinson's Disease Maternal Uncle     Parkinson's Disease Maternal Grandfather         Allergies   Allergen Reactions    Amoxicillin Hives and Itching    Pravastatin Myalgia    Sulfa (Sulfonamide Antibiotics) Rash            Home Medications:  Prior to Admission Medications   Prescriptions Last Dose Informant Patient Reported? Taking?   aspirin delayed-release 81 mg tablet 2021 at Unknown time Self Yes Yes   Sig: Take 81 mg by mouth daily. cholecalciferol (Vitamin D3) (1000 Units /25 mcg) tablet 2021 at Unknown time Self Yes Yes   Sig: Take 1,000 Units by mouth daily. dutasteride (AVODART) 0.5 mg capsule 2021 at Unknown time Self Yes Yes   Sig: Take 0.5 mg by mouth daily. insulin NPH/insulin regular (NOVOLIN 70/30, HUMULIN 70/30) 100 unit/mL (70-30) injection 2021 Self Yes Yes   Si Units by SubCUTAneous route Before breakfast and dinner.    metoprolol succinate (TOPROL-XL) 25 mg XL tablet 2021 at Unknown time Self No Yes   Sig: TAKE 1 TABLET BY MOUTH EVERY DAY   multivitamin (ONE A DAY) tablet 9/30/2021 at Unknown time Self Yes Yes   Sig: Take 1 Tablet by mouth daily. tamsulosin (FLOMAX) 0.4 mg capsule 9/30/2021 at Unknown time Self Yes Yes   Sig: Take 0.4 mg by mouth two (2) times a day. Facility-Administered Medications: None       Hospital Medications:  Current Facility-Administered Medications   Medication Dose Route Frequency    vancomycin (FIRVANQ) 50 mg/mL oral solution 125 mg  125 mg Oral Q6H    phenazopyridine (PYRIDIUM) tablet 100 mg  100 mg Oral TIDPC    potassium chloride SR (KLOR-CON 10) tablet 20 mEq  20 mEq Oral BID    cefTRIAXone (ROCEPHIN) 1 g in sterile water (preservative free) 10 mL IV syringe  1 g IntraVENous Q24H    0.9% sodium chloride infusion  50 mL/hr IntraVENous CONTINUOUS    sodium chloride (NS) flush 5-40 mL  5-40 mL IntraVENous Q8H    sodium chloride (NS) flush 5-40 mL  5-40 mL IntraVENous PRN    acetaminophen (TYLENOL) tablet 650 mg  650 mg Oral Q6H PRN    Or    acetaminophen (TYLENOL) suppository 650 mg  650 mg Rectal Q6H PRN    polyethylene glycol (MIRALAX) packet 17 g  17 g Oral DAILY PRN    bisacodyL (DULCOLAX) suppository 10 mg  10 mg Rectal DAILY PRN    ondansetron (ZOFRAN) injection 4 mg  4 mg IntraVENous Q6H PRN    tamsulosin (FLOMAX) capsule 0.4 mg  0.4 mg Oral BID    pantoprazole (PROTONIX) tablet 40 mg  40 mg Oral ACB    metoprolol succinate (TOPROL-XL) XL tablet 25 mg  25 mg Oral DAILY    glucose chewable tablet 16 g  4 Tablet Oral PRN    dextrose (D50W) injection syrg 12.5-25 g  25-50 mL IntraVENous PRN    glucagon (GLUCAGEN) injection 1 mg  1 mg IntraMUSCular PRN    insulin lispro (HUMALOG) injection   SubCUTAneous TIDAC    insulin NPH (NOVOLIN N, HUMULIN N) injection 25 Units  25 Units SubCUTAneous ACB&D    dutasteride (AVODART) capsule 0.5 mg  0.5 mg Oral DAILY    sodium chloride (NS) flush 5-10 mL  5-10 mL IntraVENous PRN       Review of Systems: Per HPI, otherwise negative.       Objective:     Physical Exam:  Visit Vitals  /64 (BP 1 Location: Left upper arm, BP Patient Position: At rest)   Pulse 80   Temp 98.3 °F (36.8 °C)   Resp 18   Ht 6' 0.01\" (1.829 m)   Wt 91.4 kg (201 lb 8 oz)   SpO2 98%   BMI 27.32 kg/m²     SpO2 Readings from Last 6 Encounters:   10/04/21 98%   09/20/21 98%   07/20/21 95%   07/18/21 96%   09/23/20 99%   02/11/20 95%            Intake/Output Summary (Last 24 hours) at 10/4/2021 1518  Last data filed at 10/4/2021 0941  Gross per 24 hour   Intake 3838.33 ml   Output 3500 ml   Net 338.33 ml      General: no distress, comfortable  Skin:  No rash or palpable dermatologic mass lesions  HEENT: Pupils equal, sclera anicteric, oropharynx with no gross lesions  Cardiovascular: No abnormal audible heart sounds, well perfused, no edema  Respiratory:  No abnormal audible breath sounds, normal respiratory effort, no throacic deformity  GI:  Abdomen nondistended, nontender, no mass, no free fluid, no rebound or guarding. Musculoskeletal:  No skeletal deformity nor acute arthritis noted.   Neurological:  Motor and sensory function intact in upper extremeties  Psychiatric:  Dementia, difficulty obtaining history    Laboratory:    Recent Results (from the past 24 hour(s))   GLUCOSE, POC    Collection Time: 10/03/21  4:09 PM   Result Value Ref Range    Glucose (POC) 155 (H) 65 - 117 mg/dL    Performed by Lee, POC    Collection Time: 10/03/21  9:38 PM   Result Value Ref Range    Glucose (POC) 122 (H) 65 - 117 mg/dL    Performed by [facility]    CBC WITH AUTOMATED DIFF    Collection Time: 10/04/21  3:08 AM   Result Value Ref Range    WBC 6.6 4.1 - 11.1 K/uL    RBC 4.56 4.10 - 5.70 M/uL    HGB 14.0 12.1 - 17.0 g/dL    HCT 40.5 36.6 - 50.3 %    MCV 88.8 80.0 - 99.0 FL    MCH 30.7 26.0 - 34.0 PG    MCHC 34.6 30.0 - 36.5 g/dL    RDW 12.9 11.5 - 14.5 %    PLATELET 654 496 - 856 K/uL    MPV 9.3 8.9 - 12.9 FL    NRBC 0.0 0  WBC    ABSOLUTE NRBC 0.00 0.00 - 0.01 K/uL    NEUTROPHILS 68 32 - 75 % LYMPHOCYTES 18 12 - 49 %    MONOCYTES 10 5 - 13 %    EOSINOPHILS 2 0 - 7 %    BASOPHILS 1 0 - 1 %    IMMATURE GRANULOCYTES 1 (H) 0.0 - 0.5 %    ABS. NEUTROPHILS 4.6 1.8 - 8.0 K/UL    ABS. LYMPHOCYTES 1.2 0.8 - 3.5 K/UL    ABS. MONOCYTES 0.6 0.0 - 1.0 K/UL    ABS. EOSINOPHILS 0.1 0.0 - 0.4 K/UL    ABS. BASOPHILS 0.0 0.0 - 0.1 K/UL    ABS. IMM. GRANS. 0.0 0.00 - 0.04 K/UL    DF AUTOMATED     METABOLIC PANEL, COMPREHENSIVE    Collection Time: 10/04/21  3:08 AM   Result Value Ref Range    Sodium 141 136 - 145 mmol/L    Potassium 3.6 3.5 - 5.1 mmol/L    Chloride 110 (H) 97 - 108 mmol/L    CO2 22 21 - 32 mmol/L    Anion gap 9 5 - 15 mmol/L    Glucose 166 (H) 65 - 100 mg/dL    BUN 7 6 - 20 MG/DL    Creatinine 1.11 0.70 - 1.30 MG/DL    BUN/Creatinine ratio 6 (L) 12 - 20      GFR est AA >60 >60 ml/min/1.73m2    GFR est non-AA >60 >60 ml/min/1.73m2    Calcium 8.6 8.5 - 10.1 MG/DL    Bilirubin, total 0.9 0.2 - 1.0 MG/DL    ALT (SGPT) 18 12 - 78 U/L    AST (SGOT) 12 (L) 15 - 37 U/L    Alk. phosphatase 53 45 - 117 U/L    Protein, total 7.4 6.4 - 8.2 g/dL    Albumin 3.0 (L) 3.5 - 5.0 g/dL    Globulin 4.4 (H) 2.0 - 4.0 g/dL    A-G Ratio 0.7 (L) 1.1 - 2.2     GLUCOSE, POC    Collection Time: 10/04/21 11:20 AM   Result Value Ref Range    Glucose (POC) 78 65 - 117 mg/dL    Performed by Nini Singh          Assessment/Plan:     Active Problems:    Type 2 diabetes mellitus without complication ([facility] ()      Hypertension, benign ()      Diarrhea (10/1/2021)      Sepsis ([facility] (10/1/2021)      UTI (urinary tract infection) due to urinary indwelling catheter ([facility] (10/1/2021)      Atelectasis, left (10/1/2021)         See above narrative for full detail.     Macho Langford NP     I have interviewed and examined patient with addendum to note above and formulation care plan to reflect my evaluation    Hernandez Fuentes M.D.

## 2021-10-04 NOTE — PROGRESS NOTES
Physical Therapy Note:    Orders acknowledged, chart reviewed, discussed with RN. PT evaluation deferred. Pt recently returned to bed with staff assist and declines participation with PT at this time. Will continue to follow and proceed with PT evaluation when appropriate.     Abhi Aguilar, PT, DPT, Tamir Thomas

## 2021-10-04 NOTE — CONSULTS
NEUROLOGY CONSULT NOTE    Patient ID:  Tej Garcia.  [id number]  80 y.o.  1940    Date of Consultation:  October 4, 2021    Referring Physician: Dr. Mary Sanders    Reason for Consultation:  Altered mental status    History of Present Illness:     Patient Active Problem List    Diagnosis Date Noted    Diarrhea 10/01/2021    Sepsis ([address] 10/01/2021    UTI (urinary tract infection) due to urinary indwelling catheter ([address] 10/01/2021    Atelectasis, left 10/01/2021    Epigastric abdominal pain 02/08/2020    Epigastric pain 02/08/2020    Depression 02/07/2020    Fall 11/29/2019    Traumatic rhabdomyolysis ([address] 11/29/2019    Dyslipidemia 10/03/2012    CAD (coronary artery disease), native coronary artery     Type 2 diabetes mellitus without complication ([address]     Hypertension, benign      Past Medical History:   Diagnosis Date    CAD (coronary artery disease), native coronary artery     Cancer ([address]     melanoma removed on forehead    Depression     Diabetic neuropathy ([address]     ED (erectile dysfunction)     Hypertension, benign     Ill-defined condition     states he has memory problems    Postsurgical aortocoronary bypass status     Type II or unspecified type diabetes mellitus without mention of complication, not stated as uncontrolled       Past Surgical History:   Procedure Laterality Date    COLONOSCOPY N/A 5/3/2016    COLONOSCOPY performed by Devin Wolf MD at [address] [place] [address] ECHO 2D ADULT  12/2009    EF 50%, basal inferior akinesis, mild MR    ECHO 2D ADULT  9/21/11    LVEF 50%, inferior AK, unchanged from Dec 2009    HX CORONARY ARTERY BYPASS GRAFT  2009    HX OTHER SURGICAL  2008    melanoma removed from forehead      Prior to Admission medications    Medication Sig Start Date End Date Taking? Authorizing Provider   dutasteride (AVODART) 0.5 mg capsule Take 0.5 mg by mouth daily.    Yes Provider, Historical   cholecalciferol (Vitamin D3) (1000 Units /25 mcg) tablet Take 1,000 Units by mouth daily. Yes Provider, Historical   multivitamin (ONE A DAY) tablet Take 1 Tablet by mouth daily. Yes Other, MD Sandi   metoprolol succinate (TOPROL-XL) 25 mg XL tablet TAKE 1 TABLET BY MOUTH EVERY DAY 3/25/21  Yes Saba Parra NP   tamsulosin (FLOMAX) 0.4 mg capsule Take 0.4 mg by mouth two (2) times a day. Yes Provider, Historical   insulin NPH/insulin regular (NOVOLIN 70/30, HUMULIN 70/30) 100 unit/mL (70-30) injection 45 Units by SubCUTAneous route Before breakfast and dinner. Yes Provider, Historical   aspirin delayed-release 81 mg tablet Take 81 mg by mouth daily. Yes Provider, Historical     Allergies   Allergen Reactions    Amoxicillin Hives and Itching    Pravastatin Myalgia    Sulfa (Sulfonamide Antibiotics) Rash      Social History     Tobacco Use    Smoking status: Never Smoker    Smokeless tobacco: Never Used   Substance Use Topics    Alcohol use: No      Family History   Problem Relation Age of Onset    Breast Cancer Mother         w bone mets    Parkinson's Disease Maternal Aunt     Parkinson's Disease Maternal Uncle     Parkinson's Disease Maternal Grandfather         Subjective:      Saddie Kehr Sr. is a 80 y.o. WM with history of CAD, melanoma, diabetes complicated with neuropathy, depression, hypertension who was admitted from the ER 9/30/2021 for fever and altered mental status and found to have C. Difficile. During his admission, WBC was elevated at 16.9, slightly low sodium 134, blood sugar increased to 297, decreased calcium, increased bilirubin, decreased protein. Slightly elevated hemoglobin A1c at 6.4. Unremarkable lactic acid, procalcitonin and urinalysis. C. difficile positive. Currently on medication. Chest x-ray was unremarkable. Abdominal CT did not reveal any acute process. Since then patient has had periods of confusion and alteration. Same when seen.     Outside reports reviewed: ER records, radiology reports, lab reports, historical medical records. Review of Systems:    Review of systems not obtained due to patient factors. Objective:     Patient Vitals for the past 8 hrs:   BP Temp Pulse Resp SpO2   10/04/21 0815 (!) 144/68 98.4 °F (36.9 °C) 92 18 97 %   10/04/21 0345 (!) 135/55 98.5 °F (36.9 °C) 97 18 93 %     PHYSICAL EXAM:    NEUROLOGICAL EXAM:    Appearance: The patient is well developed, well nourished, unable to  provide a coherent history and is in no acute distress. Mental Status: Oriented to person. Fluent, no aphasia or dysarthria. Occasionally follows simple commands. Cranial Nerves:   Intact visual fields. DOMINIQUE, EOM's full, no nystagmus, no ptosis. Facial sensation is normal. Corneal reflexes are intact. Facial movement is symmetric. Hearing is normal bilaterally. Palate is midline with normal elevation. Sternocleidomastoid and trapezius muscles are normal. Tongue is midline. Motor:  Moves all 4 extremities well. No focal weakness. No tone changes. Reflexes:   Deep tendon reflexes 1+/4 and symmetrical. Downgoing toes. Sensory:   Normal to noxious on all 4 extremities. Gait:  Not tested. Tremor:   No tremor noted. Cerebellar:  Not tested.          Imaging  CT abd/pelvis: reviewed    Lab Review    Recent Results (from the past 24 hour(s))   GLUCOSE, POC    Collection Time: 10/03/21 11:26 AM   Result Value Ref Range    Glucose (POC) 137 (H) 65 - 117 mg/dL    Performed by David Irizarry (PCT)    GLUCOSE, POC    Collection Time: 10/03/21  4:09 PM   Result Value Ref Range    Glucose (POC) 155 (H) 65 - 117 mg/dL    Performed by Lee, POC    Collection Time: 10/03/21  9:38 PM   Result Value Ref Range    Glucose (POC) 122 (H) 65 - 117 mg/dL    Performed by Jayson Cat    CBC WITH AUTOMATED DIFF    Collection Time: 10/04/21  3:08 AM   Result Value Ref Range    WBC 6.6 4.1 - 11.1 K/uL    RBC 4.56 4.10 - 5.70 M/uL    HGB 14.0 12.1 - 17.0 g/dL    HCT 40.5 36.6 - 50.3 %    MCV 88.8 80.0 - 99.0 FL    MCH 30.7 26.0 - 34.0 PG    MCHC 34.6 30.0 - 36.5 g/dL    RDW 12.9 11.5 - 14.5 %    PLATELET 210 367 - 622 K/uL    MPV 9.3 8.9 - 12.9 FL    NRBC 0.0 0  WBC    ABSOLUTE NRBC 0.00 0.00 - 0.01 K/uL    NEUTROPHILS 68 32 - 75 %    LYMPHOCYTES 18 12 - 49 %    MONOCYTES 10 5 - 13 %    EOSINOPHILS 2 0 - 7 %    BASOPHILS 1 0 - 1 %    IMMATURE GRANULOCYTES 1 (H) 0.0 - 0.5 %    ABS. NEUTROPHILS 4.6 1.8 - 8.0 K/UL    ABS. LYMPHOCYTES 1.2 0.8 - 3.5 K/UL    ABS. MONOCYTES 0.6 0.0 - 1.0 K/UL    ABS. EOSINOPHILS 0.1 0.0 - 0.4 K/UL    ABS. BASOPHILS 0.0 0.0 - 0.1 K/UL    ABS. IMM. GRANS. 0.0 0.00 - 0.04 K/UL    DF AUTOMATED     METABOLIC PANEL, COMPREHENSIVE    Collection Time: 10/04/21  3:08 AM   Result Value Ref Range    Sodium 141 136 - 145 mmol/L    Potassium 3.6 3.5 - 5.1 mmol/L    Chloride 110 (H) 97 - 108 mmol/L    CO2 22 21 - 32 mmol/L    Anion gap 9 5 - 15 mmol/L    Glucose 166 (H) 65 - 100 mg/dL    BUN 7 6 - 20 MG/DL    Creatinine 1.11 0.70 - 1.30 MG/DL    BUN/Creatinine ratio 6 (L) 12 - 20      GFR est AA >60 >60 ml/min/1.73m2    GFR est non-AA >60 >60 ml/min/1.73m2    Calcium 8.6 8.5 - 10.1 MG/DL    Bilirubin, total 0.9 0.2 - 1.0 MG/DL    ALT (SGPT) 18 12 - 78 U/L    AST (SGOT) 12 (L) 15 - 37 U/L    Alk. phosphatase 53 45 - 117 U/L    Protein, total 7.4 6.4 - 8.2 g/dL    Albumin 3.0 (L) 3.5 - 5.0 g/dL    Globulin 4.4 (H) 2.0 - 4.0 g/dL    A-G Ratio 0.7 (L) 1.1 - 2.2           Assessment:   Acute encephalopathy  C. Diff infection    Plan:   Neurological examination reveals cognitive impairment with no focal findings. Consistent with encephalopathy. Likely in this case secondary to ongoing infectious process. Difficult to a certain whether patient has baseline dementia. Head CT was ordered to assess for presence of white matter disease and strokes. EEG ordered to be done in the morning to assess for possible subclinical seizures or document extent of encephalopathy.     Limit use of sedating medication. Continue hydration and treatment of underlying infectious process. Further intervention be done pending results of testing. Thank you for the consult. This note was created using voice recognition software. Despite editing, there may be syntax errors.

## 2021-10-04 NOTE — PROGRESS NOTES
10/4/2021  3:57 PM  RUR:  14%  Risk Level: [x]Low []Moderate []High  Value-based purchasing: [x] Yes [] No  Bundle patient: [] Yes [x] No   Specify:     Transition of care plan:  1. Awaiting medical clearance and DC order. PT/OT consulted. Neuro consulted. GI following. 2. TBD pending medical progression and PT/OT reqs. 3. Outpatient follow-up. 4. Pt's family to transport.

## 2021-10-04 NOTE — PROGRESS NOTES
Daily Progress Note: 10/4/2021  Blaine Best MD    Assessment/Plan:   Sepsis: Meets criteria based on HR, RR, WBC, and UTI. -IVFs, supportive care, IV ABX with Rocephin IV. -Cultures pending and will adjust antibiotics accordingly. -ID consulted.     UTI (urinary tract infection) due to urinary indwelling catheter: Recently treated for pansensitive Proteus with Keflex. But in the setting of a chronic indwelling catheter will treat with Rocephin.    -urine culture from 10/1 neg so far.     Diarrhea:  C. difficile returned positive 10/3 PM.    - start po Vanc     Type 2 diabetes mellitus without complication:    -Monitor blood glucose levels with insulin sliding scale coverage along with basal insulin. -Monitor for complications. -A1C.    -Diabetic diet.     Hypertension, benign:   -Resume home meds   -Hydralazine as needed.     Atelectasis, left:   -Deep breath cough and incentive spirometry. Problem List:  Problem List as of 10/4/2021 Date Reviewed: 10/1/2021        Codes Class Noted - Resolved    Diarrhea ICD-10-CM: R19.7  ICD-9-CM: 787.91  10/1/2021 - Present        Sepsis ([address] ICD-10-CM: A41.9  ICD-9-CM: 038.9, 995.91  10/1/2021 - Present        UTI (urinary tract infection) due to urinary indwelling catheter ([address] ICD-10-CM: T83.511A, N39.0  ICD-9-CM: 996.64, 599.0  10/1/2021 - Present        Atelectasis, left ICD-10-CM: J98.11  ICD-9-CM: 518.0  10/1/2021 - Present        Epigastric abdominal pain ICD-10-CM: R10.13  ICD-9-CM: 789.06  2/8/2020 - Present        Epigastric pain ICD-10-CM: R10.13  ICD-9-CM: 789.06  2/8/2020 - Present        Depression ICD-10-CM: F32. A  ICD-9-CM: 900  2/7/2020 - Present        Fall ICD-10-CM: W19. Zafar Erick  ICD-9-CM: S055.7  11/29/2019 - Present        Traumatic rhabdomyolysis ([facility] [address] ICD-10-CM: T79. 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  - PCI RCA with PTCA, significant LAD disease  - suspected reocclusion several hrs later with VF, IABP placed  3v CABG July 2009 Mi Traylor @ [facility])  - LIMA-LAD, VG-OM, VG-PDA             Type 2 diabetes mellitus without complication ([facility] GHJ-50-VW: E11.9  ICD-9-CM: 250.00  Unknown - Present        Hypertension, benign ICD-10-CM: I10  ICD-9-CM: 401.1  Unknown - Present        RESOLVED: Chest pain ICD-10-CM: R07.9  ICD-9-CM: 786.50  2/7/2020 - 2/8/2020        RESOLVED: PND (paroxysmal nocturnal dyspnea) ICD-10-CM: R06.00  ICD-9-CM: 786.09  8/13/2017 - 8/11/2018        RESOLVED: Near syncope ICD-10-CM: R55  ICD-9-CM: 780.2  10/29/2013 - 4/15/2014              Subjective:    80 y.o. male with history that includes HTN, CAD, diabetes with neuropathy, and dementia who just completed Keflex for Proteus UTI presents fever, per chart worsening UTI symptoms, and diarrhea. This associated with fatigue and malaise. Due to his dementia he is not able to provide much of a history but is aware that he has had \"bad\" diarrhea but is unable to describe the consistency or color but does deny blood in it. Has not had a BM since he has been here. He did not remember that he had a UTI and was on antibiotics. In ER he was found to meet criteria for sepsis based on heart rate respiratory rate and leukocytosis in the setting of UTI and possible enteric infection. Chest x-ray showed minimal atelectasis in the left base but no chest pain or shortness of breath per patient. (Dr Roberta Salazar)    10/1:  Still with loose watery stools. Cultures pending. Tmax 100. AM labs pending. Cultures pending. 10/2:  He reports fewer loose stools and no ab pain. Nurses reports 3 loose stools and 1 semisolid overnight. Tmax 98. ID consult pending.   K+ sl low - replacing. 10/3:  Only 2 loose stools in last 12 hrs he reports. No ab pain. He reports \"starting to feel a little better. \"  WBC back to normal range. K+ still low - increased dose. 10/4:  2 loose stools overnight per nursing. He is insisting that pederson be removed. Will remove today but advised pt it may need to be replaced if he has retention. He also has some burning from the pederson and will add pyridium for 2 days. C diff returned Positive yesterday pm- po Vanc added. K+ better.        Review of Systems:   A comprehensive review of systems was negative except for that written in the HPI. Objective:   Physical Exam:   Visit Vitals  BP (!) 135/55 (BP 1 Location: Left upper arm, BP Patient Position: At rest)   Pulse 97   Temp 98.5 °F (36.9 °C)   Resp 18   Ht 6' 0.01\" (1.829 m)   Wt 91.4 kg (201 lb 8 oz)   SpO2 93%   BMI 27.32 kg/m²      O2 Device: None (Room air)  Temp (24hrs), Av.1 °F (36.7 °C), Min:97.6 °F (36.4 °C), Max:98.5 °F (36.9 °C)    10/03 1901 - 10/04 0700  In: 2533.3 [P.O.:1000; I.V.:1533.3]  Out:  [Urine:2050]   10/02 07 - 10/03 190  In: 641.7 [P.O.:350; I.V.:291.7]  Out: 3800 [Urine:3800]  General:  Alert, cooperative, no distress, appears stated age. Head:  Normocephalic, without obvious abnormality, atraumatic. Eyes:  Conjunctivae/corneas clear. PERRL, EOMs intact. Throat: Lips, mucosa, and tongue moist..   Neck: Supple, symmetrical, trachea midline, no adenopathy, thyroid: no enlargement/tenderness/nodules, no carotid bruit and no JVD. Back:    No CVA tenderness. Lungs:   Clear to auscultation bilaterally. Chest wall:  No tenderness or deformity. Heart:  Regular rate and rhythm, S1, S2 normal, no murmur, click, rub or gallop. Abdomen:   Soft, non-tender. Bowel sounds normal. No masses,  No organomegaly. Extremities: no cyanosis or edema. No calf tenderness or cords. Pulses: 2+ and symmetric all extremities.    Skin: Skin color, texture, turgor normal. No rashes    Neurologic: CNII-XII intact. Alert and oriented X 2 at this moment. Follows 2 step commands at this time. Fine motor of hands and fingers normal.   equal.  No cogwheeling or rigidity. Gait not tested at this time. Sensation grossly normal to touch. Gross motor of extremities normal.       Data Review:     CT CHEST ABD PELV W CONT  Result Date: 10/1/2021  No acute findings. Incidentals as above including coronary artery disease status post CABG, cholecystolithiasis and prostamegaly with indwelling Hernández catheter.     XR CHEST PORT  Result Date: 9/30/2021  Minimal left basilar subsegmental atelectasis. Recent Days:  Recent Labs     10/04/21  0308 10/03/21  0001 10/02/21  0233   WBC 6.6 6.6 13.0*   HGB 14.0 13.7 12.6   HCT 40.5 41.0 37.4    306 252     Recent Labs     10/04/21  0308 10/03/21  0001 10/02/21  0233    142 139   K 3.6 3.3* 3.3*   * 110* 108   CO2 22 25 23   * 71 83   BUN 7 7 12   CREA 1.11 0.82 0.76   CA 8.6 8.6 8.1*   ALB 3.0* 2.9* 2.6*   TBILI 0.9 1.0 0.9   ALT 18 19 13     No results for input(s): PH, PCO2, PO2, HCO3, FIO2 in the last 72 hours.     24 Hour Results:  Recent Results (from the past 24 hour(s))   GLUCOSE, POC    Collection Time: 10/03/21  6:28 AM   Result Value Ref Range    Glucose (POC) 76 65 - 117 mg/dL    Performed by Veronica Partida (PCT)    GLUCOSE, POC    Collection Time: 10/03/21 11:26 AM   Result Value Ref Range    Glucose (POC) 137 (H) 65 - 117 mg/dL    Performed by Enrique Escoto (PCT)    GLUCOSE, POC    Collection Time: 10/03/21  4:09 PM   Result Value Ref Range    Glucose (POC) 155 (H) 65 - 117 mg/dL    Performed by Lee, POC    Collection Time: 10/03/21  9:38 PM   Result Value Ref Range    Glucose (POC) 122 (H) 65 - 117 mg/dL    Performed by Dara Soliman    CBC WITH AUTOMATED DIFF    Collection Time: 10/04/21  3:08 AM   Result Value Ref Range    WBC 6.6 4.1 - 11.1 K/uL    RBC 4.56 4.10 - 5.70 M/uL HGB 14.0 12.1 - 17.0 g/dL    HCT 40.5 36.6 - 50.3 %    MCV 88.8 80.0 - 99.0 FL    MCH 30.7 26.0 - 34.0 PG    MCHC 34.6 30.0 - 36.5 g/dL    RDW 12.9 11.5 - 14.5 %    PLATELET 297 947 - 648 K/uL    MPV 9.3 8.9 - 12.9 FL    NRBC 0.0 0  WBC    ABSOLUTE NRBC 0.00 0.00 - 0.01 K/uL    NEUTROPHILS 68 32 - 75 %    LYMPHOCYTES 18 12 - 49 %    MONOCYTES 10 5 - 13 %    EOSINOPHILS 2 0 - 7 %    BASOPHILS 1 0 - 1 %    IMMATURE GRANULOCYTES 1 (H) 0.0 - 0.5 %    ABS. NEUTROPHILS 4.6 1.8 - 8.0 K/UL    ABS. LYMPHOCYTES 1.2 0.8 - 3.5 K/UL    ABS. MONOCYTES 0.6 0.0 - 1.0 K/UL    ABS. EOSINOPHILS 0.1 0.0 - 0.4 K/UL    ABS. BASOPHILS 0.0 0.0 - 0.1 K/UL    ABS. IMM. GRANS. 0.0 0.00 - 0.04 K/UL    DF AUTOMATED     METABOLIC PANEL, COMPREHENSIVE    Collection Time: 10/04/21  3:08 AM   Result Value Ref Range    Sodium 141 136 - 145 mmol/L    Potassium 3.6 3.5 - 5.1 mmol/L    Chloride 110 (H) 97 - 108 mmol/L    CO2 22 21 - 32 mmol/L    Anion gap 9 5 - 15 mmol/L    Glucose 166 (H) 65 - 100 mg/dL    BUN 7 6 - 20 MG/DL    Creatinine 1.11 0.70 - 1.30 MG/DL    BUN/Creatinine ratio 6 (L) 12 - 20      GFR est AA >60 >60 ml/min/1.73m2    GFR est non-AA >60 >60 ml/min/1.73m2    Calcium 8.6 8.5 - 10.1 MG/DL    Bilirubin, total 0.9 0.2 - 1.0 MG/DL    ALT (SGPT) 18 12 - 78 U/L    AST (SGOT) 12 (L) 15 - 37 U/L    Alk.  phosphatase 53 45 - 117 U/L    Protein, total 7.4 6.4 - 8.2 g/dL    Albumin 3.0 (L) 3.5 - 5.0 g/dL    Globulin 4.4 (H) 2.0 - 4.0 g/dL    A-G Ratio 0.7 (L) 1.1 - 2.2         Medications reviewed  Current Facility-Administered Medications   Medication Dose Route Frequency    potassium chloride SR (KLOR-CON 10) tablet 20 mEq  20 mEq Oral BID    cefTRIAXone (ROCEPHIN) 1 g in sterile water (preservative free) 10 mL IV syringe  1 g IntraVENous Q24H    0.9% sodium chloride infusion  100 mL/hr IntraVENous CONTINUOUS    sodium chloride (NS) flush 5-40 mL  5-40 mL IntraVENous Q8H    sodium chloride (NS) flush 5-40 mL  5-40 mL IntraVENous PRN    acetaminophen (TYLENOL) tablet 650 mg  650 mg Oral Q6H PRN    Or    acetaminophen (TYLENOL) suppository 650 mg  650 mg Rectal Q6H PRN    polyethylene glycol (MIRALAX) packet 17 g  17 g Oral DAILY PRN    bisacodyL (DULCOLAX) suppository 10 mg  10 mg Rectal DAILY PRN    ondansetron (ZOFRAN) injection 4 mg  4 mg IntraVENous Q6H PRN    tamsulosin (FLOMAX) capsule 0.4 mg  0.4 mg Oral BID    pantoprazole (PROTONIX) tablet 40 mg  40 mg Oral ACB    metoprolol succinate (TOPROL-XL) XL tablet 25 mg  25 mg Oral DAILY    glucose chewable tablet 16 g  4 Tablet Oral PRN    dextrose (D50W) injection syrg 12.5-25 g  25-50 mL IntraVENous PRN    glucagon (GLUCAGEN) injection 1 mg  1 mg IntraMUSCular PRN    insulin lispro (HUMALOG) injection   SubCUTAneous TIDAC    insulin NPH (NOVOLIN N, HUMULIN N) injection 25 Units  25 Units SubCUTAneous ACB&D    dutasteride (AVODART) capsule 0.5 mg  0.5 mg Oral DAILY    sodium chloride (NS) flush 5-10 mL  5-10 mL IntraVENous PRN       Care Plan discussed with: Patient/Family  Total time spent with patient: 30 minutes.   Zion Lazaro MD

## 2021-10-05 LAB
ALBUMIN SERPL-MCNC: 2.9 G/DL (ref 3.5–5)
ALBUMIN/GLOB SERPL: 0.7 {RATIO} (ref 1.1–2.2)
ALP SERPL-CCNC: 47 U/L (ref 45–117)
ALT SERPL-CCNC: 18 U/L (ref 12–78)
ANION GAP SERPL CALC-SCNC: 10 MMOL/L (ref 5–15)
AST SERPL-CCNC: 19 U/L (ref 15–37)
BASOPHILS # BLD: 0.1 K/UL (ref 0–0.1)
BASOPHILS NFR BLD: 1 % (ref 0–1)
BILIRUB SERPL-MCNC: 0.8 MG/DL (ref 0.2–1)
BUN SERPL-MCNC: 5 MG/DL (ref 6–20)
BUN/CREAT SERPL: 7 (ref 12–20)
CALCIUM SERPL-MCNC: 8.6 MG/DL (ref 8.5–10.1)
CHLORIDE SERPL-SCNC: 111 MMOL/L (ref 97–108)
CO2 SERPL-SCNC: 21 MMOL/L (ref 21–32)
CREAT SERPL-MCNC: 0.76 MG/DL (ref 0.7–1.3)
DIFFERENTIAL METHOD BLD: ABNORMAL
EOSINOPHIL # BLD: 0.2 K/UL (ref 0–0.4)
EOSINOPHIL NFR BLD: 2 % (ref 0–7)
ERYTHROCYTE [DISTWIDTH] IN BLOOD BY AUTOMATED COUNT: 13.1 % (ref 11.5–14.5)
GLOBULIN SER CALC-MCNC: 4 G/DL (ref 2–4)
GLUCOSE BLD STRIP.AUTO-MCNC: 130 MG/DL (ref 65–117)
GLUCOSE BLD STRIP.AUTO-MCNC: 160 MG/DL (ref 65–117)
GLUCOSE BLD STRIP.AUTO-MCNC: 169 MG/DL (ref 65–117)
GLUCOSE BLD STRIP.AUTO-MCNC: 172 MG/DL (ref 65–117)
GLUCOSE SERPL-MCNC: 79 MG/DL (ref 65–100)
HCT VFR BLD AUTO: 39.5 % (ref 36.6–50.3)
HGB BLD-MCNC: 13.5 G/DL (ref 12.1–17)
IMM GRANULOCYTES # BLD AUTO: 0 K/UL (ref 0–0.04)
IMM GRANULOCYTES NFR BLD AUTO: 0 % (ref 0–0.5)
LYMPHOCYTES # BLD: 2.2 K/UL (ref 0.8–3.5)
LYMPHOCYTES NFR BLD: 27 % (ref 12–49)
MCH RBC QN AUTO: 29.9 PG (ref 26–34)
MCHC RBC AUTO-ENTMCNC: 34.2 G/DL (ref 30–36.5)
MCV RBC AUTO: 87.6 FL (ref 80–99)
MONOCYTES # BLD: 0.8 K/UL (ref 0–1)
MONOCYTES NFR BLD: 10 % (ref 5–13)
NEUTS SEG # BLD: 4.9 K/UL (ref 1.8–8)
NEUTS SEG NFR BLD: 60 % (ref 32–75)
NRBC # BLD: 0 K/UL (ref 0–0.01)
NRBC BLD-RTO: 0 PER 100 WBC
PLATELET # BLD AUTO: 297 K/UL (ref 150–400)
PMV BLD AUTO: 8.7 FL (ref 8.9–12.9)
POTASSIUM SERPL-SCNC: 3.6 MMOL/L (ref 3.5–5.1)
PROT SERPL-MCNC: 6.9 G/DL (ref 6.4–8.2)
RBC # BLD AUTO: 4.51 M/UL (ref 4.1–5.7)
SERVICE CMNT-IMP: ABNORMAL
SODIUM SERPL-SCNC: 142 MMOL/L (ref 136–145)
WBC # BLD AUTO: 8.1 K/UL (ref 4.1–11.1)

## 2021-10-05 PROCEDURE — 97165 OT EVAL LOW COMPLEX 30 MIN: CPT

## 2021-10-05 PROCEDURE — 95816 EEG AWAKE AND DROWSY: CPT | Performed by: PSYCHIATRY & NEUROLOGY

## 2021-10-05 PROCEDURE — 97530 THERAPEUTIC ACTIVITIES: CPT

## 2021-10-05 PROCEDURE — 74011250637 HC RX REV CODE- 250/637: Performed by: NURSE PRACTITIONER

## 2021-10-05 PROCEDURE — 74011250637 HC RX REV CODE- 250/637: Performed by: FAMILY MEDICINE

## 2021-10-05 PROCEDURE — 85025 COMPLETE CBC W/AUTO DIFF WBC: CPT

## 2021-10-05 PROCEDURE — 77030005513 HC CATH URETH FOL11 MDII -B

## 2021-10-05 PROCEDURE — 77030040830 HC CATH URETH FOL MDII -A

## 2021-10-05 PROCEDURE — 74011250636 HC RX REV CODE- 250/636: Performed by: FAMILY MEDICINE

## 2021-10-05 PROCEDURE — 94760 N-INVAS EAR/PLS OXIMETRY 1: CPT

## 2021-10-05 PROCEDURE — 99223 1ST HOSP IP/OBS HIGH 75: CPT | Performed by: INTERNAL MEDICINE

## 2021-10-05 PROCEDURE — 82962 GLUCOSE BLOOD TEST: CPT

## 2021-10-05 PROCEDURE — 74011636637 HC RX REV CODE- 636/637: Performed by: FAMILY MEDICINE

## 2021-10-05 PROCEDURE — 99231 SBSQ HOSP IP/OBS SF/LOW 25: CPT | Performed by: PSYCHIATRY & NEUROLOGY

## 2021-10-05 PROCEDURE — 36415 COLL VENOUS BLD VENIPUNCTURE: CPT

## 2021-10-05 PROCEDURE — 97535 SELF CARE MNGMENT TRAINING: CPT

## 2021-10-05 PROCEDURE — 74011250637 HC RX REV CODE- 250/637: Performed by: HOSPITALIST

## 2021-10-05 PROCEDURE — 80053 COMPREHEN METABOLIC PANEL: CPT

## 2021-10-05 PROCEDURE — 97116 GAIT TRAINING THERAPY: CPT

## 2021-10-05 PROCEDURE — 65270000029 HC RM PRIVATE

## 2021-10-05 PROCEDURE — 51798 US URINE CAPACITY MEASURE: CPT

## 2021-10-05 PROCEDURE — 74011000250 HC RX REV CODE- 250: Performed by: FAMILY MEDICINE

## 2021-10-05 PROCEDURE — 97162 PT EVAL MOD COMPLEX 30 MIN: CPT

## 2021-10-05 PROCEDURE — 2709999900 HC NON-CHARGEABLE SUPPLY

## 2021-10-05 RX ORDER — L. ACIDOPHILUS/L.BULGARICUS 100MM CELL
1 GRANULES IN PACKET (EA) ORAL 2 TIMES DAILY
Status: DISCONTINUED | OUTPATIENT
Start: 2021-10-05 | End: 2021-10-06 | Stop reason: HOSPADM

## 2021-10-05 RX ORDER — FAMOTIDINE 20 MG/1
20 TABLET, FILM COATED ORAL DAILY
Status: DISCONTINUED | OUTPATIENT
Start: 2021-10-06 | End: 2021-10-06 | Stop reason: HOSPADM

## 2021-10-05 RX ADMIN — LACTOBACILLUS ACIDOPHILUS / LACTOBACILLUS BULGARICUS 1 PACKET: 100 MILLION CFU STRENGTH GRANULES at 18:00

## 2021-10-05 RX ADMIN — CEFTRIAXONE 1 G: 1 INJECTION, POWDER, FOR SOLUTION INTRAMUSCULAR; INTRAVENOUS at 15:47

## 2021-10-05 RX ADMIN — PHENAZOPYRIDINE HYDROCHLORIDE 100 MG: 100 TABLET ORAL at 09:16

## 2021-10-05 RX ADMIN — Medication 10 ML: at 22:56

## 2021-10-05 RX ADMIN — PANTOPRAZOLE SODIUM 40 MG: 40 TABLET, DELAYED RELEASE ORAL at 06:41

## 2021-10-05 RX ADMIN — TAMSULOSIN HYDROCHLORIDE 0.4 MG: 0.4 CAPSULE ORAL at 09:16

## 2021-10-05 RX ADMIN — INSULIN LISPRO 2 UNITS: 100 INJECTION, SOLUTION INTRAVENOUS; SUBCUTANEOUS at 16:19

## 2021-10-05 RX ADMIN — INSULIN LISPRO 2 UNITS: 100 INJECTION, SOLUTION INTRAVENOUS; SUBCUTANEOUS at 12:34

## 2021-10-05 RX ADMIN — HUMAN INSULIN 25 UNITS: 100 INJECTION, SUSPENSION SUBCUTANEOUS at 09:15

## 2021-10-05 RX ADMIN — HUMAN INSULIN 25 UNITS: 100 INJECTION, SUSPENSION SUBCUTANEOUS at 16:30

## 2021-10-05 RX ADMIN — POTASSIUM CHLORIDE 20 MEQ: 750 TABLET, FILM COATED, EXTENDED RELEASE ORAL at 09:16

## 2021-10-05 RX ADMIN — VANCOMYCIN HYDROCHLORIDE 125 MG: 250 POWDER, FOR SOLUTION ORAL at 06:41

## 2021-10-05 RX ADMIN — PHENAZOPYRIDINE HYDROCHLORIDE 100 MG: 100 TABLET ORAL at 18:30

## 2021-10-05 RX ADMIN — LACTOBACILLUS ACIDOPHILUS / LACTOBACILLUS BULGARICUS 1 PACKET: 100 MILLION CFU STRENGTH GRANULES at 12:34

## 2021-10-05 RX ADMIN — Medication 10 ML: at 15:46

## 2021-10-05 RX ADMIN — POTASSIUM CHLORIDE 20 MEQ: 750 TABLET, FILM COATED, EXTENDED RELEASE ORAL at 18:30

## 2021-10-05 RX ADMIN — DUTASTERIDE 0.5 MG: 0.5 CAPSULE, LIQUID FILLED ORAL at 09:16

## 2021-10-05 RX ADMIN — METOPROLOL SUCCINATE 25 MG: 25 TABLET, EXTENDED RELEASE ORAL at 09:16

## 2021-10-05 RX ADMIN — ACETAMINOPHEN 650 MG: 325 TABLET ORAL at 01:35

## 2021-10-05 RX ADMIN — PHENAZOPYRIDINE HYDROCHLORIDE 100 MG: 100 TABLET ORAL at 12:34

## 2021-10-05 RX ADMIN — VANCOMYCIN HYDROCHLORIDE 125 MG: 250 POWDER, FOR SOLUTION ORAL at 01:24

## 2021-10-05 RX ADMIN — VANCOMYCIN HYDROCHLORIDE 125 MG: 250 POWDER, FOR SOLUTION ORAL at 12:34

## 2021-10-05 RX ADMIN — SODIUM CHLORIDE 50 ML/HR: 9 INJECTION, SOLUTION INTRAVENOUS at 18:21

## 2021-10-05 RX ADMIN — TAMSULOSIN HYDROCHLORIDE 0.4 MG: 0.4 CAPSULE ORAL at 18:30

## 2021-10-05 RX ADMIN — Medication 10 ML: at 06:42

## 2021-10-05 RX ADMIN — VANCOMYCIN HYDROCHLORIDE 125 MG: 250 POWDER, FOR SOLUTION ORAL at 18:29

## 2021-10-05 RX ADMIN — VANCOMYCIN HYDROCHLORIDE 125 MG: 250 POWDER, FOR SOLUTION ORAL at 23:02

## 2021-10-05 NOTE — PROCEDURES
[facility]     Electroencephalogram Report    Procedure ID: [id number]  Procedure Date: 10/05/2021   Patient Name: Vee Hutchinson. YOB: 1940   Procedure Type: Routine Medical Record No: [id number]     INDICATION: Altered mental status    STATE: Awake and drowsy . DESCRIPTION OF PROCEDURE: Electrodes were applied in accordance with the international 10-20 system of electrode placement. EEG was reviewed in both bipolar and referential montages. Description of Activity:  During wakefulness, there is continuous runs of 8-9 Hz symmetric posterior alpha rhythm, that attenuate symmetrically with eye opening. Low voltage beta activity occurs symmetrically at the anterior head regions bilaterally. During drowsiness, there is attenuation of the alpha rhythm and low voltage theta activity occurs bilaterally. Intermittent photic stimulation was performed and did not induce posterior driving responses. No sharp or spike discharges, seizures or epileptiform discharges seen. No focal asymmetry. Clinical Interpretation: This EEG, performed during wakefulness and drowsiness is normal. There is no focal asymmetry, seizures or epileptiform discharges seen.        Medications:  Current Facility-Administered Medications   Medication Dose Route Frequency    lactobacillus-acidophilus (LACTINEX) 1 Packet  1 Packet Oral BID    [START ON 10/6/2021] famotidine (PEPCID) tablet 20 mg  20 mg Oral DAILY    vancomycin (FIRVANQ) 50 mg/mL oral solution 125 mg  125 mg Oral Q6H    phenazopyridine (PYRIDIUM) tablet 100 mg  100 mg Oral TIDPC    potassium chloride SR (KLOR-CON 10) tablet 20 mEq  20 mEq Oral BID    0.9% sodium chloride infusion  50 mL/hr IntraVENous CONTINUOUS    sodium chloride (NS) flush 5-40 mL  5-40 mL IntraVENous Q8H    sodium chloride (NS) flush 5-40 mL  5-40 mL IntraVENous PRN    acetaminophen (TYLENOL) tablet 650 mg  650 mg Oral Q6H PRN    Or    acetaminophen (TYLENOL) suppository 650 mg  650 mg Rectal Q6H PRN    polyethylene glycol (MIRALAX) packet 17 g  17 g Oral DAILY PRN    bisacodyL (DULCOLAX) suppository 10 mg  10 mg Rectal DAILY PRN    ondansetron (ZOFRAN) injection 4 mg  4 mg IntraVENous Q6H PRN    tamsulosin (FLOMAX) capsule 0.4 mg  0.4 mg Oral BID    metoprolol succinate (TOPROL-XL) XL tablet 25 mg  25 mg Oral DAILY    glucose chewable tablet 16 g  4 Tablet Oral PRN    dextrose (D50W) injection syrg 12.5-25 g  25-50 mL IntraVENous PRN    glucagon (GLUCAGEN) injection 1 mg  1 mg IntraMUSCular PRN    insulin lispro (HUMALOG) injection   SubCUTAneous TIDAC    insulin NPH (NOVOLIN N, HUMULIN N) injection 25 Units  25 Units SubCUTAneous ACB&D    dutasteride (AVODART) capsule 0.5 mg  0.5 mg Oral DAILY    sodium chloride (NS) flush 5-10 mL  5-10 mL IntraVENous PRN

## 2021-10-05 NOTE — PROGRESS NOTES
10/5/2021  12:57 PM  RUR:  14%  Risk Level: [x]Low []Moderate []High  Value-based purchasing: [x] Yes [] No  Bundle patient: [] Yes [x] No   Specify:     Transition of care plan:  1. Awaiting medical clearance and DC order. PT/OT consulted. Urology consulted. ID consulted. GI following. 2. TBD pending medical progression and PT/OT reqs. 3. Outpatient follow-up. 4. Pt's family to transport.

## 2021-10-05 NOTE — PROGRESS NOTES
1120: Patient continues to complain of pelvic discomfort and penis pain. Bladder scan performed and showed >999 mL. Contacted Dr Merna Sacks. Received verbal order to discontinue current pederson and replace it and consult urology.

## 2021-10-05 NOTE — PROGRESS NOTES
Problem: Mobility Impaired (Adult and Pediatric)  Goal: *Acute Goals and Plan of Care (Insert Text)  Description: FUNCTIONAL STATUS PRIOR TO ADMISSION: Patient was modified independent using a rolling walker and wheelchair for functional mobility. Patient was modified independent for basic and instrumental ADLs. The patient was functional at the wheelchair level and was modified independent for transfers to the chair. HOME SUPPORT PRIOR TO ADMISSION: The patient lived with wife to provide assistance. Physical Therapy Goals  Initiated 10/5/2021  1. Patient will move from supine to sit and sit to supine  in bed with modified independence within 7 day(s). 2.  Patient will transfer from bed to chair and chair to bed with supervision/set-up using the least restrictive device within 7 day(s). 3.  Patient will perform sit to stand with minimal assistance/contact guard assist within 7 day(s). 4.  Patient will ambulate with minimal assistance/contact guard assist for 50 feet with the least restrictive device within 7 day(s). 5.  Patient will ascend/descend 3 stairs with 2 handrail(s) with minimal assistance/contact guard assist within 7 day(s). Outcome: Progressing Towards Goal   PHYSICAL THERAPY EVALUATION  Patient: Eliseo Odonnell Sr. (77 y.o. male)  Date: 10/5/2021  Primary Diagnosis: Sepsis, due to unspecified organism, unspecified whether acute organ dysfunction present ([facility] [A41.9]  Diarrhea [R19.7]  UTI (urinary tract infection) due to urinary indwelling catheter ([facility] [F31.288N, N39.0]        Precautions: falls       ASSESSMENT  Based on the objective data described below, the patient presents with admission due to C-Diff with diarrhea, UTI and confusion. Pt received supine in bed with improved orientation excluding year to date. Hernández catheter in place. Pt rolling with Min A to ninfa brief. Supine to sit with Min A. Sit to stand and gait of 5' to chair with RW and same.   Left in NAD in ángel. PT recommended    Current Level of Function Impacting Discharge (mobility/balance): Min A/fair    Functional Outcome Measure: The patient scored 40/100 on the barthel outcome measure which is indicative of 40-59% functional impairment. Other factors to consider for discharge: per above     Patient will benefit from skilled therapy intervention to address the above noted impairments. PLAN :  Recommendations and Planned Interventions: bed mobility training, transfer training, gait training, therapeutic exercises, neuromuscular re-education, edema management/control, patient and family training/education, and therapeutic activities      Frequency/Duration: Patient will be followed by physical therapy:  5 times a week to address goals. Recommendation for discharge: (in order for the patient to meet his/her long term goals)  Physical therapy at least 2 days/week in the home     This discharge recommendation:  Has not yet been discussed the attending provider and/or case management    IF patient discharges home will need the following DME: has RW         SUBJECTIVE:   Patient stated I am ready to go home.     OBJECTIVE DATA SUMMARY:   HISTORY:    Past Medical History:   Diagnosis Date    CAD (coronary artery disease), native coronary artery     Cancer ([facility]     melanoma removed on forehead    Depression     Diabetic neuropathy ([facility]     ED (erectile dysfunction)     Hypertension, benign     Ill-defined condition     states he has memory problems    Postsurgical aortocoronary bypass status     Type II or unspecified type diabetes mellitus without mention of complication, not stated as uncontrolled      Past Surgical History:   Procedure Laterality Date    COLONOSCOPY N/A 5/3/2016    COLONOSCOPY performed by Manuel Steinberg MD at [address] [place] [address]    ECHO 2D ADULT  12/2009    EF 50%, basal inferior akinesis, mild MR    ECHO 2D ADULT  9/21/11    LVEF 50%, inferior AK, unchanged from Dec 2009 HX CORONARY ARTERY BYPASS GRAFT  2009    HX OTHER SURGICAL  2008    melanoma removed from forehead       Personal factors and/or comorbidities impacting plan of care: per above and below    Home Situation  Home Environment: Private residence  # Steps to Enter: 3  Rails to Enter: Yes  Hand Rails : Bilateral  One/Two Story Residence:  (4 level home)  # of Interior Steps: 10  Living Alone: No  Support Systems: Spouse/Significant Other  Patient Expects to be Discharged to[de-identified] House  Current DME Used/Available at Home: Allyn Broderick, rolling, Wheelchair, [address] chair  Tub or Shower Type: Tub/Shower combination    EXAMINATION/PRESENTATION/DECISION MAKING:   Critical Behavior:  Neurologic State: Alert  Orientation Level: Oriented to place, Oriented to person, Disoriented to time  Cognition: Follows commands  Safety/Judgement: Awareness of environment  Hearing: Auditory  Auditory Impairment: None  Skin:  IV; pederson  Edema: BLE  Range Of Motion:  AROM: Generally decreased, functional                       Strength:    Strength: Generally decreased, functional                    Tone & Sensation:   Tone: Normal                              Coordination:  Coordination: Within functional limits  Vision:   Corrective Lenses: Reading glasses  Functional Mobility:  Bed Mobility:  Rolling: Contact guard assistance  Supine to Sit: Minimum assistance     Scooting: Stand-by assistance  Transfers:  Sit to Stand: Minimum assistance  Stand to Sit: Minimum assistance                       Balance:   Sitting: Intact  Standing: Impaired  Standing - Static: Constant support;Good  Standing - Dynamic : Constant support; Fair  Ambulation/Gait Training:  Distance (ft): 5 Feet (ft)  Assistive Device: Walker, rolling;Gait belt  Ambulation - Level of Assistance: Minimal assistance                 Base of Support: Widened     Speed/Brooke: Slow;Pace decreased (<100 feet/min)  Step Length: Right shortened;Left shortened                Functional Measure:  Barthel Index:    Bathin  Bladder: 0  Bowels: 5  Groomin  Dressin  Feeding: 10  Mobility: 0  Stairs: 0  Toilet Use: 5  Transfer (Bed to Chair and Back): 10  Total: 40/100       The Barthel ADL Index: Guidelines  1. The index should be used as a record of what a patient does, not as a record of what a patient could do. 2. The main aim is to establish degree of independence from any help, physical or verbal, however minor and for whatever reason. 3. The need for supervision renders the patient not independent. 4. A patient's performance should be established using the best available evidence. Asking the patient, friends/relatives and nurses are the usual sources, but direct observation and common sense are also important. However direct testing is not needed. 5. Usually the patient's performance over the preceding 24-48 hours is important, but occasionally longer periods will be relevant. 6. Middle categories imply that the patient supplies over 50 per cent of the effort. 7. Use of aids to be independent is allowed. Riley Culp., Barthel, D.W. ([id number]). Functional evaluation: the Barthel Index. [facility] (14)2. JEROD Mercer, Josh Maria., Hill [place]., [place], [address] [place] [address] (). Measuring the change indisability after inpatient rehabilitation; comparison of the responsiveness of the Barthel Index and Functional [place] Measure. Journal of Neurology, Neurosurgery, and Psychiatry, 66(4), 309-345. Oral RAKESH Horn.A, ANDRIY Aquino, & Hebert Leone MJanetA. (2004.) Assessment of post-stroke quality of life in cost-effectiveness studies: The usefulness of the Barthel Index and the EuroQoL-5D.  Quality of Life Research, 15, 803-54           Physical Therapy Evaluation Charge Determination   History Examination Presentation Decision-Making   HIGH Complexity :3+ comorbidities / personal factors will impact the outcome/ POC  MEDIUM Complexity : 3 Standardized tests and measures addressing body structure, function, activity limitation and / or participation in recreation  MEDIUM Complexity : Evolving with changing characteristics  Other outcome measures barthel  MEDIUM      Based on the above components, the patient evaluation is determined to be of the following complexity level: MEDIUM    Pain Rating:  pederson    Activity Tolerance:   Fair    After treatment patient left in no apparent distress:   Sitting in chair, Call bell within reach, Bed / chair alarm activated, and Caregiver / family present    COMMUNICATION/EDUCATION:   The patients plan of care was discussed with: Occupational therapist and Registered nurse. Fall prevention education was provided and the patient/caregiver indicated understanding., Patient/family have participated as able in goal setting and plan of care. , and Patient/family agree to work toward stated goals and plan of care.     Thank you for this referral.  Promise Brower, PT   Time Calculation: 38 mins

## 2021-10-05 NOTE — PROGRESS NOTES
Pt has a chronic in dwelling pederson. According to note, the pederson was replaced in the ED. Dr. Chiquita Arias put in an order this morning to [facility] pederson. \" Pederson still present at shift assessment.  Called Dr. Alcon Taylor and instructed to leave pederson in until further clarification in am.

## 2021-10-05 NOTE — PROGRESS NOTES
Problem: Self Care Deficits Care Plan (Adult)  Goal: *Acute Goals and Plan of Care (Insert Text)  Description: FUNCTIONAL STATUS PRIOR TO ADMISSION: Patient was modified independent using a rolling walker and wheelchair for functional mobility. Patient was modified independent for basic and instrumental ADLs. The patient was functional at the wheelchair level and was modified independent for transfers to the chair. HOME SUPPORT PRIOR TO ADMISSION: The patient lived with wife to provide assistance. Occupational Therapy Goals  Initiated 10/5/2021  1. Patient will perform lower body dressing with supervision within 7 day(s). 2.  Patient will perform grooming tasks, standing at sink, with supervision/set-up within 7 day(s). 3.  Patient will perform toilet transfers with supervision/set-up within 7 day(s). 4.  Patient will perform all aspects of toileting with minimal assistance/contact guard assist within 7 day(s). 5.  Patient will participate in upper extremity therapeutic exercise/activities with supervision/set-up for 10 minutes within 7 day(s). Outcome: Progressing Towards Goal   OCCUPATIONAL THERAPY EVALUATION  Patient: Saddie Kehr  (77 y.o. male)  Date: 10/5/2021  Primary Diagnosis: Sepsis, due to unspecified organism, unspecified whether acute organ dysfunction present [facility] [A41.9]  Diarrhea [R19.7]  UTI (urinary tract infection) due to urinary indwelling catheter ([facility] [L19.633S, N39.0]        Precautions: fall, enteric       ASSESSMENT  Based on the objective data described below, the patient presents with hospital admission secondary to sepsis. Patient work up reveals UTI and positive for Cdiff. Patient received supine in bed. He is alert and pleasant and agreeable to activity. Patient agreeable to don protective brief secondary to bowel movements. Rolling in bed with CGA.   Patient to EOB with min assist.  Patient requests standing due to reported pain in sitting at penis (with catheter) and rectum. Patient able to stand with min assist, and uses RW for support. Patient to chair set up next to bed with min assist.   Patient found comfort in use of waffle cushion in chair and trunk reclined for pressure relief. Patient tolerated treatment well and staff in room for further medical treatment. Current Level of Function Impacting Discharge (ADLs/self-care): min assist for mobility. Up to mod assist for ADLs    Functional Outcome Measure: The patient scored 40/100 on the barthel Index outcome measure. Other factors to consider for discharge: none     Patient will benefit from skilled therapy intervention to address the above noted impairments. PLAN :  Recommendations and Planned Interventions: self care training, functional mobility training, therapeutic exercise, balance training, therapeutic activities, endurance activities, patient education, home safety training, and family training/education    Frequency/Duration: Patient will be followed by occupational therapy 5 times a week to address goals. Recommendation for discharge: (in order for the patient to meet his/her long term goals)  Occupational therapy at least 2 days/week in the home     This discharge recommendation:  Has been made in collaboration with the attending provider and/or case management    IF patient discharges home will need the following DME: TBD       SUBJECTIVE:   Patient stated Nelson me! Tatiana Jamison    OBJECTIVE DATA SUMMARY:   HISTORY:   Past Medical History:   Diagnosis Date    CAD (coronary artery disease), native coronary artery     Cancer ([facility]     melanoma removed on forehead    Depression     Diabetic neuropathy ([facility]     ED (erectile dysfunction)     Hypertension, benign     Ill-defined condition     states he has memory problems    Postsurgical aortocoronary bypass status     Type II or unspecified type diabetes mellitus without mention of complication, not stated as uncontrolled      Past Surgical History:   Procedure Laterality Date    COLONOSCOPY N/A 5/3/2016    COLONOSCOPY performed by Ger Mckeon MD [address]    ECHO 2D ADULT  12/2009    EF 50%, basal inferior akinesis, mild MR    ECHO 2D ADULT  9/21/11    LVEF 50%, inferior AK, unchanged from Dec 2009    [address] CORONARY ARTERY BYPASS GRAFT  2009    HX OTHER SURGICAL  2008    melanoma removed from forehead       Expanded or extensive additional review of patient history:     Home Situation  Home Environment: Private residence  # Steps to Enter: 3  Rails to Enter: Yes  Hand Rails : Bilateral  One/Two Story Residence:  (4 level home)  # of Interior Steps: 10  Living Alone: No  Support Systems: Spouse/Significant Other  Patient Expects to be Discharged to[de-identified] House  Current DME Used/Available at Home: irish Chahal, Wheelchair, [address] chair  Tub or Shower Type: Tub/Shower combination    Hand dominance: Right    EXAMINATION OF PERFORMANCE DEFICITS:  Cognitive/Behavioral Status:  Neurologic State: Alert  Orientation Level: Oriented to place;Oriented to person;Disoriented to time  Cognition: Follows commands  Perception: Appears intact  Perseveration: No perseveration noted  Safety/Judgement: Awareness of environment    Skin: intact as seen    Edema: none noted     Hearing: Auditory  Auditory Impairment: None    Vision/Perceptual:                                Corrective Lenses: Reading glasses    Range of Motion:  AROM: Generally decreased, functional                         Strength:  Strength: Generally decreased, functional                Coordination:  Coordination: Within functional limits            Tone & Sensation:  Tone: Normal                         Balance:  Sitting: Intact  Standing: Impaired  Standing - Static: Constant support;Good  Standing - Dynamic : Constant support; Fair    Functional Mobility and Transfers for ADLs:  Bed Mobility:  Rolling: Contact guard assistance  Supine to Sit: Minimum assistance  Scooting: Stand-by assistance    Transfers:  Sit to Stand: Minimum assistance  Stand to Sit: Minimum assistance  Bed to Chair: Minimum assistance  Toilet Transfer : Minimum assistance [facility])    ADL Assessment:  Feeding: Setup    Oral Facial Hygiene/Grooming: Setup    Bathing: Minimum assistance    Upper Body Dressing: Contact guard assistance    Lower Body Dressing: Moderate assistance    Toileting: Moderate assistance                ADL Intervention and task modifications:                                     Cognitive Retraining  Safety/Judgement: Awareness of environment    Therapeutic Exercise:     Functional Measure:  Barthel Index:    Bathin  Bladder: 0  Bowels: 5  Groomin  Dressin  Feeding: 10  Mobility: 0  Stairs: 0  Toilet Use: 5  Transfer (Bed to Chair and Back): 10  Total: 40/100        The Barthel ADL Index: Guidelines  1. The index should be used as a record of what a patient does, not as a record of what a patient could do. 2. The main aim is to establish degree of independence from any help, physical or verbal, however minor and for whatever reason. 3. The need for supervision renders the patient not independent. 4. A patient's performance should be established using the best available evidence. Asking the patient, friends/relatives and nurses are the usual sources, but direct observation and common sense are also important. However direct testing is not needed. 5. Usually the patient's performance over the preceding 24-48 hours is important, but occasionally longer periods will be relevant. 6. Middle categories imply that the patient supplies over 50 per cent of the effort. 7. Use of aids to be independent is allowed. Blanco Mckay., Barthel, D.W. ([id number]). Functional evaluation: the Barthel Index. [facility] (14)2. JEROD Urias, Kuldip Huff., Prabhu Larson., [place], [address] ().  Measuring the change indisability after inpatient rehabilitation; comparison of the responsiveness of the Barthel Index and Functional [place] Measure. Journal of Neurology, Neurosurgery, and Psychiatry, 66(4), 029-847. KEYANNA Bonds, ANDRIY Aquino, & Sarahi Hunter M.A. (2004.) Assessment of post-stroke quality of life in cost-effectiveness studies: The usefulness of the Barthel Index and the EuroQoL-5D. Quality of Life Research, 15, 371-79         Occupational Therapy Evaluation Charge Determination   History Examination Decision-Making   LOW Complexity : Brief history review  LOW Complexity : 1-3 performance deficits relating to physical, cognitive , or psychosocial skils that result in activity limitations and / or participation restrictions  LOW Complexity : No comorbidities that affect functional and no verbal or physical assistance needed to complete eval tasks       Based on the above components, the patient evaluation is determined to be of the following complexity level: LOW   Pain Rating:      Activity Tolerance:   Good    After treatment patient left in no apparent distress:    Sitting in chair, Call bell within reach, and Bed / chair alarm activated    COMMUNICATION/EDUCATION:   The patients plan of care was discussed with: Physical therapist and Registered nurse. Home safety education was provided and the patient/caregiver indicated understanding., Patient/family have participated as able in goal setting and plan of care. , and Patient/family agree to work toward stated goals and plan of care. This patients plan of care is appropriate for delegation to [facility].     Thank you for this referral.  Dalia Jones OTR/L  Time Calculation: 22 mins

## 2021-10-05 NOTE — CONSULTS
New Urology Consult Note    Patient: Ketty Stein Sr. MRN: [id number]  SSN: [id number]    YOB: 1940  Age: 80 y.o. Sex: male            Assessment:     Ketty Stein Sr. is a 80 y.o. male with history that includes HTN, CAD, diabetes with neuropathy, and dementia who just completed Keflex for Proteus UTI presents fever, per chart worsening UTI symptoms, and diarrhea. He is currently admitted for sepsis, UTI, C. Diff. Now with pelvic and penile discomfort with >999cc on bladder scan with catheter. Catheter exchanged, draining well. He is an established patient of Dr. Kandy Torres who has been followed for urinary retention with multiple failed voiding trials and trouble with malpositioned catheter. Recommendations:     1. Prior urine culture grew proteus and treated with Keflex. Culture from 10/1/2021 no growth. 2. Catheter replaced by nursing staff and draining well. Likely pulling on catheter. Asked nurse to closely monitor output, assess for pain and assess catheter placement. 3. Patient scheduled for voiding trial tomorrow and will push this back to next week. 4. Has discussed TURP with Dr. Kandy Torres. Continue avodart and flomax for now. Please call if needed. Dr. Salma Wilkes supervising MD.      Thank you for this consult. Please contact [facility] with any further questions/concerns. Beth Loya NP [phone]    History of Present Illness:     Chief Complaint:  Pelvic and penile pain    Ketty Stein Sr. is seen in consultation for reasons noted above at the request of Latia Le MD.    This is a 80 y.o. male with a history of urinary retention and multiple failed voiding trials. Asked to see today due to catheter drainage issues. Patient has a history of dementia and is a poor historian. Spoke to nurse who reports no urine output for her shift and patient complaining of pelvic discomfort.  She did note a small blood clot when the catheter was removed. No issues placing the catheter and no additional clots or hematuria noted. Pain improved with placement of new catheter.      Urine culture negative  Had good urine output prior to this am  No abdominal imaging this admission  Afebrile, VSS      Subjective     Past Medical History  Past Medical History:   Diagnosis Date    CAD (coronary artery disease), native coronary artery     Cancer ([facility] [address]     melanoma removed on forehead    Depression     Diabetic neuropathy ([facility] [address]     ED (erectile dysfunction)     Hypertension, benign     Ill-defined condition     states he has memory problems    Postsurgical aortocoronary bypass status     Type II or unspecified type diabetes mellitus without mention of complication, not stated as uncontrolled        Past Surgical History:   Past Surgical History:   Procedure Laterality Date    COLONOSCOPY N/A 5/3/2016    COLONOSCOPY performed by Jadon Jesus MD at [address] [facility] ECHO 2D ADULT  12/2009    EF 50%, basal inferior akinesis, mild MR    ECHO 2D ADULT  9/21/11    LVEF 50%, inferior AK, unchanged from Dec 2009    HX CORONARY ARTERY BYPASS GRAFT  2009    HX OTHER SURGICAL  2008    melanoma removed from forehead       Medication:  Current Facility-Administered Medications   Medication Dose Route Frequency Provider Last Rate Last Admin    lactobacillus-acidophilus (LACTINEX) 1 Packet  1 Packet Oral BID Mirian Herrera, LINNEA   1 Packet at 10/05/21 1234    cefTRIAXone (ROCEPHIN) 1 g in sterile water (preservative free) 10 mL IV syringe  1 g IntraVENous Q24H Gilmar Merritt MD        vancomycin [facility]) 50 mg/mL oral solution 125 mg  125 mg Oral Q6H Gilmar Merritt MD   125 mg at 10/05/21 1234    phenazopyridine (PYRIDIUM) tablet 100 mg  100 mg Oral Yojanaa MD Cornelio   100 mg at 10/05/21 1234    potassium chloride SR (KLOR-CON 10) tablet 20 mEq  20 mEq Oral BID Gilmar Merritt MD   20 mEq at 10/05/21 0916    0.9% sodium chloride infusion  50 mL/hr IntraVENous CONTINUOUS Nayeli Villegas MD 50 mL/hr at 10/04/21 1255 50 mL/hr at 10/04/21 1255    sodium chloride (NS) flush 5-40 mL  5-40 mL IntraVENous Q8H Cinthia Arthur MD   10 mL at 10/05/21 6156    sodium chloride (NS) flush 5-40 mL  5-40 mL IntraVENous PRN Cinthia Arthur MD        acetaminophen (TYLENOL) tablet 650 mg  650 mg Oral Q6H PRN Cinthia Arthur MD   650 mg at 10/05/21 0166    Or    acetaminophen (TYLENOL) suppository 650 mg  650 mg Rectal Q6H PRN Cinthia Arthur MD        polyethylene glycol [facility]) packet 17 g  17 g Oral DAILY PRN Cinthia Arthur MD        bisacodyL (DULCOLAX) suppository 10 mg  10 mg Rectal DAILY PRN Cinthia Arthur MD        [facility]etron [facility]) injection 4 mg  4 mg IntraVENous Q6H PRN Cinthia Arthur MD        [facility]) capsule 0.4 mg  0.4 mg Oral BID Lily MORTON MD   0.4 mg at 10/05/21 0916    pantoprazole (PROTONIX) tablet 40 mg  40 mg Oral ACB Leandro Dacosta MD   40 mg at 10/05/21 7999    metoprolol succinate (TOPROL-XL) XL tablet 25 mg  25 mg Oral DAILY Leandro Dacosta MD   25 mg at 10/05/21 3601    glucose chewable tablet 16 g  4 Tablet Oral PRN Mario Hodges MD        dextrose (D50W) injection syrg 12.5-25 g  25-50 mL IntraVENous PRN Leandro Dacosta MD        glucagon (GLUCAGEN) injection 1 mg  1 mg IntraMUSCular PRN Leandro Dacosta MD        insulin lispro (HUMALOG) injection   SubCUTAneous TIDAC Mario Hodges MD   2 Units at 10/05/21 1234    insulin NPH (NOVOLIN N, HUMULIN N) injection 25 Units  25 Units SubCUTAneous ACB&D Mario Hodges MD   25 Units at 10/05/21 0915    dutasteride (AVODART) capsule 0.5 mg  0.5 mg Oral DAILY Leandro Dacosta MD   0.5 mg at 10/05/21 0916    sodium chloride (NS) flush 5-10 mL  5-10 mL IntraVENous PRN Erasmo Sterling MD           Allergies:   Allergies   Allergen Reactions    Amoxicillin Hives and Itching    Pravastatin Myalgia    Sulfa (Sulfonamide Antibiotics) Rash       Social History:  Social History     Tobacco Use    Smoking status: Never Smoker    Smokeless tobacco: Never Used   Substance Use Topics    Alcohol use: No    Drug use: No       Family History  Family History   Problem Relation Age of Onset    Breast Cancer Mother         w bone mets    Parkinson's Disease Maternal Aunt     Parkinson's Disease Maternal Uncle     Parkinson's Disease Maternal Grandfather        Review of Systems  Unchanged from admitting provider note from 10/5/2021 other than HPI    Objective:     Vital signs in last 24 hours:  Visit Vitals  BP (!) 163/79 (BP 1 Location: Left upper arm, BP Patient Position: Sitting)   Pulse 92   Temp 97.8 °F (36.6 °C)   Resp 18   Ht 6' 0.01\" (1.829 m)   Wt 91.4 kg (201 lb 8 oz)   SpO2 98%   BMI 27.32 kg/m²       Intake/Output last 3 shifts:  Date 10/04/21 0700 - 10/05/21 0659 10/05/21 0700 - 10/06/21 0659   Shift 0689-2894 0670-9872 24 Hour Total 8285-9104 1514-7879 24 Hour Total   INTAKE   P.O. 570 120 690 240  240     P. O. 570 120 690 240  240   I. V.(mL/kg/hr)  400(0.4) 400(0.2)        Volume (0.9% sodium chloride infusion)  400 400      Shift Total(mL/kg) 570(6.2) 520(5.7) 1090(11.9) 240(2.6)  240(2.6)   OUTPUT   Urine(mL/kg/hr) 1500(1.4) 575(0.5) 2075(0.9) 575  575     Urine Output (mL) ([REMOVED] Urinary Catheter 10/01/21 Hernández) 5643 147 7333        Urine Output (mL) (Urinary Catheter 10/05/21 Coude)    575  575   Emesis/NG output           Emesis Occurrence(s)  0 x 0 x      Stool           Stool Occurrence(s)  0 x 0 x      Shift Total(mL/kg) 1500(16.4) 575(6.3) 2075(22.7) 575(6.3)  575(6.3)   NET -930 -55 -985 -335  -335   Weight (kg) 91.4 91.4 91.4 91.4 91.4 91.4       Physical Exam  General Appearance: NAD, awake  HENT: atraumatic, normal ears  Cardiovascular: not tachycardic, no LE edema  Respiratory: no distress, room air  Abdomen: soft, no suprapubic fullness or tenderness  : no CVA tenderness  Extremities: moves all  Musculoskeletal: normal alignment of neck and head  Neuro: Appropriate, no focal neurological deficits, alert and oriented to person and place  Mood/Affect: poor insight    Lab/Imaging Review:       Most Recent Labs:  Lab Results   Component Value Date/Time    WBC 8.1 10/05/2021 01:02 AM    Hemoglobin (POC) 10.9 (L) 07/25/2009 11:34 AM    HGB 13.5 10/05/2021 01:02 AM    Hematocrit (POC) 32 (L) 07/25/2009 11:34 AM    HCT 39.5 10/05/2021 01:02 AM    PLATELET 333 64/44/4861 01:02 AM    MCV 87.6 10/05/2021 01:02 AM        Lab Results   Component Value Date/Time    Sodium 142 10/05/2021 01:02 AM    Potassium 3.6 10/05/2021 01:02 AM    Chloride 111 (H) 10/05/2021 01:02 AM    CO2 21 10/05/2021 01:02 AM    Anion gap 10 10/05/2021 01:02 AM    Glucose 79 10/05/2021 01:02 AM    BUN 5 (L) 10/05/2021 01:02 AM    Creatinine 0.76 10/05/2021 01:02 AM    BUN/Creatinine ratio 7 (L) 10/05/2021 01:02 AM    GFR est AA >60 10/05/2021 01:02 AM    GFR est non-AA >60 10/05/2021 01:02 AM    Calcium 8.6 10/05/2021 01:02 AM    Bilirubin, total 0.8 10/05/2021 01:02 AM    Alk.  phosphatase 47 10/05/2021 01:02 AM    Protein, total 6.9 10/05/2021 01:02 AM    Albumin 2.9 (L) 10/05/2021 01:02 AM    Globulin 4.0 10/05/2021 01:02 AM    A-G Ratio 0.7 (L) 10/05/2021 01:02 AM    ALT (SGPT) 18 10/05/2021 01:02 AM        No results found for: PSA, Nicolette Knack, PSAR3, [id number], [id number], 69141, PSAEXT     COAGS:  No results found for: APTT, PTP, INR, INREXT    Lab Results   Component Value Date/Time    Hemoglobin A1c 6.4 (H) 09/30/2021 10:35 PM    Hemoglobin A1c, External 6.9 10/28/2015 12:00 AM        Lab Results   Component Value Date/Time     12/06/2019 03:48 AM    CK - MB 0.6 07/25/2009 11:55 AM    CK-MB Index 1.7 07/25/2009 11:55 AM    Troponin-I, Qt. <0.05 02/08/2020 02:48 AM          Urine/Blood Cultures:  Results     Procedure Component Value Units Date/Time ENTERIC BACTERIA PANEL, DNA [[id number]] Collected: 10/02/21 1540    Order Status: Completed Specimen: Stool Updated: 10/03/21 2154     Shigella species, DNA Negative        Campylobacter species, DNA Negative        Vibrio species, DNA Negative        Enterotoxigen E Coli, DNA Negative        Shiga toxin producing, DNA Negative        Salmonella species, DNA Negative        P. shigelloides, DNA Negative        Y. enterocolitica, DNA Negative       OVA & PARASITES, STOOL [[id number]] Collected: 10/02/21 1540    Order Status: Completed Specimen: Feces from Stool Updated: 10/02/21 1608    CULTURE, URINE [[id number]] Collected: 10/01/21 0550    Order Status: Completed Specimen: Urine Updated: 10/02/21 0830     Special Requests: NO SPECIAL REQUESTS        Culture result: No growth (<1,000 CFU/ML)       CULTURE, URINE [[id number]] Collected: 10/01/21 0436    Order Status: Canceled Specimen: Cath Urine     C. DIFFICILE AG & TOXIN A/B [[id number]]  (Abnormal) Collected: 10/01/21 0200    Order Status: Completed Specimen: Stool Updated: 10/03/21 2204     PCR Reflex       See Reflex order for C. difficile (DNA)           INTERPRETATION       Indeterminate for Toxigenic C. difficile, DNA confirmation to follow. C. DIFFICILE (DNA) [[id number]]  (Abnormal) Collected: 10/01/21 0200    Order Status: Completed Specimen: Stool Updated: 10/03/21 2204     C. difficile (DNA) Positive        Comment: CALLED TO AND READ BACK BY  Mary Ramirez RN [facility] AT 2584 ON 10/3/21. [facility]  This specimen is positive for toxigenic C difficile by DNA amplification. Repeat testing is not recommended, samples received within 7 days of this positive result will be rejected. Assay is not approved to test for cure, since nucleic acids may persist after effective treatment and may prompt false positive results.          CULTURE, BLOOD [[id number]] Collected: 10/01/21 0001    Order Status: Completed Specimen: Blood Updated: 10/05/21 9317     Special Requests: NO SPECIAL REQUESTS        Culture result: NO GROWTH 4 DAYS       COVID-19 RAPID TEST [[id number]] Collected: 09/30/21 2257    Order Status: Completed Specimen: Nasopharyngeal Updated: 09/30/21 2327     Specimen source Nasopharyngeal        COVID-19 rapid test Not detected        Comment: Rapid Abbott ID Now       Rapid NAAT:  The specimen is NEGATIVE for SARS-CoV-2, the novel coronavirus associated with COVID-19. Negative results should be treated as presumptive and, if inconsistent with clinical signs and symptoms or necessary for patient management, should be tested with an alternative molecular assay. Negative results do not preclude SARS-CoV-2 infection and should not be used as the sole basis for patient management decisions. This test has been authorized by the FDA under an Emergency Use Authorization (EUA) for use by authorized laboratories. Fact sheet for Healthcare Providers: ConventionUpdate.co.nz  Fact sheet for Patients: Handledate.co.nz       Methodology: Isothermal Nucleic Acid Amplification         CULTURE, BLOOD [[id number]] Collected: 09/30/21 2235    Order Status: Completed Specimen: Blood Updated: 10/05/21 0622     Special Requests: NO SPECIAL REQUESTS        Culture result: NO GROWTH 5 DAYS                IMAGING:  CT HEAD WO CONT    Result Date: 10/4/2021  EXAM: Brain CT without contrast. INDICATION: altered mental status. Assess for stroke. TECHNIQUE: Noncontrast CT of the brain is performed with 5 mm collimation. Bone algorithm axial and sagittal and coronal reconstructions performed and evaluated. CT dose reduction was achieved through use of a standardized protocol tailored for this examination and automatic exposure control for dose modulation. COMPARISON: MRI 12/3/2019 and CT 11/29/2019. FINDINGS: There is no acute intracranial hemorrhage, mass, mass effect or herniation.  Ventricles and sulci show no significant change in proportionate and symmetric prominence. There is no significant change in pattern of periventricular white matter hypodensity. The gray-white matter differentiation is well-preserved. Atherosclerotic calcifications are seen within the carotid siphons and vertebral arteries. The mastoid air cells are well pneumatized. The visualized paranasal sinuses are normal.     No acute intracranial hemorrhage, mass or infarct. No significant change in pattern of atrophy and chronic white matter change most compatible with small vessel ischemic and/or senescent change. Intracranial atherosclerosis.           Signed By: Otilio Clements, LINNEA  - October 5, 2021

## 2021-10-05 NOTE — PROGRESS NOTES
Progress Note  Date:10/5/2021       Room:[facility]  Patient Name:Germán Lopez Sr. YOB: 1940     Age:81 y.o. Subjective    Subjective:  Symptoms:  Improved. No diarrhea. Diet:  Adequate intake. No nausea or vomiting. Activity level: Normal.    Pain:  He reports no pain. Review of Systems   Constitutional: Negative for appetite change, fatigue and fever. Gastrointestinal: Negative for abdominal pain, diarrhea, nausea and vomiting. Feels constipated now     Objective         Vitals Last 24 Hours:  TEMPERATURE:  Temp  Av.2 °F (36.8 °C)  Min: 97.9 °F (36.6 °C)  Max: 98.5 °F (36.9 °C)  RESPIRATIONS RANGE: Resp  Av  Min: 18  Max: 18  PULSE OXIMETRY RANGE: SpO2  Av.3 %  Min: 91 %  Max: 98 %  PULSE RANGE: Pulse  Av.2  Min: 73  Max: 100  BLOOD PRESSURE RANGE: Systolic (84PDW), QHV:907 , Min:112 , ISZ:557   ; Diastolic (57DUZ), WUK:94, Min:60, Max:82    I/O (24Hr): Intake/Output Summary (Last 24 hours) at 10/5/2021 0948  Last data filed at 10/5/2021 0486  Gross per 24 hour   Intake 1110 ml   Output 2075 ml   Net -965 ml     Objective:  General Appearance:  Comfortable and not in pain. Vital signs: (most recent): Blood pressure (!) 147/76, pulse 73, temperature 97.9 °F (36.6 °C), resp. rate 18, height 6' 0.01\" (1.829 m), weight 91.4 kg (201 lb 8 oz), SpO2 93 %. No fever. Output: Producing stool. HEENT: Normal HEENT exam.    Lungs:  Normal effort and normal respiratory rate. Breath sounds clear to auscultation. Heart: Normal rate. Regular rhythm. S1 normal and S2 normal.  No murmur. Abdomen: Abdomen is soft and non-distended. Bowel sounds are normal.   There is no abdominal tenderness. Extremities: Normal range of motion. There is no dependent edema. Pulses: Distal pulses are intact. Neurological: Patient is alert and oriented to person, place and time.     Pupils:  Pupils are equal, round, and reactive to light. Skin:  Warm and dry. Labs/Imaging/Diagnostics    Labs:  CBC:  Recent Labs     10/05/21  0102 10/04/21  0308 10/03/21  0001   WBC 8.1 6.6 6.6   RBC 4.51 4.56 4.57   HGB 13.5 14.0 13.7   HCT 39.5 40.5 41.0   MCV 87.6 88.8 89.7   RDW 13.1 12.9 12.8    307 306     CHEMISTRIES:  Recent Labs     10/05/21  0102 10/04/21  0308 10/03/21  0001    141 142   K 3.6 3.6 3.3*   * 110* 110*   CO2 21 22 25   BUN 5* 7 7   CA 8.6 8.6 8.6   PT/INR:No results for input(s): INR, INREXT in the last 72 hours. No lab exists for component: PROTIME  APTT:No results for input(s): APTT in the last 72 hours. LIVER PROFILE:  Recent Labs     10/05/21  0102 10/04/21  0308 10/03/21  0001   AST 19 12* 16   ALT 18 18 19     Lab Results   Component Value Date/Time    ALT (SGPT) 18 10/05/2021 01:02 AM    AST (SGOT) 19 10/05/2021 01:02 AM    Alk. phosphatase 47 10/05/2021 01:02 AM    Bilirubin, total 0.8 10/05/2021 01:02 AM       Imaging Last 24 Hours:  CT HEAD WO CONT    Result Date: 10/4/2021  EXAM: Brain CT without contrast. INDICATION: altered mental status. Assess for stroke. TECHNIQUE: Noncontrast CT of the brain is performed with 5 mm collimation. Bone algorithm axial and sagittal and coronal reconstructions performed and evaluated. CT dose reduction was achieved through use of a standardized protocol tailored for this examination and automatic exposure control for dose modulation. COMPARISON: MRI 12/3/2019 and CT 11/29/2019. FINDINGS: There is no acute intracranial hemorrhage, mass, mass effect or herniation. Ventricles and sulci show no significant change in proportionate and symmetric prominence. There is no significant change in pattern of periventricular white matter hypodensity. The gray-white matter differentiation is well-preserved. Atherosclerotic calcifications are seen within the carotid siphons and vertebral arteries. The mastoid air cells are well pneumatized.  The visualized paranasal sinuses are normal.     No acute intracranial hemorrhage, mass or infarct. No significant change in pattern of atrophy and chronic white matter change most compatible with small vessel ischemic and/or senescent change. Intracranial atherosclerosis. Assessment//Plan   Active Problems:    Type 2 diabetes mellitus without complication (HCC) ()      Hypertension, benign ()      Diarrhea (10/1/2021)      Sepsis ([facility] [address] (10/1/2021)      UTI (urinary tract infection) due to urinary indwelling catheter ([facility] [address] (10/1/2021)      Atelectasis, left (10/1/2021)      Assessment:  (78 y/o male with GI consult for C. Diff positive diarrhea. History is difficult due to patient's history of dementia. Apparently has history of recent abx use and developed diarrhea which resulted in his coming to ER. His diarrhea is better now that Vanc has started. In fact, feeling constipated now. He denies abdominal pain, nausea, vomiting, or rectal bleeding. CT without colitis. Last colonoscopy 5/2016 for screening with 2 adenomatous polyps, erythema SC which was thought to be prep related, and medium internal hemorrhoids. ). Plan:   (- Will add probiotic  - Continue vancomycin for total of 10-14 days. No indication for endoscopic procedures and improvement with abx, no colitis on CT. GI will sign off, please call if needed further. ).        Electronically signed by Rian Craven NP on 10/5/2021 at 9:48 AM    I have interviewed and examined patient with addendum to note above and formulation care plan to reflect my evaluation    Patience Silver M.D.

## 2021-10-05 NOTE — PROGRESS NOTES
Neurology Progress Note    Patient ID:  Poornima Mann.  [id number]  80 y.o.  1940    CC: altered mental status    Subjective:      Patient's mentation is much improved. Patient able to interact appropriately. Head CT without contrast did not reveal any acute process. Chronic white matter disease. CBC and CMP were unremarkable except for decreased albumin. EEG done was essentially normal awake and drowsy patterns. No seizures.     Current Facility-Administered Medications   Medication Dose Route Frequency    lactobacillus-acidophilus (LACTINEX) 1 Packet  1 Packet Oral BID    vancomycin (FIRVANQ) 50 mg/mL oral solution 125 mg  125 mg Oral Q6H    phenazopyridine (PYRIDIUM) tablet 100 mg  100 mg Oral TIDPC    potassium chloride SR (KLOR-CON 10) tablet 20 mEq  20 mEq Oral BID    cefTRIAXone (ROCEPHIN) 1 g in sterile water (preservative free) 10 mL IV syringe  1 g IntraVENous Q24H    0.9% sodium chloride infusion  50 mL/hr IntraVENous CONTINUOUS    sodium chloride (NS) flush 5-40 mL  5-40 mL IntraVENous Q8H    sodium chloride (NS) flush 5-40 mL  5-40 mL IntraVENous PRN    acetaminophen (TYLENOL) tablet 650 mg  650 mg Oral Q6H PRN    Or    acetaminophen (TYLENOL) suppository 650 mg  650 mg Rectal Q6H PRN    polyethylene glycol (MIRALAX) packet 17 g  17 g Oral DAILY PRN    bisacodyL (DULCOLAX) suppository 10 mg  10 mg Rectal DAILY PRN    ondansetron (ZOFRAN) injection 4 mg  4 mg IntraVENous Q6H PRN    tamsulosin (FLOMAX) capsule 0.4 mg  0.4 mg Oral BID    pantoprazole (PROTONIX) tablet 40 mg  40 mg Oral ACB    metoprolol succinate (TOPROL-XL) XL tablet 25 mg  25 mg Oral DAILY    glucose chewable tablet 16 g  4 Tablet Oral PRN    dextrose (D50W) injection syrg 12.5-25 g  25-50 mL IntraVENous PRN    glucagon (GLUCAGEN) injection 1 mg  1 mg IntraMUSCular PRN    insulin lispro (HUMALOG) injection   SubCUTAneous TIDAC    insulin NPH (NOVOLIN N, HUMULIN N) injection 25 Units  25 Units SubCUTAneous ACB&D    dutasteride (AVODART) capsule 0.5 mg  0.5 mg Oral DAILY    sodium chloride (NS) flush 5-10 mL  5-10 mL IntraVENous PRN        Review of Systems:    Pertinent items are noted in HPI. Objective:     Patient Vitals for the past 8 hrs:   BP Temp Pulse Resp SpO2   10/05/21 0810 (!) 147/76 97.9 °F (36.6 °C) 73 18 93 %   10/05/21 0242 112/70 98.4 °F (36.9 °C) 100 18 95 %       10/05 0701 - 10/05 1900  In: 240 [P.O.:240]  Out: -   10/03 1901 - 10/05 0700  In: 4708.3 [P.O.:1940; I.V.:2768.3]  Out: 4775 [Urine:4775]    Lab Review   Recent Results (from the past 24 hour(s))   GLUCOSE, POC    Collection Time: 10/04/21 11:20 AM   Result Value Ref Range    Glucose (POC) 78 65 - 117 mg/dL    Performed by Elisha Bess    GLUCOSE, POC    Collection Time: 10/04/21  3:25 PM   Result Value Ref Range    Glucose (POC) 91 65 - 117 mg/dL    Performed by Elisha Bess    GLUCOSE, POC    Collection Time: 10/04/21  8:49 PM   Result Value Ref Range    Glucose (POC) 72 65 - 117 mg/dL    Performed by Veronica Partida (PCT)    CBC WITH AUTOMATED DIFF    Collection Time: 10/05/21  1:02 AM   Result Value Ref Range    WBC 8.1 4.1 - 11.1 K/uL    RBC 4.51 4.10 - 5.70 M/uL    HGB 13.5 12.1 - 17.0 g/dL    HCT 39.5 36.6 - 50.3 %    MCV 87.6 80.0 - 99.0 FL    MCH 29.9 26.0 - 34.0 PG    MCHC 34.2 30.0 - 36.5 g/dL    RDW 13.1 11.5 - 14.5 %    PLATELET 312 972 - 607 K/uL    MPV 8.7 (L) 8.9 - 12.9 FL    NRBC 0.0 0  WBC    ABSOLUTE NRBC 0.00 0.00 - 0.01 K/uL    NEUTROPHILS 60 32 - 75 %    LYMPHOCYTES 27 12 - 49 %    MONOCYTES 10 5 - 13 %    EOSINOPHILS 2 0 - 7 %    BASOPHILS 1 0 - 1 %    IMMATURE GRANULOCYTES 0 0.0 - 0.5 %    ABS. NEUTROPHILS 4.9 1.8 - 8.0 K/UL    ABS. LYMPHOCYTES 2.2 0.8 - 3.5 K/UL    ABS. MONOCYTES 0.8 0.0 - 1.0 K/UL    ABS. EOSINOPHILS 0.2 0.0 - 0.4 K/UL    ABS. BASOPHILS 0.1 0.0 - 0.1 K/UL    ABS. IMM.  GRANS. 0.0 0.00 - 0.04 K/UL    DF AUTOMATED     METABOLIC PANEL, COMPREHENSIVE    Collection Time: 10/05/21  1:02 AM   Result Value Ref Range    Sodium 142 136 - 145 mmol/L    Potassium 3.6 3.5 - 5.1 mmol/L    Chloride 111 (H) 97 - 108 mmol/L    CO2 21 21 - 32 mmol/L    Anion gap 10 5 - 15 mmol/L    Glucose 79 65 - 100 mg/dL    BUN 5 (L) 6 - 20 MG/DL    Creatinine 0.76 0.70 - 1.30 MG/DL    BUN/Creatinine ratio 7 (L) 12 - 20      GFR est AA >60 >60 ml/min/1.73m2    GFR est non-AA >60 >60 ml/min/1.73m2    Calcium 8.6 8.5 - 10.1 MG/DL    Bilirubin, total 0.8 0.2 - 1.0 MG/DL    ALT (SGPT) 18 12 - 78 U/L    AST (SGOT) 19 15 - 37 U/L    Alk. phosphatase 47 45 - 117 U/L    Protein, total 6.9 6.4 - 8.2 g/dL    Albumin 2.9 (L) 3.5 - 5.0 g/dL    Globulin 4.0 2.0 - 4.0 g/dL    A-G Ratio 0.7 (L) 1.1 - 2.2     GLUCOSE, POC    Collection Time: 10/05/21  8:45 AM   Result Value Ref Range    Glucose (POC) 130 (H) 65 - 117 mg/dL    Performed by Xavier Sarabia      PHYSICAL EXAM:     NEUROLOGICAL EXAM:     Appearance: The patient is well developed, well nourished, unable to  provide a coherent history and is in no acute distress. Mental Status: Oriented to place, time and person. Fluent, no aphasia or dysarthria. Cranial Nerves:   Intact visual fields. DOMINIQUE, EOM's full, no nystagmus, no ptosis. Facial sensation is normal. Corneal reflexes are intact. Facial movement is symmetric. Hearing is normal bilaterally. Palate is midline with normal elevation. Sternocleidomastoid and trapezius muscles are normal. Tongue is midline. Motor:  Moves all 4 extremities well. No focal weakness. No tone changes. Reflexes:   Deep tendon reflexes 1+/4 and symmetrical. Downgoing toes. Sensory:   Normal to cold on all 4 extremities. Gait:  Not tested. Tremor:   No tremor noted. Cerebellar:  Intact         Assessment:   Acute encephalopathy  C. Diff infection    Plan:   Neurological examination reveals improvement of the cognitive impairment with no focal findings. Consistent with encephalopathy.   Likely in this case secondary to ongoing infectious process.       Head CT without contrast revealed atrophy and chronic white matter disease. No acute stroke. EEG done revealed normal awake and drowsy patterns. No seizures.       Limit use of sedating medication.     Continue hydration and treatment of underlying infectious process. No further recommendations from a neurological standpoint.     Signed:  Rafy Lopez MD  10/5/2021  10:12 AM

## 2021-10-05 NOTE — PROGRESS NOTES
Daily Progress Note: 10/5/2021  Donne Kocher, MD    Assessment/Plan:   Sepsis: Meets criteria based on HR, RR, WBC, and UTI.      - IV ABX with Rocephin IV. -Cultures pending and will adjust antibiotics accordingly- no growth so far (10/5)  -ID consulted.     UTI (urinary tract infection) due to urinary indwelling catheter: Recently treated for pansensitive Proteus with Keflex. But in the setting of a chronic indwelling catheter will treat with Rocephin.    -urine culture from 10/5 neg so far.     Diarrhea:  C. difficile returned positive 10/3 PM.    - start po Vanc     Type 2 diabetes mellitus without complication:    -Monitor blood glucose levels with insulin sliding scale coverage along with basal insulin. -Monitor for complications. -A1C.    -Diabetic diet.     Hypertension, benign:   -Resume home meds   -Hydralazine as needed.     Atelectasis, left:   -Deep breath cough and incentive spirometry. Problem List:  Problem List as of 10/5/2021 Date Reviewed: 10/1/2021        Codes Class Noted - Resolved    Diarrhea ICD-10-CM: R19.7  ICD-9-CM: 787.91  10/1/2021 - Present        Sepsis ([facility] [address] ICD-10-CM: A41.9  ICD-9-CM: 038.9, 995.91  10/1/2021 - Present        UTI (urinary tract infection) due to urinary indwelling catheter ([facility] [address] ICD-10-CM: T83.511A, N39.0  ICD-9-CM: 996.64, 599.0  10/1/2021 - Present        Atelectasis, left ICD-10-CM: J98.11  ICD-9-CM: 518.0  10/1/2021 - Present        Epigastric abdominal pain ICD-10-CM: R10.13  ICD-9-CM: 789.06  2/8/2020 - Present        Epigastric pain ICD-10-CM: R10.13  ICD-9-CM: 789.06  2/8/2020 - Present        Depression ICD-10-CM: F32. A  ICD-9-CM: 586  2/7/2020 - Present        Fall ICD-10-CM: W19. Healy Blinks  ICD-9-CM: M300.5  11/29/2019 - Present        Traumatic rhabdomyolysis ([facility] [address] ICD-10-CM: T79. 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 Boyd Gupta @ [facility])  - LIMA-LAD, VG-OM, VG-PDA             Type 2 diabetes mellitus without complication ([facility] [address] TJG-06-GA: E11.9  ICD-9-CM: 250.00  Unknown - Present        Hypertension, benign ICD-10-CM: I10  ICD-9-CM: 401.1  Unknown - Present        RESOLVED: Chest pain ICD-10-CM: R07.9  ICD-9-CM: 786.50  2/7/2020 - 2/8/2020        RESOLVED: PND (paroxysmal nocturnal dyspnea) ICD-10-CM: R06.00  ICD-9-CM: 786.09  8/13/2017 - 8/11/2018        RESOLVED: Near syncope ICD-10-CM: R55  ICD-9-CM: 780.2  10/29/2013 - 4/15/2014              Subjective:    80 y.o. male with history that includes HTN, CAD, diabetes with neuropathy, and dementia who just completed Keflex for Proteus UTI presents fever, per chart worsening UTI symptoms, and diarrhea. This associated with fatigue and malaise. Due to his dementia he is not able to provide much of a history but is aware that he has had \"bad\" diarrhea but is unable to describe the consistency or color but does deny blood in it. Has not had a BM since he has been here. He did not remember that he had a UTI and was on antibiotics. In ER he was found to meet criteria for sepsis based on heart rate respiratory rate and leukocytosis in the setting of UTI and possible enteric infection. Chest x-ray showed minimal atelectasis in the left base but no chest pain or shortness of breath per patient. (Dr Jade rWight)    10/1:  Still with loose watery stools. Cultures pending. Tmax 100. AM labs pending. Cultures pending. 10/2:  He reports fewer loose stools and no ab pain. Nurses reports 3 loose stools and 1 semisolid overnight. Tmax 98. ID consult pending.   K+ sl low - replacing. 10/3:  Only 2 loose stools in last 12 hrs he reports. No ab pain. He reports \"starting to feel a little better. \"  WBC back to normal range. K+ still low - increased dose. 10/4:  2 loose stools overnight per nursing. He is insisting that pederson be removed. Will remove today but advised pt it may need to be replaced if he has retention. He also has some burning from the pederson and will add pyridium for 2 days. C diff returned Positive yesterday pm- po Vanc added. K+ better. 10/5: Reports no complaints at this moment and is much more alert and oriented this AM.  He remembers me from yesterday. He knows he is in [facility]. Nurse reports a few min ago he was not oriented at all. Neuro has seen for his waxing and waning mental status and ordered CT of head which was unrevealing. Nursing was able to redirect him from his insistence on pederson removal, left in place, and he makes no mention of pederson at this visit. Watch one more day and hopefully home tomorrow.     Review of Systems:   A comprehensive review of systems was negative except for that written in the HPI. Objective:   Physical Exam:   Visit Vitals  /70 (BP 1 Location: Left upper arm, BP Patient Position: At rest)   Pulse 100   Temp 98.4 °F (36.9 °C)   Resp 18   Ht 6' 0.01\" (1.829 m)   Wt 91.4 kg (201 lb 8 oz)   SpO2 95%   BMI 27.32 kg/m²      O2 Device: None (Room air)  Temp (24hrs), Av.3 °F (36.8 °C), Min:98 °F (36.7 °C), Max:98.5 °F (36.9 °C)    10/04 1901 - 10/05 0700  In: 520 [P.O.:120; I.V.:400]  Out: 425 [Urine:425]   10/03 0701 - 10/04 1900  In: 4538.3 [P.O.:2170; I.V.:2368.3]  Out: 5000 [Urine:5000]  General:  Alert, cooperative, no distress, appears stated age. Head:  Normocephalic, without obvious abnormality, atraumatic. Eyes:  Conjunctivae/corneas clear. PERRL, EOMs intact.    Throat: Lips, mucosa, and tongue moist..   Neck: Supple, symmetrical, trachea midline, no adenopathy, thyroid: no enlargement/tenderness/nodules, no carotid bruit and no JVD. Back:    No CVA tenderness. Lungs:   Clear to auscultation bilaterally. Chest wall:  No tenderness or deformity. Heart:  Regular rate and rhythm, S1, S2 normal, no murmur, click, rub or gallop. Abdomen:   Soft, non-tender. Bowel sounds normal. No masses,  No organomegaly. TTP lower R and L quadrants   Extremities: no cyanosis or edema. No calf tenderness or cords. Pulses: 2+ and symmetric all extremities. Skin: Skin color, texture, turgor normal. No rashes    Neurologic: CNII-XII intact. Alert and oriented X 3 at this moment. Follows 2 step commands at this time. Fine motor of hands and fingers normal.   equal.  No cogwheeling or rigidity. Gait not tested at this time. Sensation grossly normal to touch. Gross motor of extremities normal.       Data Review:     CT CHEST ABD PELV W CONT  Result Date: 10/1/2021  No acute findings. Incidentals as above including coronary artery disease status post CABG, cholecystolithiasis and prostamegaly with indwelling Hernández catheter.     XR CHEST PORT  Result Date: 9/30/2021  Minimal left basilar subsegmental atelectasis. CT Head w/o contrast  10/4/21  INDICATION: altered mental status. Assess for stroke. COMPARISON: MRI 12/3/2019 and CT 11/29/2019. FINDINGS: There is no acute intracranial hemorrhage, mass, mass effect or  herniation. Ventricles and sulci show no significant change in proportionate and  symmetric prominence. There is no significant change in pattern of  periventricular white matter hypodensity. The gray-white matter differentiation  is well-preserved. Atherosclerotic calcifications are seen within the carotid  siphons and vertebral arteries. The mastoid air cells are well pneumatized. The  visualized paranasal sinuses are normal.  IMPRESSION  No acute intracranial hemorrhage, mass or infarct.  No significant  change in pattern of atrophy and chronic white matter change most compatible  with small vessel ischemic and/or senescent change. Intracranial  atherosclerosis. Recent Days:  Recent Labs     10/05/21  0102 10/04/21  0308 10/03/21  0001   WBC 8.1 6.6 6.6   HGB 13.5 14.0 13.7   HCT 39.5 40.5 41.0    307 306     Recent Labs     10/05/21  0102 10/04/21  0308 10/03/21  0001    141 142   K 3.6 3.6 3.3*   * 110* 110*   CO2 21 22 25   GLU 79 166* 71   BUN 5* 7 7   CREA 0.76 1.11 0.82   CA 8.6 8.6 8.6   ALB 2.9* 3.0* 2.9*   TBILI 0.8 0.9 1.0   ALT 18 18 19     No results for input(s): PH, PCO2, PO2, HCO3, FIO2 in the last 72 hours. 24 Hour Results:  Recent Results (from the past 24 hour(s))   GLUCOSE, POC    Collection Time: 10/04/21 11:20 AM   Result Value Ref Range    Glucose (POC) 78 65 - 117 mg/dL    Performed by Suzie Morales    GLUCOSE, POC    Collection Time: 10/04/21  3:25 PM   Result Value Ref Range    Glucose (POC) 91 65 - 117 mg/dL    Performed by Suzie Morales    GLUCOSE, POC    Collection Time: 10/04/21  8:49 PM   Result Value Ref Range    Glucose (POC) 72 65 - 117 mg/dL    Performed by Veronica Partida (PCT)    CBC WITH AUTOMATED DIFF    Collection Time: 10/05/21  1:02 AM   Result Value Ref Range    WBC 8.1 4.1 - 11.1 K/uL    RBC 4.51 4.10 - 5.70 M/uL    HGB 13.5 12.1 - 17.0 g/dL    HCT 39.5 36.6 - 50.3 %    MCV 87.6 80.0 - 99.0 FL    MCH 29.9 26.0 - 34.0 PG    MCHC 34.2 30.0 - 36.5 g/dL    RDW 13.1 11.5 - 14.5 %    PLATELET 379 105 - 882 K/uL    MPV 8.7 (L) 8.9 - 12.9 FL    NRBC 0.0 0  WBC    ABSOLUTE NRBC 0.00 0.00 - 0.01 K/uL    NEUTROPHILS 60 32 - 75 %    LYMPHOCYTES 27 12 - 49 %    MONOCYTES 10 5 - 13 %    EOSINOPHILS 2 0 - 7 %    BASOPHILS 1 0 - 1 %    IMMATURE GRANULOCYTES 0 0.0 - 0.5 %    ABS. NEUTROPHILS 4.9 1.8 - 8.0 K/UL    ABS. LYMPHOCYTES 2.2 0.8 - 3.5 K/UL    ABS. MONOCYTES 0.8 0.0 - 1.0 K/UL    ABS. EOSINOPHILS 0.2 0.0 - 0.4 K/UL    ABS. BASOPHILS 0.1 0.0 - 0.1 K/UL    ABS. IMM.  GRANS. 0.0 0.00 - 0.04 K/UL    DF AUTOMATED METABOLIC PANEL, COMPREHENSIVE    Collection Time: 10/05/21  1:02 AM   Result Value Ref Range    Sodium 142 136 - 145 mmol/L    Potassium 3.6 3.5 - 5.1 mmol/L    Chloride 111 (H) 97 - 108 mmol/L    CO2 21 21 - 32 mmol/L    Anion gap 10 5 - 15 mmol/L    Glucose 79 65 - 100 mg/dL    BUN 5 (L) 6 - 20 MG/DL    Creatinine 0.76 0.70 - 1.30 MG/DL    BUN/Creatinine ratio 7 (L) 12 - 20      GFR est AA >60 >60 ml/min/1.73m2    GFR est non-AA >60 >60 ml/min/1.73m2    Calcium 8.6 8.5 - 10.1 MG/DL    Bilirubin, total 0.8 0.2 - 1.0 MG/DL    ALT (SGPT) 18 12 - 78 U/L    AST (SGOT) 19 15 - 37 U/L    Alk.  phosphatase 47 45 - 117 U/L    Protein, total 6.9 6.4 - 8.2 g/dL    Albumin 2.9 (L) 3.5 - 5.0 g/dL    Globulin 4.0 2.0 - 4.0 g/dL    A-G Ratio 0.7 (L) 1.1 - 2.2         Medications reviewed  Current Facility-Administered Medications   Medication Dose Route Frequency    vancomycin (FIRVANQ) 50 mg/mL oral solution 125 mg  125 mg Oral Q6H    phenazopyridine (PYRIDIUM) tablet 100 mg  100 mg Oral TIDPC    potassium chloride SR (KLOR-CON 10) tablet 20 mEq  20 mEq Oral BID    cefTRIAXone (ROCEPHIN) 1 g in sterile water (preservative free) 10 mL IV syringe  1 g IntraVENous Q24H    0.9% sodium chloride infusion  50 mL/hr IntraVENous CONTINUOUS    sodium chloride (NS) flush 5-40 mL  5-40 mL IntraVENous Q8H    sodium chloride (NS) flush 5-40 mL  5-40 mL IntraVENous PRN    acetaminophen (TYLENOL) tablet 650 mg  650 mg Oral Q6H PRN    Or    acetaminophen (TYLENOL) suppository 650 mg  650 mg Rectal Q6H PRN    polyethylene glycol (MIRALAX) packet 17 g  17 g Oral DAILY PRN    bisacodyL (DULCOLAX) suppository 10 mg  10 mg Rectal DAILY PRN    ondansetron (ZOFRAN) injection 4 mg  4 mg IntraVENous Q6H PRN    tamsulosin (FLOMAX) capsule 0.4 mg  0.4 mg Oral BID    pantoprazole (PROTONIX) tablet 40 mg  40 mg Oral ACB    metoprolol succinate (TOPROL-XL) XL tablet 25 mg  25 mg Oral DAILY    glucose chewable tablet 16 g  4 Tablet Oral PRN  dextrose (D50W) injection syrg 12.5-25 g  25-50 mL IntraVENous PRN    glucagon (GLUCAGEN) injection 1 mg  1 mg IntraMUSCular PRN    insulin lispro (HUMALOG) injection   SubCUTAneous TIDAC    insulin NPH (NOVOLIN N, HUMULIN N) injection 25 Units  25 Units SubCUTAneous ACB&D    dutasteride (AVODART) capsule 0.5 mg  0.5 mg Oral DAILY    sodium chloride (NS) flush 5-10 mL  5-10 mL IntraVENous PRN       Care Plan discussed with: Patient/Family  Total time spent with patient: 30 minutes.   Meghna Gordon MD

## 2021-10-06 ENCOUNTER — TELEPHONE (OUTPATIENT)
Dept: CARDIOLOGY CLINIC | Age: 81
End: 2021-10-06

## 2021-10-06 VITALS
TEMPERATURE: 98.9 F | WEIGHT: 201.5 LBS | BODY MASS INDEX: 27.29 KG/M2 | HEART RATE: 72 BPM | OXYGEN SATURATION: 96 % | DIASTOLIC BLOOD PRESSURE: 74 MMHG | HEIGHT: 72 IN | RESPIRATION RATE: 18 BRPM | SYSTOLIC BLOOD PRESSURE: 142 MMHG

## 2021-10-06 LAB
ALBUMIN SERPL-MCNC: 2.7 G/DL (ref 3.5–5)
ALBUMIN/GLOB SERPL: 0.7 {RATIO} (ref 1.1–2.2)
ALP SERPL-CCNC: 49 U/L (ref 45–117)
ALT SERPL-CCNC: 16 U/L (ref 12–78)
ANION GAP SERPL CALC-SCNC: 8 MMOL/L (ref 5–15)
AST SERPL-CCNC: 12 U/L (ref 15–37)
BACTERIA SPEC CULT: NORMAL
BASOPHILS # BLD: 0.1 K/UL (ref 0–0.1)
BASOPHILS NFR BLD: 1 % (ref 0–1)
BILIRUB SERPL-MCNC: 0.8 MG/DL (ref 0.2–1)
BUN SERPL-MCNC: 6 MG/DL (ref 6–20)
BUN/CREAT SERPL: 8 (ref 12–20)
CALCIUM SERPL-MCNC: 8.4 MG/DL (ref 8.5–10.1)
CHLORIDE SERPL-SCNC: 111 MMOL/L (ref 97–108)
CO2 SERPL-SCNC: 22 MMOL/L (ref 21–32)
CREAT SERPL-MCNC: 0.72 MG/DL (ref 0.7–1.3)
DIFFERENTIAL METHOD BLD: NORMAL
EOSINOPHIL # BLD: 0.3 K/UL (ref 0–0.4)
EOSINOPHIL NFR BLD: 6 % (ref 0–7)
ERYTHROCYTE [DISTWIDTH] IN BLOOD BY AUTOMATED COUNT: 13.2 % (ref 11.5–14.5)
GLOBULIN SER CALC-MCNC: 3.9 G/DL (ref 2–4)
GLUCOSE BLD STRIP.AUTO-MCNC: 113 MG/DL (ref 65–117)
GLUCOSE BLD STRIP.AUTO-MCNC: 137 MG/DL (ref 65–117)
GLUCOSE SERPL-MCNC: 102 MG/DL (ref 65–100)
HCT VFR BLD AUTO: 37.1 % (ref 36.6–50.3)
HGB BLD-MCNC: 12.5 G/DL (ref 12.1–17)
IMM GRANULOCYTES # BLD AUTO: 0 K/UL (ref 0–0.04)
IMM GRANULOCYTES NFR BLD AUTO: 0 % (ref 0–0.5)
LYMPHOCYTES # BLD: 2.2 K/UL (ref 0.8–3.5)
LYMPHOCYTES NFR BLD: 36 % (ref 12–49)
MCH RBC QN AUTO: 29.7 PG (ref 26–34)
MCHC RBC AUTO-ENTMCNC: 33.7 G/DL (ref 30–36.5)
MCV RBC AUTO: 88.1 FL (ref 80–99)
MONOCYTES # BLD: 0.7 K/UL (ref 0–1)
MONOCYTES NFR BLD: 12 % (ref 5–13)
NEUTS SEG # BLD: 2.7 K/UL (ref 1.8–8)
NEUTS SEG NFR BLD: 45 % (ref 32–75)
NRBC # BLD: 0 K/UL (ref 0–0.01)
NRBC BLD-RTO: 0 PER 100 WBC
PLATELET # BLD AUTO: 296 K/UL (ref 150–400)
PMV BLD AUTO: 8.9 FL (ref 8.9–12.9)
POTASSIUM SERPL-SCNC: 3.8 MMOL/L (ref 3.5–5.1)
PROT SERPL-MCNC: 6.6 G/DL (ref 6.4–8.2)
RBC # BLD AUTO: 4.21 M/UL (ref 4.1–5.7)
SERVICE CMNT-IMP: ABNORMAL
SERVICE CMNT-IMP: NORMAL
SERVICE CMNT-IMP: NORMAL
SODIUM SERPL-SCNC: 141 MMOL/L (ref 136–145)
WBC # BLD AUTO: 5.9 K/UL (ref 4.1–11.1)

## 2021-10-06 PROCEDURE — 94760 N-INVAS EAR/PLS OXIMETRY 1: CPT

## 2021-10-06 PROCEDURE — 74011250637 HC RX REV CODE- 250/637: Performed by: FAMILY MEDICINE

## 2021-10-06 PROCEDURE — 74011636637 HC RX REV CODE- 636/637: Performed by: FAMILY MEDICINE

## 2021-10-06 PROCEDURE — 77030012865 HC BG URIN LEG MDII -A

## 2021-10-06 PROCEDURE — 74011250637 HC RX REV CODE- 250/637: Performed by: NURSE PRACTITIONER

## 2021-10-06 PROCEDURE — 77030040831 HC BAG URINE DRNG MDII -A

## 2021-10-06 PROCEDURE — 36415 COLL VENOUS BLD VENIPUNCTURE: CPT

## 2021-10-06 PROCEDURE — 80053 COMPREHEN METABOLIC PANEL: CPT

## 2021-10-06 PROCEDURE — 85025 COMPLETE CBC W/AUTO DIFF WBC: CPT

## 2021-10-06 PROCEDURE — 82962 GLUCOSE BLOOD TEST: CPT

## 2021-10-06 RX ORDER — L. ACIDOPHILUS/L.BULGARICUS 100MM CELL
1 GRANULES IN PACKET (EA) ORAL 2 TIMES DAILY
Qty: 60 EACH | Refills: 0 | Status: ON HOLD | OUTPATIENT
Start: 2021-10-06 | End: 2022-05-11

## 2021-10-06 RX ORDER — FAMOTIDINE 20 MG/1
20 TABLET, FILM COATED ORAL DAILY
Qty: 30 TABLET | Refills: 0 | Status: SHIPPED | OUTPATIENT
Start: 2021-10-07

## 2021-10-06 RX ORDER — VANCOMYCIN HYDROCHLORIDE 250 MG/5ML
125 POWDER, FOR SOLUTION ORAL EVERY 6 HOURS
Qty: 100 ML | Refills: 0 | Status: SHIPPED | OUTPATIENT
Start: 2021-10-06 | End: 2021-10-16

## 2021-10-06 RX ADMIN — Medication 10 ML: at 08:02

## 2021-10-06 RX ADMIN — FAMOTIDINE 20 MG: 20 TABLET ORAL at 08:01

## 2021-10-06 RX ADMIN — DUTASTERIDE 0.5 MG: 0.5 CAPSULE, LIQUID FILLED ORAL at 08:01

## 2021-10-06 RX ADMIN — VANCOMYCIN HYDROCHLORIDE 125 MG: 250 POWDER, FOR SOLUTION ORAL at 12:21

## 2021-10-06 RX ADMIN — LACTOBACILLUS ACIDOPHILUS / LACTOBACILLUS BULGARICUS 1 PACKET: 100 MILLION CFU STRENGTH GRANULES at 08:01

## 2021-10-06 RX ADMIN — VANCOMYCIN HYDROCHLORIDE 125 MG: 250 POWDER, FOR SOLUTION ORAL at 08:01

## 2021-10-06 RX ADMIN — POTASSIUM CHLORIDE 20 MEQ: 750 TABLET, FILM COATED, EXTENDED RELEASE ORAL at 08:01

## 2021-10-06 RX ADMIN — TAMSULOSIN HYDROCHLORIDE 0.4 MG: 0.4 CAPSULE ORAL at 08:01

## 2021-10-06 RX ADMIN — METOPROLOL SUCCINATE 25 MG: 25 TABLET, EXTENDED RELEASE ORAL at 08:01

## 2021-10-06 RX ADMIN — HUMAN INSULIN 25 UNITS: 100 INJECTION, SUSPENSION SUBCUTANEOUS at 08:01

## 2021-10-06 NOTE — PROGRESS NOTES
Daily Progress Note: 10/6/2021  Latia Alex MD    Assessment/Plan:   Sepsis: Meets criteria based on HR, RR, WBC, and UTI.      - IV ABX with Rocephin IV. -Cultures pending and will adjust antibiotics accordingly- no growth so far (10/5)  -ID consulted.     UTI (urinary tract infection) due to urinary indwelling catheter: Recently treated for pansensitive Proteus with Keflex. Pederson changed. Urology has been following. In the setting of a chronic indwelling catheter treated with Rocephin for 5 days - completed  -urine culture from 10/5 neg so far. -Needs to keep pederson in until seen by Urology next week     Diarrhea:  C. difficile returned positive 10/3 PM.    - started po Vanc for 10 days per ID/GI recs     Type 2 diabetes mellitus without complication:    -Monitor blood glucose levels with insulin sliding scale coverage along with basal insulin. -Monitor for complications. -A1C.    -Diabetic diet.     Hypertension, benign:   -Resume home meds   -Hydralazine as needed.     Atelectasis, left:   -Deep breath cough and incentive spirometry. Problem List:  Problem List as of 10/6/2021 Date Reviewed: 10/1/2021        Codes Class Noted - Resolved    Diarrhea ICD-10-CM: R19.7  ICD-9-CM: 787.91  10/1/2021 - Present        Sepsis ([facility] ICD-10-CM: A41.9  ICD-9-CM: 038.9, 995.91  10/1/2021 - Present        UTI (urinary tract infection) due to urinary indwelling catheter ([facility] ICD-10-CM: T83.511A, N39.0  ICD-9-CM: 996.64, 599.0  10/1/2021 - Present        Atelectasis, left ICD-10-CM: J98.11  ICD-9-CM: 518.0  10/1/2021 - Present        Epigastric abdominal pain ICD-10-CM: R10.13  ICD-9-CM: 789.06  2/8/2020 - Present        Epigastric pain ICD-10-CM: R10.13  ICD-9-CM: 789.06  2/8/2020 - Present        Depression ICD-10-CM: F32. A  ICD-9-CM: 611  2/7/2020 - Present        Fall ICD-10-CM: W19. Ganesh Fieldt  ICD-9-CM: V504.5  11/29/2019 - Present        Traumatic rhabdomyolysis ([facility] ICD-10-CM: T79. 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 Janet Carson @ [facility])  - LIMA-LAD, VG-OM, VG-PDA             Type 2 diabetes mellitus without complication ([facility] PVL-22-WP: E11.9  ICD-9-CM: 250.00  Unknown - Present        Hypertension, benign ICD-10-CM: I10  ICD-9-CM: 401.1  Unknown - Present        RESOLVED: Chest pain ICD-10-CM: R07.9  ICD-9-CM: 786.50  2/7/2020 - 2/8/2020        RESOLVED: PND (paroxysmal nocturnal dyspnea) ICD-10-CM: R06.00  ICD-9-CM: 786.09  8/13/2017 - 8/11/2018        RESOLVED: Near syncope ICD-10-CM: R55  ICD-9-CM: 780.2  10/29/2013 - 4/15/2014              Subjective:    80 y.o. male with history that includes HTN, CAD, diabetes with neuropathy, and dementia who just completed Keflex for Proteus UTI presents fever, per chart worsening UTI symptoms, and diarrhea. This associated with fatigue and malaise. Due to his dementia he is not able to provide much of a history but is aware that he has had \"bad\" diarrhea but is unable to describe the consistency or color but does deny blood in it. Has not had a BM since he has been here. He did not remember that he had a UTI and was on antibiotics. In ER he was found to meet criteria for sepsis based on heart rate respiratory rate and leukocytosis in the setting of UTI and possible enteric infection. Chest x-ray showed minimal atelectasis in the left base but no chest pain or shortness of breath per patient. (Dr Brian Short)    10/1:  Still with loose watery stools. Cultures pending. Tmax 100. AM labs pending. Cultures pending. 10/2:   He reports fewer loose stools and no ab pain. Nurses reports 3 loose stools and 1 semisolid overnight. Tmax 98. ID consult pending. K+ sl low - replacing. 10/3:  Only 2 loose stools in last 12 hrs he reports. No ab pain. He reports \"starting to feel a little better. \"  WBC back to normal range. K+ still low - increased dose. 10/4:  2 loose stools overnight per nursing. He is insisting that pederson be removed. Will remove today but advised pt it may need to be replaced if he has retention. He also has some burning from the pederson and will add pyridium for 2 days. C diff returned Positive yesterday pm- po Vanc added. K+ better. 10/5: Reports no complaints at this moment and is much more alert and oriented this AM.  He remembers me from yesterday. He knows he is in [address] [place] [address]. Nurse reports a few min ago he was not oriented at all. Neuro has seen for his waxing and waning mental status and ordered CT of head which was unrevealing. Nursing was able to redirect him from his insistence on pederson removal, left in place, and he makes no mention of pederson at this visit. Watch one more day and hopefully home tomorrow. 10/6:  Awake and eating breakfast. EEG neg. ID and GI have signed off. On po Vanc. Pederson to stay in until he is evaluated by Urology as outot next week. Is medically stable for d/c. Will need neuropsych testing as an outpt. Tried calling spouse x2.      Review of Systems:   A comprehensive review of systems was negative except for that written in the HPI. Objective:   Physical Exam:   Visit Vitals  /60 (BP 1 Location: Left upper arm, BP Patient Position: At rest)   Pulse 77   Temp 98.2 °F (36.8 °C)   Resp 18   Ht 6' 0.01\" (1.829 m)   Wt 91.4 kg (201 lb 8 oz)   SpO2 94%   BMI 27.32 kg/m²      O2 Device: None (Room air)  Temp (24hrs), Av °F (36.7 °C), Min:97.7 °F (36.5 °C), Max:98.4 °F (36.9 °C)    No intake/output data recorded.    10/04 0701 - 10/05 190  In: 9526 [P.O.:1130; I.V.:400]  Out: 3400 [Urine:3400]  General:  Alert, cooperative, no distress, appears stated age. Head:  Normocephalic, without obvious abnormality, atraumatic. Eyes:  Conjunctivae/corneas clear. PERRL, EOMs intact. Throat: Lips, mucosa, and tongue moist..   Neck: Supple, symmetrical, trachea midline, no adenopathy, thyroid: no enlargement/tenderness/nodules, no carotid bruit and no JVD. Back:    No CVA tenderness. Lungs:   Clear to auscultation bilaterally. Chest wall:  No tenderness or deformity. Heart:  Regular rate and rhythm, S1, S2 normal, no murmur, click, rub or gallop. Abdomen:   Soft, non-tender. Bowel sounds normal. No masses,  No organomegaly. TTP lower R and L quadrants   Extremities: no cyanosis or edema. No calf tenderness or cords. Pulses: 2+ and symmetric all extremities. Skin: Skin color, texture, turgor normal. No rashes    Neurologic: CNII-XII intact. Alert and oriented X 2 at this moment. Follows 2 step commands at this time. Fine motor of hands and fingers normal.   equal.  No cogwheeling or rigidity. Gait not tested at this time. Sensation grossly normal to touch. Gross motor of extremities normal.       Data Review:     CT CHEST ABD PELV W CONT  Result Date: 10/1/2021  No acute findings. Incidentals as above including coronary artery disease status post CABG, cholecystolithiasis and prostamegaly with indwelling Hernández catheter.     XR CHEST PORT  Result Date: 9/30/2021  Minimal left basilar subsegmental atelectasis. CT Head w/o contrast  10/4/21  INDICATION: altered mental status. Assess for stroke. COMPARISON: MRI 12/3/2019 and CT 11/29/2019. FINDINGS: There is no acute intracranial hemorrhage, mass, mass effect or  herniation. Ventricles and sulci show no significant change in proportionate and  symmetric prominence. There is no significant change in pattern of  periventricular white matter hypodensity.  The gray-white matter differentiation  is well-preserved. Atherosclerotic calcifications are seen within the carotid  siphons and vertebral arteries. The mastoid air cells are well pneumatized. The  visualized paranasal sinuses are normal.  IMPRESSION  No acute intracranial hemorrhage, mass or infarct. No significant  change in pattern of atrophy and chronic white matter change most compatible  with small vessel ischemic and/or senescent change. Intracranial  atherosclerosis. Recent Days:  Recent Labs     10/06/21  0346 10/05/21  0102 10/04/21  0308   WBC 5.9 8.1 6.6   HGB 12.5 13.5 14.0   HCT 37.1 39.5 40.5    297 307     Recent Labs     10/06/21  0346 10/05/21  0102 10/04/21  0308    142 141   K 3.8 3.6 3.6   * 111* 110*   CO2 22 21 22   * 79 166*   BUN 6 5* 7   CREA 0.72 0.76 1.11   CA 8.4* 8.6 8.6   ALB 2.7* 2.9* 3.0*   TBILI 0.8 0.8 0.9   ALT 16 18 18     No results for input(s): PH, PCO2, PO2, HCO3, FIO2 in the last 72 hours.     24 Hour Results:  Recent Results (from the past 24 hour(s))   GLUCOSE, POC    Collection Time: 10/05/21  8:45 AM   Result Value Ref Range    Glucose (POC) 130 (H) 65 - 117 mg/dL    Performed by Sarah Norman    GLUCOSE, POC    Collection Time: 10/05/21 11:18 AM   Result Value Ref Range    Glucose (POC) 160 (H) 65 - 117 mg/dL    Performed by Sarah Norman    GLUCOSE, POC    Collection Time: 10/05/21  3:58 PM   Result Value Ref Range    Glucose (POC) 169 (H) 65 - 117 mg/dL    Performed by JoshuaPurigen Biosystem[employer]asi Valverdeumbles    GLUCOSE, POC    Collection Time: 10/05/21  8:45 PM   Result Value Ref Range    Glucose (POC) 172 (H) 65 - 117 mg/dL    Performed by Veronica Partida (PCT)    CBC WITH AUTOMATED DIFF    Collection Time: 10/06/21  3:46 AM   Result Value Ref Range    WBC 5.9 4.1 - 11.1 K/uL    RBC 4.21 4.10 - 5.70 M/uL    HGB 12.5 12.1 - 17.0 g/dL    HCT 37.1 36.6 - 50.3 %    MCV 88.1 80.0 - 99.0 FL    MCH 29.7 26.0 - 34.0 PG    MCHC 33.7 30.0 - 36.5 g/dL    RDW 13.2 11.5 - 14.5 %    PLATELET 399 391 - 672 K/uL MPV 8.9 8.9 - 12.9 FL    NRBC 0.0 0  WBC    ABSOLUTE NRBC 0.00 0.00 - 0.01 K/uL    NEUTROPHILS 45 32 - 75 %    LYMPHOCYTES 36 12 - 49 %    MONOCYTES 12 5 - 13 %    EOSINOPHILS 6 0 - 7 %    BASOPHILS 1 0 - 1 %    IMMATURE GRANULOCYTES 0 0.0 - 0.5 %    ABS. NEUTROPHILS 2.7 1.8 - 8.0 K/UL    ABS. LYMPHOCYTES 2.2 0.8 - 3.5 K/UL    ABS. MONOCYTES 0.7 0.0 - 1.0 K/UL    ABS. EOSINOPHILS 0.3 0.0 - 0.4 K/UL    ABS. BASOPHILS 0.1 0.0 - 0.1 K/UL    ABS. IMM. GRANS. 0.0 0.00 - 0.04 K/UL    DF AUTOMATED     METABOLIC PANEL, COMPREHENSIVE    Collection Time: 10/06/21  3:46 AM   Result Value Ref Range    Sodium 141 136 - 145 mmol/L    Potassium 3.8 3.5 - 5.1 mmol/L    Chloride 111 (H) 97 - 108 mmol/L    CO2 22 21 - 32 mmol/L    Anion gap 8 5 - 15 mmol/L    Glucose 102 (H) 65 - 100 mg/dL    BUN 6 6 - 20 MG/DL    Creatinine 0.72 0.70 - 1.30 MG/DL    BUN/Creatinine ratio 8 (L) 12 - 20      GFR est AA >60 >60 ml/min/1.73m2    GFR est non-AA >60 >60 ml/min/1.73m2    Calcium 8.4 (L) 8.5 - 10.1 MG/DL    Bilirubin, total 0.8 0.2 - 1.0 MG/DL    ALT (SGPT) 16 12 - 78 U/L    AST (SGOT) 12 (L) 15 - 37 U/L    Alk.  phosphatase 49 45 - 117 U/L    Protein, total 6.6 6.4 - 8.2 g/dL    Albumin 2.7 (L) 3.5 - 5.0 g/dL    Globulin 3.9 2.0 - 4.0 g/dL    A-G Ratio 0.7 (L) 1.1 - 2.2         Medications reviewed  Current Facility-Administered Medications   Medication Dose Route Frequency    lactobacillus-acidophilus (LACTINEX) 1 Packet  1 Packet Oral BID    famotidine (PEPCID) tablet 20 mg  20 mg Oral DAILY    vancomycin (FIRVANQ) 50 mg/mL oral solution 125 mg  125 mg Oral Q6H    potassium chloride SR (KLOR-CON 10) tablet 20 mEq  20 mEq Oral BID    0.9% sodium chloride infusion  50 mL/hr IntraVENous CONTINUOUS    sodium chloride (NS) flush 5-40 mL  5-40 mL IntraVENous Q8H    sodium chloride (NS) flush 5-40 mL  5-40 mL IntraVENous PRN    acetaminophen (TYLENOL) tablet 650 mg  650 mg Oral Q6H PRN    Or    acetaminophen (TYLENOL) suppository 650 mg  650 mg Rectal Q6H PRN    polyethylene glycol (MIRALAX) packet 17 g  17 g Oral DAILY PRN    bisacodyL (DULCOLAX) suppository 10 mg  10 mg Rectal DAILY PRN    ondansetron (ZOFRAN) injection 4 mg  4 mg IntraVENous Q6H PRN    tamsulosin (FLOMAX) capsule 0.4 mg  0.4 mg Oral BID    metoprolol succinate (TOPROL-XL) XL tablet 25 mg  25 mg Oral DAILY    glucose chewable tablet 16 g  4 Tablet Oral PRN    dextrose (D50W) injection syrg 12.5-25 g  25-50 mL IntraVENous PRN    glucagon (GLUCAGEN) injection 1 mg  1 mg IntraMUSCular PRN    insulin lispro (HUMALOG) injection   SubCUTAneous TIDAC    insulin NPH (NOVOLIN N, HUMULIN N) injection 25 Units  25 Units SubCUTAneous ACB&D    dutasteride (AVODART) capsule 0.5 mg  0.5 mg Oral DAILY    sodium chloride (NS) flush 5-10 mL  5-10 mL IntraVENous PRN       Care Plan discussed with: Patient/Family  Total time spent with patient: 30 minutes.     Anabell Roca MD

## 2021-10-06 NOTE — PROGRESS NOTES
1252: Discussed discharge instructions and summary with patient and spouse. Patient verbalized understanding of instructions and medications. Patient aware that prescriptions were sent electronically to patient's usual pharmacy. Patient signed discharge paperwork via e-sign. Opportunity for questions/clarification provided. VSS. IV removed with no complication. Pt took all belongings with them. Problem: Risk for Spread of Infection  Goal: Prevent transmission of infectious organism to others  Description: Prevent the transmission of infectious organisms to other patients, staff members, and visitors.   Outcome: Progressing Towards Goal     Problem: Patient Education:  Go to Education Activity  Goal: Patient/Family Education  Outcome: Progressing Towards Goal

## 2021-10-06 NOTE — TELEPHONE ENCOUNTER
Patient's daughter Kai Jean, called to schedule a cardiac clearance appointment, states she was advised patient to have a hospital follow up (pt was discharged from [facility].           SXTNB:[phone]

## 2021-10-06 NOTE — DISCHARGE SUMMARY
Physician Discharge Summary     Patient ID:    Inna Santiago  [id number]  80 y.o.  1940  Bruce Oliva MD    Admit date: 9/30/2021  Discharge date and time: 10/6/2021  Admission Diagnoses: Sepsis, due to unspecified organism, unspecified whether acute organ dysfunction present ([facility] [A41.9]; Diarrhea [R19.7];UTI (urinary tract infection) due to urinary indwelling catheter ([facility] [G39.195L, N39.0]  Discharge Medications:   Current Discharge Medication List      START taking these medications    Details   famotidine (PEPCID) 20 mg tablet Take 1 Tablet by mouth daily. Qty: 30 Tablet, Refills: 0      lactobacillus-acidophilus (LACTINEX) 100 million cell grpk Take 1 Packet by mouth two (2) times a day. Qty: 60 Each, Refills: 0      vancomycin (FIRVANQ) 50 mg/mL oral solution Take 2.5 mL by mouth every six (6) hours for 10 days. Qty: 100 mL, Refills: 0         CONTINUE these medications which have CHANGED    Details   insulin NPH/insulin regular (NOVOLIN 70/30, HUMULIN 70/30) 100 unit/mL (70-30) injection 35 Units by SubCUTAneous route Before breakfast and dinner. Qty: 10 mL, Refills: 0         CONTINUE these medications which have NOT CHANGED    Details   dutasteride (AVODART) 0.5 mg capsule Take 0.5 mg by mouth daily. cholecalciferol (Vitamin D3) (1000 Units /25 mcg) tablet Take 1,000 Units by mouth daily. multivitamin (ONE A DAY) tablet Take 1 Tablet by mouth daily. metoprolol succinate (TOPROL-XL) 25 mg XL tablet TAKE 1 TABLET BY MOUTH EVERY DAY  Qty: 90 Tab, Refills: 0    Comments: Return office visit requested prior to additional refills. tamsulosin (FLOMAX) 0.4 mg capsule Take 0.4 mg by mouth two (2) times a day. aspirin delayed-release 81 mg tablet Take 81 mg by mouth daily. Follow up Care:    1. Bruce Oliva MD with in 1 weeks  2. specialists as directed. Diet:  Diabetic Diet  Disposition:  Home.   Advanced Directive:  Discharge Exam:  Akin Valencia today's progress note.]  CONSULTATIONS: ID, GI, Neurology and Urology    Significant Diagnostic Studies:   Recent Labs     10/06/21  0346 10/05/21  0102   WBC 5.9 8.1   HGB 12.5 13.5   HCT 37.1 39.5    297     Recent Labs     10/06/21  0346 10/05/21  0102 10/04/21  0308    142 141   K 3.8 3.6 3.6   * 111* 110*   CO2 22 21 22   BUN 6 5* 7   CREA 0.72 0.76 1.11   * 79 166*   CA 8.4* 8.6 8.6     Recent Labs     10/06/21  0346 10/05/21  0102 10/04/21  0308   ALT 16 18 18   AP 49 47 53   TBILI 0.8 0.8 0.9   TP 6.6 6.9 7.4   ALB 2.7* 2.9* 3.0*   GLOB 3.9 4.0 4.4*       Lab Results   Component Value Date/Time    Glucose (POC) 113 10/06/2021 06:31 AM    Glucose (POC) 172 (H) 10/05/2021 08:45 PM    Glucose (POC) 169 (H) 10/05/2021 03:58 PM    Glucose (POC) 160 (H) 10/05/2021 11:18 AM    Glucose (POC) 130 (H) 10/05/2021 08:45 AM     Lab Results   Component Value Date/Time    TSH 0.84 11/27/2019 10:58 AM     HOSPITAL COURSE & TREATMENT RENDERED:   Sepsis: Meets criteria based on HR, RR, WBC, and UTI.      - IV ABX with Rocephin IV.    -Cultures pending and will adjust antibiotics accordingly- no growth so far (10/5)  -ID consulted.     UTI (urinary tract infection) due to urinary indwelling catheter: Recently treated for pansensitive Proteus with Keflex. Pederson changed.  Urology has been following. In the setting of a chronic indwelling catheter treated with Rocephin for 5 days - completed  -urine culture from 10/5 neg so far.   -Needs to keep pederson in until seen by Urology next week     Diarrhea:  C. difficile returned positive 10/3 PM.    - started po Vanc for 10 days per ID/GI recs     Type 2 diabetes mellitus without complication:    -Monitor blood glucose levels with insulin sliding scale coverage along with basal insulin.    -Monitor for complications.    -N4Y.    -Diabetic diet.     Hypertension, benign:   -Resume home meds   -Hydralazine as needed.     Atelectasis, left:   -Deep breath cough and incentive spirometry.              Subjective:    80 y. o. male with history that includes HTN, CAD, diabetes with neuropathy, and dementia who just completed Keflex for Proteus UTI presents fever, per chart worsening UTI symptoms, and diarrhea.  This associated with fatigue and malaise.  Due to his dementia he is not able to provide much of a history but is aware that he has had \"bad\" diarrhea but is unable to describe the consistency or color but does deny blood in it.  Has not had a BM since he has been here. [facility] did not remember that he had a UTI and was on antibiotics. In [facility] found to meet criteria for sepsis based on heart rate respiratory rate and leukocytosis in the setting of UTI and possible enteric infection.  Chest x-ray showed minimal atelectasis in the left base but no chest pain or shortness of breath per patient. (Dr Brian Short)     10/1:  Still with loose watery stools. Cultures pending. Tmax 100. AM labs pending. Cultures pending.      10/2:  He reports fewer loose stools and no ab pain. Nurses reports 3 loose stools and 1 semisolid overnight. Tmax 98. ID consult pending. K+ sl low - replacing.      10/3:  Only 2 loose stools in last 12 hrs he reports. No ab pain. He reports \"starting to feel a little better. \"  WBC back to normal range. K+ still low - increased dose.      10/4:  2 loose stools overnight per nursing. He is insisting that pederson be removed. Will remove today but advised pt it may need to be replaced if he has retention. He also has some burning from the pederson and will add pyridium for 2 days. C diff returned Positive yesterday pm- po Vanc added. K+ better.        10/5: Reports no complaints at this moment and is much more alert and oriented this AM.  He remembers me from yesterday. He knows he is in [address] [place] [address]. Nurse reports a few min ago he was not oriented at all.   Neuro has seen for his waxing and waning mental status and ordered CT of head which was unrevealing. Nursing was able to redirect him from his insistence on pederson removal, left in place, and he makes no mention of pederson at this visit. Watch one more day and hopefully home tomorrow.     10/6:  Awake and eating breakfast. EEG neg. ID and GI have signed off. On po Vanc. Pederson to stay in until he is evaluated by Urology as outot next week. Is medically stable for d/c. Will need neuropsych testing as an outpt. Tried calling spouse x2.      Review of Systems:   A comprehensive review of systems was negative except for that written in the HPI.     Objective:   Physical Exam:   Visit Vitals  /60 (BP 1 Location: Left upper arm, BP Patient Position: At rest)   Pulse 77   Temp 98.2 °F (36.8 °C)   Resp 18   Ht 6' 0.01\" (1.829 m)   Wt 91.4 kg (201 lb 8 oz)   SpO2 94%   BMI 27.32 kg/m²      O2 Device: None (Room air)  Temp (24hrs), Av °F (36.7 °C), Min:97.7 °F (36.5 °C), Max:98.4 °F (36.9 °C)    No intake/output data recorded. 10/04 0701 - 10/05 1900  In: 9543 [P.O.:1130; I.V.:400]  Out: 3400 [Urine:3400]  General:  Alert, cooperative, no distress, appears stated age. Head:  Normocephalic, without obvious abnormality, atraumatic. Eyes:  Conjunctivae/corneas clear. PERRL, EOMs intact. Throat: Lips, mucosa, and tongue moist..   Neck: Supple, symmetrical, trachea midline, no adenopathy, thyroid: no enlargement/tenderness/nodules, no carotid bruit and no JVD. Back:    No CVA tenderness. Lungs:   Clear to auscultation bilaterally. Chest wall:  No tenderness or deformity. Heart:  Regular rate and rhythm, S1, S2 normal, no murmur, click, rub or gallop. Abdomen:   Soft, non-tender. Bowel sounds normal. No masses,  No organomegaly. TTP lower R and L quadrants   Extremities: no cyanosis or edema. No calf tenderness or cords. Pulses: 2+ and symmetric all extremities. Skin: Skin color, texture, turgor normal. No rashes    Neurologic: CNII-XII intact. Alert and oriented X 2 at this moment. Follows 2 step commands at this time. Fine motor of hands and fingers normal.   equal.  No cogwheeling or rigidity. Gait not tested at this time. Sensation grossly normal to touch. Gross motor of extremities normal.        Data Review:     CT CHEST ABD PELV W CONT  Result Date: 10/1/2021  No acute findings. Incidentals as above including coronary artery disease status post CABG, cholecystolithiasis and prostamegaly with indwelling Hernández catheter.     XR CHEST PORT  Result Date: 9/30/2021  Minimal left basilar subsegmental atelectasis.     CT Head w/o contrast  10/4/21  INDICATION: altered mental status. Assess for stroke. COMPARISON: MRI 12/3/2019 and CT 11/29/2019. FINDINGS: There is no acute intracranial hemorrhage, mass, mass effect or  herniation. Ventricles and sulci show no significant change in proportionate and  symmetric prominence. Linda Mimes is no significant change in pattern of  periventricular white matter hypodensity. The gray-white matter differentiation  is well-preserved. Atherosclerotic calcifications are seen within the carotid  siphons and vertebral arteries. The mastoid air cells are well pneumatized. The  visualized paranasal sinuses are normal.  IMPRESSION  No acute intracranial hemorrhage, mass or infarct. No significant  change in pattern of atrophy and chronic white matter change most compatible  with small vessel ischemic and/or senescent change.  Intracranial  atherosclerosis.         Signed:  Valeria Bentley MD  10/6/2021  8:05 AM

## 2021-10-06 NOTE — PROGRESS NOTES
10/6/2021  10:24 AM  RUR:  16%  Risk Level: [x]Low []Moderate []High  Value-based purchasing: [x] Yes [] No  Bundle patient: [] Yes [x] No   Specify:     Transition of care plan:  1. Discharge order submitted. Pt has medically cleared. 2. Home with [facility] - CM consult for [facility] noted. CM spoke with pt's wife via p/c who is agreeable to [facility] services with [facility]. CM completed referral, and they accepted. 3. Pt's wife notified of pt's Medicare rights via p/c.   4. Outpatient follow-up. 5. Pt's family to transport.

## 2021-10-06 NOTE — TELEPHONE ENCOUNTER
Spoke with the pt's spouse, Yonny Sheehan, identified the pt with name and .   I scheduled the pt for next Tuesday 10/12/ 21 1300

## 2021-10-06 NOTE — DISCHARGE INSTRUCTIONS
Patient Discharge Instructions    Adalberto PoeJanet / [id number] : 1940    Admitted 2021 Discharged: 10/6/2021 8:05 AM     ACUTE DIAGNOSES:  Sepsis, due to unspecified organism, unspecified whether acute organ dysfunction present ([facility] [A41.9]  Diarrhea [R19.7]  UTI (urinary tract infection) due to urinary indwelling catheter ([facility] [T83.511A, N39.0]    CHRONIC MEDICAL DIAGNOSES:  Problem List as of 10/6/2021 Date Reviewed: 10/1/2021        Codes Class Noted - Resolved    Diarrhea ICD-10-CM: R19.7  ICD-9-CM: 787.91  10/1/2021 - Present        Sepsis ([facility] ICD-10-CM: A41.9  ICD-9-CM: 038.9, 995.91  10/1/2021 - Present        UTI (urinary tract infection) due to urinary indwelling catheter ([facility] ICD-10-CM: T83.511A, N39.0  ICD-9-CM: 996.64, 599.0  10/1/2021 - Present        Atelectasis, left ICD-10-CM: J98.11  ICD-9-CM: 518.0  10/1/2021 - Present        Epigastric abdominal pain ICD-10-CM: R10.13  ICD-9-CM: 789.06  2020 - Present        Epigastric pain ICD-10-CM: R10.13  ICD-9-CM: 789.06  2020 - Present        Depression ICD-10-CM: F32. A  ICD-9-CM: 240  2020 - Present        Fall ICD-10-CM: W19. Ganesh Maikel  ICD-9-CM: K869.0  2019 - Present        Traumatic rhabdomyolysis ([facility] ICD-10-CM: T79. 6XXA  ICD-9-CM: 958.6  2019 - Present        Dyslipidemia ICD-10-CM: E78.5  ICD-9-CM: 272.4  10/3/2012 - Present        CAD (coronary artery disease), native coronary artery ICD-10-CM: I25.10  ICD-9-CM: 414.01  Unknown - Present    Overview Signed 3/22/2011  3:04 PM by Davin Mcbride MD     Inferior MI 2009  - PCI RCA with PTCA, significant LAD disease  - suspected reocclusion several hrs later with VF, IABP placed  3v CABG 2009 Tello Garcia @ [facility])  - LIMA-LAD, VG-OM, VG-PDA             Type 2 diabetes mellitus without complication ([facility] WUL-89-NZ: E11.9  ICD-9-CM: 250.00  Unknown - Present        Hypertension, benign ICD-10-CM: I10  ICD-9-CM: 401.1  Unknown - Present        RESOLVED: Chest pain ICD-10-CM: R07.9  ICD-9-CM: 786.50  2/7/2020 - 2/8/2020        RESOLVED: PND (paroxysmal nocturnal dyspnea) ICD-10-CM: R06.00  ICD-9-CM: 786.09  8/13/2017 - 8/11/2018        RESOLVED: Near syncope ICD-10-CM: R55  ICD-9-CM: 780.2  10/29/2013 - 4/15/2014              DISCHARGE MEDICATIONS:         · It is important that you take the medication exactly as they are prescribed. · Keep your medication in the bottles provided by the pharmacist and keep a list of the medication names, dosages, and times to be taken in your wallet. · Do not take other medications without consulting your doctor. DIET:  Diabetic Diet  ACTIVITY: Activity as tolerated    ADDITIONAL INFORMATION: If you experience any of the following symptoms then please call your primary care physician or return to the emergency room if you cannot get hold of your doctor: Fever, chills, nausea, vomiting, diarrhea, change in mentation, falling, bleeding, shortness of breath. FOLLOW UP CARE:  Dr. Pamela Azul MD  you are to call and set up an appointment to see them with in 1 week. Urology next week    Follow-up with specialists at directed by them      Information obtained by :  I understand that if any problems occur once I am at home I am to contact my physician. I understand and acknowledge receipt of the instructions indicated above.                                                                                                                                            Physician's or R.N.'s Signature                                                                  Date/Time                                                                                                                                              Patient or Representative Signature                                                          Date/Time

## 2021-10-07 ENCOUNTER — PATIENT OUTREACH (OUTPATIENT)
Dept: CASE MANAGEMENT | Age: 81
End: 2021-10-07

## 2021-10-07 LAB
BACTERIA SPEC CULT: NORMAL
LACTOFERRIN, LCTFLT: 5.96 UG/ML(G) (ref 0–7.24)
O+P SPEC MICRO: NORMAL
O+P STL CONC: NORMAL
SERVICE CMNT-IMP: NORMAL
SPECIMEN SOURCE: NORMAL

## 2021-10-07 NOTE — PROGRESS NOTES
Care Transitions Initial Call    Call within 2 business days of discharge: Yes     Patient: Reymundo Valencia Sr. Patient : 1940 MRN: [id number]    Last Discharge  [address]       Complaint Diagnosis Description Type Department Provider    21 Fever; Bladder Infection Sepsis, due to unspecified organism, unspecified whether acute organ dysfunction present ([facility] . .. ED to Hosp-Admission (Discharged) (ADMIT) [id number] Cody Harris MD; Karo Pedroza,... Was this an external facility discharge? No     Challenges to be reviewed by the provider   Additional needs identified to be addressed with provider:   Neuropsych testing OP  Patient declined [facility]- Spouse will monitor for need  H/o fall a few weeks ago  Does not routinely monitor BS  All new prescriptions will be available for pickup at [facility] tomorrow       Method of communication with provider : none    Discussed COVID-19 related testing which was available at this time. Test results were negative. Patient informed of results, if available? yes    Advance Care Planning:    Does patient have an Advance Directive:  currently not on file; education provided . Spouse reports patient has an AMD. Requested copy to be scanned into chart. Spouse verbalized her understanding. Inpatient Readmission Risk score: Unplanned Readmit Risk Score: 16    Was this a readmission? no   Patient stated reason for the admission: fever    Patients top risk factors for readmission: falls, lack of knowledge about disease, level of motivation, medical condition-sepsis and utilization of services   Interventions to address risk factors: Scheduled appointment with PCP-10/8, Scheduled appointment with Specialist-10/12 and Obtained and reviewed discharge summary and/or continuity of care documents    Care Transition Nurse (CTN) contacted the patient by telephone to perform post hospital discharge assessment. Verified name and  with patient as identifiers.  Provided introduction to self, and explanation of the CTN role. Reports doing fair. Patient able to take shower and shave independently. Denies chest pain, sob, fever. Reports fair appetite. Spouse to urge patient to consume for fiber. Hernández catheter intact. Draining clear urine. Denies hematuria. No diarrhea since discharge. CTN reviewed discharge instructions, medical action plan and red flags with patient/spouse who verbalized understanding. Were discharge instructions available to patient? yes. Reviewed appropriate site of care based on symptoms and resources available to patient including: PCP, Specialist and [facility]. Family given an opportunity to ask questions and does not have any further questions or concerns at this time. The family agrees to contact the PCP office for questions related to their healthcare. Medication reconciliation was performed with family, who verbalizes understanding of administration of home medications. Referral to Pharm D needed: no     Home Health/Outpatient orders at discharge: refused  [address]: [address] reports patient no longer wanted HH. CTN advised spouse, HH can be revisited if they find they need [address] ordered at discharge: None      Covid Risk Education  Not vaccinated  Educated patient about risk for severe COVID-19 due to risk factors according to CDC guidelines. CTN reviewed discharge instructions, medical action plan and red flag symptoms with the family who verbalized understanding. Discussed COVID vaccination status: yes. Education provided on COVID-19 vaccination as appropriate. Discussed exposure protocols and quarantine with CDC Guidelines. Family was given an opportunity to verbalize any questions and concerns and agrees to contact CTN or health care provider for questions related to their healthcare. Was patient discharged with a pulse oximeter? no.       Discussed follow-up appointments.  If no appointment was previously scheduled, appointment scheduling offered: no. Is follow up appointment scheduled within 7 days of discharge? yes. [address] follow up appointment(s):   Future Appointments   Date Time Provider Sage Bhagat   10/12/2021  1:00 PM Gilmar Sandhu MD [facility]     Non-[facility] follow up appointment(s): 10/8 Dr. Cosme Chau for follow-up call in 5-7 days based on severity of symptoms and risk factors. Plan for next call: self management-diarrhea, , follow up appointment-PCP/Cards/Urology and medication management-abx  CTN provided contact information for future needs. Goals Addressed                 This Visit's Progress     Attend follow up appointments on schedule          10/07/21   Will attend follow up appt with PCP scheduled 10/8; Cards/urology scheduled 10/12       Prevent complications post hospitalization.           10/07/21   Will complete abx therapy   Will monitor for fever   Will not fall

## 2021-10-07 NOTE — ACP (ADVANCE CARE PLANNING)
Advance Care Planning:    Does patient have an Advance Directive:  currently not on file; education provided . Spouse reports patient has an AMD. Requested copy to be scanned into chart. Spouse verbalized her understanding.

## 2021-10-12 ENCOUNTER — OFFICE VISIT (OUTPATIENT)
Dept: CARDIOLOGY CLINIC | Age: 81
End: 2021-10-12
Payer: MEDICARE

## 2021-10-12 VITALS
HEIGHT: 72 IN | WEIGHT: 186 LBS | TEMPERATURE: 97.8 F | SYSTOLIC BLOOD PRESSURE: 120 MMHG | BODY MASS INDEX: 25.19 KG/M2 | DIASTOLIC BLOOD PRESSURE: 72 MMHG | OXYGEN SATURATION: 99 % | RESPIRATION RATE: 16 BRPM | HEART RATE: 70 BPM

## 2021-10-12 DIAGNOSIS — E78.5 DYSLIPIDEMIA: ICD-10-CM

## 2021-10-12 DIAGNOSIS — I25.2 CORONARY ARTERY DISEASE WITH HISTORY OF MYOCARDIAL INFARCTION WITHOUT HISTORY OF CABG: ICD-10-CM

## 2021-10-12 DIAGNOSIS — I10 HYPERTENSION, BENIGN: ICD-10-CM

## 2021-10-12 DIAGNOSIS — I25.10 CORONARY ARTERY DISEASE WITH HISTORY OF MYOCARDIAL INFARCTION WITHOUT HISTORY OF CABG: ICD-10-CM

## 2021-10-12 DIAGNOSIS — I25.10 CORONARY ARTERY DISEASE INVOLVING NATIVE CORONARY ARTERY OF NATIVE HEART WITHOUT ANGINA PECTORIS: Primary | ICD-10-CM

## 2021-10-12 PROCEDURE — 1111F DSCHRG MED/CURRENT MED MERGE: CPT | Performed by: INTERNAL MEDICINE

## 2021-10-12 PROCEDURE — G0463 HOSPITAL OUTPT CLINIC VISIT: HCPCS | Performed by: INTERNAL MEDICINE

## 2021-10-12 PROCEDURE — G8427 DOCREV CUR MEDS BY ELIG CLIN: HCPCS | Performed by: INTERNAL MEDICINE

## 2021-10-12 PROCEDURE — G9717 DOC PT DX DEP/BP F/U NT REQ: HCPCS | Performed by: INTERNAL MEDICINE

## 2021-10-12 PROCEDURE — 99214 OFFICE O/P EST MOD 30 MIN: CPT | Performed by: INTERNAL MEDICINE

## 2021-10-12 PROCEDURE — 1101F PT FALLS ASSESS-DOCD LE1/YR: CPT | Performed by: INTERNAL MEDICINE

## 2021-10-12 PROCEDURE — G8419 CALC BMI OUT NRM PARAM NOF/U: HCPCS | Performed by: INTERNAL MEDICINE

## 2021-10-12 PROCEDURE — G8536 NO DOC ELDER MAL SCRN: HCPCS | Performed by: INTERNAL MEDICINE

## 2021-10-12 RX ORDER — LISINOPRIL 10 MG/1
10 TABLET ORAL DAILY
Qty: 90 TABLET | Refills: 3 | Status: SHIPPED | OUTPATIENT
Start: 2021-10-12 | End: 2022-10-03

## 2021-10-12 NOTE — PROGRESS NOTES
Richard Anival. is a 80 y.o. male    Chief Complaint   Patient presents with   [facility] Follow Up     Diarrhea, 21 (in chart);        1. Have you been to the ER, urgent care clinic since your last visit? Hospitalized since your last visit? [facility], 2021 running temp, WBC high; C Diff    2. Have you seen or consulted any other health care providers outside of the [address] [place] [address] since your last visit? Include any pap smears or colon screening. Dr Shanique Braxton, Urology Enlarged Prostate    Visit Vitals  /72 (BP 1 Location: Left upper arm, BP Patient Position: Sitting)   Pulse 70   Temp 97.8 °F (36.6 °C) (Temporal)   Resp 16   Ht 6' (1.829 m)   Wt 186 lb (84.4 kg)   SpO2 99%   BMI 25.23 kg/m²       Dizziness:  NO    Edema:  NO    SOB:  Slight, usually after shower, after physical exertion.     Covid vaccines:  None

## 2021-10-12 NOTE — PROGRESS NOTES
Office Follow-up    NAME: Cathryn Burnett Sr.   :  1940  MRM:  [id number]    Date:  10/12/2021            Assessment:     Problem List  Date Reviewed: 10/1/2021        Codes Class Noted    Diarrhea ICD-10-CM: R19.7  ICD-9-CM: 787.91  10/1/2021        Sepsis ([facility] ICD-10-CM: A41.9  ICD-9-CM: 038.9, 995.91  10/1/2021        UTI (urinary tract infection) due to urinary indwelling catheter ([facility] ICD-10-CM: T83.511A, N39.0  ICD-9-CM: 996.64, 599.0  10/1/2021        Atelectasis, left ICD-10-CM: J98.11  ICD-9-CM: 518.0  10/1/2021        Epigastric abdominal pain ICD-10-CM: R10.13  ICD-9-CM: 789.06  2020        Epigastric pain ICD-10-CM: R10.13  ICD-9-CM: 789.06  2020        Depression ICD-10-CM: F32. A  ICD-9-CM: 421  2020        Fall ICD-10-CM: Shabnam Raaf. Raphael Vazquez  ICD-9-CM: V495.7  2019        Traumatic rhabdomyolysis ([facility] ICD-10-CM: T79. 6XXA  ICD-9-CM: 958.6  2019        Dyslipidemia ICD-10-CM: E78.5  ICD-9-CM: 272.4  10/3/2012        CAD (coronary artery disease), native coronary artery ICD-10-CM: I25.10  ICD-9-CM: 414.01  Unknown    Overview Signed 3/22/2011  3:04 PM by Jorje Benavides MD     Inferior MI 2009  - PCI RCA with PTCA, significant LAD disease  - suspected reocclusion several hrs later with VF, IABP placed  3v CABG 2009 Allison Weinstein @ [facility])  - LIMA-LAD, VG-OM, VG-PDA             Type 2 diabetes mellitus without complication ([facility] GU-43-HI: E11.9  ICD-9-CM: 250.00  Unknown        Hypertension, benign ICD-10-CM: I10  ICD-9-CM: 401.1  Unknown                 Plan:     1. Chest pain: Known history of CAD/CABG in . He had a stress test in 2020 which was negative for ischemia however had inferior infarct with an LVEF of 40 to 45%. Continue medical management of CAD. Unable to tolerate statins. 2.  Hypertension: Blood pressure is controlled. Continue metoprolol. 3. JIMENEZ: Likely due to ICM. LVEF 40% on Echo last year. Will repeat Echo.  Will strat Lisinopril 10mg po daily.     4. See Zofia Jimenez in 1 month with same day Echo. ATTENTION:   This medical record was transcribed using an electronic medical records/speech recognition system. Although proofread, it may and can contain electronic, spelling and other errors. Corrections may be executed at a later time. Please feel free to contact us for any clarifications as needed. Subjective:     Timothy Rose., a 80y.o. year-old who presents for followup. He has past medical history significant for CAD, CABG in 2009, type 2 diabetes, hypertension. He has been lost to follow-up and had last seen Dr. Luis Hernandez in 2019. In 2020 he was admitted with abdominal pain and was seen by Dr. Esther Anaya in the hospital.  He was recently admitted to the hospital on September 30, 2021 and was discharged, 6/20/2021. He was admitted for urosepsis. EKG at that time demonstrated normal sinus rhythm with first-degree AV block. Left axis deviation. He also had C-diff infection. From last 2 weeks he has been having some off-and-on right-sided anterior chest wall vague sensation. He feels apprehensive during those periods. He is also having symptoms of shortness of breath with minimal exertion including just taking shower make him short of breath from past 1 year.           Exam:     Physical Exam:  Visit Vitals  /72 (BP 1 Location: Left upper arm, BP Patient Position: Sitting)   Pulse 70   Temp 97.8 °F (36.6 °C) (Temporal)   Resp 16   Ht 6' (1.829 m)   Wt 186 lb (84.4 kg)   SpO2 99%   BMI 25.23 kg/m²     General appearance - alert, well appearing, and in no distress  Mental status - affect appropriate to mood  Eyes - sclera anicteric, moist mucous membranes  Neck - supple, no significant adenopathy  Chest - clear to auscultation, no wheezes, rales or rhonchi  Heart - normal rate, regular rhythm, normal S1, S2, no murmurs, rubs, clicks or gallops  Abdomen - soft, nontender, nondistended, no masses or organomegaly  Extremities - peripheral pulses normal, no pedal edema  Skin - normal coloration  no rashes    Medications:     Current Outpatient Medications   Medication Sig    insulin NPH/insulin regular (NOVOLIN 70/30, HUMULIN 70/30) 100 unit/mL (70-30) injection 35 Units by SubCUTAneous route Before breakfast and dinner.  famotidine (PEPCID) 20 mg tablet Take 1 Tablet by mouth daily.  lactobacillus-acidophilus (LACTINEX) 100 million cell grpk Take 1 Packet by mouth two (2) times a day.  vancomycin (FIRVANQ) 50 mg/mL oral solution Take 2.5 mL by mouth every six (6) hours for 10 days.  dutasteride (AVODART) 0.5 mg capsule Take 0.5 mg by mouth daily.  cholecalciferol (Vitamin D3) (1000 Units /25 mcg) tablet Take 1,000 Units by mouth daily.  multivitamin (ONE A DAY) tablet Take 1 Tablet by mouth daily.  metoprolol succinate (TOPROL-XL) 25 mg XL tablet TAKE 1 TABLET BY MOUTH EVERY DAY    tamsulosin (FLOMAX) 0.4 mg capsule Take 0.4 mg by mouth two (2) times a day.  aspirin delayed-release 81 mg tablet Take 81 mg by mouth daily. No current facility-administered medications for this visit. Diagnostic Data Review:       Echo 2d adult 12/2009  EF 50%, basal inferior akinesis, mild MR  Echo 9/21/11 - LVEF 50%, inferior AK, unchanged from Dec 2009    Stress echo March 2012 - 3 min, resting EF 50%, inferior AK, no ischemia    Echo 10/29/13 - EF 55 % to 60 %. There was akinesis of the basal-mid inferior wall(s). There was akinesis of the basal inferolateral wall(s). Paradoxical ventricular septal motion, no PFO, no significant Change from previous study. Echo 8/23/17 - EF 55-60%. Inferior AK. Mild LVH. Mild MAC with no MR. AoV sclerosis without stenosis. No change compared to study 2013. Lexiscan Cardiolite 2/2020 - Abnormal Lexiscan gated SPECT myocardial perfusion study demonstrating RCA territory infarction.  No stress induced ischemia  LVEF 50%    [facility] Lexiscan Cardiolite 9/10/20 - evidence of nontransmural myocardial infarction of the inferior wall with mild residual ischemia. LVEF 44% with reduced wall motion and thickening of the inferior wall. [facility] Echo 9/9/20 - EF 40-45%. Mild LVH. HK of basal mid inferior and basal mid inferolateral myocardium. Lab Review:     Lab Results   Component Value Date/Time    Cholesterol, total 174 02/11/2020 12:16 AM    HDL Cholesterol 40 02/11/2020 12:16 AM    LDL, calculated 110.2 (H) 02/11/2020 12:16 AM    Triglyceride 119 02/11/2020 12:16 AM    CHOL/HDL Ratio 4.4 02/11/2020 12:16 AM     Lab Results   Component Value Date/Time    Creatinine (POC) 1.0 07/25/2009 11:34 AM    Creatinine 0.72 10/06/2021 03:46 AM     Lab Results   Component Value Date/Time    BUN 6 10/06/2021 03:46 AM    BUN (POC) 18 07/25/2009 11:34 AM     Lab Results   Component Value Date/Time    Potassium 3.8 10/06/2021 03:46 AM     Lab Results   Component Value Date/Time    Hemoglobin A1c 6.4 (H) 09/30/2021 10:35 PM    Hemoglobin A1c, External 6.9 10/28/2015 12:00 AM     Lab Results   Component Value Date/Time    Hemoglobin (POC) 10.9 (L) 07/25/2009 11:34 AM    HGB 12.5 10/06/2021 03:46 AM     Lab Results   Component Value Date/Time    PLATELET 831 33/46/5783 03:46 AM     No results for input(s): CPK, CKMB, TROIQ in the last 72 hours. No lab exists for component: CKQMB, CPKMB             ___________________________________________________    Liss Presume.  Emile Magana MD, [facility]

## 2021-10-12 NOTE — PROGRESS NOTES
All orders entered per verbal orders of Dr. Sandi Stewart    Start Lisinopril 10mg po daily. See Aleena House in 1 month with same day Echo for CAD, CABG. Refill per VO of Dr. Sandi Stewart  Last appt: 9/23/2020  Future Appointments   Date Time Provider Sage Leola   11/19/2021  1:00 PM JEAN MCCARTHY AMB   11/19/2021  2:00 PM [facility]       Requested Prescriptions     Pending Prescriptions Disp Refills    lisinopriL (PRINIVIL, ZESTRIL) 10 mg tablet 90 Tablet 3     Sig: Take 1 Tablet by mouth daily.

## 2021-10-13 ENCOUNTER — TELEPHONE (OUTPATIENT)
Dept: CARDIOLOGY CLINIC | Age: 81
End: 2021-10-13

## 2021-10-13 NOTE — TELEPHONE ENCOUNTER
Holli from [facility] called to see if the requested for a cardiac clearance was received on 10/5/21. Stated she will re-fax the request today.       Phone: [phone]

## 2021-10-14 NOTE — TELEPHONE ENCOUNTER
Patient's spouse called inquiring whether patient should manasa both the metoprolol succinate (TOPROL-XL) 25 mg XL tablet and lisinopriL (PRINIVIL, ZESTRIL) 10 mg tablet      Please advise          BIYSS:[phone]

## 2021-10-14 NOTE — TELEPHONE ENCOUNTER
Unable to reach pt.  Message left \"advising that it is fine to be taking both toprol an lisinopril at the same time, they work differently to control B/P \"

## 2021-10-18 ENCOUNTER — PATIENT OUTREACH (OUTPATIENT)
Dept: CASE MANAGEMENT | Age: 81
End: 2021-10-18

## 2021-10-18 NOTE — PROGRESS NOTES
Care Transitions Follow Up Call    Challenges to be reviewed by the provider   Additional needs identified to be addressed with provider:     Pederson catheter reinserted due to urinary retention         Method of communication with provider : none    Care Transition Nurse (CTN) contacted the patient by telephone to follow up after admission on 10/7/2021. Reports pederson catheter was re-inserted 2/2 urinary retention, drained 2L of urine. Reports spouse and son manage catheter care. Addressed changes since last contact: pederson catheter reinserted  Follow up appointment completed? yes. Was follow up appointment scheduled within 7 days of discharge? yes. CTN reviewed medical action plan and red flags with patient and discussed any barriers to care and/or understanding of plan of care after discharge. Discussed appropriate site of care based on symptoms and resources available to patient including: PCP and Specialist. The patient agrees to contact the PCP office for questions related to their healthcare. Patients top risk factors for readmission: medical condition-urinary retention   Interventions to address risk factors: Scheduled appointment with Specialist-will follow up with urologist    [facility] follow up appointment(s):   Future Appointments   Date Time Provider Sage Bhagat   11/19/2021  1:00 PM JEAN MCCARTHY [facility] AMB   11/19/2021  2:00 PM Alissa Torres NP [facility] AMB     Non-[facility] follow up appointment(s): NED    CTN provided contact information for future needs. Plan for follow-up call in 7-10 days based on severity of symptoms and risk factors. Plan for next call: self management-urinary retention     Goals Addressed                 This Visit's Progress     Attend follow up appointments on schedule          10/07/21   Will attend follow up appt with PCP scheduled 10/8; Cards/urology scheduled 10/12    10/18/21   attended       Prevent complications post hospitalization. 10/07/21   Will complete abx therapy   Will monitor for fever   Will not fall    10/18/21   No falls   Will monitor pederson catheter for s/sx of infection and hematuria   Pt will remain out of the hospital or ER for remainder of OLIMPIA period.

## 2021-10-28 ENCOUNTER — PATIENT OUTREACH (OUTPATIENT)
Dept: CASE MANAGEMENT | Age: 81
End: 2021-10-28

## 2021-10-28 NOTE — PROGRESS NOTES
Care Transitions Follow Up Call    Care Transition Nurse (CTN) contacted the patient by telephone to follow up after admission on 9/30/2021. Addressed changes since last contact: pederson catheter remains intact    CTN reviewed  medical action plan with patient and discussed any barriers to care and/or understanding of plan of care after discharge. Discussed appropriate site of care based on symptoms and resources available to patient including: Specialist.   Patients top risk factors for readmission: NA   Interventions to address risk factors: Scheduled appointment with Specialist-urology in a couple of weeks    [facility] follow up appointment(s):   Future Appointments   Date Time Provider Sage Bhagat   11/19/2021  1:00 PM JEAN MCCARTHYSKATE BS AMB   11/19/2021  2:00 PM Gutierrez Mitchell NP [facility] AMB     Non-[facility] follow up appointment(s): na    CTN provided contact information for future needs. No further follow-up call indicated based on severity of symptoms and risk factors. Plan for next call: na     Goals Addressed                 This Visit's Progress     COMPLETED: Attend follow up appointments on schedule          10/07/21   Will attend follow up appt with PCP scheduled 10/8; Cards/urology scheduled 10/12    10/18/21   attended       COMPLETED: Prevent complications post hospitalization. 10/07/21   Will complete abx therapy   Will monitor for fever   Will not fall    10/18/21   No falls   Will monitor pederson catheter for s/sx of infection and hematuria   Pt will remain out of the hospital or ER for remainder of OLIMPIA period.

## 2021-11-08 ENCOUNTER — PATIENT OUTREACH (OUTPATIENT)
Dept: CASE MANAGEMENT | Age: 81
End: 2021-11-08

## 2021-11-08 NOTE — PROGRESS NOTES
Patient has graduated from the Transitions of Care Coordination  program on 11/8/2021. Patient/family has the ability to self-manage at this time Care management goals have been completed. Patient was not referred to the [facility] team for further management. Patient has Care Transition Nurse's contact information for any further questions, concerns, or needs.   Patients upcoming visits:    Future Appointments   Date Time Provider Sage Bhagat   11/19/2021  1:00 PM JEAN MCCARTHY   11/19/2021  2:00 PM LINNEA Urbina AMB

## 2021-11-16 ENCOUNTER — HOSPITAL ENCOUNTER (EMERGENCY)
Age: 81
Discharge: HOME | End: 2021-11-16
Attending: EMERGENCY MEDICINE
Payer: MEDICARE

## 2021-11-16 VITALS
SYSTOLIC BLOOD PRESSURE: 126 MMHG | OXYGEN SATURATION: 99 % | HEIGHT: 72 IN | RESPIRATION RATE: 16 BRPM | WEIGHT: 192 LBS | DIASTOLIC BLOOD PRESSURE: 59 MMHG | TEMPERATURE: 98.9 F | HEART RATE: 86 BPM | BODY MASS INDEX: 26.01 KG/M2

## 2021-11-16 DIAGNOSIS — T83.9XXA PROBLEM WITH FOLEY CATHETER, INITIAL ENCOUNTER (HCC): Primary | ICD-10-CM

## 2021-11-16 PROCEDURE — 99284 EMERGENCY DEPT VISIT MOD MDM: CPT

## 2021-11-16 NOTE — DISCHARGE INSTRUCTIONS
Thank you for allowing us to provide you with medical care today. We realize that you have many choices for your emergency care needs. We thank you for choosing [facility]. Please choose us in the future for any continued health care needs. We hope we addressed all of your medical concerns. We strive to provide excellent quality care in the Emergency Department. Anything less than excellent does not meet our expectations. The exam and treatment you received in the Emergency Department were for an emergent problem and are not intended as complete care. It is important that you follow up with a doctor, nurse practitioner, or physician's assistant for ongoing care. If your symptoms worsen or you do not improve as expected and you are unable to reach your usual health care provider, you should return to the Emergency Department. We are available 24 hours a day. Take this sheet with you when you go to your follow-up visit. If you have any problem arranging the follow-up visit, contact the Emergency Department immediately. Make an appointment your family doctor for follow up of this visit. Return to the ER if you are unable to be seen in a timely manner.

## 2021-11-16 NOTE — ED TRIAGE NOTES
Patient arrived to the ED with his wife, via car. Was sitting on a rolling walker and used that to push him to room 16; he is unsteady on his feet. He and his wife have definite tension between them. Dr Ji at bedside and used the US machine to check catheter placement. We checked the balloon placement; re-inflated and re-taped to the inner leg. RN aware and is trying to find a new leg catheter securing device. Urine drains well from the catheter.

## 2021-11-16 NOTE — ED PROVIDER NOTES
22-year-old male history of coronary disease, melanoma, depression, hypertension, chronic indwelling Hernández since July of this year due to BPH presents to the emergency department concerned about catheter problem. Tells me that his pain in his penis. The Hernández was replaced this morning in urology clinic. No hematuria. The history is provided by the patient and the spouse. Penis Injury  This is a new problem. Episode onset: This morning. The problem occurs constantly. The problem has not changed since onset. Primary symptoms include penile pain. Pertinent negatives include no dysuria and no penile discharge. Context: Hernández in place. Pertinent negatives include no nausea, no vomiting, no abdominal pain and no diarrhea. He has tried nothing for the symptoms. Sexual activity: non-contributory.          Past Medical History:   Diagnosis Date    CAD (coronary artery disease), native coronary artery     Cancer ([facility] [address]     melanoma removed on forehead    Depression     Diabetic neuropathy ([facility] [address]     ED (erectile dysfunction)     Enlarged prostate 07/19/2021    Hypertension, benign     Ill-defined condition     states he has memory problems    Postsurgical aortocoronary bypass status     Type II or unspecified type diabetes mellitus without mention of complication, not stated as uncontrolled        Past Surgical History:   Procedure Laterality Date    COLONOSCOPY N/A 5/3/2016    COLONOSCOPY performed by Juan Antonio Tobin MD at [facility] ECHO 2D ADULT  12/2009    EF 50%, basal inferior akinesis, mild MR    ECHO 2D ADULT  9/21/11    LVEF 50%, inferior AK, unchanged from Dec 2009    HX CORONARY ARTERY BYPASS GRAFT  2009    HX OTHER SURGICAL  2008    melanoma removed from forehead         Family History:   Problem Relation Age of Onset    Breast Cancer Mother         w bone mets    Parkinson's Disease Maternal Aunt     Parkinson's Disease Maternal Uncle     Parkinson's Disease Maternal Grandfather Social History     Socioeconomic History    Marital status:      Spouse name: Not on file    Number of children: Not on file    Years of education: Not on file    Highest education level: Not on file   Occupational History    Not on file   Tobacco Use    Smoking status: Never Smoker    Smokeless tobacco: Never Used   Substance and Sexual Activity    Alcohol use: No    Drug use: No    Sexual activity: Not on file   Other Topics Concern    Not on file   Social History Narrative    Not on file     Social Determinants of Health     Financial Resource Strain:     Difficulty of Paying Living Expenses: Not on file   Food Insecurity:     Worried About Running Out of Food in the Last Year: Not on file    Lalo of Food in the Last Year: Not on file   Transportation Needs:     Lack of Transportation (Medical): Not on file    Lack of Transportation (Non-Medical): Not on file   Physical Activity:     Days of Exercise per Week: Not on file    Minutes of Exercise per Session: Not on file   Stress:     Feeling of Stress : Not on file   Social Connections:     Frequency of Communication with Friends and Family: Not on file    Frequency of Social Gatherings with Friends and Family: Not on file    Attends Rastafari Services: Not on file    Active Member of [address] [place] [address] [employer] or Organizations: Not on file    Attends Club or Organization Meetings: Not on file    Marital Status: Not on file   Intimate Partner Violence:     Fear of Current or Ex-Partner: Not on file    Emotionally Abused: Not on file    Physically Abused: Not on file    Sexually Abused: Not on file   Housing Stability:     Unable to Pay for Housing in the Last Year: Not on file    Number of [place] in the Last Year: Not on file    Unstable Housing in the Last Year: Not on file         ALLERGIES: Amoxicillin, Pravastatin, and Sulfa (sulfonamide antibiotics)    Review of Systems   Constitutional: Negative for fatigue and fever. HENT: Negative for sneezing and sore throat. Respiratory: Negative for cough and shortness of breath. Cardiovascular: Negative for chest pain and leg swelling. Gastrointestinal: Negative for abdominal pain, diarrhea, nausea and vomiting. Genitourinary: Positive for penile pain. Negative for difficulty urinating, dysuria and penile discharge. Musculoskeletal: Negative for arthralgias and myalgias. Skin: Negative for color change and rash. Neurological: Negative for weakness and headaches. Psychiatric/Behavioral: Negative for agitation and behavioral problems. There were no vitals filed for this visit. Physical Exam  Vitals and nursing note reviewed. Exam conducted with a chaperone present. Constitutional:       General: He is not in acute distress. Appearance: Normal appearance. He is well-developed. He is not ill-appearing, toxic-appearing or diaphoretic. HENT:      Head: Normocephalic and atraumatic. Nose: Nose normal.      Mouth/Throat:      Mouth: Mucous membranes are moist.      Pharynx: Oropharynx is clear. Eyes:      Extraocular Movements: Extraocular movements intact. Conjunctiva/sclera: Conjunctivae normal.      Pupils: Pupils are equal, round, and reactive to light. Cardiovascular:      Rate and Rhythm: Normal rate and regular rhythm. Pulses: Normal pulses. Heart sounds: No murmur heard. Pulmonary:      Effort: Pulmonary effort is normal. No respiratory distress. Breath sounds: Normal breath sounds. No wheezing. Chest:      Chest wall: No mass or tenderness. Abdominal:      General: There is no distension. Palpations: Abdomen is soft. Tenderness: There is no abdominal tenderness. There is no guarding or rebound. Genitourinary:     Comments: Hernández in place, bedside ultrasound performed. Unable to ascertain the location of the balloon due to air artifact.   Musculoskeletal:         General: No swelling, tenderness, deformity or signs of injury. Normal range of motion. Cervical back: Normal range of motion and neck supple. No rigidity. No muscular tenderness. Right lower leg: No tenderness. No edema. Left lower leg: No tenderness. No edema. Skin:     General: Skin is warm and dry. Capillary Refill: Capillary refill takes less than 2 seconds. Neurological:      General: No focal deficit present. Mental Status: He is alert and oriented to person, place, and time. Psychiatric:         Mood and Affect: Mood normal.         Behavior: Behavior normal.          MDM  Number of Diagnoses or Management Options  Diagnosis management comments: 68-year-old male presents as above with pain associated with his Hernández catheter. The balloon was deflated and the Hernández advanced and the balloon reinflated with resolution of his discomfort. Plan to discharge with instructions to follow-up with urology as needed, return if needed.          Procedures

## 2021-11-17 NOTE — ED NOTES
Patient discharged home. Indwelling pederson catheter that was present on arrival remains in place. It is draining clear yellow urine at discharge and patient denies pain. Catheter tubing is secured to leg with foam tape. Discharge instructions given. Patient and spouse verbalized understanding. Patient ambulatory at discharge with use of rollator walker.

## 2021-11-19 ENCOUNTER — ANCILLARY PROCEDURE (OUTPATIENT)
Dept: CARDIOLOGY CLINIC | Age: 81
End: 2021-11-19
Payer: MEDICARE

## 2021-11-19 ENCOUNTER — OFFICE VISIT (OUTPATIENT)
Dept: CARDIOLOGY CLINIC | Age: 81
End: 2021-11-19
Payer: MEDICARE

## 2021-11-19 VITALS
WEIGHT: 186 LBS | DIASTOLIC BLOOD PRESSURE: 70 MMHG | SYSTOLIC BLOOD PRESSURE: 138 MMHG | BODY MASS INDEX: 25.19 KG/M2 | HEIGHT: 72 IN

## 2021-11-19 VITALS
HEIGHT: 72 IN | BODY MASS INDEX: 25.19 KG/M2 | DIASTOLIC BLOOD PRESSURE: 70 MMHG | WEIGHT: 186 LBS | SYSTOLIC BLOOD PRESSURE: 138 MMHG

## 2021-11-19 DIAGNOSIS — I50.22 CHRONIC SYSTOLIC CONGESTIVE HEART FAILURE (HCC): Primary | ICD-10-CM

## 2021-11-19 DIAGNOSIS — I25.10 CORONARY ARTERY DISEASE WITH HISTORY OF MYOCARDIAL INFARCTION WITHOUT HISTORY OF CABG: ICD-10-CM

## 2021-11-19 DIAGNOSIS — I10 HYPERTENSION, BENIGN: ICD-10-CM

## 2021-11-19 DIAGNOSIS — I25.2 CORONARY ARTERY DISEASE WITH HISTORY OF MYOCARDIAL INFARCTION WITHOUT HISTORY OF CABG: ICD-10-CM

## 2021-11-19 DIAGNOSIS — I25.10 CORONARY ARTERY DISEASE INVOLVING NATIVE CORONARY ARTERY OF NATIVE HEART WITHOUT ANGINA PECTORIS: ICD-10-CM

## 2021-11-19 DIAGNOSIS — E11.9 TYPE 2 DIABETES MELLITUS WITHOUT COMPLICATION, UNSPECIFIED WHETHER LONG TERM INSULIN USE (HCC): ICD-10-CM

## 2021-11-19 DIAGNOSIS — E78.5 DYSLIPIDEMIA: ICD-10-CM

## 2021-11-19 PROCEDURE — 1101F PT FALLS ASSESS-DOCD LE1/YR: CPT | Performed by: NURSE PRACTITIONER

## 2021-11-19 PROCEDURE — G0463 HOSPITAL OUTPT CLINIC VISIT: HCPCS | Performed by: NURSE PRACTITIONER

## 2021-11-19 PROCEDURE — 99214 OFFICE O/P EST MOD 30 MIN: CPT | Performed by: NURSE PRACTITIONER

## 2021-11-19 PROCEDURE — G8427 DOCREV CUR MEDS BY ELIG CLIN: HCPCS | Performed by: NURSE PRACTITIONER

## 2021-11-19 PROCEDURE — G8536 NO DOC ELDER MAL SCRN: HCPCS | Performed by: NURSE PRACTITIONER

## 2021-11-19 PROCEDURE — G9717 DOC PT DX DEP/BP F/U NT REQ: HCPCS | Performed by: NURSE PRACTITIONER

## 2021-11-19 PROCEDURE — G8419 CALC BMI OUT NRM PARAM NOF/U: HCPCS | Performed by: NURSE PRACTITIONER

## 2021-11-19 PROCEDURE — 93306 TTE W/DOPPLER COMPLETE: CPT | Performed by: INTERNAL MEDICINE

## 2021-11-19 NOTE — PROGRESS NOTES
Chief Complaint   Patient presents with    Follow-up     1 month Marina; Radha w [place] Coronary Artery Disease    Hypertension    Cholesterol Problem     Visit Vitals  /70   Ht 6' (1.829 m)   Wt 186 lb (84.4 kg)   BMI 25.23 kg/m²     Chest pain denied   SOB denied   Palpitations denied   Swelling in hands/feet denied   Dizziness denied   Recent hospital stays denied   Refills denied

## 2021-11-21 LAB
ECHO AO ASC DIAM: 3.41 CM
ECHO AO ROOT DIAM: 3.42 CM
ECHO AV AREA PEAK VELOCITY: 2.83 CM2
ECHO AV AREA VTI: 2.83 CM2
ECHO AV AREA/BSA PEAK VELOCITY: 1.4 CM2/M2
ECHO AV AREA/BSA VTI: 1.4 CM2/M2
ECHO AV MEAN GRADIENT: 3.5 MMHG
ECHO AV PEAK GRADIENT: 6.68 MMHG
ECHO AV PEAK VELOCITY: 129.2 CM/S
ECHO AV VTI: 24.7 CM
ECHO LA AREA 4C: 14.58 CM2
ECHO LA MAJOR AXIS: 5.26 CM
ECHO LA MINOR AXIS: 2.54 CM
ECHO LA VOL 2C: 58.92 ML (ref 18–58)
ECHO LA VOL 4C: 33.91 ML (ref 18–58)
ECHO LA VOL BP: 49.99 ML (ref 18–58)
ECHO LA VOL/BSA BIPLANE: 24.15 ML/M2 (ref 16–28)
ECHO LA VOLUME INDEX A2C: 28.46 ML/M2 (ref 16–28)
ECHO LA VOLUME INDEX A4C: 16.38 ML/M2 (ref 16–28)
ECHO LV E' LATERAL VELOCITY: 12.5 CM/S
ECHO LV E' SEPTAL VELOCITY: 6.51 CM/S
ECHO LV EDV A2C: 112.21 ML
ECHO LV EDV A4C: 94.06 ML
ECHO LV EDV BP: 102.6 ML (ref 67–155)
ECHO LV EDV INDEX A4C: 45.4 ML/M2
ECHO LV EDV INDEX BP: 49.6 ML/M2
ECHO LV EDV NDEX A2C: 54.2 ML/M2
ECHO LV EJECTION FRACTION A2C: 55 PERCENT
ECHO LV EJECTION FRACTION A4C: 40 PERCENT
ECHO LV EJECTION FRACTION BIPLANE: 43.5 PERCENT (ref 55–100)
ECHO LV ESV A2C: 50.98 ML
ECHO LV ESV A4C: 56.86 ML
ECHO LV ESV BP: 57.96 ML (ref 22–58)
ECHO LV ESV INDEX A2C: 24.6 ML/M2
ECHO LV ESV INDEX A4C: 27.5 ML/M2
ECHO LV ESV INDEX BP: 28 ML/M2
ECHO LV INTERNAL DIMENSION DIASTOLIC: 4.31 CM (ref 4.2–5.9)
ECHO LV INTERNAL DIMENSION SYSTOLIC: 3.39 CM
ECHO LV IVSD: 1.24 CM (ref 0.6–1)
ECHO LV MASS 2D: 179.8 G (ref 88–224)
ECHO LV MASS INDEX 2D: 86.8 G/M2 (ref 49–115)
ECHO LV POSTERIOR WALL DIASTOLIC: 1.11 CM (ref 0.6–1)
ECHO LVOT DIAM: 2.19 CM
ECHO LVOT PEAK GRADIENT: 3.78 MMHG
ECHO LVOT PEAK VELOCITY: 97.27 CM/S
ECHO LVOT SV: 69.8 ML
ECHO LVOT VTI: 18.54 CM
ECHO MV A VELOCITY: 87.73 CM/S
ECHO MV AREA PHT: 4.13 CM2
ECHO MV E DECELERATION TIME (DT): 183.55 MS
ECHO MV E VELOCITY: 57.85 CM/S
ECHO MV E/A RATIO: 0.66
ECHO MV E/E' LATERAL: 4.63
ECHO MV E/E' RATIO (AVERAGED): 6.76
ECHO MV E/E' SEPTAL: 8.89
ECHO MV PRESSURE HALF TIME (PHT): 53.23 MS
ECHO RA AREA 4C: 15.03 CM2
ECHO RV INTERNAL DIMENSION: 4.04 CM
ECHO RV TAPSE: 1.53 CM (ref 1.5–2)
LA VOL DISK BP: 48.01 ML (ref 18–58)

## 2021-11-21 PROCEDURE — 93306 TTE W/DOPPLER COMPLETE: CPT | Performed by: INTERNAL MEDICINE

## 2021-12-02 ENCOUNTER — HOSPITAL ENCOUNTER (EMERGENCY)
Age: 81
Discharge: HOME | End: 2021-12-02
Attending: STUDENT IN AN ORGANIZED HEALTH CARE EDUCATION/TRAINING PROGRAM
Payer: MEDICARE

## 2021-12-02 VITALS
DIASTOLIC BLOOD PRESSURE: 76 MMHG | BODY MASS INDEX: 25.19 KG/M2 | RESPIRATION RATE: 14 BRPM | SYSTOLIC BLOOD PRESSURE: 145 MMHG | HEART RATE: 89 BPM | OXYGEN SATURATION: 99 % | TEMPERATURE: 98.5 F | WEIGHT: 186 LBS | HEIGHT: 72 IN

## 2021-12-02 DIAGNOSIS — T83.9XXA FOLEY CATHETER PROBLEM, INITIAL ENCOUNTER (HCC): Primary | ICD-10-CM

## 2021-12-02 PROCEDURE — 99282 EMERGENCY DEPT VISIT SF MDM: CPT

## 2021-12-03 NOTE — ED PROVIDER NOTES
Patient is a 81-year-old male who presents with an issue with his Hernández catheter, he took shower today, wife noted that it was tangled. He has no complaints currently.       Other         Past Medical History:   Diagnosis Date    CAD (coronary artery disease), native coronary artery     Cancer ([facility] [address]     melanoma removed on forehead    Depression     Diabetic neuropathy ([facility] [address]     ED (erectile dysfunction)     Enlarged prostate 07/19/2021    Hypertension, benign     Ill-defined condition     states he has memory problems    Postsurgical aortocoronary bypass status     Type II or unspecified type diabetes mellitus without mention of complication, not stated as uncontrolled        Past Surgical History:   Procedure Laterality Date    COLONOSCOPY N/A 5/3/2016    COLONOSCOPY performed by Lisa Bettencourt MD at [address] ECHO 2D ADULT  12/2009    EF 50%, basal inferior akinesis, mild MR    ECHO 2D ADULT  9/21/11    LVEF 50%, inferior AK, unchanged from Dec 2009    HX CORONARY ARTERY BYPASS GRAFT  2009    HX OTHER SURGICAL  2008    melanoma removed from forehead         Family History:   Problem Relation Age of Onset    Breast Cancer Mother         w bone mets    Parkinson's Disease Maternal Aunt     Parkinson's Disease Maternal Uncle     Parkinson's Disease Maternal Grandfather        Social History     Socioeconomic History    Marital status:      Spouse name: Not on file    Number of children: Not on file    Years of education: Not on file    Highest education level: Not on file   Occupational History    Not on file   Tobacco Use    Smoking status: Never Smoker    Smokeless tobacco: Never Used   Substance and Sexual Activity    Alcohol use: No    Drug use: No    Sexual activity: Not on file   Other Topics Concern    Not on file   Social History Narrative    Not on file     Social Determinants of Health     Financial Resource Strain:     Difficulty of Paying Living Expenses: Not on file   Food Insecurity:     Worried About Running Out of Food in the Last Year: Not on file    Lalo of Food in the Last Year: Not on file   Transportation Needs:     Lack of Transportation (Medical): Not on file    Lack of Transportation (Non-Medical): Not on file   Physical Activity:     Days of Exercise per Week: Not on file    Minutes of Exercise per Session: Not on file   Stress:     Feeling of Stress : Not on file   Social Connections:     Frequency of Communication with Friends and Family: Not on file    Frequency of Social Gatherings with Friends and Family: Not on file    Attends [employer] Services: Not on file    Active Member of [address] [place] [address] [employer] or Organizations: Not on file    Attends Club or Organization Meetings: Not on file    Marital Status: Not on file   Intimate Partner Violence:     Fear of Current or Ex-Partner: Not on file    Emotionally Abused: Not on file    Physically Abused: Not on file    Sexually Abused: Not on file   Housing Stability:     Unable to Pay for Housing in the Last Year: Not on file    Number of [place] in the Last Year: Not on file    Unstable Housing in the Last Year: Not on file         ALLERGIES: Amoxicillin, Pravastatin, and Sulfa (sulfonamide antibiotics)    Review of Systems   Genitourinary:        See HPI. Hernández catheter problem. Vitals:    12/02/21 1912   BP: (!) 145/76   Pulse: 89   Resp: 14   Temp: 98.5 °F (36.9 °C)   SpO2: 99%   Weight: 84.4 kg (186 lb)   Height: 6' (1.829 m)            Physical Exam  Vitals and nursing note reviewed. Constitutional:       General: He is not in acute distress. Appearance: He is well-developed. HENT:      Head: Normocephalic and atraumatic. Eyes:      Conjunctiva/sclera: Conjunctivae normal.   Cardiovascular:      Rate and Rhythm: Normal rate and regular rhythm. Pulmonary:      Effort: Pulmonary effort is normal. No respiratory distress.    Abdominal:      General: There is no distension. Palpations: Abdomen is soft. Tenderness: There is no abdominal tenderness. There is no guarding. Genitourinary:     Comments: Chronic indwelling Hernández catheter, draining yellow clear urine, RN readjusted the secure device. Musculoskeletal:      Cervical back: Normal range of motion and neck supple. Skin:     General: Skin is warm and dry. Neurological:      Mental Status: He is alert. Mental status is at baseline. Motor: No abnormal muscle tone.           MDM       Procedures

## 2021-12-09 ENCOUNTER — TELEPHONE (OUTPATIENT)
Dept: CARDIOLOGY CLINIC | Age: 81
End: 2021-12-09

## 2021-12-09 NOTE — TELEPHONE ENCOUNTER
All orders entered per verbal orders of Libby Heart, NP  Stable renal function and potassium. Please inquire about current BP and HR trend and we can adjust meds, as needed. Unable to reach pt.  Message left \"for a call back\"

## 2021-12-09 NOTE — TELEPHONE ENCOUNTER
Spoke with The pt, identified the pt with name and . Pt returned my call, I informed him of his lab results and inquired after his B/P and HR. Pt reported his B/P's have been running 110's/ 55-65, with HR in the lower 80's. . I told him I would let Ed Gee know he was doing good and told him to call if he needs anything. The pt expressed understanding and agreement. Pt has no further questions or concerns at this time.

## 2021-12-11 ENCOUNTER — HOSPITAL ENCOUNTER (EMERGENCY)
Age: 81
Discharge: HOME | End: 2021-12-11
Attending: EMERGENCY MEDICINE
Payer: MEDICARE

## 2021-12-11 VITALS
HEART RATE: 87 BPM | SYSTOLIC BLOOD PRESSURE: 115 MMHG | HEIGHT: 72 IN | BODY MASS INDEX: 24.38 KG/M2 | WEIGHT: 180 LBS | RESPIRATION RATE: 20 BRPM | OXYGEN SATURATION: 98 % | TEMPERATURE: 98.5 F | DIASTOLIC BLOOD PRESSURE: 65 MMHG

## 2021-12-11 DIAGNOSIS — T83.511A URINARY TRACT INFECTION ASSOCIATED WITH INDWELLING URETHRAL CATHETER, INITIAL ENCOUNTER (HCC): Primary | ICD-10-CM

## 2021-12-11 DIAGNOSIS — N39.0 URINARY TRACT INFECTION ASSOCIATED WITH INDWELLING URETHRAL CATHETER, INITIAL ENCOUNTER (HCC): Primary | ICD-10-CM

## 2021-12-11 DIAGNOSIS — L89.151 PRESSURE INJURY OF SACRAL REGION, STAGE 1: ICD-10-CM

## 2021-12-11 LAB
ANION GAP SERPL CALC-SCNC: 7 MMOL/L (ref 5–15)
APPEARANCE UR: ABNORMAL
BACTERIA URNS QL MICRO: NEGATIVE /HPF
BASOPHILS # BLD: 0 K/UL (ref 0–0.1)
BASOPHILS NFR BLD: 1 % (ref 0–1)
BILIRUB UR QL: NEGATIVE
BUN SERPL-MCNC: 15 MG/DL (ref 6–20)
BUN/CREAT SERPL: 13 (ref 12–20)
CALCIUM SERPL-MCNC: 9 MG/DL (ref 8.5–10.1)
CHLORIDE SERPL-SCNC: 106 MMOL/L (ref 97–108)
CO2 SERPL-SCNC: 24 MMOL/L (ref 21–32)
COLOR UR: ABNORMAL
COMMENT, HOLDF: NORMAL
CREAT SERPL-MCNC: 1.12 MG/DL (ref 0.7–1.3)
DIFFERENTIAL METHOD BLD: ABNORMAL
EOSINOPHIL # BLD: 0.1 K/UL (ref 0–0.4)
EOSINOPHIL NFR BLD: 2 % (ref 0–7)
EPITH CASTS URNS QL MICRO: ABNORMAL /LPF
ERYTHROCYTE [DISTWIDTH] IN BLOOD BY AUTOMATED COUNT: 13.4 % (ref 11.5–14.5)
GLUCOSE SERPL-MCNC: 234 MG/DL (ref 65–100)
GLUCOSE UR STRIP.AUTO-MCNC: >1000 MG/DL
HCT VFR BLD AUTO: 42.1 % (ref 36.6–50.3)
HGB BLD-MCNC: 14.1 G/DL (ref 12.1–17)
HGB UR QL STRIP: ABNORMAL
HYALINE CASTS URNS QL MICRO: ABNORMAL /LPF (ref 0–5)
IMM GRANULOCYTES # BLD AUTO: 0 K/UL (ref 0–0.04)
IMM GRANULOCYTES NFR BLD AUTO: 0 % (ref 0–0.5)
KETONES UR QL STRIP.AUTO: NEGATIVE MG/DL
LEUKOCYTE ESTERASE UR QL STRIP.AUTO: ABNORMAL
LYMPHOCYTES # BLD: 1.3 K/UL (ref 0.8–3.5)
LYMPHOCYTES NFR BLD: 21 % (ref 12–49)
MCH RBC QN AUTO: 29.9 PG (ref 26–34)
MCHC RBC AUTO-ENTMCNC: 33.5 G/DL (ref 30–36.5)
MCV RBC AUTO: 89.2 FL (ref 80–99)
MONOCYTES # BLD: 0.5 K/UL (ref 0–1)
MONOCYTES NFR BLD: 9 % (ref 5–13)
NEUTS SEG # BLD: 4.2 K/UL (ref 1.8–8)
NEUTS SEG NFR BLD: 67 % (ref 32–75)
NITRITE UR QL STRIP.AUTO: NEGATIVE
NRBC # BLD: 0 K/UL (ref 0–0.01)
NRBC BLD-RTO: 0 PER 100 WBC
PH UR STRIP: 5.5 [PH] (ref 5–8)
PLATELET # BLD AUTO: 291 K/UL (ref 150–400)
PMV BLD AUTO: 8.7 FL (ref 8.9–12.9)
POTASSIUM SERPL-SCNC: 4.2 MMOL/L (ref 3.5–5.1)
PROT UR STRIP-MCNC: 100 MG/DL
RBC # BLD AUTO: 4.72 M/UL (ref 4.1–5.7)
RBC #/AREA URNS HPF: >100 /HPF (ref 0–5)
SAMPLES BEING HELD,HOLD: NORMAL
SODIUM SERPL-SCNC: 137 MMOL/L (ref 136–145)
SP GR UR REFRACTOMETRY: 1.02 (ref 1–1.03)
UR CULT HOLD, URHOLD: NORMAL
UROBILINOGEN UR QL STRIP.AUTO: 0.2 EU/DL (ref 0.2–1)
WBC # BLD AUTO: 6.2 K/UL (ref 4.1–11.1)
WBC URNS QL MICRO: >100 /HPF (ref 0–4)

## 2021-12-11 PROCEDURE — 77030005513 HC CATH URETH FOL11 MDII -B

## 2021-12-11 PROCEDURE — 85025 COMPLETE CBC W/AUTO DIFF WBC: CPT

## 2021-12-11 PROCEDURE — 99285 EMERGENCY DEPT VISIT HI MDM: CPT

## 2021-12-11 PROCEDURE — 74011250637 HC RX REV CODE- 250/637: Performed by: EMERGENCY MEDICINE

## 2021-12-11 PROCEDURE — 87186 SC STD MICRODIL/AGAR DIL: CPT

## 2021-12-11 PROCEDURE — 81001 URINALYSIS AUTO W/SCOPE: CPT

## 2021-12-11 PROCEDURE — 87077 CULTURE AEROBIC IDENTIFY: CPT

## 2021-12-11 PROCEDURE — 77030040830 HC CATH URETH FOL MDII -A

## 2021-12-11 PROCEDURE — 80048 BASIC METABOLIC PNL TOTAL CA: CPT

## 2021-12-11 PROCEDURE — 36415 COLL VENOUS BLD VENIPUNCTURE: CPT

## 2021-12-11 PROCEDURE — 87086 URINE CULTURE/COLONY COUNT: CPT

## 2021-12-11 RX ORDER — FOAM BANDAGE 9.2" X9.2"
1 BANDAGE TOPICAL DAILY
Qty: 15 EACH | Refills: 0 | Status: SHIPPED | OUTPATIENT
Start: 2021-12-11 | End: 2021-12-26

## 2021-12-11 RX ORDER — LEVOFLOXACIN 750 MG/1
750 TABLET ORAL
Status: COMPLETED | OUTPATIENT
Start: 2021-12-11 | End: 2021-12-11

## 2021-12-11 RX ORDER — LEVOFLOXACIN 750 MG/1
750 TABLET ORAL DAILY
Qty: 4 TABLET | Refills: 0 | Status: SHIPPED | OUTPATIENT
Start: 2021-12-12 | End: 2021-12-16

## 2021-12-11 RX ADMIN — LEVOFLOXACIN 750 MG: 750 TABLET, FILM COATED ORAL at 14:24

## 2021-12-11 NOTE — ED TRIAGE NOTES
PT arrived by EMS  Pt complains of pain at pederson cath site, pt states cloudy/smelly urine. PT states pain in rectum. Last bowel movement was yesterday.

## 2021-12-11 NOTE — ED PROVIDER NOTES
Patient is a 42-year-old gentleman with a history of BPH and chronic indwelling Hernández catheter who comes into the emergency department with rectal pain that he describes as 20 out of 10 and burning. He reports normal bowel movement yesterday and intermittent episodes of constipation; he does not currently feel constipated. Patient also reports burning in the setting of his Hernández catheter site and his wife told EMS that his urine has been cloudy recently. Patient has not had any fevers or chills, nausea or vomiting.            Past Medical History:   Diagnosis Date    CAD (coronary artery disease), native coronary artery     Cancer ([facility] [address]     melanoma removed on forehead    Depression     Diabetic neuropathy ([facility] [address]     ED (erectile dysfunction)     Enlarged prostate 07/19/2021    Hypertension, benign     Ill-defined condition     states he has memory problems    Postsurgical aortocoronary bypass status     Type II or unspecified type diabetes mellitus without mention of complication, not stated as uncontrolled        Past Surgical History:   Procedure Laterality Date    COLONOSCOPY N/A 5/3/2016    COLONOSCOPY performed by Josh Chavarria MD at [facility] ECHO 2D ADULT  12/2009    EF 50%, basal inferior akinesis, mild MR    ECHO 2D ADULT  9/21/11    LVEF 50%, inferior AK, unchanged from Dec 2009    HX CORONARY ARTERY BYPASS GRAFT  2009    HX OTHER SURGICAL  2008    melanoma removed from forehead         Family History:   Problem Relation Age of Onset    Breast Cancer Mother         w bone mets    Parkinson's Disease Maternal Aunt     Parkinson's Disease Maternal Uncle     Parkinson's Disease Maternal Grandfather        Social History     Socioeconomic History    Marital status:      Spouse name: Not on file    Number of children: Not on file    Years of education: Not on file    Highest education level: Not on file   Occupational History    Not on file   Tobacco Use    Smoking status: Never Smoker    Smokeless tobacco: Never Used   Substance and Sexual Activity    Alcohol use: No    Drug use: No    Sexual activity: Not on file   Other Topics Concern    Not on file   Social History Narrative    Not on file     Social Determinants of Health     Financial Resource Strain:     Difficulty of Paying Living Expenses: Not on file   Food Insecurity:     Worried About Running Out of Food in the Last Year: Not on file    Lalo of Food in the Last Year: Not on file   Transportation Needs:     Lack of Transportation (Medical): Not on file    Lack of Transportation (Non-Medical): Not on file   Physical Activity:     Days of Exercise per Week: Not on file    Minutes of Exercise per Session: Not on file   Stress:     Feeling of Stress : Not on file   Social Connections:     Frequency of Communication with Friends and Family: Not on file    Frequency of Social Gatherings with Friends and Family: Not on file    Attends Orthodoxy Services: Not on file    Active Member of [employer] or Organizations: Not on file    Attends Club or Organization Meetings: Not on file    Marital Status: Not on file   Intimate Partner Violence:     Fear of Current or Ex-Partner: Not on file    Emotionally Abused: Not on file    Physically Abused: Not on file    Sexually Abused: Not on file   Housing Stability:     Unable to Pay for Housing in the Last Year: Not on file    Number of [place] in the Last Year: Not on file    Unstable Housing in the Last Year: Not on file         ALLERGIES: Amoxicillin, Pravastatin, and Sulfa (sulfonamide antibiotics)    Review of Systems   Constitutional: Negative for chills and fever. Gastrointestinal: Positive for rectal pain. Negative for constipation, diarrhea, nausea and vomiting. Genitourinary: Positive for difficulty urinating, dysuria and penile pain. All other systems reviewed and are negative.       Vitals:    12/11/21 1228 12/11/21 1235   BP: (!) 137/97    Pulse: 87    Resp: 20    Temp: 98.5 °F (36.9 °C)    SpO2: 100% 100%   Weight: 81.6 kg (180 lb)    Height: 6' (1.829 m)             Physical Exam  Vitals and nursing note reviewed. Constitutional:       General: He is not in acute distress. Appearance: He is well-developed. HENT:      Head: Normocephalic and atraumatic. Eyes:      Conjunctiva/sclera: Conjunctivae normal.      Pupils: Pupils are equal, round, and reactive to light. Cardiovascular:      Rate and Rhythm: Normal rate and regular rhythm. Pulmonary:      Effort: Pulmonary effort is normal.   Abdominal:      General: Abdomen is flat. There is no distension. Palpations: Abdomen is soft. Tenderness: There is no abdominal tenderness. Genitourinary:     Prostate: Not tender. Rectum: Tenderness present. No anal fissure, external hemorrhoid or internal hemorrhoid. Normal anal tone. Comments: Chronic indwelling Hernández catheter draining cloudy yellow urine, stage 1 sacral decubitus ulcer  Musculoskeletal:         General: Normal range of motion. Cervical back: Normal range of motion. Skin:     General: Skin is dry. Capillary Refill: Capillary refill takes less than 2 seconds. Neurological:      Mental Status: He is alert.           MDM  Number of Diagnoses or Management Options  Pressure injury of sacral region, stage 1: new and requires workup  Urinary tract infection associated with indwelling urethral catheter, initial encounter [facility]): new and requires workup         Procedures          MEDICAL DECISION MAKIN y.o. male presents with Rectal Pain and Penis Pain    Differential diagnosis includes but not limited to:  UTI, ulcer, constipation, impaction, anal fissure, cellulitis, cystitis      LABORATORY TESTS:  Labs Reviewed   CULTURE, URINE - Abnormal; Notable for the following components:       Result Value    Culture result: GRAM NEGATIVE RODS (*)     All other components within normal limits URINALYSIS W/MICROSCOPIC - Abnormal; Notable for the following components:    Appearance CLOUDY (*)     Protein 100 (*)     Glucose >1,000 (*)     Blood LARGE (*)     Leukocyte Esterase MODERATE (*)     WBC >100 (*)     RBC >100 (*)     All other components within normal limits   CBC WITH AUTOMATED DIFF - Abnormal; Notable for the following components:    MPV 8.7 (*)     All other components within normal limits   METABOLIC PANEL, BASIC - Abnormal; Notable for the following components:    Glucose 234 (*)     All other components within normal limits   URINE CULTURE HOLD SAMPLE   SAMPLES BEING HELD       IMAGING RESULTS:  No orders to display       MEDICATIONS GIVEN:  Medications   levoFLOXacin (LEVAQUIN) tablet 750 mg (750 mg Oral Given 12/11/21 1424)       PROGRESS NOTE:   The patient's ED course has been uncomplicated    IMPRESSION:  1. Urinary tract infection associated with indwelling urethral catheter, initial encounter ([facility] [address]    2. Pressure injury of sacral region, stage 1        PLAN:  -   Discharge  Discharge Medication List as of 12/11/2021  2:16 PM      START taking these medications    Details   levoFLOXacin (LEVAQUIN) 750 mg tablet Take 1 Tablet by mouth daily for 4 days. , Normal, Disp-4 Tablet, R-0      foam bandage (Mepilex Border Sacrum) 9.2 X 9.2 \" bndg 1 Each by Apply Externally route daily for 15 days. , Normal, Disp-15 Each, R-0         CONTINUE these medications which have NOT CHANGED    Details   lisinopriL (PRINIVIL, ZESTRIL) 10 mg tablet Take 1 Tablet by mouth daily. , Normal, Disp-90 Tablet, R-3NEW MEDICATION AS OF 10/12/21      insulin NPH/insulin regular (NOVOLIN 70/30, HUMULIN 70/30) 100 unit/mL (70-30) injection 35 Units by SubCUTAneous route Before breakfast and dinner., No Print, Disp-10 mL, R-0      famotidine (PEPCID) 20 mg tablet Take 1 Tablet by mouth daily. , Normal, Disp-30 Tablet, R-0      lactobacillus-acidophilus (LACTINEX) 100 million cell grpk Take 1 Packet by mouth two (2) times a day., Normal, Disp-60 Each, R-0      dutasteride (AVODART) 0.5 mg capsule Take 0.5 mg by mouth daily. , Historical Med      cholecalciferol (Vitamin D3) (1000 Units /25 mcg) tablet Take 1,000 Units by mouth daily. , Historical Med      multivitamin (ONE A DAY) tablet Take 1 Tablet by mouth daily. , Historical Med      metoprolol succinate (TOPROL-XL) 25 mg XL tablet TAKE 1 TABLET BY MOUTH EVERY DAY, Normal, Disp-90 Tab, R-0Return office visit requested prior to additional refills. tamsulosin (FLOMAX) 0.4 mg capsule Take 0.4 mg by mouth two (2) times a day., Historical Med      aspirin delayed-release 81 mg tablet Take 81 mg by mouth daily. , Historical Med           Follow-up Information     Follow up With Specialties Details Why Contact Info    Marcela Sanchez MD Family Medicine Schedule an appointment as soon as possible for a visit in 3 days For wound re-check [address] [place] [address] [phone]            Total critical care time spent exclusive of procedures:  35 minutes    Marycarmen Conn MD          Please note that this dictation was completed with [employer], the [employer] voice recognition software. Quite often unanticipated grammatical, syntax, homophones, and other interpretive errors are inadvertently transcribed by the computer software. Please disregard these errors. Please excuse any errors that have escaped final proofreading.

## 2021-12-14 LAB
BACTERIA SPEC CULT: ABNORMAL
CC UR VC: ABNORMAL
SERVICE CMNT-IMP: ABNORMAL

## 2022-01-21 ENCOUNTER — HOSPITAL ENCOUNTER (EMERGENCY)
Age: 82
Discharge: HOME | End: 2022-01-22
Attending: EMERGENCY MEDICINE
Payer: MEDICARE

## 2022-01-21 DIAGNOSIS — K59.01 SLOW TRANSIT CONSTIPATION: ICD-10-CM

## 2022-01-21 DIAGNOSIS — E16.2 HYPOGLYCEMIA: ICD-10-CM

## 2022-01-21 DIAGNOSIS — N30.01 ACUTE CYSTITIS WITH HEMATURIA: ICD-10-CM

## 2022-01-21 DIAGNOSIS — T83.9XXA URINARY CATHETER COMPLICATION, INITIAL ENCOUNTER (HCC): ICD-10-CM

## 2022-01-21 DIAGNOSIS — R31.0 GROSS HEMATURIA: Primary | ICD-10-CM

## 2022-01-22 ENCOUNTER — APPOINTMENT (OUTPATIENT)
Dept: GENERAL RADIOLOGY | Age: 82
End: 2022-01-22
Attending: EMERGENCY MEDICINE
Payer: MEDICARE

## 2022-01-22 VITALS
OXYGEN SATURATION: 100 % | HEIGHT: 72 IN | DIASTOLIC BLOOD PRESSURE: 60 MMHG | TEMPERATURE: 97.8 F | SYSTOLIC BLOOD PRESSURE: 131 MMHG | WEIGHT: 180 LBS | RESPIRATION RATE: 18 BRPM | HEART RATE: 73 BPM | BODY MASS INDEX: 24.38 KG/M2

## 2022-01-22 LAB
ALBUMIN SERPL-MCNC: 3.2 G/DL (ref 3.5–5)
ALBUMIN/GLOB SERPL: 0.8 {RATIO} (ref 1.1–2.2)
ALP SERPL-CCNC: 60 U/L (ref 45–117)
ALT SERPL-CCNC: 13 U/L (ref 12–78)
ANION GAP SERPL CALC-SCNC: 5 MMOL/L (ref 5–15)
APPEARANCE UR: ABNORMAL
APTT PPP: 27.5 SEC (ref 22.1–31)
AST SERPL-CCNC: 15 U/L (ref 15–37)
BACTERIA URNS QL MICRO: ABNORMAL /HPF
BASOPHILS # BLD: 0.1 K/UL (ref 0–0.1)
BASOPHILS NFR BLD: 1 % (ref 0–1)
BILIRUB SERPL-MCNC: 0.8 MG/DL (ref 0.2–1)
BILIRUB UR QL: NEGATIVE
BUN SERPL-MCNC: 17 MG/DL (ref 6–20)
BUN/CREAT SERPL: 21 (ref 12–20)
CALCIUM SERPL-MCNC: 9.1 MG/DL (ref 8.5–10.1)
CHLORIDE SERPL-SCNC: 109 MMOL/L (ref 97–108)
CO2 SERPL-SCNC: 25 MMOL/L (ref 21–32)
COLOR UR: ABNORMAL
CREAT SERPL-MCNC: 0.8 MG/DL (ref 0.7–1.3)
DIFFERENTIAL METHOD BLD: ABNORMAL
EOSINOPHIL # BLD: 0.1 K/UL (ref 0–0.4)
EOSINOPHIL NFR BLD: 1 % (ref 0–7)
EPITH CASTS URNS QL MICRO: ABNORMAL /LPF
ERYTHROCYTE [DISTWIDTH] IN BLOOD BY AUTOMATED COUNT: 13 % (ref 11.5–14.5)
GLOBULIN SER CALC-MCNC: 3.9 G/DL (ref 2–4)
GLUCOSE BLD STRIP.AUTO-MCNC: 100 MG/DL (ref 65–117)
GLUCOSE SERPL-MCNC: 44 MG/DL (ref 65–100)
GLUCOSE UR STRIP.AUTO-MCNC: 100 MG/DL
HCT VFR BLD AUTO: 39.1 % (ref 36.6–50.3)
HGB BLD-MCNC: 13.1 G/DL (ref 12.1–17)
HGB UR QL STRIP: ABNORMAL
HYALINE CASTS URNS QL MICRO: ABNORMAL /LPF (ref 0–5)
IMM GRANULOCYTES # BLD AUTO: 0 K/UL (ref 0–0.04)
IMM GRANULOCYTES NFR BLD AUTO: 0 % (ref 0–0.5)
INR PPP: 1 (ref 0.9–1.1)
KETONES UR QL STRIP.AUTO: NEGATIVE MG/DL
LEUKOCYTE ESTERASE UR QL STRIP.AUTO: ABNORMAL
LYMPHOCYTES # BLD: 2.1 K/UL (ref 0.8–3.5)
LYMPHOCYTES NFR BLD: 22 % (ref 12–49)
MCH RBC QN AUTO: 29.5 PG (ref 26–34)
MCHC RBC AUTO-ENTMCNC: 33.5 G/DL (ref 30–36.5)
MCV RBC AUTO: 88.1 FL (ref 80–99)
MONOCYTES # BLD: 0.8 K/UL (ref 0–1)
MONOCYTES NFR BLD: 9 % (ref 5–13)
NEUTS SEG # BLD: 6.3 K/UL (ref 1.8–8)
NEUTS SEG NFR BLD: 67 % (ref 32–75)
NITRITE UR QL STRIP.AUTO: POSITIVE
NRBC # BLD: 0 K/UL (ref 0–0.01)
NRBC BLD-RTO: 0 PER 100 WBC
PH UR STRIP: 5.5 [PH] (ref 5–8)
PLATELET # BLD AUTO: 263 K/UL (ref 150–400)
PMV BLD AUTO: 8.8 FL (ref 8.9–12.9)
POTASSIUM SERPL-SCNC: 3.6 MMOL/L (ref 3.5–5.1)
PROT SERPL-MCNC: 7.1 G/DL (ref 6.4–8.2)
PROT UR STRIP-MCNC: 30 MG/DL
PROTHROMBIN TIME: 10.6 SEC (ref 9–11.1)
RBC # BLD AUTO: 4.44 M/UL (ref 4.1–5.7)
RBC #/AREA URNS HPF: >100 /HPF (ref 0–5)
SERVICE CMNT-IMP: NORMAL
SODIUM SERPL-SCNC: 139 MMOL/L (ref 136–145)
SP GR UR REFRACTOMETRY: 1.01 (ref 1–1.03)
THERAPEUTIC RANGE,PTTT: NORMAL SECS (ref 58–77)
UROBILINOGEN UR QL STRIP.AUTO: 0.2 EU/DL (ref 0.2–1)
WBC # BLD AUTO: 9.4 K/UL (ref 4.1–11.1)
WBC URNS QL MICRO: ABNORMAL /HPF (ref 0–4)

## 2022-01-22 PROCEDURE — 96365 THER/PROPH/DIAG IV INF INIT: CPT

## 2022-01-22 PROCEDURE — 36415 COLL VENOUS BLD VENIPUNCTURE: CPT

## 2022-01-22 PROCEDURE — 85025 COMPLETE CBC W/AUTO DIFF WBC: CPT

## 2022-01-22 PROCEDURE — 2709999900 HC NON-CHARGEABLE SUPPLY

## 2022-01-22 PROCEDURE — 87186 SC STD MICRODIL/AGAR DIL: CPT

## 2022-01-22 PROCEDURE — 77030034696 HC CATH URETH FOL 2W BARD -A

## 2022-01-22 PROCEDURE — 87077 CULTURE AEROBIC IDENTIFY: CPT

## 2022-01-22 PROCEDURE — 99285 EMERGENCY DEPT VISIT HI MDM: CPT

## 2022-01-22 PROCEDURE — 81001 URINALYSIS AUTO W/SCOPE: CPT

## 2022-01-22 PROCEDURE — 74018 RADEX ABDOMEN 1 VIEW: CPT

## 2022-01-22 PROCEDURE — 85610 PROTHROMBIN TIME: CPT

## 2022-01-22 PROCEDURE — 74011000250 HC RX REV CODE- 250: Performed by: EMERGENCY MEDICINE

## 2022-01-22 PROCEDURE — 77030040831 HC BAG URINE DRNG MDII -A

## 2022-01-22 PROCEDURE — 80053 COMPREHEN METABOLIC PANEL: CPT

## 2022-01-22 PROCEDURE — 87086 URINE CULTURE/COLONY COUNT: CPT

## 2022-01-22 PROCEDURE — 74011250637 HC RX REV CODE- 250/637: Performed by: EMERGENCY MEDICINE

## 2022-01-22 PROCEDURE — 85730 THROMBOPLASTIN TIME PARTIAL: CPT

## 2022-01-22 PROCEDURE — 82962 GLUCOSE BLOOD TEST: CPT

## 2022-01-22 PROCEDURE — 51702 INSERT TEMP BLADDER CATH: CPT

## 2022-01-22 RX ORDER — CEPHALEXIN 500 MG/1
500 CAPSULE ORAL 3 TIMES DAILY
Qty: 30 CAPSULE | Refills: 0 | Status: SHIPPED | OUTPATIENT
Start: 2022-01-22 | End: 2022-02-01

## 2022-01-22 RX ORDER — PHENAZOPYRIDINE HYDROCHLORIDE 100 MG/1
200 TABLET, FILM COATED ORAL
Status: COMPLETED | OUTPATIENT
Start: 2022-01-22 | End: 2022-01-22

## 2022-01-22 RX ORDER — POLYETHYLENE GLYCOL 3350 17 G/17G
17 POWDER, FOR SOLUTION ORAL DAILY
Qty: 85 G | Refills: 0 | Status: SHIPPED | OUTPATIENT
Start: 2022-01-22 | End: 2022-01-27

## 2022-01-22 RX ADMIN — PHENAZOPYRIDINE HYDROCHLORIDE 200 MG: 100 TABLET ORAL at 03:58

## 2022-01-22 RX ADMIN — DEXTROSE MONOHYDRATE 250 ML: 100 INJECTION, SOLUTION INTRAVENOUS at 03:57

## 2022-01-22 NOTE — ED PROVIDER NOTES
HPI   79 yo M presents with hematuria in urinary catheter onset tonight. Arrives via EMS. Has had the catheter for several weeks, followed by [facility]. C/o mild burning and pain in penis. Pt is aspirin only, no blood thinner. Denies fever, chills, vomiting, diarrhea. C/o constipation, last BM few days ago, no bloody or black stools.     Past Medical History:   Diagnosis Date    CAD (coronary artery disease), native coronary artery     Cancer ([facility] [address]     melanoma removed on forehead    Depression     Diabetic neuropathy ([facility] [address]     ED (erectile dysfunction)     Enlarged prostate 07/19/2021    Hypertension, benign     Ill-defined condition     states he has memory problems    Postsurgical aortocoronary bypass status     Type II or unspecified type diabetes mellitus without mention of complication, not stated as uncontrolled        Past Surgical History:   Procedure Laterality Date    COLONOSCOPY N/A 5/3/2016    COLONOSCOPY performed by Angelina Bettencourt MD at [facility] ECHO 2D ADULT  12/2009    EF 50%, basal inferior akinesis, mild MR    ECHO 2D ADULT  9/21/11    LVEF 50%, inferior AK, unchanged from Dec 2009    HX CORONARY ARTERY BYPASS GRAFT  2009    HX OTHER SURGICAL  2008    melanoma removed from forehead         Family History:   Problem Relation Age of Onset    Breast Cancer Mother         w bone mets    Parkinson's Disease Maternal Aunt     Parkinson's Disease Maternal Uncle     Parkinson's Disease Maternal Grandfather        Social History     Socioeconomic History    Marital status:      Spouse name: Not on file    Number of children: Not on file    Years of education: Not on file    Highest education level: Not on file   Occupational History    Not on file   Tobacco Use    Smoking status: Never Smoker    Smokeless tobacco: Never Used   Substance and Sexual Activity    Alcohol use: No    Drug use: No    Sexual activity: Not on file   Other Topics Concern    Not on file   Social History Narrative    Not on file     Social Determinants of Health     Financial Resource Strain:     Difficulty of Paying Living Expenses: Not on file   Food Insecurity:     Worried About Running Out of Food in the Last Year: Not on file    Lalo of Food in the Last Year: Not on file   Transportation Needs:     Lack of Transportation (Medical): Not on file    Lack of Transportation (Non-Medical): Not on file   Physical Activity:     Days of Exercise per Week: Not on file    Minutes of Exercise per Session: Not on file   Stress:     Feeling of Stress : Not on file   Social Connections:     Frequency of Communication with Friends and Family: Not on file    Frequency of Social Gatherings with Friends and Family: Not on file    Attends Mormonism Services: Not on file    Active Member of [address] [place] [address] [employer] or Organizations: Not on file    Attends Club or Organization Meetings: Not on file    Marital Status: Not on file   Intimate Partner Violence:     Fear of Current or Ex-Partner: Not on file    Emotionally Abused: Not on file    Physically Abused: Not on file    Sexually Abused: Not on file   Housing Stability:     Unable to Pay for Housing in the Last Year: Not on file    Number of [place] in the Last Year: Not on file    Unstable Housing in the Last Year: Not on file         ALLERGIES: Amoxicillin, Pravastatin, and Sulfa (sulfonamide antibiotics)    Review of Systems   Constitutional: Negative for chills and fever. HENT: Negative for congestion. Respiratory: Negative for cough and shortness of breath. Cardiovascular: Negative for chest pain. Gastrointestinal: Positive for constipation. Negative for abdominal pain, blood in stool, nausea and vomiting. Genitourinary: Positive for dysuria, hematuria and penile pain. Musculoskeletal: Negative for back pain. Skin: Negative for rash. Neurological: Negative for headaches.    All other systems reviewed and are negative. Vitals:    01/21/22 2356 01/22/22 0001   BP: (!) 135/59    Pulse: 73    Resp: 18    Temp: 97.8 °F (36.6 °C)    SpO2: 98% 98%   Weight: 81.6 kg (180 lb)    Height: 6' (1.829 m)             Physical Exam   Physical Examination: General appearance - alert, well appearing, and in no distress, oriented to person, place, and time and normal appearing weight  Eyes - pupils equal and reactive, extraocular eye movements intact  Neck - supple, no significant adenopathy  Chest - clear to auscultation, no wheezes, rales or rhonchi, symmetric air entry  Heart - normal rate, regular rhythm, normal S1, S2, no murmurs, rubs, clicks or gallops  Abdomen - soft, nontender, nondistended, no masses or organomegaly  Back exam - full range of motion, no tenderness, palpable spasm or pain on motion  Neurological - alert, oriented, normal speech, no focal findings or movement disorder noted  Musculoskeletal - no joint tenderness, deformity or swelling  Extremities - peripheral pulses normal, no pedal edema, no clubbing or cyanosis  Skin - normal coloration and turgor, no rashes, no suspicious skin lesions noted  -urinary catheter not properly secured to leg clamp and pulling at penis, clear yellow urine draining from catheter with blood-tinged urine and blood clots seen in the bag, urinary catheter properly clamped during exam and no longer pulling on penis  MDM  Number of Diagnoses or Management Options     Amount and/or Complexity of Data Reviewed  Clinical lab tests: ordered and reviewed  Tests in the radiology section of CPT®: ordered and reviewed  Decide to obtain previous medical records or to obtain history from someone other than the patient: yes  Obtain history from someone other than the patient: yes (EMS)  Review and summarize past medical records: yes    Patient Progress  Patient progress: improved         Procedures    Will check labs and urinalysis.   Patient states symptoms have improved since reclamping of the catheter. Plan for discharge with urology and PCP follow-up or return to ED for worsening symptoms. Patient is no longer having hematuria.    7:03 AM  Called and updated wife on test results and care plan. She agrees to monitor her sugar today. She will start antibiotics and try MiraLAX for constipation. Follow-up with PCP return ED for worsening symptoms.

## 2022-01-22 NOTE — ED TRIAGE NOTES
Patient reports today at 3pm noted blood on his Hernández. Patient has chronic Hernández due to difficulty urinating/retention, last time it was changed on Jan 11th. Reports pain/burning in the penis and constipation for several days. Patient uses walker and wheelchair at baseline, today reports feeling weaker then usual.     Denies CP, SOB,  abd pain.

## 2022-01-22 NOTE — DISCHARGE INSTRUCTIONS
We hope that we have addressed all of your medical concerns. The examination and treatment you received in the Emergency Department were for an emergent problem and were not intended as complete care. It is important that you follow up with your healthcare provider(s) for ongoing care. If your symptoms worsen or do not improve as expected, and you are unable to reach your usual health care provider(s), you should return to the Emergency Department. Today's healthcare is undergoing tremendous change, and patient satisfaction surveys are one of the many tools to assess the quality of medical care. You may receive a survey from the [facility] regarding your experience in the Emergency Department. I hope that your experience has been completely positive, particularly the medical care that I provided. As such, please participate in the survey; anything less than excellent does not meet my expectations or intentions. [facility] and [facility] participate in nationally recognized quality of care measures. If your blood pressure is greater than 120/80, as reported below, we urge that you seek medical care to address the potential of high blood pressure, commonly known as hypertension. Hypertension can be hereditary or can be caused by certain medical conditions, pain, stress, or \"white coat syndrome. \"       Please make an appointment with your health care provider(s) for follow up of your Emergency Department visit. VITALS:   Patient Vitals for the past 8 hrs:   Temp Pulse Resp BP SpO2   01/22/22 0001 -- -- -- -- 98 %   01/21/22 2356 97.8 °F (36.6 °C) 73 18 (!) 135/59 98 %          Thank you for allowing us to provide you with medical care today. We realize that you have many choices for your emergency care needs. Please choose us in the future for any continued health care needs. Demetrio Maher, [address] Emergency [facility].   Office: [phone]            Recent Results (from the past 24 hour(s))   URINALYSIS W/MICROSCOPIC    Collection Time: 01/22/22  2:32 AM   Result Value Ref Range    Color YELLOW/STRAW      Appearance CLOUDY (A) CLEAR      Specific gravity 1.012 1.003 - 1.030      pH (UA) 5.5 5.0 - 8.0      Protein 30 (A) NEG mg/dL    Glucose 100 (A) NEG mg/dL    Ketone Negative NEG mg/dL    Bilirubin Negative NEG      Blood LARGE (A) NEG      Urobilinogen 0.2 0.2 - 1.0 EU/dL    Nitrites Positive (A) NEG      Leukocyte Esterase LARGE (A) NEG      WBC 20-50 0 - 4 /hpf    RBC >100 (H) 0 - 5 /hpf    Epithelial cells FEW FEW /lpf    Bacteria 4+ (A) NEG /hpf    Hyaline cast 0-2 0 - 5 /lpf   CBC WITH AUTOMATED DIFF    Collection Time: 01/22/22  2:32 AM   Result Value Ref Range    WBC 9.4 4.1 - 11.1 K/uL    RBC 4.44 4.10 - 5.70 M/uL    HGB 13.1 12.1 - 17.0 g/dL    HCT 39.1 36.6 - 50.3 %    MCV 88.1 80.0 - 99.0 FL    MCH 29.5 26.0 - 34.0 PG    MCHC 33.5 30.0 - 36.5 g/dL    RDW 13.0 11.5 - 14.5 %    PLATELET 347 086 - 260 K/uL    MPV 8.8 (L) 8.9 - 12.9 FL    NRBC 0.0 0  WBC    ABSOLUTE NRBC 0.00 0.00 - 0.01 K/uL    NEUTROPHILS 67 32 - 75 %    LYMPHOCYTES 22 12 - 49 %    MONOCYTES 9 5 - 13 %    EOSINOPHILS 1 0 - 7 %    BASOPHILS 1 0 - 1 %    IMMATURE GRANULOCYTES 0 0.0 - 0.5 %    ABS. NEUTROPHILS 6.3 1.8 - 8.0 K/UL    ABS. LYMPHOCYTES 2.1 0.8 - 3.5 K/UL    ABS. MONOCYTES 0.8 0.0 - 1.0 K/UL    ABS. EOSINOPHILS 0.1 0.0 - 0.4 K/UL    ABS. BASOPHILS 0.1 0.0 - 0.1 K/UL    ABS. IMM.  GRANS. 0.0 0.00 - 0.04 K/UL    DF AUTOMATED     METABOLIC PANEL, COMPREHENSIVE    Collection Time: 01/22/22  2:32 AM   Result Value Ref Range    Sodium 139 136 - 145 mmol/L    Potassium 3.6 3.5 - 5.1 mmol/L    Chloride 109 (H) 97 - 108 mmol/L    CO2 25 21 - 32 mmol/L    Anion gap 5 5 - 15 mmol/L    Glucose 44 (LL) 65 - 100 mg/dL    BUN 17 6 - 20 MG/DL    Creatinine 0.80 0.70 - 1.30 MG/DL    BUN/Creatinine ratio 21 (H) 12 - 20 GFR est AA >60 >60 ml/min/1.73m2    GFR est non-AA >60 >60 ml/min/1.73m2    Calcium 9.1 8.5 - 10.1 MG/DL    Bilirubin, total 0.8 0.2 - 1.0 MG/DL    ALT (SGPT) 13 12 - 78 U/L    AST (SGOT) 15 15 - 37 U/L    Alk. phosphatase 60 45 - 117 U/L    Protein, total 7.1 6.4 - 8.2 g/dL    Albumin 3.2 (L) 3.5 - 5.0 g/dL    Globulin 3.9 2.0 - 4.0 g/dL    A-G Ratio 0.8 (L) 1.1 - 2.2     PTT    Collection Time: 01/22/22  2:32 AM   Result Value Ref Range    aPTT 27.5 22.1 - 31.0 sec    aPTT, therapeutic range     58.0 - 77.0 SECS   PROTHROMBIN TIME + INR    Collection Time: 01/22/22  2:32 AM   Result Value Ref Range    INR 1.0 0.9 - 1.1      Prothrombin time 10.6 9.0 - 11.1 sec   GLUCOSE, POC    Collection Time: 01/22/22  5:32 AM   Result Value Ref Range    Glucose (POC) 100 65 - 117 mg/dL    Performed by Ra DAILEY ABD PORT  1 V    Result Date: 1/22/2022  History: Constipation. An AP radiograph of the abdomen demonstrates moderate stool in the colon. The bowel gas pattern is otherwise unremarkable. There are phleboliths in the pelvis. The osseous structures are unremarkable. Moderate colonic stool.

## 2022-01-24 LAB
BACTERIA SPEC CULT: ABNORMAL
CC UR VC: ABNORMAL
SERVICE CMNT-IMP: ABNORMAL

## 2022-02-03 ENCOUNTER — HOSPITAL ENCOUNTER (EMERGENCY)
Age: 82
Discharge: HOME | End: 2022-02-03
Attending: EMERGENCY MEDICINE
Payer: MEDICARE

## 2022-02-03 ENCOUNTER — APPOINTMENT (OUTPATIENT)
Dept: GENERAL RADIOLOGY | Age: 82
End: 2022-02-03
Attending: EMERGENCY MEDICINE
Payer: MEDICARE

## 2022-02-03 VITALS
RESPIRATION RATE: 19 BRPM | HEART RATE: 83 BPM | OXYGEN SATURATION: 99 % | DIASTOLIC BLOOD PRESSURE: 43 MMHG | TEMPERATURE: 98.7 F | BODY MASS INDEX: 24.72 KG/M2 | SYSTOLIC BLOOD PRESSURE: 114 MMHG | WEIGHT: 182.5 LBS | HEIGHT: 72 IN

## 2022-02-03 DIAGNOSIS — T83.091A OBSTRUCTION OF FOLEY CATHETER, INITIAL ENCOUNTER (HCC): ICD-10-CM

## 2022-02-03 DIAGNOSIS — R33.9 URINARY RETENTION: ICD-10-CM

## 2022-02-03 DIAGNOSIS — K59.00 CONSTIPATION, UNSPECIFIED CONSTIPATION TYPE: Primary | ICD-10-CM

## 2022-02-03 LAB
ALBUMIN SERPL-MCNC: 3.2 G/DL (ref 3.5–5)
ALBUMIN/GLOB SERPL: 0.8 {RATIO} (ref 1.1–2.2)
ALP SERPL-CCNC: 60 U/L (ref 45–117)
ALT SERPL-CCNC: 14 U/L (ref 12–78)
ANION GAP SERPL CALC-SCNC: 6 MMOL/L (ref 5–15)
AST SERPL-CCNC: 13 U/L (ref 15–37)
BASOPHILS # BLD: 0.1 K/UL (ref 0–0.1)
BASOPHILS NFR BLD: 1 % (ref 0–1)
BILIRUB SERPL-MCNC: 1.1 MG/DL (ref 0.2–1)
BUN SERPL-MCNC: 13 MG/DL (ref 6–20)
BUN/CREAT SERPL: 12 (ref 12–20)
CALCIUM SERPL-MCNC: 9.2 MG/DL (ref 8.5–10.1)
CHLORIDE SERPL-SCNC: 105 MMOL/L (ref 97–108)
CO2 SERPL-SCNC: 24 MMOL/L (ref 21–32)
COMMENT, HOLDF: NORMAL
CREAT SERPL-MCNC: 1.1 MG/DL (ref 0.7–1.3)
CRP SERPL-MCNC: 3.06 MG/DL (ref 0–0.6)
DIFFERENTIAL METHOD BLD: ABNORMAL
EOSINOPHIL # BLD: 0.1 K/UL (ref 0–0.4)
EOSINOPHIL NFR BLD: 1 % (ref 0–7)
ERYTHROCYTE [DISTWIDTH] IN BLOOD BY AUTOMATED COUNT: 12.8 % (ref 11.5–14.5)
GLOBULIN SER CALC-MCNC: 4.1 G/DL (ref 2–4)
GLUCOSE SERPL-MCNC: 212 MG/DL (ref 65–100)
HCT VFR BLD AUTO: 40.5 % (ref 36.6–50.3)
HGB BLD-MCNC: 14 G/DL (ref 12.1–17)
IMM GRANULOCYTES # BLD AUTO: 0.1 K/UL (ref 0–0.04)
IMM GRANULOCYTES NFR BLD AUTO: 1 % (ref 0–0.5)
LIPASE SERPL-CCNC: 76 U/L (ref 73–393)
LYMPHOCYTES # BLD: 1.5 K/UL (ref 0.8–3.5)
LYMPHOCYTES NFR BLD: 14 % (ref 12–49)
MCH RBC QN AUTO: 30.8 PG (ref 26–34)
MCHC RBC AUTO-ENTMCNC: 34.6 G/DL (ref 30–36.5)
MCV RBC AUTO: 89 FL (ref 80–99)
MONOCYTES # BLD: 0.9 K/UL (ref 0–1)
MONOCYTES NFR BLD: 8 % (ref 5–13)
NEUTS SEG # BLD: 7.8 K/UL (ref 1.8–8)
NEUTS SEG NFR BLD: 75 % (ref 32–75)
NRBC # BLD: 0 K/UL (ref 0–0.01)
NRBC BLD-RTO: 0 PER 100 WBC
PLATELET # BLD AUTO: 289 K/UL (ref 150–400)
PMV BLD AUTO: 8.9 FL (ref 8.9–12.9)
POTASSIUM SERPL-SCNC: 4.1 MMOL/L (ref 3.5–5.1)
PROT SERPL-MCNC: 7.3 G/DL (ref 6.4–8.2)
RBC # BLD AUTO: 4.55 M/UL (ref 4.1–5.7)
SAMPLES BEING HELD,HOLD: NORMAL
SODIUM SERPL-SCNC: 135 MMOL/L (ref 136–145)
WBC # BLD AUTO: 10.3 K/UL (ref 4.1–11.1)

## 2022-02-03 PROCEDURE — 80053 COMPREHEN METABOLIC PANEL: CPT

## 2022-02-03 PROCEDURE — 99284 EMERGENCY DEPT VISIT MOD MDM: CPT

## 2022-02-03 PROCEDURE — 85025 COMPLETE CBC W/AUTO DIFF WBC: CPT

## 2022-02-03 PROCEDURE — 86140 C-REACTIVE PROTEIN: CPT

## 2022-02-03 PROCEDURE — 74018 RADEX ABDOMEN 1 VIEW: CPT

## 2022-02-03 PROCEDURE — 83690 ASSAY OF LIPASE: CPT

## 2022-02-03 PROCEDURE — 36415 COLL VENOUS BLD VENIPUNCTURE: CPT

## 2022-02-03 PROCEDURE — 2709999900 HC NON-CHARGEABLE SUPPLY

## 2022-02-03 PROCEDURE — 77030005518 HC CATH URETH FOL 2W BARD -B

## 2022-02-03 PROCEDURE — 77030005513 HC CATH URETH FOL11 MDII -B

## 2022-02-03 RX ORDER — POLYETHYLENE GLYCOL 3350 17 G/17G
POWDER, FOR SOLUTION ORAL
Qty: 289 G | Refills: 0 | Status: ON HOLD | OUTPATIENT
Start: 2022-02-03 | End: 2022-05-11

## 2022-02-03 RX ORDER — LIDOCAINE HYDROCHLORIDE 20 MG/ML
JELLY TOPICAL
Status: DISCONTINUED | OUTPATIENT
Start: 2022-02-03 | End: 2022-02-03 | Stop reason: HOSPADM

## 2022-02-03 RX ORDER — LIDOCAINE HYDROCHLORIDE 20 MG/ML
JELLY TOPICAL
Status: DISCONTINUED
Start: 2022-02-03 | End: 2022-02-03 | Stop reason: HOSPADM

## 2022-02-03 NOTE — ED NOTES
RN replaced patient's pederson w/ a 20 Fr Pederson using sterile technique and help of the physician.

## 2022-02-03 NOTE — ED PROVIDER NOTES
53-year-old male with a past medical history significant for depression, coronary disease, melanoma of the forehead, diabetic neuropathy, apical dysfunction, BPH, hypertension, CABG, type 2 diabetes, urinary retention chronic indwelling Hernández catheter who presents to the ED by EMS for evaluation for rectal pain for the past 2 to 3 days. The patient stated that he has an urge to defecate but unable to do so. He has been using MiraLAX twice daily without any success. This is a complaining of urinary urgency and frequency with burning at the tip of his penis. He feels like he cannot empty his bladder. He denies any fever chills, headache, sore throat, cough or congestion, chest pain or shortness of breath, vomiting, diarrhea, constipation, sick contact, skin rash or recent travel.            Past Medical History:   Diagnosis Date    CAD (coronary artery disease), native coronary artery     Cancer ([facility] [address]     melanoma removed on forehead    Depression     Diabetic neuropathy ([facility] [address]     ED (erectile dysfunction)     Enlarged prostate 07/19/2021    Hypertension, benign     Ill-defined condition     states he has memory problems    Postsurgical aortocoronary bypass status     Type II or unspecified type diabetes mellitus without mention of complication, not stated as uncontrolled        Past Surgical History:   Procedure Laterality Date    COLONOSCOPY N/A 5/3/2016    COLONOSCOPY performed by Annalise Slater MD at [address] [facility] ECHO 2D ADULT  12/2009    EF 50%, basal inferior akinesis, mild MR    ECHO 2D ADULT  9/21/11    LVEF 50%, inferior AK, unchanged from Dec 2009    HX CORONARY ARTERY BYPASS GRAFT  2009    HX OTHER SURGICAL  2008    melanoma removed from forehead         Family History:   Problem Relation Age of Onset    Breast Cancer Mother         w bone mets    Parkinson's Disease Maternal Aunt     Parkinson's Disease Maternal Uncle     Parkinson's Disease Maternal Grandfather Social History     Socioeconomic History    Marital status:      Spouse name: Not on file    Number of children: Not on file    Years of education: Not on file    Highest education level: Not on file   Occupational History    Not on file   Tobacco Use    Smoking status: Never Smoker    Smokeless tobacco: Never Used   Substance and Sexual Activity    Alcohol use: No    Drug use: No    Sexual activity: Not on file   Other Topics Concern    Not on file   Social History Narrative    Not on file     Social Determinants of Health     Financial Resource Strain:     Difficulty of Paying Living Expenses: Not on file   Food Insecurity:     Worried About Running Out of Food in the Last Year: Not on file    Lalo of Food in the Last Year: Not on file   Transportation Needs:     Lack of Transportation (Medical): Not on file    Lack of Transportation (Non-Medical):  Not on file   Physical Activity:     Days of Exercise per Week: Not on file    Minutes of Exercise per Session: Not on file   Stress:     Feeling of Stress : Not on file   Social Connections:     Frequency of Communication with Friends and Family: Not on file    Frequency of Social Gatherings with Friends and Family: Not on file    Attends Je[employer]'s witness Services: Not on file    Active Member of [address] [place] [address] or Organizations: Not on file    Attends Club or Organization Meetings: Not on file    Marital Status: Not on file   Intimate Partner Violence:     Fear of Current or Ex-Partner: Not on file    Emotionally Abused: Not on file    Physically Abused: Not on file    Sexually Abused: Not on file   Housing Stability:     Unable to Pay for Housing in the Last Year: Not on file    Number of [place] in the Last Year: Not on file    Unstable Housing in the Last Year: Not on file         ALLERGIES: Amoxicillin, Pravastatin, and Sulfa (sulfonamide antibiotics)    Review of Systems   All other systems reviewed and are negative. Vitals:    02/03/22 0616 02/03/22 0621   BP: (!) 144/95    Pulse: 76    Resp: 18    Temp: 98.7 °F (37.1 °C)    SpO2: 99% 98%   Weight: 82.8 kg (182 lb 8 oz)    Height: 6' (1.829 m)             Physical Exam  Vitals and nursing note reviewed. Exam conducted with a chaperone present. CONSTITUTIONAL: Frail elderly male, chronically ill-appearing; in no apparent distress  HEAD: Normocephalic; atraumatic  EYES: PERRL; EOM intact; conjunctiva and sclera are clear bilaterally. ENT: No rhinorrhea; normal pharynx with no tonsillar hypertrophy; mucous membranes pink/moist, no erythema, no exudate. NECK: Supple; non-tender; no cervical lymphadenopathy  CARD: Normal S1, S2; no murmurs, rubs, or gallops. Regular rate and rhythm. RESP: Normal respiratory effort; breath sounds clear and equal bilaterally; no wheezes, rhonchi, or rales. ABD: Normal bowel sounds; non-distended; suprapubic tenderness and bladder fullness without any rebound or guarding; no palpable organomegaly, no masses, no bruits. Rectal exam: Normal inspection, weak tone, prostate tenderness on palpation; no rectal stool in the vault and normal colored stool  Back Exam: Normal inspection; no vertebral point tenderness, no CVA tenderness. Normal range of motion. EXT: Normal ROM in all four extremities; non-tender to palpation; no swelling or deformity; distal pulses are normal, no edema. SKIN: Warm; dry; no rash.   NEURO:Alert and oriented x 3, coherent, CARLOS-XII grossly intact, sensory and motor are non-focal.        MDM  Number of Diagnoses or Management Options  Constipation, unspecified constipation type  Obstruction of Hernández catheter, initial encounter [facility])  Urinary retention  Diagnosis management comments: Assessment: Abdominal pain with rectal pain suspect constipation/urinary retention/due to indwelling Hernández catheter placement suspect obstructed catheter    Plan: Bladder check/irrigate Hernández catheter/change Hernández catheter/abdominal x-ray/lab/serial exam/ Monitor and Reevaluate. Amount and/or Complexity of Data Reviewed  Clinical lab tests: ordered and reviewed  Tests in the radiology section of CPT®: ordered and reviewed  Tests in the medicine section of CPT®: reviewed and ordered  Discussion of test results with the performing providers: yes  Decide to obtain previous medical records or to obtain history from someone other than the patient: yes  Obtain history from someone other than the patient: yes  Review and summarize past medical records: yes  Discuss the patient with other providers: yes  Independent visualization of images, tracings, or specimens: yes    Risk of Complications, Morbidity, and/or Mortality  Presenting problems: moderate  Diagnostic procedures: moderate  Management options: moderate           Procedures    ABDOMINAL XRAY INTERPRETATION (ED MD)  Impaction throughout the colon; no free air. No evidence of obstruction. No air-fluid levels. Darylene Grant, MD      Progress Note:   Pt has been reexamined by Darylene Grant, MD. Pt is feeling much better. Symptoms have improved. All available results have been reviewed with pt and any available family. The patient has no further discomfort at this time. His Hernández catheter was irrigated and eventually changed to a larger size and he felt much better. Pt understands sx, dx, and tx in ED. Care plan has been outlined and questions have been answered. Pt is ready to go home. Will send home on urinary retention constipation instruction. Prescription for MiraLAX. Outpatient referral with PCP as needed. Written by Darylene Grant, MD,1:28 AM    .   .

## 2022-02-03 NOTE — ED NOTES
Bedside and Verbal shift change report given to Chris Baum RN (oncoming nurse) by Rylee Arango RN (offgoing nurse). Report included the following information SBAR, Kardex, ED Summary, [facility] and Recent Results.

## 2022-02-03 NOTE — ED TRIAGE NOTES
Per EMS, Patient has been having rectal pain and unable to have a bowel movement for several days.  Patient also reports having pain from cathter and that it has \"not been working well\"

## 2022-02-18 ENCOUNTER — OFFICE VISIT (OUTPATIENT)
Dept: CARDIOLOGY CLINIC | Age: 82
End: 2022-02-18
Payer: MEDICARE

## 2022-02-18 VITALS
HEART RATE: 84 BPM | OXYGEN SATURATION: 98 % | WEIGHT: 179.8 LBS | SYSTOLIC BLOOD PRESSURE: 118 MMHG | HEIGHT: 72 IN | DIASTOLIC BLOOD PRESSURE: 62 MMHG | BODY MASS INDEX: 24.35 KG/M2

## 2022-02-18 DIAGNOSIS — I25.2 CORONARY ARTERY DISEASE WITH HISTORY OF MYOCARDIAL INFARCTION WITHOUT HISTORY OF CABG: ICD-10-CM

## 2022-02-18 DIAGNOSIS — I10 HYPERTENSION, BENIGN: ICD-10-CM

## 2022-02-18 DIAGNOSIS — I25.10 CORONARY ARTERY DISEASE WITH HISTORY OF MYOCARDIAL INFARCTION WITHOUT HISTORY OF CABG: ICD-10-CM

## 2022-02-18 DIAGNOSIS — I50.22 CHRONIC SYSTOLIC CONGESTIVE HEART FAILURE (HCC): Primary | ICD-10-CM

## 2022-02-18 PROCEDURE — G8427 DOCREV CUR MEDS BY ELIG CLIN: HCPCS | Performed by: INTERNAL MEDICINE

## 2022-02-18 PROCEDURE — G0463 HOSPITAL OUTPT CLINIC VISIT: HCPCS | Performed by: INTERNAL MEDICINE

## 2022-02-18 PROCEDURE — 99214 OFFICE O/P EST MOD 30 MIN: CPT | Performed by: INTERNAL MEDICINE

## 2022-02-18 PROCEDURE — G9717 DOC PT DX DEP/BP F/U NT REQ: HCPCS | Performed by: INTERNAL MEDICINE

## 2022-02-18 PROCEDURE — G8536 NO DOC ELDER MAL SCRN: HCPCS | Performed by: INTERNAL MEDICINE

## 2022-02-18 PROCEDURE — G8754 DIAS BP LESS 90: HCPCS | Performed by: INTERNAL MEDICINE

## 2022-02-18 PROCEDURE — G8420 CALC BMI NORM PARAMETERS: HCPCS | Performed by: INTERNAL MEDICINE

## 2022-02-18 PROCEDURE — G8752 SYS BP LESS 140: HCPCS | Performed by: INTERNAL MEDICINE

## 2022-02-18 PROCEDURE — 1101F PT FALLS ASSESS-DOCD LE1/YR: CPT | Performed by: INTERNAL MEDICINE

## 2022-02-18 NOTE — PROGRESS NOTES
Office Follow-up    NAME: Heide Guajardo Sr.   :  1940  MRM:  [id number]    Date:  2022         Assessment and Plan:     1. CAD/hx of MI s/p 3v CABG in . Negative ischemic evaluation 2020 with evidence of inferior infarct and LVEF of 40-45%. Repeat Echo  with stable LV function at 40-45%. No exertional sxs at his current functional capacity. - Continue ASA 81 mg daily  - Intolerant to statin therapy    - Continue BB     2.  Cardiomyopathy; suspected to be ICM - EF 40-45% by Echo.  - NYHA class II or III symptoms. -   Volume stable by exam today on no scheduled loop diuretics. - Continue Lisinopril 10 mg daily. - Continue Toprol XL 25 mg daily.       3. HTN - normotensive in the office today. - Continue current regimen  - Encouraged low sodium diet and daily home monitoring     4. Dyslipidemia - intolerant to simva in the past due to myalgias. Had tolerated Pravastatin in the past but developed transaminitis.       5. Type 2 DM - on insulin. followed by Dr. Melanie Finch. 7. Chronic indwelling pederson for BPH. 8. See Ginna Peacock in 6 months and see Dr. Candelaria Florian in 1 year. ATTENTION:   This medical record was transcribed using an electronic medical records/speech recognition system. Although proofread, it may and can contain electronic, spelling and other errors. Corrections may be executed at a later time. Please feel free to contact us for any clarifications as needed. Subjective:     Bogdan Theodore., a 80y.o. year-old who presents for followup. Currently gets SOB on doing some daily activities like shower. Has not had any decompensation and no peripheral edema. No chest pain.      Exam:     Physical Exam:  Visit Vitals  /62 (BP 1 Location: Left upper arm, BP Patient Position: Sitting)   Pulse 84   Ht 6' (1.829 m)   Wt 179 lb 12.8 oz (81.6 kg)   SpO2 98%   BMI 24.39 kg/m²     General appearance - alert, well appearing, and in no distress  Mental status - affect appropriate to mood  Eyes - sclera anicteric, moist mucous membranes  Neck - supple, no significant adenopathy  Chest - clear to auscultation, no wheezes, rales or rhonchi  Heart - normal rate, regular rhythm, normal S1, S2, no murmurs, rubs, clicks or gallops  Abdomen - soft, nontender, nondistended, no masses or organomegaly  Extremities - peripheral pulses normal, no pedal edema  Skin - normal coloration  no rashes    Medications:     Current Outpatient Medications   Medication Sig    polyethylene glycol (Miralax) 17 gram/dose powder 1 tablespoon with 8 oz of water every hour until stools are clear (Patient taking differently: as needed. 1 tablespoon with 8 oz of water every hour until stools are clear)    lisinopriL (PRINIVIL, ZESTRIL) 10 mg tablet Take 1 Tablet by mouth daily.  insulin NPH/insulin regular (NOVOLIN 70/30, HUMULIN 70/30) 100 unit/mL (70-30) injection 35 Units by SubCUTAneous route Before breakfast and dinner.  famotidine (PEPCID) 20 mg tablet Take 1 Tablet by mouth daily.  dutasteride (AVODART) 0.5 mg capsule Take 0.5 mg by mouth daily.  cholecalciferol (Vitamin D3) (1000 Units /25 mcg) tablet Take 1,000 Units by mouth daily.  multivitamin (ONE A DAY) tablet Take 1 Tablet by mouth daily.  metoprolol succinate (TOPROL-XL) 25 mg XL tablet TAKE 1 TABLET BY MOUTH EVERY DAY    tamsulosin (FLOMAX) 0.4 mg capsule Take 0.4 mg by mouth two (2) times a day.  aspirin delayed-release 81 mg tablet Take 81 mg by mouth daily.  lactobacillus-acidophilus (LACTINEX) 100 million cell grpk Take 1 Packet by mouth two (2) times a day. (Patient not taking: Reported on 2/18/2022)     No current facility-administered medications for this visit.       Diagnostic Data Review:       Echo 2d adult 12/2009  EF 50%, basal inferior akinesis, mild MR  Echo 9/21/11 - LVEF 50%, inferior AK, unchanged from Dec 2009    Stress echo March 2012 - 3 min, resting EF 50%, inferior AK, no ischemia    Echo 10/29/13 - EF 55 % to 60 %. There was akinesis of the basal-mid inferior wall(s). There was akinesis of the basal inferolateral wall(s). Paradoxical ventricular septal motion, no PFO, no significant Change from previous study. Echo 8/23/17 - EF 55-60%. Inferior AK. Mild LVH. Mild MAC with no MR. AoV sclerosis without stenosis. No change compared to study 2013. Lexiscan Cardiolite 2/2020 - Abnormal Lexiscan gated SPECT myocardial perfusion study demonstrating RCA territory infarction. No stress induced ischemia  LVEF 50%    [facility] Lexiscan Cardiolite 9/10/20 - evidence of nontransmural myocardial infarction of the inferior wall with mild residual ischemia. LVEF 44% with reduced wall motion and thickening of the inferior wall. [facility] Echo 9/9/20 - EF 40-45%. Mild LVH. HK of basal mid inferior and basal mid inferolateral myocardium. Lab Review:     Lab Results   Component Value Date/Time    Cholesterol, total 174 02/11/2020 12:16 AM    HDL Cholesterol 40 02/11/2020 12:16 AM    LDL, calculated 110.2 (H) 02/11/2020 12:16 AM    Triglyceride 119 02/11/2020 12:16 AM    CHOL/HDL Ratio 4.4 02/11/2020 12:16 AM     Lab Results   Component Value Date/Time    Creatinine (POC) 1.0 07/25/2009 11:34 AM    Creatinine 1.10 02/03/2022 06:28 AM     Lab Results   Component Value Date/Time    BUN 13 02/03/2022 06:28 AM    BUN (POC) 18 07/25/2009 11:34 AM     Lab Results   Component Value Date/Time    Potassium 4.1 02/03/2022 06:28 AM     Lab Results   Component Value Date/Time    Hemoglobin A1c 6.4 (H) 09/30/2021 10:35 PM    Hemoglobin A1c, External 6.9 10/28/2015 12:00 AM     Lab Results   Component Value Date/Time    Hemoglobin (POC) 10.9 (L) 07/25/2009 11:34 AM    HGB 14.0 02/03/2022 06:28 AM     Lab Results   Component Value Date/Time    PLATELET 117 25/29/5858 06:28 AM     No results for input(s): CPK, CKMB, TROIQ in the last 72 hours.     No lab exists for component: CKQMB, CPKMB             ___________________________________________________    Behzad Fenton MD, [facility]

## 2022-02-18 NOTE — PROGRESS NOTES
Leigha Arzate is a 80 y.o. male    Chief Complaint   Patient presents with   [facility] Follow Up     ED 2/3- constipation     Hypertension    Coronary Artery Disease    CHF     Patient states he has some pain in the back of his neck     Chest pain No    SOB slight SOB    Dizziness No; off balance patient states this has been for a long time    Swelling No    Refills No    Visit Vitals  /62 (BP 1 Location: Left upper arm, BP Patient Position: Sitting)   Pulse 84   Ht 6' (1.829 m)   Wt 179 lb 12.8 oz (81.6 kg)   SpO2 98%   BMI 24.39 kg/m²       1. Have you been to the ER, urgent care clinic since your last visit? Hospitalized since your last visit? Ed 2/3/22    2. Have you seen or consulted any other health care providers outside of the [address] [place] [address] since your last visit? Include any pap smears or colon screening.   No

## 2022-03-18 PROBLEM — T79.6XXA TRAUMATIC RHABDOMYOLYSIS (HCC): Status: ACTIVE | Noted: 2019-11-29

## 2022-03-18 PROBLEM — J98.11 ATELECTASIS, LEFT: Status: ACTIVE | Noted: 2021-10-01

## 2022-03-18 PROBLEM — F32.A DEPRESSION: Status: ACTIVE | Noted: 2020-02-07

## 2022-03-19 PROBLEM — T83.511A UTI (URINARY TRACT INFECTION) DUE TO URINARY INDWELLING CATHETER (HCC): Status: ACTIVE | Noted: 2021-10-01
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
## 2022-03-19 PROBLEM — R10.13 EPIGASTRIC PAIN: Status: ACTIVE | Noted: 2020-02-08

## 2022-03-19 PROBLEM — N39.0 UTI (URINARY TRACT INFECTION) DUE TO URINARY INDWELLING CATHETER (HCC): Status: ACTIVE | Noted: 2021-10-01

## 2022-03-19 PROBLEM — R19.7 DIARRHEA: Status: ACTIVE | Noted: 2021-10-01

## 2022-03-20 PROBLEM — R10.13 EPIGASTRIC ABDOMINAL PAIN: Status: ACTIVE | Noted: 2020-02-08

## 2022-03-20 PROBLEM — A41.9 SEPSIS (HCC): Status: ACTIVE | Noted: 2021-10-01

## 2022-05-11 ENCOUNTER — HOSPITAL ENCOUNTER (INPATIENT)
Age: 82
LOS: 2 days | Discharge: HOME HEALTH | DRG: 371 | End: 2022-05-13
Attending: EMERGENCY MEDICINE | Admitting: INTERNAL MEDICINE
Payer: MEDICARE

## 2022-05-11 ENCOUNTER — APPOINTMENT (OUTPATIENT)
Dept: CT IMAGING | Age: 82
DRG: 371 | End: 2022-05-11
Attending: EMERGENCY MEDICINE
Payer: MEDICARE

## 2022-05-11 DIAGNOSIS — E86.0 DEHYDRATION: ICD-10-CM

## 2022-05-11 DIAGNOSIS — R19.7 DIARRHEA, UNSPECIFIED TYPE: Primary | ICD-10-CM

## 2022-05-11 PROBLEM — K21.9 GERD (GASTROESOPHAGEAL REFLUX DISEASE): Status: ACTIVE | Noted: 2022-05-11

## 2022-05-11 PROBLEM — N40.0 BPH (BENIGN PROSTATIC HYPERPLASIA): Status: ACTIVE | Noted: 2022-05-11

## 2022-05-11 PROBLEM — K52.9 ACUTE COLITIS: Status: ACTIVE | Noted: 2022-05-11

## 2022-05-11 LAB
ALBUMIN SERPL-MCNC: 3 G/DL (ref 3.5–5)
ALBUMIN/GLOB SERPL: 0.7 (ref 1.1–2.2)
ALP SERPL-CCNC: 60 U/L (ref 45–117)
ALT SERPL-CCNC: 15 U/L (ref 12–78)
ANION GAP SERPL CALC-SCNC: 6 MMOL/L (ref 5–15)
APPEARANCE UR: CLEAR
AST SERPL-CCNC: 13 U/L (ref 15–37)
BACTERIA URNS QL MICRO: NEGATIVE /HPF
BASOPHILS # BLD: 0.1 K/UL (ref 0–0.1)
BASOPHILS NFR BLD: 1 % (ref 0–1)
BILIRUB SERPL-MCNC: 1.3 MG/DL (ref 0.2–1)
BILIRUB UR QL: NEGATIVE
BUN SERPL-MCNC: 14 MG/DL (ref 6–20)
BUN/CREAT SERPL: 15 (ref 12–20)
CALCIUM SERPL-MCNC: 8.4 MG/DL (ref 8.5–10.1)
CHLORIDE SERPL-SCNC: 109 MMOL/L (ref 97–108)
CO2 SERPL-SCNC: 23 MMOL/L (ref 21–32)
COLOR UR: ABNORMAL
COMMENT, HOLDF: NORMAL
CREAT SERPL-MCNC: 0.96 MG/DL (ref 0.7–1.3)
DIFFERENTIAL METHOD BLD: NORMAL
EOSINOPHIL # BLD: 0.2 K/UL (ref 0–0.4)
EOSINOPHIL NFR BLD: 2 % (ref 0–7)
EPITH CASTS URNS QL MICRO: ABNORMAL /LPF
ERYTHROCYTE [DISTWIDTH] IN BLOOD BY AUTOMATED COUNT: 14.1 % (ref 11.5–14.5)
GLOBULIN SER CALC-MCNC: 4.1 G/DL (ref 2–4)
GLUCOSE BLD STRIP.AUTO-MCNC: 131 MG/DL (ref 65–117)
GLUCOSE BLD STRIP.AUTO-MCNC: 199 MG/DL (ref 65–117)
GLUCOSE SERPL-MCNC: 138 MG/DL (ref 65–100)
GLUCOSE UR STRIP.AUTO-MCNC: NEGATIVE MG/DL
HCT VFR BLD AUTO: 43.7 % (ref 36.6–50.3)
HGB BLD-MCNC: 14.5 G/DL (ref 12.1–17)
HGB UR QL STRIP: ABNORMAL
IMM GRANULOCYTES # BLD AUTO: 0 K/UL (ref 0–0.04)
IMM GRANULOCYTES NFR BLD AUTO: 0 % (ref 0–0.5)
KETONES UR QL STRIP.AUTO: 15 MG/DL
LEUKOCYTE ESTERASE UR QL STRIP.AUTO: ABNORMAL
LYMPHOCYTES # BLD: 1.7 K/UL (ref 0.8–3.5)
LYMPHOCYTES NFR BLD: 18 % (ref 12–49)
MCH RBC QN AUTO: 30.1 PG (ref 26–34)
MCHC RBC AUTO-ENTMCNC: 33.2 G/DL (ref 30–36.5)
MCV RBC AUTO: 90.9 FL (ref 80–99)
MONOCYTES # BLD: 0.9 K/UL (ref 0–1)
MONOCYTES NFR BLD: 10 % (ref 5–13)
NEUTS SEG # BLD: 6.4 K/UL (ref 1.8–8)
NEUTS SEG NFR BLD: 69 % (ref 32–75)
NITRITE UR QL STRIP.AUTO: NEGATIVE
NRBC # BLD: 0 K/UL (ref 0–0.01)
NRBC BLD-RTO: 0 PER 100 WBC
PH UR STRIP: 5.5 (ref 5–8)
PLATELET # BLD AUTO: 287 K/UL (ref 150–400)
PMV BLD AUTO: 9.1 FL (ref 8.9–12.9)
POTASSIUM SERPL-SCNC: 4 MMOL/L (ref 3.5–5.1)
PROT SERPL-MCNC: 7.1 G/DL (ref 6.4–8.2)
PROT UR STRIP-MCNC: ABNORMAL MG/DL
RBC # BLD AUTO: 4.81 M/UL (ref 4.1–5.7)
RBC #/AREA URNS HPF: ABNORMAL /HPF (ref 0–5)
SAMPLES BEING HELD,HOLD: NORMAL
SERVICE CMNT-IMP: ABNORMAL
SERVICE CMNT-IMP: ABNORMAL
SODIUM SERPL-SCNC: 138 MMOL/L (ref 136–145)
SP GR UR REFRACTOMETRY: <1.005 (ref 1–1.03)
UA: UC IF INDICATED,UAUC: ABNORMAL
UROBILINOGEN UR QL STRIP.AUTO: 0.2 EU/DL (ref 0.2–1)
WBC # BLD AUTO: 9.3 K/UL (ref 4.1–11.1)
WBC URNS QL MICRO: ABNORMAL /HPF (ref 0–4)

## 2022-05-11 PROCEDURE — 77030005513 HC CATH URETH FOL11 MDII -B

## 2022-05-11 PROCEDURE — 82962 GLUCOSE BLOOD TEST: CPT

## 2022-05-11 PROCEDURE — 74011250637 HC RX REV CODE- 250/637: Performed by: INTERNAL MEDICINE

## 2022-05-11 PROCEDURE — 74011000636 HC RX REV CODE- 636: Performed by: RADIOLOGY

## 2022-05-11 PROCEDURE — 51702 INSERT TEMP BLADDER CATH: CPT

## 2022-05-11 PROCEDURE — 80053 COMPREHEN METABOLIC PANEL: CPT

## 2022-05-11 PROCEDURE — 81001 URINALYSIS AUTO W/SCOPE: CPT

## 2022-05-11 PROCEDURE — 74011250636 HC RX REV CODE- 250/636: Performed by: INTERNAL MEDICINE

## 2022-05-11 PROCEDURE — 65270000029 HC RM PRIVATE

## 2022-05-11 PROCEDURE — 87324 CLOSTRIDIUM AG IA: CPT

## 2022-05-11 PROCEDURE — 83036 HEMOGLOBIN GLYCOSYLATED A1C: CPT

## 2022-05-11 PROCEDURE — 74177 CT ABD & PELVIS W/CONTRAST: CPT

## 2022-05-11 PROCEDURE — 87506 IADNA-DNA/RNA PROBE TQ 6-11: CPT

## 2022-05-11 PROCEDURE — 74011250636 HC RX REV CODE- 250/636: Performed by: EMERGENCY MEDICINE

## 2022-05-11 PROCEDURE — 99285 EMERGENCY DEPT VISIT HI MDM: CPT

## 2022-05-11 PROCEDURE — 36415 COLL VENOUS BLD VENIPUNCTURE: CPT

## 2022-05-11 PROCEDURE — 74011000250 HC RX REV CODE- 250: Performed by: INTERNAL MEDICINE

## 2022-05-11 PROCEDURE — 94761 N-INVAS EAR/PLS OXIMETRY MLT: CPT

## 2022-05-11 PROCEDURE — 85025 COMPLETE CBC W/AUTO DIFF WBC: CPT

## 2022-05-11 RX ORDER — ONDANSETRON 2 MG/ML
4 INJECTION INTRAMUSCULAR; INTRAVENOUS
Status: DISCONTINUED | OUTPATIENT
Start: 2022-05-11 | End: 2022-05-13 | Stop reason: HOSPADM

## 2022-05-11 RX ORDER — INSULIN LISPRO 100 [IU]/ML
INJECTION, SOLUTION INTRAVENOUS; SUBCUTANEOUS
Status: DISCONTINUED | OUTPATIENT
Start: 2022-05-11 | End: 2022-05-13 | Stop reason: HOSPADM

## 2022-05-11 RX ORDER — METRONIDAZOLE 250 MG/1
500 TABLET ORAL EVERY 8 HOURS
Status: DISCONTINUED | OUTPATIENT
Start: 2022-05-11 | End: 2022-05-13

## 2022-05-11 RX ORDER — LISINOPRIL 5 MG/1
10 TABLET ORAL DAILY
Status: DISCONTINUED | OUTPATIENT
Start: 2022-05-12 | End: 2022-05-13 | Stop reason: HOSPADM

## 2022-05-11 RX ORDER — PROMETHAZINE HYDROCHLORIDE 25 MG/1
12.5 TABLET ORAL
Status: DISCONTINUED | OUTPATIENT
Start: 2022-05-11 | End: 2022-05-13 | Stop reason: HOSPADM

## 2022-05-11 RX ORDER — METOPROLOL SUCCINATE 25 MG/1
25 TABLET, EXTENDED RELEASE ORAL DAILY
Status: DISCONTINUED | OUTPATIENT
Start: 2022-05-11 | End: 2022-05-13 | Stop reason: HOSPADM

## 2022-05-11 RX ORDER — DUTASTERIDE 0.5 MG/1
0.5 CAPSULE, LIQUID FILLED ORAL DAILY
Status: DISCONTINUED | OUTPATIENT
Start: 2022-05-12 | End: 2022-05-13 | Stop reason: HOSPADM

## 2022-05-11 RX ORDER — IBUPROFEN 200 MG
4 TABLET ORAL AS NEEDED
Status: DISCONTINUED | OUTPATIENT
Start: 2022-05-11 | End: 2022-05-13 | Stop reason: HOSPADM

## 2022-05-11 RX ORDER — SODIUM CHLORIDE 9 MG/ML
100 INJECTION, SOLUTION INTRAVENOUS CONTINUOUS
Status: DISCONTINUED | OUTPATIENT
Start: 2022-05-11 | End: 2022-05-13 | Stop reason: HOSPADM

## 2022-05-11 RX ORDER — SENNOSIDES 8.6 MG/1
1 TABLET ORAL DAILY PRN
Status: DISCONTINUED | OUTPATIENT
Start: 2022-05-11 | End: 2022-05-13 | Stop reason: HOSPADM

## 2022-05-11 RX ORDER — SODIUM CHLORIDE 0.9 % (FLUSH) 0.9 %
5-40 SYRINGE (ML) INJECTION EVERY 8 HOURS
Status: DISCONTINUED | OUTPATIENT
Start: 2022-05-11 | End: 2022-05-13 | Stop reason: HOSPADM

## 2022-05-11 RX ORDER — ASPIRIN 81 MG/1
81 TABLET ORAL DAILY
Status: DISCONTINUED | OUTPATIENT
Start: 2022-05-12 | End: 2022-05-13 | Stop reason: HOSPADM

## 2022-05-11 RX ORDER — ENOXAPARIN SODIUM 100 MG/ML
40 INJECTION SUBCUTANEOUS DAILY
Status: DISCONTINUED | OUTPATIENT
Start: 2022-05-11 | End: 2022-05-13 | Stop reason: HOSPADM

## 2022-05-11 RX ORDER — SODIUM CHLORIDE 0.9 % (FLUSH) 0.9 %
5-40 SYRINGE (ML) INJECTION AS NEEDED
Status: DISCONTINUED | OUTPATIENT
Start: 2022-05-11 | End: 2022-05-13 | Stop reason: HOSPADM

## 2022-05-11 RX ORDER — ACETAMINOPHEN 325 MG/1
650 TABLET ORAL
Status: DISCONTINUED | OUTPATIENT
Start: 2022-05-11 | End: 2022-05-13 | Stop reason: HOSPADM

## 2022-05-11 RX ORDER — VANCOMYCIN HYDROCHLORIDE 250 MG/5ML
125 POWDER, FOR SOLUTION ORAL EVERY 6 HOURS
Status: DISCONTINUED | OUTPATIENT
Start: 2022-05-11 | End: 2022-05-13 | Stop reason: HOSPADM

## 2022-05-11 RX ORDER — TAMSULOSIN HYDROCHLORIDE 0.4 MG/1
0.4 CAPSULE ORAL 2 TIMES DAILY
Status: DISCONTINUED | OUTPATIENT
Start: 2022-05-11 | End: 2022-05-13 | Stop reason: HOSPADM

## 2022-05-11 RX ORDER — ACETAMINOPHEN 650 MG/1
650 SUPPOSITORY RECTAL
Status: DISCONTINUED | OUTPATIENT
Start: 2022-05-11 | End: 2022-05-13 | Stop reason: HOSPADM

## 2022-05-11 RX ORDER — POLYETHYLENE GLYCOL 3350 17 G/17G
17 POWDER, FOR SOLUTION ORAL DAILY PRN
Status: DISCONTINUED | OUTPATIENT
Start: 2022-05-11 | End: 2022-05-13 | Stop reason: HOSPADM

## 2022-05-11 RX ADMIN — ENOXAPARIN SODIUM 40 MG: 100 INJECTION SUBCUTANEOUS at 18:28

## 2022-05-11 RX ADMIN — METRONIDAZOLE 500 MG: 250 TABLET ORAL at 21:02

## 2022-05-11 RX ADMIN — SODIUM CHLORIDE, PRESERVATIVE FREE 10 ML: 5 INJECTION INTRAVENOUS at 16:30

## 2022-05-11 RX ADMIN — TAMSULOSIN HYDROCHLORIDE 0.4 MG: 0.4 CAPSULE ORAL at 17:39

## 2022-05-11 RX ADMIN — METOPROLOL SUCCINATE 25 MG: 25 TABLET, FILM COATED, EXTENDED RELEASE ORAL at 21:02

## 2022-05-11 RX ADMIN — SODIUM CHLORIDE 1000 ML: 9 INJECTION, SOLUTION INTRAVENOUS at 11:09

## 2022-05-11 RX ADMIN — VANCOMYCIN HYDROCHLORIDE 125 MG: 250 POWDER, FOR SOLUTION ORAL at 15:07

## 2022-05-11 RX ADMIN — ACETAMINOPHEN 650 MG: 325 TABLET ORAL at 21:10

## 2022-05-11 RX ADMIN — IOPAMIDOL 100 ML: 755 INJECTION, SOLUTION INTRAVENOUS at 14:11

## 2022-05-11 RX ADMIN — FAMOTIDINE 20 MG: 10 INJECTION, SOLUTION INTRAVENOUS at 21:02

## 2022-05-11 RX ADMIN — SODIUM CHLORIDE 100 ML/HR: 9 INJECTION, SOLUTION INTRAVENOUS at 16:30

## 2022-05-11 RX ADMIN — VANCOMYCIN HYDROCHLORIDE 125 MG: 250 POWDER, FOR SOLUTION ORAL at 21:02

## 2022-05-11 RX ADMIN — METRONIDAZOLE 500 MG: 250 TABLET ORAL at 15:07

## 2022-05-11 NOTE — PROGRESS NOTES
Physical Therapy Note:    Orders acknowledged, chart reviewed, PT evaluation deferred. Pt is currently off the unit and unavailable to participate in PT evaluation. Will continue to follow and proceed with PT evaluation when appropriate.     Pairsh Jolley, PT, DPT, [facility]
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
## 2022-05-11 NOTE — PROGRESS NOTES
Admission Medication Reconciliation:    Comments/Recommendations:  -Medication history obtained via phone call  -Dutasteride (not on RxQuery) is new medication, added on by urologist on top of tamuslosin  -Recent clindamycin prescription from dentist on  for 7 day course  -Per wife, patient had C diff infection in 2021 and reports that the full oral vancomycin course was not fully completed as the PCP advised him not to finish course because his WBC came back down to normal.    Medications added: None  Medications removed: Lactinex, Miralax  Medications changed: None    Information obtained from: Patient, wife, RxQuery, chart review    Allergies:  Amoxicillin, Pravastatin, and Sulfa (sulfonamide antibiotics)    Prior to Admission Medications:   Prior to Admission Medications   Prescriptions Last Dose Informant Patient Reported? Taking?   aspirin delayed-release 81 mg tablet 2022 at AM Self Yes Yes   Sig: Take 81 mg by mouth daily. cholecalciferol (Vitamin D3) (1000 Units /25 mcg) tablet 2022 at AM Self Yes Yes   Sig: Take 1,000 Units by mouth daily. dutasteride (AVODART) 0.5 mg capsule 2022 at [facility] time Self Yes Yes   Sig: Take 0.5 mg by mouth daily. famotidine (PEPCID) 20 mg tablet 5/10/2022 at bedtime  No Yes   Sig: Take 1 Tablet by mouth daily. insulin NPH/insulin regular (NOVOLIN 70/30, HUMULIN 70/30) 100 unit/mL (70-30) injection 2022 at AM  No Yes   Si Units by SubCUTAneous route Before breakfast and dinner. lisinopriL (PRINIVIL, ZESTRIL) 10 mg tablet 2022 at AM  No Yes   Sig: Take 1 Tablet by mouth daily. metoprolol succinate (TOPROL-XL) 25 mg XL tablet 2022 at AM Self No Yes   Sig: TAKE 1 TABLET BY MOUTH EVERY DAY   multivitamin (ONE A DAY) tablet 2022 at AM Self Yes Yes   Sig: Take 1 Tablet by mouth daily. tamsulosin (FLOMAX) 0.4 mg capsule 2022 at AM Self Yes Yes   Sig: Take 0.4 mg by mouth two (2) times a day. Facility-Administered Medications: None     Thank you,  Elia Daly, PHARMD

## 2022-05-11 NOTE — ED NOTES
TRANSFER - IN REPORT:    Verbal report received from Aldo(name) on [address] [facility] [address]  being received from 5th floor (unit) for routine progression of care      Report consisted of patients Situation, Background, Assessment and   Recommendations(SBAR). Information from the following report(s) SBAR, ED Summary, Procedure Summary, Intake/Output, MAR, Recent Results and Quality Measures was reviewed with the receiving nurse. Opportunity for questions and clarification was provided. Assessment completed upon patients arrival to unit and care assumed.
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
## 2022-05-11 NOTE — PROGRESS NOTES
Bedside shift change report given to Fernanda (oncoming nurse) by Jessica Geller (offgoing nurse). Report included the following information SBAR, Kardex, Intake/Output, MAR and Recent Results. Per Rx, give po vanco when it arrives, no need to reschedule next dose. Oncoming RN aware.

## 2022-05-11 NOTE — ED TRIAGE NOTES
Pt brought in by EMS for diarrhea for several days (maybe 5 per wife). Hx of c-diff and similar symptoms. Pt states \"it just runs out\". AOX4. Pt states his abdomen is uncomfortable. No vomiting.

## 2022-05-11 NOTE — PROGRESS NOTES
Pt arrived to unit from ED via stretcher, unable to slide himself over to bed, assisted by staff. Pt alert to person, place, and situation, not time. Pt pleasant and cooperative, extremely forgetful. Skin intact. Sacrum is red but blanching, turn team order placed.

## 2022-05-12 PROBLEM — A04.72 C. DIFFICILE COLITIS: Status: ACTIVE | Noted: 2022-05-12

## 2022-05-12 LAB
ALBUMIN SERPL-MCNC: 2.9 G/DL (ref 3.5–5)
ALBUMIN/GLOB SERPL: 0.8 (ref 1.1–2.2)
ALP SERPL-CCNC: 60 U/L (ref 45–117)
ALT SERPL-CCNC: 15 U/L (ref 12–78)
ANION GAP SERPL CALC-SCNC: 6 MMOL/L (ref 5–15)
AST SERPL-CCNC: 14 U/L (ref 15–37)
BASOPHILS # BLD: 0.1 K/UL (ref 0–0.1)
BASOPHILS NFR BLD: 1 % (ref 0–1)
BILIRUB SERPL-MCNC: 1.2 MG/DL (ref 0.2–1)
BUN SERPL-MCNC: 11 MG/DL (ref 6–20)
BUN/CREAT SERPL: 12 (ref 12–20)
C DIFF GDH STL QL: POSITIVE
C DIFF TOX A+B STL QL IA: POSITIVE
CALCIUM SERPL-MCNC: 8.5 MG/DL (ref 8.5–10.1)
CAMPYLOBACTER SPECIES, DNA: NEGATIVE
CHLORIDE SERPL-SCNC: 109 MMOL/L (ref 97–108)
CO2 SERPL-SCNC: 24 MMOL/L (ref 21–32)
CREAT SERPL-MCNC: 0.9 MG/DL (ref 0.7–1.3)
DIFFERENTIAL METHOD BLD: ABNORMAL
ENTEROTOXIGEN E COLI, DNA: NEGATIVE
EOSINOPHIL # BLD: 0.3 K/UL (ref 0–0.4)
EOSINOPHIL NFR BLD: 3 % (ref 0–7)
ERYTHROCYTE [DISTWIDTH] IN BLOOD BY AUTOMATED COUNT: 12.7 % (ref 11.5–14.5)
EST. AVERAGE GLUCOSE BLD GHB EST-MCNC: 123 MG/DL
GLOBULIN SER CALC-MCNC: 3.8 G/DL (ref 2–4)
GLUCOSE BLD STRIP.AUTO-MCNC: 178 MG/DL (ref 65–117)
GLUCOSE BLD STRIP.AUTO-MCNC: 192 MG/DL (ref 65–117)
GLUCOSE BLD STRIP.AUTO-MCNC: 205 MG/DL (ref 65–117)
GLUCOSE BLD STRIP.AUTO-MCNC: 235 MG/DL (ref 65–117)
GLUCOSE SERPL-MCNC: 142 MG/DL (ref 65–100)
HBA1C MFR BLD: 5.9 % (ref 4–5.6)
HCT VFR BLD AUTO: 40.4 % (ref 36.6–50.3)
HGB BLD-MCNC: 13.5 G/DL (ref 12.1–17)
IMM GRANULOCYTES # BLD AUTO: 0 K/UL (ref 0–0.04)
IMM GRANULOCYTES NFR BLD AUTO: 0 % (ref 0–0.5)
INTERPRETATION: ABNORMAL
LYMPHOCYTES # BLD: 1.8 K/UL (ref 0.8–3.5)
LYMPHOCYTES NFR BLD: 20 % (ref 12–49)
MCH RBC QN AUTO: 29.9 PG (ref 26–34)
MCHC RBC AUTO-ENTMCNC: 33.4 G/DL (ref 30–36.5)
MCV RBC AUTO: 89.6 FL (ref 80–99)
MONOCYTES # BLD: 1.1 K/UL (ref 0–1)
MONOCYTES NFR BLD: 12 % (ref 5–13)
NEUTS SEG # BLD: 5.9 K/UL (ref 1.8–8)
NEUTS SEG NFR BLD: 64 % (ref 32–75)
NRBC # BLD: 0 K/UL (ref 0–0.01)
NRBC BLD-RTO: 0 PER 100 WBC
P SHIGELLOIDES DNA STL QL NAA+PROBE: NEGATIVE
PLATELET # BLD AUTO: 243 K/UL (ref 150–400)
PLATELET COMMENTS,PCOM: ABNORMAL
POTASSIUM SERPL-SCNC: 4 MMOL/L (ref 3.5–5.1)
PROT SERPL-MCNC: 6.7 G/DL (ref 6.4–8.2)
RBC # BLD AUTO: 4.51 M/UL (ref 4.1–5.7)
RBC MORPH BLD: ABNORMAL
SALMONELLA SPECIES, DNA: NEGATIVE
SERVICE CMNT-IMP: ABNORMAL
SHIGA TOXIN PRODUCING, DNA: NEGATIVE
SHIGELLA SP+EIEC IPAH STL QL NAA+PROBE: NEGATIVE
SODIUM SERPL-SCNC: 139 MMOL/L (ref 136–145)
VIBRIO SPECIES, DNA: NEGATIVE
WBC # BLD AUTO: 9.2 K/UL (ref 4.1–11.1)
Y. ENTEROCOLITICA, DNA: NEGATIVE

## 2022-05-12 PROCEDURE — 80053 COMPREHEN METABOLIC PANEL: CPT

## 2022-05-12 PROCEDURE — 97116 GAIT TRAINING THERAPY: CPT

## 2022-05-12 PROCEDURE — 97535 SELF CARE MNGMENT TRAINING: CPT

## 2022-05-12 PROCEDURE — 36415 COLL VENOUS BLD VENIPUNCTURE: CPT

## 2022-05-12 PROCEDURE — 74011636637 HC RX REV CODE- 636/637: Performed by: INTERNAL MEDICINE

## 2022-05-12 PROCEDURE — 74011250636 HC RX REV CODE- 250/636: Performed by: INTERNAL MEDICINE

## 2022-05-12 PROCEDURE — 97162 PT EVAL MOD COMPLEX 30 MIN: CPT

## 2022-05-12 PROCEDURE — 74011250637 HC RX REV CODE- 250/637: Performed by: INTERNAL MEDICINE

## 2022-05-12 PROCEDURE — 82962 GLUCOSE BLOOD TEST: CPT

## 2022-05-12 PROCEDURE — 74011000250 HC RX REV CODE- 250: Performed by: INTERNAL MEDICINE

## 2022-05-12 PROCEDURE — 85025 COMPLETE CBC W/AUTO DIFF WBC: CPT

## 2022-05-12 PROCEDURE — 97530 THERAPEUTIC ACTIVITIES: CPT

## 2022-05-12 PROCEDURE — 65270000029 HC RM PRIVATE

## 2022-05-12 PROCEDURE — 97165 OT EVAL LOW COMPLEX 30 MIN: CPT

## 2022-05-12 RX ADMIN — SODIUM CHLORIDE, PRESERVATIVE FREE 10 ML: 5 INJECTION INTRAVENOUS at 06:27

## 2022-05-12 RX ADMIN — TAMSULOSIN HYDROCHLORIDE 0.4 MG: 0.4 CAPSULE ORAL at 18:27

## 2022-05-12 RX ADMIN — VANCOMYCIN HYDROCHLORIDE 125 MG: 250 POWDER, FOR SOLUTION ORAL at 11:40

## 2022-05-12 RX ADMIN — FAMOTIDINE 20 MG: 10 INJECTION, SOLUTION INTRAVENOUS at 08:20

## 2022-05-12 RX ADMIN — Medication 3 UNITS: at 16:30

## 2022-05-12 RX ADMIN — Medication 3 UNITS: at 11:38

## 2022-05-12 RX ADMIN — METRONIDAZOLE 500 MG: 250 TABLET ORAL at 21:15

## 2022-05-12 RX ADMIN — Medication 2 UNITS: at 07:30

## 2022-05-12 RX ADMIN — DUTASTERIDE 0.5 MG: 0.5 CAPSULE, LIQUID FILLED ORAL at 10:48

## 2022-05-12 RX ADMIN — SODIUM CHLORIDE, PRESERVATIVE FREE 10 ML: 5 INJECTION INTRAVENOUS at 21:16

## 2022-05-12 RX ADMIN — FAMOTIDINE 20 MG: 10 INJECTION, SOLUTION INTRAVENOUS at 21:15

## 2022-05-12 RX ADMIN — LISINOPRIL 10 MG: 5 TABLET ORAL at 08:20

## 2022-05-12 RX ADMIN — METRONIDAZOLE 500 MG: 250 TABLET ORAL at 06:25

## 2022-05-12 RX ADMIN — SODIUM CHLORIDE 100 ML/HR: 9 INJECTION, SOLUTION INTRAVENOUS at 00:15

## 2022-05-12 RX ADMIN — METOPROLOL SUCCINATE 25 MG: 25 TABLET, FILM COATED, EXTENDED RELEASE ORAL at 18:27

## 2022-05-12 RX ADMIN — TAMSULOSIN HYDROCHLORIDE 0.4 MG: 0.4 CAPSULE ORAL at 08:20

## 2022-05-12 RX ADMIN — VANCOMYCIN HYDROCHLORIDE 125 MG: 250 POWDER, FOR SOLUTION ORAL at 06:25

## 2022-05-12 RX ADMIN — ACETAMINOPHEN 650 MG: 325 TABLET ORAL at 21:15

## 2022-05-12 RX ADMIN — ENOXAPARIN SODIUM 40 MG: 100 INJECTION SUBCUTANEOUS at 21:15

## 2022-05-12 RX ADMIN — VANCOMYCIN HYDROCHLORIDE 125 MG: 250 POWDER, FOR SOLUTION ORAL at 00:15

## 2022-05-12 RX ADMIN — METRONIDAZOLE 500 MG: 250 TABLET ORAL at 13:45

## 2022-05-12 RX ADMIN — SODIUM CHLORIDE, PRESERVATIVE FREE 10 ML: 5 INJECTION INTRAVENOUS at 16:31

## 2022-05-12 RX ADMIN — Medication 15 UNITS: at 16:30

## 2022-05-12 RX ADMIN — VANCOMYCIN HYDROCHLORIDE 125 MG: 250 POWDER, FOR SOLUTION ORAL at 19:21

## 2022-05-12 RX ADMIN — ASPIRIN 81 MG: 81 TABLET, COATED ORAL at 08:20
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
## 2022-05-12 NOTE — PROGRESS NOTES
Problem: Self Care Deficits Care Plan (Adult)  Goal: *Acute Goals and Plan of Care (Insert Text)  Description: FUNCTIONAL STATUS PRIOR TO ADMISSION: Patient was modified independent using a rollator and wheelchair  for functional mobility. HOME SUPPORT: The patient lived with his spouse and son, but did not require assist.    Occupational Therapy Goals  Initiated 5/12/2022  1. Patient will perform lower body dressing with supervision/set-up within 7 day(s). 2.  Patient will perform grooming, standing at sink, with supervision/set-up within 7 day(s). 3.  Patient will perform toilet transfers with supervision/set-up within 7 day(s). 4.  Patient will perform all aspects of toileting with supervision/set-up within 7 day(s). 5.  Patient will participate in upper extremity therapeutic exercise/activities with supervision/set-up for 10 minutes within 7 day(s). Outcome: Progressing Towards Goal   OCCUPATIONAL THERAPY EVALUATION  Patient: Alisha Rea Sr. [de-identified] y.o. male)  Date: 5/12/2022  Primary Diagnosis: Acute colitis [K52.9]        Precautions:   Fall    ASSESSMENT  Based on the objective data described below, the patient presents with hospital admission secondary to acute colitis. Patient received in bed, finishing meal tray. Patient agreeable to activity. Patient alert and oriented x 3 and requires some prompting for orientation at times. Patient to EOB with SBA and able to perform sit to stand with CGA. Patient using RW for support but difficulty managing as patient used to using rollator at home. Patient requires min assist for transfer. Patient left in NAD seated in chair, alarm set, sitter present in room. Current Level of Function Impacting Discharge (ADLs/self-care): CGA/Min A    Functional Outcome Measure: The patient scored 35/100 on the Barthel INDex  outcome measure.       Other factors to consider for discharge: lives with family        PLAN :  Recommendations and Planned Interventions: self care training, functional mobility training, therapeutic exercise, balance training, therapeutic activities, endurance activities, patient education, home safety training, and family training/education    Frequency/Duration: Patient will be followed by occupational therapy 5 times a week to address goals. Recommendation for discharge: (in order for the patient to meet his/her long term goals)  To be determined: return home with [facility] services if family able to assist as patient high fall risk, vs rehab placement    This discharge recommendation:  Has been made in collaboration with the attending provider and/or case management    IF patient discharges home will need the following DME: TBD       SUBJECTIVE:   Patient stated I worked for [employer].     OBJECTIVE DATA SUMMARY:   HISTORY:   Past Medical History:   Diagnosis Date    CAD (coronary artery disease), native coronary artery     Cancer ([facility] [address]     melanoma removed on forehead    Depression     Diabetic neuropathy ([facility] [address]     ED (erectile dysfunction)     Enlarged prostate 07/19/2021    Hypertension, benign     Ill-defined condition     states he has memory problems    Postsurgical aortocoronary bypass status     Type II or unspecified type diabetes mellitus without mention of complication, not stated as uncontrolled      Past Surgical History:   Procedure Laterality Date    COLONOSCOPY N/A 5/3/2016    COLONOSCOPY performed by Avel Ash MD at [address] [place] [address] 10    ECHO 2D ADULT  12/2009    EF 50%, basal inferior akinesis, mild MR    ECHO 2D ADULT  9/21/11    LVEF 50%, inferior AK, unchanged from Dec 2009    [address] CORONARY ARTERY BYPASS GRAFT  2009    HX OTHER SURGICAL  2008    melanoma removed from forehead       Expanded or extensive additional review of patient history:     Home Situation  Home Environment: Private residence  # Steps to Enter: 4  Rails to Enter: Yes  One/Two Story Residence: Other (Comment) (3 story)  Living Alone: No  Support Systems: Spouse/Significant Other,Child(ana laura)  Patient Expects to be Discharged to[de-identified] Home  Current DME Used/Available at Home: irish [employer],Wheelchair    Hand dominance: Right    EXAMINATION OF PERFORMANCE DEFICITS:  Cognitive/Behavioral Status:  Neurologic State: Alert;Confused  Orientation Level: Oriented to place;Oriented to person;Disoriented to time;Oriented to situation  Cognition: Follows commands  Perception: Appears intact  Perseveration: No perseveration noted  Safety/Judgement: Decreased awareness of need for safety    Skin: intact as seen    Edema: none noted     Hearing: Auditory  Auditory Impairment: Hard of hearing, bilateral    Vision/Perceptual:                                     Range of Motion  AROM: Within functional limits                         Strength:  Strength: Generally decreased, functional                Coordination:  Coordination: Generally decreased, functional            Tone & Sensation:  Tone: Normal                         Balance:  Sitting: Intact; With support  Standing: Impaired; With support  Standing - Static: Fair;Constant support  Standing - Dynamic : Fair;Constant support    Functional Mobility and Transfers for ADLs:  Bed Mobility:  Rolling: Stand-by assistance  Supine to Sit: Stand-by assistance  Scooting: Stand-by assistance    Transfers:  Sit to Stand: Contact guard assistance  Stand to Sit: Contact guard assistance  Bed to Chair: Minimum assistance;Assist x1  Bathroom Mobility: Minimum assistance  Toilet Transfer : Contact guard assistance  Assistive Device : Walker, rolling    ADL Assessment:  Feeding: Setup    Oral Facial Hygiene/Grooming: Contact guard assistance    Bathing: Minimum assistance    Upper Body Dressing: Contact guard assistance    Lower Body Dressing: Minimum assistance    Toileting: Minimum assistance                ADL Intervention and task modifications:                                     Cognitive Retraining  Safety/Judgement: Decreased awareness of need for safety    Therapeutic Exercise:     Functional Measure:    Barthel Index:  Bathin  Bladder: 0  Bowels: 10  Groomin  Dressin  Feedin  Mobility: 0  Stairs: 0  Toilet Use: 5  Transfer (Bed to Chair and Back): 10  Total: 35/100      The Barthel ADL Index: Guidelines  1. The index should be used as a record of what a patient does, not as a record of what a patient could do. 2. The main aim is to establish degree of independence from any help, physical or verbal, however minor and for whatever reason. 3. The need for supervision renders the patient not independent. 4. A patient's performance should be established using the best available evidence. Asking the patient, friends/relatives and nurses are the usual sources, but direct observation and common sense are also important. However direct testing is not needed. 5. Usually the patient's performance over the preceding 24-48 hours is important, but occasionally longer periods will be relevant. 6. Middle categories imply that the patient supplies over 50 per cent of the effort. 7. Use of aids to be independent is allowed. Score Interpretation (from [address] [address])    Independent   60-79 Minimally independent   40-59 Partially dependent   20-39 Very dependent   <20 Totally dependent     -Katie Ghotra., Barthel, D.W. (1965). Functional evaluation: the Barthel Index. [facility] ([address] (1997). The Barthel activities of daily living index: self-reporting versus actual performance in the old (> or = 75 years). Journal of [address] [place] [address] 45(7), [facility], J.J.M.F, Tim Sotelo.Cruz. (1999). Measuring the change in disability after inpatient rehabilitation; comparison of the responsiveness of the Barthel Index and Functional [place]ton Measure. Journal of Neurology, Neurosurgery, and Psychiatry, 66(4), .   -KEYANNA Purcell, Gasper burris Radha Conteh M.A. (2004) Assessment of post-stroke quality of life in cost-effectiveness studies: The usefulness of the Barthel Index and the EuroQoL-5D. Quality of Life Research, 15, 211-17         Occupational Therapy Evaluation Charge Determination   History Examination Decision-Making   LOW Complexity : Brief history review  LOW Complexity : 1-3 performance deficits relating to physical, cognitive , or psychosocial skils that result in activity limitations and / or participation restrictions  LOW Complexity : No comorbidities that affect functional and no verbal or physical assistance needed to complete eval tasks       Basd on the above components, the patient evaluation is determined to be of the following complexity level: LOW   Pain Rating:  None noted     Activity Tolerance:   Good    After treatment patient left in no apparent distress:    Sitting in chair, Call bell within reach, Bed / chair alarm activated, and Caregiver / family present    COMMUNICATION/EDUCATION:   The patients plan of care was discussed with: Physical therapist and Registered nurse. Home safety education was provided and the patient/caregiver indicated understanding., Patient/family have participated as able in goal setting and plan of care. , and Patient/family agree to work toward stated goals and plan of care. This patients plan of care is appropriate for delegation to [facility].     Thank you for this referral.  Tree Lazcano OTR/L  Time Calculation: 23 mins

## 2022-05-13 VITALS
HEIGHT: 72 IN | BODY MASS INDEX: 26.64 KG/M2 | SYSTOLIC BLOOD PRESSURE: 129 MMHG | DIASTOLIC BLOOD PRESSURE: 61 MMHG | RESPIRATION RATE: 16 BRPM | TEMPERATURE: 97.7 F | OXYGEN SATURATION: 100 % | HEART RATE: 70 BPM | WEIGHT: 196.65 LBS

## 2022-05-13 LAB
GLUCOSE BLD STRIP.AUTO-MCNC: 135 MG/DL (ref 65–117)
GLUCOSE BLD STRIP.AUTO-MCNC: 195 MG/DL (ref 65–117)
SERVICE CMNT-IMP: ABNORMAL
SERVICE CMNT-IMP: ABNORMAL

## 2022-05-13 PROCEDURE — 74011000250 HC RX REV CODE- 250: Performed by: INTERNAL MEDICINE

## 2022-05-13 PROCEDURE — 74011250636 HC RX REV CODE- 250/636: Performed by: INTERNAL MEDICINE

## 2022-05-13 PROCEDURE — 74011636637 HC RX REV CODE- 636/637: Performed by: INTERNAL MEDICINE

## 2022-05-13 PROCEDURE — 82962 GLUCOSE BLOOD TEST: CPT

## 2022-05-13 PROCEDURE — 74011250637 HC RX REV CODE- 250/637: Performed by: INTERNAL MEDICINE

## 2022-05-13 PROCEDURE — 2709999900 HC NON-CHARGEABLE SUPPLY

## 2022-05-13 RX ORDER — VANCOMYCIN HYDROCHLORIDE 250 MG/5ML
125 POWDER, FOR SOLUTION ORAL EVERY 6 HOURS
Qty: 80 ML | Refills: 0 | Status: SHIPPED | OUTPATIENT
Start: 2022-05-13 | End: 2022-05-21

## 2022-05-13 RX ADMIN — METRONIDAZOLE 500 MG: 250 TABLET ORAL at 06:21

## 2022-05-13 RX ADMIN — VANCOMYCIN HYDROCHLORIDE 125 MG: 250 POWDER, FOR SOLUTION ORAL at 06:22

## 2022-05-13 RX ADMIN — TAMSULOSIN HYDROCHLORIDE 0.4 MG: 0.4 CAPSULE ORAL at 09:40

## 2022-05-13 RX ADMIN — VANCOMYCIN HYDROCHLORIDE 125 MG: 250 POWDER, FOR SOLUTION ORAL at 00:03

## 2022-05-13 RX ADMIN — FAMOTIDINE 20 MG: 10 INJECTION, SOLUTION INTRAVENOUS at 09:40

## 2022-05-13 RX ADMIN — ASPIRIN 81 MG: 81 TABLET, COATED ORAL at 09:39

## 2022-05-13 RX ADMIN — DUTASTERIDE 0.5 MG: 0.5 CAPSULE, LIQUID FILLED ORAL at 09:53

## 2022-05-13 RX ADMIN — Medication 15 UNITS: at 09:40

## 2022-05-13 RX ADMIN — SODIUM CHLORIDE, PRESERVATIVE FREE 10 ML: 5 INJECTION INTRAVENOUS at 06:33

## 2022-05-13 RX ADMIN — LISINOPRIL 10 MG: 5 TABLET ORAL at 09:39

## 2022-05-13 NOTE — DISCHARGE INSTRUCTIONS
HOSPITALIST DISCHARGE INSTRUCTIONS  NAME: Yohannes Radford Sr.   :  1940   MRN:  [id number]     Date/Time:  2022 10:10 AM    ADMIT DATE: 2022     DISCHARGE DATE: 2022     ADMITTING DIAGNOSIS:  C dif Colitis  Confusion    DISCHARGE DIAGNOSIS:  You were admitted for evaluation and treatment of the above. Confusion was likely caused by acute infection due to C dif. You will discharge on a course of oral vancomycin. Make sure you complete this course of antibiotics all the way through. MEDICATIONS:    · It is important that you take the medication exactly as they are prescribed. · Keep your medication in the bottles provided by the pharmacist and keep a list of the medication names, dosages, and times to be taken in your wallet. · Do not take other medications without consulting your doctor. If you experience any of the following symptoms then please call your primary care physician or return to the emergency room if you cannot get hold of your doctor:  Fever, chills, nausea, vomiting, diarrhea, change in mentation, falling, bleeding, shortness of breath    Follow Up:  Dr. Edilberto Vidales MD  you are to call and set up an appointment to see them in 1 week. Information obtained by :  I understand that if any problems occur once I am at home I am to contact my physician. I understand and acknowledge receipt of the instructions indicated above.                                                                                                                                            Physician's or R.N.'s Signature                                                                  Date/Time                                                                                                                                              Patient or Representative Signature                                                          Date/Time

## 2022-05-13 NOTE — PROGRESS NOTES
Problem: Risk for Spread of Infection  Goal: Prevent transmission of infectious organism to others  Description: Prevent the transmission of infectious organisms to other patients, staff members, and visitors. Outcome: Progressing Towards Goal     Problem: Falls - Risk of  Goal: *Absence of Falls  Description: Document Henning Fall Risk and appropriate interventions in the flowsheet. Outcome: Progressing Towards Goal  Note: Fall Risk Interventions:  Mobility Interventions: Bed/chair exit alarm,Communicate number of staff needed for ambulation/transfer,Patient to call before getting OOB,Utilize walker, cane, or other assistive device    Mentation Interventions: Adequate sleep, hydration, pain control,Bed/chair exit alarm,Family/sitter at bedside,More frequent rounding,Reorient patient,Room close to nurse's station    Medication Interventions: Bed/chair exit alarm,Patient to call before getting OOB,Teach patient to arise slowly,Utilize gait belt for transfers/ambulation    Elimination Interventions: Bed/chair exit alarm,Call light in reach,Toileting schedule/hourly rounds    History of Falls Interventions: Bed/chair exit alarm,Room close to nurse's station         Problem: Pressure Injury - Risk of  Goal: *Prevention of pressure injury  Description: Document Savage Scale and appropriate interventions in the flowsheet.   Outcome: Progressing Towards Goal  Note: Pressure Injury Interventions:  Sensory Interventions: Assess changes in LOC,Monitor skin under medical devices,Minimize linen layers,Keep linens dry and wrinkle-free    Moisture Interventions: Absorbent underpads,Internal/External urinary devices    Activity Interventions: Pressure redistribution bed/mattress(bed type),PT/OT evaluation    Mobility Interventions: HOB 30 degrees or less,PT/OT evaluation    Nutrition Interventions: Document food/fluid/supplement intake    Friction and Shear Interventions: Lift team/patient mobility team,Minimize layers,HOB 30 degrees or less                Problem: Patient Education: Go to Patient Education Activity  Goal: Patient/Family Education  Outcome: Progressing Towards Goal

## 2022-05-13 NOTE — PROGRESS NOTES
5/13/2022   CARE MANAGEMENT NOTE:  CM reviewed EMR. Pt was admitted with acute colitis; also CAD, DM. Reportedly, pt resides with his wife Rosalee Nicole ([phone]; [phone]), and son with intellectual disability.       RUR 9%     Transition Plan of Care:  1. CM spoke with [facility] APS worker Rickey Lex [phone]) via phone this a.m. and pt is cleared to return home with his family  2. Pt will return home with his wife and son  3. Billy  (PT) has been arranged  4. Outpt f/u  5. Wife will transport pt home     No further post discharge needs indicated at this writing.   Antonette

## 2022-05-13 NOTE — PROGRESS NOTES
Bedside and Verbal shift change report given to April RN (oncoming nurse) by George Santa RN (offgoing nurse). Report included the following informat SBAR, Intake/Output, MAR, Recent Results and Med Rec Status. Sitter at bedside.

## 2022-05-13 NOTE — PROGRESS NOTES
PIV removed, discharge instructions reviewed with pt and family. Opportunity given to ask questions. All voiced understanding.

## 2022-05-16 ENCOUNTER — PATIENT OUTREACH (OUTPATIENT)
Dept: CASE MANAGEMENT | Age: 82
End: 2022-05-16

## 2022-05-16 NOTE — PROGRESS NOTES
Care Transitions Initial Call    Call within 2 business days of discharge: Yes     Patient: Sophia Garvey Sr. Patient : 1940 MRN: [id number]    Last Discharge [address]       Complaint Diagnosis Description Type Department Provider    22 Diarrhea Diarrhea, unspecified type . .. ED to Hosp-Admission (Discharged) (ADMIT) Dennie Moorman, MD; Juan Juarez. .. Was this an external facility discharge? No Discharge Facility: n/a    Challenges to be reviewed by the provider   Additional needs identified to be addressed with provider: no  none         Method of communication with provider : none    Discussed COVID-19 related testing which was not done at this time. Test results were not done. Patient informed of results, if available? n/a     Advance Care Planning:   Does patient have an Advance Directive: not on file. Inpatient Readmission Risk score: Unplanned Readmit Risk Score: 9.4 ( )    Was this a readmission? no   Patient stated reason for the admission: n/a    Patients top risk factors for readmission: medical condition-CHF, HTN, reoccur C-diff, CAD, DM, indwelling pederson and medication management   Interventions to address risk factors: Scheduled appointment with PCP- and Obtained and reviewed discharge summary and/or continuity of care documents    Care Transition Nurse (CTN) contacted the patient by telephone to perform post hospital discharge assessment. Verified name and  with patient as identifiers. Provided introduction to self, and explanation of the CTN role. CTN reviewed discharge instructions, medical action plan and red flags with patient who verbalized understanding. Were discharge instructions available to patient? yes. Reviewed appropriate site of care based on symptoms and resources available to patient including: PCP. Patient given an opportunity to ask questions and does not have any further questions or concerns at this time.  The patient agrees to contact the PCP office for questions related to their healthcare. Medication reconciliation was performed with patient/family, who verbalizes understanding of administration of home medications. Referral to Pharm D needed: no     Home Health/Outpatient orders at discharge: [facility]: [place] 1958  Date of initial visit: declined services    Durable Medical Equipment ordered at discharge: None     Was patient discharged with a pulse oximeter? no    Discussed follow-up appointments. If no appointment was previously scheduled, appointment scheduling offered: n/a. Is follow up appointment scheduled within 7 days of discharge? yes. [address] follow up appointment(s):   Future Appointments   Date Time Provider Sage Bhagat   8/23/2022  1:00 PM LINNEA RomanoSF BS AMB   2/20/2023  3:40 PM Isis Gray MD CAVSF BS AMB     Non-[facility] follow up appointment(s):   PCP- Dr. Dixie Odonnell on [address]    Plan for follow-up call in 5-7 days based on severity of symptoms and risk factors. Plan for next call: self management-monitor red flags and follow up appointment-attend as directed  CTN provided contact information for future needs. Goals Addressed                 This Visit's Progress     Prevent complications post hospitalization. 05/17/22   Monitor red flags- ensure hydration, eating and drinking OK, denies any diarrhea.  Medication compliance- complete course of vancomycin   May benefit from probiotics, wife encourages him to eat yogurt daily.

## 2022-05-19 NOTE — PROGRESS NOTES
Physician Progress Note      Rosi Nicholsi  CSN #:                  [id number]  :                       1940  ADMIT DATE:       2022 10:08 AM  DISCH DATE:        2022 12:50 PM  RESPONDING  PROVIDER #:        Jerry Patel MD          QUERY TEXT:    Good Morning,    This patient admitted on 2022-2022 for C-diff  The patient has known BPH and is maintained on Flomax. If possible, can you please further specify the BPH and  please document in progress notes and discharge summary if you are evaluating and/or treating any of the following: The medical record reflects the following:  Risk Factors: Known BPH  Clinical Indicators:  Documented BPH and pt continued Flomax and proscar this admission  Treatment: Continued with Flomax and Proscar this admission, Chronic pederson      Thank you  Shane Genao, [address] [place] [address]  Options provided:  -- BPH with urinary obstruction  -- BPH with urinary retention without obstruction  -- Other - I will add my own diagnosis  -- Disagree - Not applicable / Not valid  -- Disagree - Clinically unable to determine / Unknown  -- Refer to Clinical Documentation Reviewer    PROVIDER RESPONSE TEXT:    This patient has BPH with urinary obstruction.     Query created by: Meseret Arzate on 2022 8:16 AM      Electronically signed by:  Jerry Patel MD 2022 12:20 PM

## 2022-05-24 ENCOUNTER — PATIENT OUTREACH (OUTPATIENT)
Dept: CASE MANAGEMENT | Age: 82
End: 2022-05-24

## 2022-05-24 NOTE — PROGRESS NOTES
Care Transitions Follow Up Call    Challenges to be reviewed by the provider   Contacted PCP office patient cancelled hospital follow up set for 2022. Patient reports he did not feel up to going to see his doctor. States he \"could be better\". Denies any diarrhea, almost completed vancomycin course. Wife things urine smell foul. Denies fever/chills. She will contact PCP office and see about rescheduling appointment or possible dropping off a urine sample. [address] contact info provided. CTN offered to set up visit. Wife wrote down contact info but declined assistance with setting up visit. Care Transition Nurse (CTN) contacted the patient by telephone to follow up after admission on . Verified name and  with patient as identifiers. Patient placed CTN on speaker to allow communication with both him and his wife. Addressed changes since last contact: patient will complete vancomycin tomorrow. Follow up appointment completed? no.   Was follow up appointment scheduled within 7 days of discharge? patient scheduled for  hospital follow up with provider but cancelled day of appt . Patients top risk factors for readmission: depression, functional physical ability, lack of knowledge about disease, level of motivation, PCP relationship and support system   Interventions to address risk factors: encouraged patient/family to follow up with PCP. Offered assistance with [address] [place] [address] Dr visit. Wife offered may contact urologist regarding UTI concerns. [facility] follow up appointment(s):   Future Appointments   Date Time Provider Sage Bhagat   2022  1:00 PM LINNEA ValenzuelaF BS AMB   2023  3:40 PM Jeromy Gray MD CAVSF BS AMB     Non-[facility] follow up appointment(s): not listed    CTN provided contact information for future needs. Plan for follow-up call in 5-7 days based on severity of symptoms and risk factors.   Plan for next call: follow up appointment-reschedule hospital follow up.     Goals Addressed                 This Visit's Progress     Prevent complications post hospitalization. 05/17/22   Monitor red flags- ensure hydration, eating and drinking OK, denies any diarrhea.  Medication compliance- complete course of vancomycin   May benefit from probiotics, wife encourages him to eat yogurt daily. 05/24/22  Patient not feeling well today states \"could be better\". Scheduled for hospital follow up yesterday but decided in the morning that he was not up to going so cancelled. Wife feels urine has \"foul\" odor. She will contact PCP office and try to reschedule an appointment. She will see if she can drop off a urine sample today or tomorrow for testing. She was supplied with [facility] information but declined assistance with setting up a visit. Patient denies fever/chills, diarrhea, eating and drinking with no issues.

## 2022-05-26 ENCOUNTER — HOSPITAL ENCOUNTER (EMERGENCY)
Age: 82
Discharge: HOME | End: 2022-05-27
Attending: STUDENT IN AN ORGANIZED HEALTH CARE EDUCATION/TRAINING PROGRAM
Payer: MEDICARE

## 2022-05-26 VITALS
HEART RATE: 112 BPM | RESPIRATION RATE: 18 BRPM | WEIGHT: 184.8 LBS | TEMPERATURE: 97.5 F | OXYGEN SATURATION: 96 % | SYSTOLIC BLOOD PRESSURE: 114 MMHG | BODY MASS INDEX: 25.03 KG/M2 | HEIGHT: 72 IN | DIASTOLIC BLOOD PRESSURE: 69 MMHG

## 2022-05-26 DIAGNOSIS — R31.0 GROSS HEMATURIA: Primary | ICD-10-CM

## 2022-05-26 DIAGNOSIS — T83.9XXA PROBLEM WITH FOLEY CATHETER, INITIAL ENCOUNTER (HCC): ICD-10-CM

## 2022-05-26 DIAGNOSIS — S37.30XA TRAUMA OF URETHRA, INITIAL ENCOUNTER: ICD-10-CM

## 2022-05-26 LAB
ANION GAP SERPL CALC-SCNC: 9 MMOL/L (ref 5–15)
APPEARANCE UR: ABNORMAL
BACTERIA URNS QL MICRO: NEGATIVE /HPF
BASOPHILS # BLD: 0 K/UL (ref 0–0.1)
BASOPHILS NFR BLD: 0 % (ref 0–1)
BILIRUB UR QL CFM: POSITIVE
BUN SERPL-MCNC: 23 MG/DL (ref 6–20)
BUN/CREAT SERPL: 23 (ref 12–20)
CALCIUM SERPL-MCNC: 8.9 MG/DL (ref 8.5–10.1)
CHLORIDE SERPL-SCNC: 105 MMOL/L (ref 97–108)
CO2 SERPL-SCNC: 21 MMOL/L (ref 21–32)
COLOR UR: ABNORMAL
CREAT SERPL-MCNC: 1.01 MG/DL (ref 0.7–1.3)
DIFFERENTIAL METHOD BLD: ABNORMAL
EOSINOPHIL # BLD: 0.1 K/UL (ref 0–0.4)
EOSINOPHIL NFR BLD: 1 % (ref 0–7)
EPITH CASTS URNS QL MICRO: ABNORMAL /LPF
ERYTHROCYTE [DISTWIDTH] IN BLOOD BY AUTOMATED COUNT: 12.4 % (ref 11.5–14.5)
GLUCOSE SERPL-MCNC: 188 MG/DL (ref 65–100)
GLUCOSE UR STRIP.AUTO-MCNC: 100 MG/DL
HCT VFR BLD AUTO: 38.3 % (ref 36.6–50.3)
HGB BLD-MCNC: 12.9 G/DL (ref 12.1–17)
HGB UR QL STRIP: ABNORMAL
IMM GRANULOCYTES # BLD AUTO: 0 K/UL (ref 0–0.04)
IMM GRANULOCYTES NFR BLD AUTO: 0 % (ref 0–0.5)
KETONES UR QL STRIP.AUTO: NEGATIVE MG/DL
LEUKOCYTE ESTERASE UR QL STRIP.AUTO: ABNORMAL
LYMPHOCYTES # BLD: 1.1 K/UL (ref 0.8–3.5)
LYMPHOCYTES NFR BLD: 10 % (ref 12–49)
MCH RBC QN AUTO: 29.3 PG (ref 26–34)
MCHC RBC AUTO-ENTMCNC: 33.7 G/DL (ref 30–36.5)
MCV RBC AUTO: 87 FL (ref 80–99)
MONOCYTES # BLD: 0.8 K/UL (ref 0–1)
MONOCYTES NFR BLD: 7 % (ref 5–13)
NEUTS SEG # BLD: 9.3 K/UL (ref 1.8–8)
NEUTS SEG NFR BLD: 82 % (ref 32–75)
NITRITE UR QL STRIP.AUTO: POSITIVE
NRBC # BLD: 0 K/UL (ref 0–0.01)
NRBC BLD-RTO: 0 PER 100 WBC
PH UR STRIP: 6.5 [PH] (ref 5–8)
PLATELET # BLD AUTO: 353 K/UL (ref 150–400)
PMV BLD AUTO: 8.5 FL (ref 8.9–12.9)
POTASSIUM SERPL-SCNC: 4.1 MMOL/L (ref 3.5–5.1)
PROT UR STRIP-MCNC: >300 MG/DL
RBC # BLD AUTO: 4.4 M/UL (ref 4.1–5.7)
RBC #/AREA URNS HPF: >100 /HPF (ref 0–5)
SODIUM SERPL-SCNC: 135 MMOL/L (ref 136–145)
SP GR UR REFRACTOMETRY: 1.02 (ref 1–1.03)
UROBILINOGEN UR QL STRIP.AUTO: 1 EU/DL (ref 0.2–1)
WBC # BLD AUTO: 11.4 K/UL (ref 4.1–11.1)
WBC URNS QL MICRO: ABNORMAL /HPF (ref 0–4)

## 2022-05-26 PROCEDURE — 87086 URINE CULTURE/COLONY COUNT: CPT

## 2022-05-26 PROCEDURE — 36415 COLL VENOUS BLD VENIPUNCTURE: CPT

## 2022-05-26 PROCEDURE — 74011000250 HC RX REV CODE- 250: Performed by: STUDENT IN AN ORGANIZED HEALTH CARE EDUCATION/TRAINING PROGRAM

## 2022-05-26 PROCEDURE — 85025 COMPLETE CBC W/AUTO DIFF WBC: CPT

## 2022-05-26 PROCEDURE — 81001 URINALYSIS AUTO W/SCOPE: CPT

## 2022-05-26 PROCEDURE — 74011000250 HC RX REV CODE- 250

## 2022-05-26 PROCEDURE — 96374 THER/PROPH/DIAG INJ IV PUSH: CPT

## 2022-05-26 PROCEDURE — 99284 EMERGENCY DEPT VISIT MOD MDM: CPT

## 2022-05-26 PROCEDURE — 51798 US URINE CAPACITY MEASURE: CPT

## 2022-05-26 PROCEDURE — 74011250636 HC RX REV CODE- 250/636: Performed by: STUDENT IN AN ORGANIZED HEALTH CARE EDUCATION/TRAINING PROGRAM

## 2022-05-26 PROCEDURE — 80048 BASIC METABOLIC PNL TOTAL CA: CPT

## 2022-05-26 RX ORDER — LIDOCAINE HYDROCHLORIDE 20 MG/ML
JELLY TOPICAL
Status: COMPLETED | OUTPATIENT
Start: 2022-05-26 | End: 2022-05-26

## 2022-05-26 RX ORDER — LIDOCAINE HYDROCHLORIDE 20 MG/ML
JELLY TOPICAL
Status: COMPLETED
Start: 2022-05-26 | End: 2022-05-26

## 2022-05-26 RX ADMIN — LIDOCAINE HYDROCHLORIDE: 20 JELLY TOPICAL at 20:22

## 2022-05-26 RX ADMIN — CEFTRIAXONE 1 G: 1 INJECTION, POWDER, FOR SOLUTION INTRAMUSCULAR; INTRAVENOUS at 22:34

## 2022-05-27 PROCEDURE — 77030040830 HC CATH URETH FOL MDII -A

## 2022-05-27 PROCEDURE — 2709999900 HC NON-CHARGEABLE SUPPLY

## 2022-05-27 PROCEDURE — 77030005549 HC CATH URETH FOL 3W COVD -A

## 2022-05-27 PROCEDURE — 77030034696 HC CATH URETH FOL 2W BARD -A

## 2022-05-27 PROCEDURE — 77030005513 HC CATH URETH FOL11 MDII -B

## 2022-05-27 NOTE — CONSULTS
[address]     Name:  Fausto Alvarez  MR#:  [id number]  :  1940  ACCOUNT #:  [de-identified]  DATE OF SERVICE:  2022    REASON FOR CONSULTATION:  Gross hematuria, clot retention. Blood   LOCATION:  Bladder     DURATION:  Today. MODIFYING FACTORS:  None  CONTEXT:  Blood clots in bladder. No associated fevers, chills, nausea, or vomiting. MEDICATIONS:  Include:  1. Aspirin. 2.  Cholecalciferol. 4.  Tamsulosin. ALLERGIES:  AMOXICILLIN, SULFA, PRAVASTATIN. PHYSICAL EXAMINATION:  VITAL SIGNS:  He has temperature of 97.5, pulse 112, respiratory rate 18, blood pressure 141/114, SaO2 of 98%. NECK:  Supple. Thyroid was enlarged. Trachea was central.  LUNGS:  Good respiratory effort. HEART:  RRR. ABDOMEN:  Soft SP  consistent with clot with urine retention. EXTREMITIES:  Ankles without edema. SKIN:  Warm and dry. ASSESSMENT AND PLAN:  A gentleman with clot retention, history of enlarged prostate, placed a [address] [facility], bladder was irrigated out for approximately 35 minutes to clear. Secondly, he has a history of urinary tract infection, no active infection state. Continue to monitor. For his benign prostatic hyperplasia, he need to see Dr. Ulises Guillen to consider undergoing a laser transurethral resection of the prostate. Discussed this with himself and his wife, irrigate cath as need be.       MD AMY Tierney/KERWIN_LADAN_CHERRI/BC_DONTE  D:  2022 22:38  T:  2022 3:52  JOB #:  [id number]

## 2022-05-27 NOTE — ED TRIAGE NOTES
Pt arrives to ED via EMS c/o catheter pain. Roughly one hour ago pt stepped on catheter and catheter tugged. Pt c/o 10/10 pain. Blood noted in bag upon arrival. Hx CAD, DM. Has catheter for urinary retention per pt.

## 2022-05-27 NOTE — PROGRESS NOTES
5/27/2022  11:16 AM  Case management    Martita Wagoner from [facility] attempted to this this patient today and told her it was not a good time and dismissed her. She stated she will continue to follow.       Bro Flores

## 2022-05-27 NOTE — ED PROVIDER NOTES
Patient is an 46-year-old male history of coronary disease, depression, diabetic neuropathy with type 2 diabetes, BPH with chronic indwelling Hernández presenting today secondary to bleeding in the Hernández. Around lunchtime today, per his wife, he excellently stepped on the Hernández and since then has had blood coming out of it. His wife reports that the blood has been black. He was actually supposed to start an antibiotic today for urinary tract infection but had not yet started it. Patient complains of severe pain in the penis. No abdominal pain. No fever or vomiting. No flank pain.            Past Medical History:   Diagnosis Date    CAD (coronary artery disease), native coronary artery     Cancer ([facility]     melanoma removed on forehead    Depression     Diabetic neuropathy ([facility]     ED (erectile dysfunction)     Enlarged prostate 07/19/2021    Hypertension, benign     Ill-defined condition     states he has memory problems    Postsurgical aortocoronary bypass status     Type II or unspecified type diabetes mellitus without mention of complication, not stated as uncontrolled        Past Surgical History:   Procedure Laterality Date    COLONOSCOPY N/A 5/3/2016    [facility] Jessi Watson [facility] ECHO 2D ADULT  12/2009    EF 50%, basal inferior akinesis, mild MR    ECHO 2D ADULT  9/21/11    LVEF 50%, inferior AK, unchanged from Dec 2009    HX CORONARY ARTERY BYPASS GRAFT  2009    HX OTHER SURGICAL  2008    melanoma removed from forehead         Family History:   Problem Relation Age of Onset    Breast Cancer Mother         w bone mets    Parkinson's Disease Maternal Aunt     Parkinson's Disease Maternal Uncle     Parkinson's Disease Maternal Grandfather        Social History     Socioeconomic History    Marital status:      Spouse name: Not on file    Number of children: Not on file    Years of education: Not on file    Highest education level: Not on file   Occupational History    Not on file   Tobacco Use    Smoking status: Never Smoker    Smokeless tobacco: Never Used   Substance and Sexual Activity    Alcohol use: No    Drug use: No    Sexual activity: Not on file   Other Topics Concern    Not on file   Social History Narrative    Not on file     Social Determinants of Health     Financial Resource Strain:     Difficulty of Paying Living Expenses: Not on file   Food Insecurity:     Worried About Running Out of Food in the Last Year: Not on file    Lalo of Food in the Last Year: Not on file   Transportation Needs:     Lack of Transportation (Medical): Not on file    Lack of Transportation (Non-Medical): Not on file   Physical Activity:     Days of Exercise per Week: Not on file    Minutes of Exercise per Session: Not on file   Stress:     Feeling of Stress : Not on file   Social Connections:     Frequency of Communication with Friends and Family: Not on file    Frequency of Social Gatherings with Friends and Family: Not on file    Attends Muslim Services: Not on file    Active Member of [address] [place] [address] or Organizations: Not on file    Attends Club or Organization Meetings: Not on file    Marital Status: Not on file   Intimate Partner Violence:     Fear of Current or Ex-Partner: Not on file    Emotionally Abused: Not on file    Physically Abused: Not on file    Sexually Abused: Not on file   Housing Stability:     Unable to Pay for Housing in the Last Year: Not on file    Number of [place] in the Last Year: Not on file    Unstable Housing in the Last Year: Not on file         ALLERGIES: Amoxicillin, Pravastatin, and Sulfa (sulfonamide antibiotics)    Review of Systems   Constitutional: Negative for chills and fever. HENT: Negative for congestion and sore throat. Eyes: Negative for pain and redness. Respiratory: Negative for cough and shortness of breath. Cardiovascular: Negative for chest pain and palpitations. Gastrointestinal: Negative for abdominal pain, diarrhea, nausea and vomiting. Genitourinary: Positive for hematuria and penile swelling. Negative for frequency. Musculoskeletal: Negative for back pain and neck pain. Skin: Negative for rash and wound. Neurological: Negative for dizziness and headaches. Hematological: Does not bruise/bleed easily. Vitals:    05/26/22 2103 05/26/22 2203 05/26/22 2218 05/26/22 2233   BP: (!) 127/55 136/64  (!) 151/63   Pulse:       Resp:       Temp:       SpO2:  96% 98%    Weight:       Height:                Physical Exam  Constitutional:       General: He is not in acute distress. Appearance: He is well-developed. He is not ill-appearing. HENT:      Head: Normocephalic. Eyes:      Conjunctiva/sclera: Conjunctivae normal.   Pulmonary:      Effort: Pulmonary effort is normal. No respiratory distress. Genitourinary:     Comments: Indwelling Hernández with dark blood in the bag. No testicular tenderness. Normal-appearing penis. Musculoskeletal:         General: Normal range of motion. Cervical back: Normal range of motion. Skin:     General: Skin is warm and dry. Capillary Refill: Capillary refill takes less than 2 seconds. Neurological:      Mental Status: He is alert and oriented to person, place, and time. Psychiatric:         Behavior: Behavior normal.          MDM       Procedures      RN attempted to flush Hernández but unable to do so. It was removed in order to exchange however this was done with significant difficulty. Ultimately placed a 14 [address]. I discussed with Dr. Heide Shepherd urology who came and replaced with a larger Hernández due to blood. It was irrigated and running clear and from his perspective was okay to go home after antibiotics. Labs show mild leukocytosis  Renal function acceptable  UA with blood and leukoesterase/nitrites without white cells.   Doubt infection but given leukoesterase, nitrates and significant instrumentation I will treat with a dose of ceftriaxone and have him continue his home antibiotic. Patient discharged home in stable condition. Follow-up with urology tomorrow further recommendations. ICD-10-CM ICD-9-CM    1. Gross hematuria  R31.0 599.71    2. Trauma of urethra, initial encounter  S37.30XA 867.0    3. Problem with Hernández catheter, initial encounter ([facility]  T83. 9XXA 996.76      SAGRARIO Botello,

## 2022-05-28 ENCOUNTER — HOSPITAL ENCOUNTER (EMERGENCY)
Age: 82
Discharge: HOME | End: 2022-05-28
Attending: STUDENT IN AN ORGANIZED HEALTH CARE EDUCATION/TRAINING PROGRAM
Payer: MEDICARE

## 2022-05-28 VITALS
DIASTOLIC BLOOD PRESSURE: 80 MMHG | OXYGEN SATURATION: 97 % | HEART RATE: 92 BPM | TEMPERATURE: 98.3 F | HEIGHT: 72 IN | WEIGHT: 195 LBS | SYSTOLIC BLOOD PRESSURE: 135 MMHG | RESPIRATION RATE: 18 BRPM | BODY MASS INDEX: 26.41 KG/M2

## 2022-05-28 DIAGNOSIS — T83.091A OBSTRUCTION OF FOLEY CATHETER, INITIAL ENCOUNTER (HCC): Primary | ICD-10-CM

## 2022-05-28 DIAGNOSIS — R31.9 HEMATURIA, UNSPECIFIED TYPE: ICD-10-CM

## 2022-05-28 LAB
BACTERIA SPEC CULT: NORMAL
COMMENT, HOLDF: NORMAL
SAMPLES BEING HELD,HOLD: NORMAL
SERVICE CMNT-IMP: NORMAL

## 2022-05-28 PROCEDURE — 77030005549 HC CATH URETH FOL 3W COVD -A

## 2022-05-28 PROCEDURE — 99283 EMERGENCY DEPT VISIT LOW MDM: CPT

## 2022-05-28 PROCEDURE — 77030005513 HC CATH URETH FOL11 MDII -B

## 2022-05-28 PROCEDURE — 51798 US URINE CAPACITY MEASURE: CPT

## 2022-05-28 PROCEDURE — 2709999900 HC NON-CHARGEABLE SUPPLY

## 2022-05-28 PROCEDURE — 51702 INSERT TEMP BLADDER CATH: CPT

## 2022-05-28 NOTE — ED TRIAGE NOTES
Patient complaining of constipation X 3 days, lower quadrant abdominal pain since last night, no urinary output from catheter for unknown time. Patient reports stepping on catheter yesterday and noticing some bloody drainage.

## 2022-05-28 NOTE — ED PROVIDER NOTES
Patient is a 74-year-old male with indwelling Hernández catheter for subacute urinary retention likely due to prostate hyperplasia presented to ED with no urine output and mild abdominal pain with constipation. Patient [place] fevers, chest pain or shortness of breath. Urinary Retention   This is a chronic problem. The current episode started 6 to 12 hours ago. The problem occurs constantly. The problem has been gradually worsening. Associated with: Manipulation and stress on Hernández catheter. The pain is located in the suprapubic region. The quality of the pain is aching. The pain is at a severity of 3/10. The pain is mild. Associated symptoms include constipation. Pertinent negatives include no fever, no nausea and no vomiting. Nothing worsens the pain. The pain is relieved by nothing. Past medical history comments: Large prostate.         Past Medical History:   Diagnosis Date    CAD (coronary artery disease), native coronary artery     Cancer ([facility]     melanoma removed on forehead    Depression     Diabetic neuropathy ([facility]     ED (erectile dysfunction)     Enlarged prostate 07/19/2021    Hypertension, benign     Ill-defined condition     states he has memory problems    Postsurgical aortocoronary bypass status     Type II or unspecified type diabetes mellitus without mention of complication, not stated as uncontrolled        Past Surgical History:   Procedure Laterality Date    COLONOSCOPY N/A 5/3/2016    COLONOSCOPY performed by Kwan Swartz MD at [facility] ECHO 2D ADULT  12/2009    EF 50%, basal inferior akinesis, mild MR    ECHO 2D ADULT  9/21/11    LVEF 50%, inferior AK, unchanged from Dec 2009    HX CORONARY ARTERY BYPASS GRAFT  2009    HX OTHER SURGICAL  2008    melanoma removed from forehead         Family History:   Problem Relation Age of Onset    Breast Cancer Mother         w bone mets    Parkinson's Disease Maternal Aunt     Parkinson's Disease Maternal Uncle     Parkinson's Disease Maternal Grandfather        Social History     Socioeconomic History    Marital status:      Spouse name: Not on file    Number of children: Not on file    Years of education: Not on file    Highest education level: Not on file   Occupational History    Not on file   Tobacco Use    Smoking status: Never Smoker    Smokeless tobacco: Never Used   Substance and Sexual Activity    Alcohol use: No    Drug use: No    Sexual activity: Not on file   Other Topics Concern    Not on file   Social History Narrative    Not on file     Social Determinants of Health     Financial Resource Strain:     Difficulty of Paying Living Expenses: Not on file   Food Insecurity:     Worried About Running Out of Food in the Last Year: Not on file    Lalo of Food in the Last Year: Not on file   Transportation Needs:     Lack of Transportation (Medical): Not on file    Lack of Transportation (Non-Medical):  Not on file   Physical Activity:     Days of Exercise per Week: Not on file    Minutes of Exercise per Session: Not on file   Stress:     Feeling of Stress : Not on file   Social Connections:     Frequency of Communication with Friends and Family: Not on file    Frequency of Social Gatherings with Friends and Family: Not on file    Attends Buddhist Services: Not on file    Active Member of [address] [place] [address] [employer] or Organizations: Not on file    Attends Club or Organization Meetings: Not on file    Marital Status: Not on file   Intimate Partner Violence:     Fear of Current or Ex-Partner: Not on file    Emotionally Abused: Not on file    Physically Abused: Not on file    Sexually Abused: Not on file   Housing Stability:     Unable to Pay for Housing in the Last Year: Not on file    Number of [place] in the Last Year: Not on file    Unstable Housing in the Last Year: Not on file       ALLERGIES: Amoxicillin, Pravastatin, and Sulfa (sulfonamide antibiotics)    Review of Systems Constitutional: Negative. Negative for activity change, appetite change and fever. HENT: Negative. Eyes: Negative. Respiratory: Negative. Cardiovascular: Negative. Gastrointestinal: Positive for constipation. Negative for nausea and vomiting. Endocrine: Negative. Genitourinary: Negative. Musculoskeletal: Negative. Skin: Negative. Allergic/Immunologic: Negative. Neurological: Negative. Hematological: Negative. Psychiatric/Behavioral: Negative. Vitals:    05/28/22 1137   BP: 135/80   Pulse: 92   Resp: 18   Temp: 98.3 °F (36.8 °C)   SpO2: 97%   Weight: 88.5 kg (195 lb)   Height: 6' (1.829 m)            Physical Exam  Vitals and nursing note reviewed. Constitutional:       General: He is not in acute distress. Appearance: Normal appearance. HENT:      Head: Normocephalic and atraumatic. Right Ear: External ear normal.      Left Ear: External ear normal.      Nose: Nose normal.   Eyes:      Extraocular Movements: Extraocular movements intact. Conjunctiva/sclera: Conjunctivae normal.   Cardiovascular:      Rate and Rhythm: Normal rate. Pulses: Normal pulses. Radial pulses are 2+ on the right side and 2+ on the left side. Heart sounds: Normal heart sounds. Pulmonary:      Effort: Pulmonary effort is normal.      Breath sounds: Normal breath sounds. Chest:      Chest wall: No deformity or tenderness. Abdominal:      General: Abdomen is flat. There is no distension. Tenderness: There is no abdominal tenderness. Genitourinary:     Comments: Hernández catheter in place  Musculoskeletal:         General: No deformity or signs of injury. Normal range of motion. Cervical back: Normal range of motion and neck supple. No tenderness. Skin:     General: Skin is warm and dry. Capillary Refill: Capillary refill takes less than 2 seconds. Neurological:      General: No focal deficit present.       Mental Status: He is alert and oriented to person, place, and time. Psychiatric:         Attention and Perception: Attention normal.         Mood and Affect: Mood normal.         Behavior: Behavior normal.          MDM       MEDICATIONS GIVEN:  Medications - No data to display    Differential diagnosis: Hernández obstruction, abdominal pain, hematuria    MDM: Patient is a 28-year-old male with a history of indwelling Hernández catheter not draining found to have greater than 700 mL of urine on bladder scan with Hernández catheter swapped and clot found during swab process. After Hernández catheter swapped urine drained freely and patient symptoms resolved. Patient has intermittent constipation but nothing severe at this time. Recommendations made to patient for MiraLAX increase. No further emergency medical intervention needed at this time. Patient is stable for discharge    Key discharge instructions and summary of care: You presented to the ED with abdominal pain. You are found to have a blocked Hernández catheter. Once catheter was replaced it drained relatively normal urine. There was a blood clot in the catheter likely causing obstruction. But no active bleeding at this time. Please follow-up with urology for hematuria and Hernández catheter management. Additionally instructed patient to increase MiraLAX intake up to 4-6 capfuls a day until having normal bowel movements    The patient has been re-evaluated and feeling better. Patient is stable for discharge. All available radiology and laboratory results have been reviewed with patient and/or available family. Patient and/or family verbally conveyed their understanding and agreement of the patient's signs, symptoms, diagnosis, treatment and prognosis and additionally agree to follow-up as recommended in the discharge instructions or to return to the Emergency Department should their condition change or worsen prior to their follow-up appointment.   All questions have been answered and patient and/or available family express understanding. IMPRESSION:  1. Obstruction of Hernández catheter, initial encounter ([facility]    2. Hematuria, unspecified type        DISPOSITION: Discharged    Jorge Juárze MD    Procedures

## 2022-05-28 NOTE — ED NOTES
Pederson catheter removed and blood on pederson catheter noted. Pederson changed out with 3 way pederson #20 Faroese pederson with one attempt. Blood clot passed through tubing. Pederson tubing draining clear yellow urine. No hematuria noted.

## 2022-05-28 NOTE — DISCHARGE INSTRUCTIONS
You presented to the ED with abdominal pain. You are found to have a blocked Hernández catheter. Once catheter was replaced it drained relatively normal urine. There was a blood clot in the catheter likely causing obstruction. But no active bleeding at this time. Please follow-up with urology for hematuria and Hernández catheter management.

## 2022-06-02 ENCOUNTER — PATIENT OUTREACH (OUTPATIENT)
Dept: CASE MANAGEMENT | Age: 82
End: 2022-06-02

## 2022-06-02 NOTE — PROGRESS NOTES
Care Transitions Follow Up Call  Care Transition Nurse (CTN) contacted the patient by telephone to follow up after admission on 5/12. Conversation was limited today as patient is very tearful. He tells CTN his son was just taken to the hospital d/t nose bleed. He is very worried about his son and becomes emotional talking about health issues. He states he can not talk right now, but agrees to have CTN reach out next week. Addressed changes since last contact: patient went to ED X2-5/26 and 5/28 for problems with urine catheter. Patient report catheter is currently working OK. He has catheter tube secured to his leg with connector device. [facility] follow up appointment(s):   Future Appointments   Date Time Provider Sage Dawsoni   8/23/2022  1:00 PM LINNEA Siu Junior   2/20/2023  3:40 PM Duane Gray MD [facility] BS AMB        CTN provided contact information for future needs. Plan for follow-up call in 3-5 days based on severity of symptoms and risk factors. Goals Addressed                 This Visit's Progress     Prevent complications post hospitalization. 05/17/22   Monitor red flags- ensure hydration, eating and drinking OK, denies any diarrhea.  Medication compliance- complete course of vancomycin   May benefit from probiotics, wife encourages him to eat yogurt daily. 05/24/22  Patient not feeling well today states \"could be better\". Scheduled for hospital follow up yesterday but decided in the morning that he was not up to going so cancelled. Wife feels urine has \"foul\" odor. She will contact PCP office and try to reschedule an appointment. She will see if she can drop off a urine sample today or tomorrow for testing. She was supplied with [facility] information but declined assistance with setting up a visit. Patient denies fever/chills, diarrhea, eating and drinking with no issues.     06/02/22  2 ED visits last week for catheter issues on 5/26 and 5/28  Patient report catheter is currently working OK. He has catheter tube secured to his leg with connector device.   Patient tearful over son going to hospital (nose bleed)

## 2022-06-09 ENCOUNTER — PATIENT OUTREACH (OUTPATIENT)
Dept: CASE MANAGEMENT | Age: 82
End: 2022-06-09

## 2022-06-09 NOTE — PROGRESS NOTES
Care Transitions Follow Up Call    Care Transition Nurse (CTN) contacted the family by telephone to follow up after admission on . Verified name and  with family as identifiers. CTN reviewed discharge instructions, medical action plan and red flags with family and discussed any barriers to care and/or understanding of plan of care after discharge. Discussed appropriate site of care based on symptoms and resources available to patient including: PCP and Specialist. The family agrees to contact the PCP office for questions related to their healthcare. [address] follow up appointment(s):   Future Appointments   Date Time Provider Sage Bhagat   2022  1:00 PM LINNEA Santiago BS AMB   2023  3:40 PM Carol Gray MD [facility] BS AMB     Non-[facility] follow up appointment(s):   Urology-       Goals Addressed                 This Visit's Progress     Prevent complications post hospitalization. 22   Monitor red flags- ensure hydration, eating and drinking OK, denies any diarrhea.  Medication compliance- complete course of vancomycin   May benefit from probiotics, wife encourages him to eat yogurt daily. 22  Patient not feeling well today states \"could be better\". Scheduled for hospital follow up yesterday but decided in the morning that he was not up to going so cancelled. Wife feels urine has \"foul\" odor. She will contact PCP office and try to reschedule an appointment. She will see if she can drop off a urine sample today or tomorrow for testing. She was supplied with [facility] information but declined assistance with setting up a visit. Patient denies fever/chills, diarrhea, eating and drinking with no issues. 22  2 ED visits last week for catheter issues on  and   Patient report catheter is currently working OK. He has catheter tube secured to his leg with connector device.   Patient tearful over son going to hospital (nose bleed)    06/09/22  Completed Macrobid course last week for UTI. Had large BM this am. Not liquid but loose. Will monitor if has multiple liquid/loose stools will reach out to PCP for possible C-diff testing.

## 2022-06-13 ENCOUNTER — PATIENT OUTREACH (OUTPATIENT)
Dept: CASE MANAGEMENT | Age: 82
End: 2022-06-13

## 2022-06-13 NOTE — PROGRESS NOTES
Patient has graduated from the Transitions of Care Coordination  program on 6/13/2022. Patient/family has the ability to self-manage at this time Care management goals have been completed. Patient was not referred to the [facility] team for further management. Goals Addressed                 This Visit's Progress     COMPLETED: Prevent complications post hospitalization. 05/17/22   Monitor red flags- ensure hydration, eating and drinking OK, denies any diarrhea.  Medication compliance- complete course of vancomycin   May benefit from probiotics, wife encourages him to eat yogurt daily. 05/24/22  Patient not feeling well today states \"could be better\". Scheduled for hospital follow up yesterday but decided in the morning that he was not up to going so cancelled. Wife feels urine has \"foul\" odor. She will contact PCP office and try to reschedule an appointment. She will see if she can drop off a urine sample today or tomorrow for testing. She was supplied with [facility] information but declined assistance with setting up a visit. Patient denies fever/chills, diarrhea, eating and drinking with no issues. 06/02/22  2 ED visits last week for catheter issues on 5/26 and 5/28  Patient report catheter is currently working OK. He has catheter tube secured to his leg with connector device. Patient tearful over son going to hospital (nose bleed)    06/09/22  Completed Macrobid course last week for UTI. Had large BM this am. Not liquid but loose. Will monitor if has multiple liquid/loose stools will reach out to PCP for possible C-diff testing. Patient has Care Transition Nurse's contact information for any further questions, concerns, or needs.   Patients upcoming visits:    Future Appointments   Date Time Provider Sage Bhagat   8/23/2022  1:00 PM LINNEA Vargas BS AMB   2/20/2023  3:40 PM Som Gray MD [facility] BS AMB

## 2022-06-14 NOTE — PROGRESS NOTES
6/14/2022  9:18 AM  Case management note    Received notification from Jose Ramon Collins, investigation completed and no evidence that patient in need of protective services at this time.   [address]

## 2022-07-12 ENCOUNTER — TELEPHONE (OUTPATIENT)
Dept: CARDIOLOGY CLINIC | Age: 82
End: 2022-07-12

## 2022-07-13 ENCOUNTER — DOCUMENTATION ONLY (OUTPATIENT)
Dept: CARDIOLOGY CLINIC | Age: 82
End: 2022-07-13

## 2022-07-13 NOTE — PROGRESS NOTES
All orders entered per verbal orders of Dr. Thania Saldana. Jersey Underwood MD      He is low to intermediate risk for low risk procedure      CC letter given to [facility] to hold. Pt seeing Daron Hartmann on 8/23/22, those office notes are also requested.

## 2022-08-23 ENCOUNTER — OFFICE VISIT (OUTPATIENT)
Dept: CARDIOLOGY CLINIC | Age: 82
End: 2022-08-23
Payer: MEDICARE

## 2022-08-23 VITALS
BODY MASS INDEX: 24.24 KG/M2 | DIASTOLIC BLOOD PRESSURE: 60 MMHG | SYSTOLIC BLOOD PRESSURE: 134 MMHG | HEART RATE: 72 BPM | OXYGEN SATURATION: 98 % | HEIGHT: 72 IN | WEIGHT: 179 LBS

## 2022-08-23 DIAGNOSIS — Z95.1 S/P CABG (CORONARY ARTERY BYPASS GRAFT): ICD-10-CM

## 2022-08-23 DIAGNOSIS — I10 HYPERTENSION, BENIGN: ICD-10-CM

## 2022-08-23 DIAGNOSIS — I25.5 ISCHEMIC CARDIOMYOPATHY: Primary | ICD-10-CM

## 2022-08-23 DIAGNOSIS — I51.9 LV DYSFUNCTION: ICD-10-CM

## 2022-08-23 DIAGNOSIS — I25.10 CORONARY ARTERY DISEASE INVOLVING NATIVE CORONARY ARTERY OF NATIVE HEART WITHOUT ANGINA PECTORIS: ICD-10-CM

## 2022-08-23 DIAGNOSIS — E78.5 HYPERLIPIDEMIA, UNSPECIFIED HYPERLIPIDEMIA TYPE: ICD-10-CM

## 2022-08-23 PROCEDURE — G0463 HOSPITAL OUTPT CLINIC VISIT: HCPCS | Performed by: NURSE PRACTITIONER

## 2022-08-23 PROCEDURE — G8428 CUR MEDS NOT DOCUMENT: HCPCS | Performed by: NURSE PRACTITIONER

## 2022-08-23 PROCEDURE — G8536 NO DOC ELDER MAL SCRN: HCPCS | Performed by: NURSE PRACTITIONER

## 2022-08-23 PROCEDURE — 1123F ACP DISCUSS/DSCN MKR DOCD: CPT | Performed by: NURSE PRACTITIONER

## 2022-08-23 PROCEDURE — 99214 OFFICE O/P EST MOD 30 MIN: CPT | Performed by: NURSE PRACTITIONER

## 2022-08-23 PROCEDURE — G9717 DOC PT DX DEP/BP F/U NT REQ: HCPCS | Performed by: NURSE PRACTITIONER

## 2022-08-23 PROCEDURE — 3078F DIAST BP <80 MM HG: CPT | Performed by: NURSE PRACTITIONER

## 2022-08-23 PROCEDURE — G8752 SYS BP LESS 140: HCPCS | Performed by: NURSE PRACTITIONER

## 2022-08-23 PROCEDURE — 3074F SYST BP LT 130 MM HG: CPT | Performed by: NURSE PRACTITIONER

## 2022-08-23 PROCEDURE — G8420 CALC BMI NORM PARAMETERS: HCPCS | Performed by: NURSE PRACTITIONER

## 2022-08-23 PROCEDURE — 1101F PT FALLS ASSESS-DOCD LE1/YR: CPT | Performed by: NURSE PRACTITIONER

## 2022-08-23 PROCEDURE — 93010 ELECTROCARDIOGRAM REPORT: CPT | Performed by: NURSE PRACTITIONER

## 2022-08-23 PROCEDURE — 93005 ELECTROCARDIOGRAM TRACING: CPT | Performed by: NURSE PRACTITIONER

## 2022-08-23 PROCEDURE — G8754 DIAS BP LESS 90: HCPCS | Performed by: NURSE PRACTITIONER

## 2022-08-23 NOTE — PATIENT INSTRUCTIONS
If you are hesitant to have a procedure with [facility], you could consider other facilities as an option -     [facility] is 1 of many options - [address], [address]; [address] [place] [address], 2022 [facility], [phone]. You could also look at [facility]. No changes - see us in 6mo or as needed - echo same day   Get cholesterol checked when able.

## 2022-08-23 NOTE — PROGRESS NOTES
Room: [facility]    Procedure with [facility] is not happening   Cath change on Thurs     Visit Vitals  /60 (BP 1 Location: Left upper arm, BP Patient Position: Sitting)   Pulse 72   Ht 6' (1.829 m)   Wt 179 lb (81.2 kg)   SpO2 98%   BMI 24.28 kg/m²         Chest pain: twinge   Shortness of breath: no  Dizziness: no  Palpitations/Racing Heart: no  Swelling: no    New diagnosis/Surgeries since your last visit: no    Hospitalizations since your last visit: ED 5-    Refills metoprolol 90 days

## 2022-08-23 NOTE — PROGRESS NOTES
Mireille Servin, [facility]  [id number] Leonid Cox  [place], [id number] [place] [address]    Office [phone]  Fax [phone]          NAME: María Garner Sr.   :  1940  MRM:  [id number]    Date:  2022         Assessment and Plan:     CAD; hx of MI s/p 3v CABG in . Negative ischemic evaluation 2020 with evidence of inferior infarct and LVEF of 40-45%. Repeat Echo  with stable LV function at 40-45%. No exertional sxs at his current functional capacity. - Continue ASA 81 mg daily  - Intolerant to statin therapy    - Continue BB  Lab Results   Component Value Date/Time    LDL, calculated 110.2 (H) 2020 12:16 AM     Cardiomyopathy; suspected to be ICM - EF 40-45% by Echo; - repeat echo at fu in 6mo  - NYHA class II or III symptoms. -  Volume stable by exam today on no scheduled loop diuretics. - Continue Lisinopril 10 mg daily. - Continue Toprol XL 25 mg daily. HTN - BP Ok  - Continue current regimen  - Encouraged low sodium diet and daily home monitoring     Dyslipidemia - intolerant to simva in the past due to myalgias. Had tolerated Pravastatin in the past but developed transaminitis. - recheck lipids in the near future; pending results, consider PCSK9i     Type 2 DM - on insulin. followed by Dr. Surendra Qureshi. Lab Results   Component Value Date/Time    Hemoglobin A1c 5.9 (H) 2022 11:10 AM    Hemoglobin A1c, External 6.9 10/28/2015 12:00 AM     Chronic indwelling pederson for BPH. Fu in 6mo or PRN; echo same day               Subjective:     Gayathri Mares., a [de-identified]y.o. year-old who presents for followup. Initially needed cardiac clearance for urology procedure but pt had decided not to have this done. Procedure is at [facility] which pt refuses to go to again. Pt could not give exact details for procedure. SOB with much activity and mostly sedentary - at baseline. In wheelchair today. +pederson cath with change on Thurs.     NO CP or swelling c/o. No breathing changes. Exam:     Physical Exam:  Visit Vitals  /60 (BP 1 Location: Left upper arm, BP Patient Position: Sitting)   Pulse 72   Ht 6' (1.829 m)   Wt 179 lb (81.2 kg)   SpO2 98%   BMI 24.28 kg/m²     General - elderly, appears chronically ill,   Neck - neck supple, no JVD  Cardiac - normal S1, S2, RRR, no murmurs, rubs or gallops. No clicks  Vascular - radials pulses equal bilateral  Lungs - clear to auscultation bilaterals, no rales, wheezing or rhonchi  Abd - soft nontender, non-distended, +BS  Extremities - no edema, warm  Neuro - nonfocal  Psych - normal mood and affect      Medications:     Current Outpatient Medications   Medication Sig    lisinopriL (PRINIVIL, ZESTRIL) 10 mg tablet Take 1 Tablet by mouth daily. insulin NPH/insulin regular (NOVOLIN 70/30, HUMULIN 70/30) 100 unit/mL (70-30) injection 35 Units by SubCUTAneous route Before breakfast and dinner. famotidine (PEPCID) 20 mg tablet Take 1 Tablet by mouth daily. dutasteride (AVODART) 0.5 mg capsule Take 0.5 mg by mouth daily. cholecalciferol (Vitamin D3) (1000 Units /25 mcg) tablet Take 1,000 Units by mouth daily. multivitamin (ONE A DAY) tablet Take 1 Tablet by mouth daily. metoprolol succinate (TOPROL-XL) 25 mg XL tablet TAKE 1 TABLET BY MOUTH EVERY DAY    tamsulosin (FLOMAX) 0.4 mg capsule Take 0.4 mg by mouth two (2) times a day. aspirin delayed-release 81 mg tablet Take 81 mg by mouth daily. No current facility-administered medications for this visit. Diagnostic Data Review:       Echo 2d adult 12/2009  EF 50%, basal inferior akinesis, mild MR  Echo 9/21/11 - LVEF 50%, inferior AK, unchanged from Dec 2009    Stress echo March 2012 - 3 min, resting EF 50%, inferior AK, no ischemia    Echo 10/29/13 - EF 55 % to 60 %. There was akinesis of the basal-mid inferior wall(s). There was akinesis of the basal inferolateral wall(s).  Paradoxical ventricular septal motion, no PFO, no significant Change from previous study. Echo 8/23/17 - EF 55-60%. Inferior AK. Mild LVH. Mild MAC with no MR. AoV sclerosis without stenosis. No change compared to study 2013. Lexiscan Cardiolite 2/2020 - Abnormal Lexiscan gated SPECT myocardial perfusion study demonstrating RCA territory infarction. No stress induced ischemia  LVEF 50%    CJW Lexiscan Cardiolite 9/10/20 - evidence of nontransmural myocardial infarction of the inferior wall with mild residual ischemia. LVEF 44% with reduced wall motion and thickening of the inferior wall. CJW Echo 9/9/20 - EF 40-45%. Mild LVH. HK of basal mid inferior and basal mid inferolateral myocardium. 11/19/21    ECHO ADULT COMPLETE 11/21/2021 11/21/2021    Interpretation Summary  · LV: Estimated LVEF is 45 - 50%. Biplane method used to measure ejection fraction. Normal cavity size. Mild concentric hypertrophy. Severe hypokinesis of the basal inferior, mid inferior and basal inferoseptal wall(s). Mild (grade 1) left ventricular diastolic dysfunction. Signed by: Patrice Tello MD on 11/21/2021 10:59 PM        Lab Review:     Lab Results   Component Value Date/Time    Sodium 135 (L) 05/26/2022 09:32 PM    Potassium 4.1 05/26/2022 09:32 PM    Chloride 105 05/26/2022 09:32 PM    CO2 21 05/26/2022 09:32 PM    Anion gap 9 05/26/2022 09:32 PM    Glucose 188 (H) 05/26/2022 09:32 PM    BUN 23 (H) 05/26/2022 09:32 PM    Creatinine 1.01 05/26/2022 09:32 PM    BUN/Creatinine ratio 23 (H) 05/26/2022 09:32 PM    GFR est AA >60 05/26/2022 09:32 PM    GFR est non-AA >60 05/26/2022 09:32 PM    Calcium 8.9 05/26/2022 09:32 PM    Bilirubin, total 1.2 (H) 05/12/2022 03:06 AM    Alk.  phosphatase 60 05/12/2022 03:06 AM    Protein, total 6.7 05/12/2022 03:06 AM    Albumin 2.9 (L) 05/12/2022 03:06 AM    Globulin 3.8 05/12/2022 03:06 AM    A-G Ratio 0.8 (L) 05/12/2022 03:06 AM    ALT (SGPT) 15 05/12/2022 03:06 AM    AST (SGOT) 14 (L) 05/12/2022 03:06 AM     Lab Results   Component Value Date/Time    WBC 11.4 (H) 05/26/2022 09:32 PM    Hemoglobin (POC) 10.9 (L) 07/25/2009 11:34 AM    HGB 12.9 05/26/2022 09:32 PM    Hematocrit (POC) 32 (L) 07/25/2009 11:34 AM    HCT 38.3 05/26/2022 09:32 PM    PLATELET 080 24/81/1574 09:32 PM    MCV 87.0 05/26/2022 09:32 PM     Lab Results   Component Value Date/Time    Cholesterol, total 174 02/11/2020 12:16 AM    HDL Cholesterol 40 02/11/2020 12:16 AM    LDL, calculated 110.2 (H) 02/11/2020 12:16 AM    VLDL, calculated 23.8 02/11/2020 12:16 AM    Triglyceride 119 02/11/2020 12:16 AM    CHOL/HDL Ratio 4.4 02/11/2020 12:16 AM     Lab Results   Component Value Date/Time    TSH 0.84 11/27/2019 10:58 AM     Lab Results   Component Value Date/Time    Hemoglobin A1c 5.9 (H) 05/11/2022 11:10 AM    Hemoglobin A1c, External 6.9 10/28/2015 12:00 AM     Bishop Quintero, ANP

## 2022-10-03 RX ORDER — LISINOPRIL 10 MG/1
TABLET ORAL
Qty: 90 TABLET | Refills: 3 | Status: SHIPPED | OUTPATIENT
Start: 2022-10-03

## 2022-10-03 NOTE — TELEPHONE ENCOUNTER
Refill per VO of Dr. Maria Elena Boswell:    Last appt: 8/23/2022    Future Appointments   Date Time Provider Sage Bhagat   2/20/2023  2:30 PM JEAN YOUNG   2/20/2023  3:40 PM Alan Gray MD [facility]       Requested Prescriptions     Pending Prescriptions Disp Refills    lisinopriL (PRINIVIL, ZESTRIL) 10 mg tablet [Pharmacy Med Name: LISINOPRIL 10 MG TABLET] 90 Tablet 3     Sig: TAKE 1 TABLET BY MOUTH EVERY DAY

## 2022-12-07 RX ORDER — METOPROLOL SUCCINATE 25 MG/1
25 TABLET, EXTENDED RELEASE ORAL DAILY
Qty: 90 TABLET | Refills: 3 | Status: SHIPPED | OUTPATIENT
Start: 2022-12-07

## 2022-12-07 NOTE — TELEPHONE ENCOUNTER
Refill per VO of Dr. Juan Mohr:    Last appt: 8/23/2022    Future Appointments   Date Time Provider Sage Bhagat   2/20/2023  2:30 PM JEAN YOUNG   2/20/2023  3:40 PM Nichol Gray MD [facility]       Requested Prescriptions     Pending Prescriptions Disp Refills    metoprolol succinate (TOPROL-XL) 25 mg XL tablet 90 Tablet 3     Sig: Take 1 Tablet by mouth daily.

## 2023-03-07 ENCOUNTER — ANCILLARY PROCEDURE (OUTPATIENT)
Dept: CARDIOLOGY CLINIC | Age: 83
End: 2023-03-07
Payer: MEDICARE

## 2023-03-07 ENCOUNTER — OFFICE VISIT (OUTPATIENT)
Dept: CARDIOLOGY CLINIC | Age: 83
End: 2023-03-07
Payer: MEDICARE

## 2023-03-07 ENCOUNTER — DOCUMENTATION ONLY (OUTPATIENT)
Dept: CARDIOLOGY CLINIC | Age: 83
End: 2023-03-07

## 2023-03-07 VITALS
HEART RATE: 69 BPM | WEIGHT: 179 LBS | BODY MASS INDEX: 24.24 KG/M2 | DIASTOLIC BLOOD PRESSURE: 58 MMHG | SYSTOLIC BLOOD PRESSURE: 114 MMHG | HEIGHT: 72 IN

## 2023-03-07 VITALS
HEIGHT: 72 IN | BODY MASS INDEX: 24.24 KG/M2 | DIASTOLIC BLOOD PRESSURE: 58 MMHG | SYSTOLIC BLOOD PRESSURE: 114 MMHG | WEIGHT: 179 LBS

## 2023-03-07 DIAGNOSIS — Z01.818 PRE-OP EXAMINATION: ICD-10-CM

## 2023-03-07 DIAGNOSIS — I50.22 CHRONIC SYSTOLIC CONGESTIVE HEART FAILURE (HCC): ICD-10-CM

## 2023-03-07 DIAGNOSIS — Z95.1 S/P CABG (CORONARY ARTERY BYPASS GRAFT): ICD-10-CM

## 2023-03-07 DIAGNOSIS — E11.9 TYPE 2 DIABETES MELLITUS WITHOUT COMPLICATION, UNSPECIFIED WHETHER LONG TERM INSULIN USE (HCC): ICD-10-CM

## 2023-03-07 DIAGNOSIS — I51.9 LV DYSFUNCTION: ICD-10-CM

## 2023-03-07 DIAGNOSIS — I51.9 LV DYSFUNCTION: Primary | ICD-10-CM

## 2023-03-07 DIAGNOSIS — I10 HYPERTENSION, BENIGN: ICD-10-CM

## 2023-03-07 DIAGNOSIS — I25.5 ISCHEMIC CARDIOMYOPATHY: ICD-10-CM

## 2023-03-07 PROCEDURE — G8420 CALC BMI NORM PARAMETERS: HCPCS | Performed by: INTERNAL MEDICINE

## 2023-03-07 PROCEDURE — 99214 OFFICE O/P EST MOD 30 MIN: CPT | Performed by: INTERNAL MEDICINE

## 2023-03-07 PROCEDURE — 1123F ACP DISCUSS/DSCN MKR DOCD: CPT | Performed by: INTERNAL MEDICINE

## 2023-03-07 PROCEDURE — 93010 ELECTROCARDIOGRAM REPORT: CPT | Performed by: INTERNAL MEDICINE

## 2023-03-07 PROCEDURE — 3078F DIAST BP <80 MM HG: CPT | Performed by: INTERNAL MEDICINE

## 2023-03-07 PROCEDURE — G9717 DOC PT DX DEP/BP F/U NT REQ: HCPCS | Performed by: INTERNAL MEDICINE

## 2023-03-07 PROCEDURE — 93306 TTE W/DOPPLER COMPLETE: CPT | Performed by: INTERNAL MEDICINE

## 2023-03-07 PROCEDURE — G0463 HOSPITAL OUTPT CLINIC VISIT: HCPCS | Performed by: INTERNAL MEDICINE

## 2023-03-07 PROCEDURE — G8536 NO DOC ELDER MAL SCRN: HCPCS | Performed by: INTERNAL MEDICINE

## 2023-03-07 PROCEDURE — 1101F PT FALLS ASSESS-DOCD LE1/YR: CPT | Performed by: INTERNAL MEDICINE

## 2023-03-07 PROCEDURE — 3074F SYST BP LT 130 MM HG: CPT | Performed by: INTERNAL MEDICINE

## 2023-03-07 PROCEDURE — 93005 ELECTROCARDIOGRAM TRACING: CPT | Performed by: INTERNAL MEDICINE

## 2023-03-07 PROCEDURE — G8428 CUR MEDS NOT DOCUMENT: HCPCS | Performed by: INTERNAL MEDICINE

## 2023-03-07 NOTE — LETTER
3/7/2023 3:53 PM    Mr. Yosi Phillips. [address]      To Whom it may Concern. Sheri Valdez Sr. underwent cardiac evaluation and based on the available data he/she is a intermediate risk candidate from cardiac standpoint to undergo an intermediate risk surgery. Yosi Phillips. may proceed with his/her planned green light photovaporization prostate surgery. Thanks for giving us the opportunity to participate in the care of your patient.      Sincerely,          Tyron Garza MD

## 2023-03-07 NOTE — PROGRESS NOTES
Chief Complaint   Patient presents with    Cardiac Testing     ECHO TODAY WITH DAVID    Surgical Clearance    Coronary Artery Disease    Hypertension    Cholesterol Problem     Vitals:    03/07/23 1447   BP: (!) 114/58   BP 1 Location: Left upper arm   BP Patient Position: Sitting   Pulse: 69   Height: 6' (1.829 m)   Weight: 179 lb (81.2 kg)     Chest pain Denied     Palpations Denied    SOB Denied    Dizziness Denied    Swelling in hands/feet Denied     Recent hospital stays Denied

## 2023-03-07 NOTE — PROGRESS NOTES
Office Follow-up    NAME: Mook Garcia Sr.   :  1940  MRM:  [id number]    Date:  3/7/2023         Assessment and Plan:     1. CAD/hx of MI s/p 3v CABG in . Negative ischemic evaluation 2020 with evidence of inferior infarct and LVEF of 40-45%. Repeat Echo  with stable LV function at 44%. - Continue ASA 81 mg daily  - Intolerant to statin therapy    - Continue BB     2. Cardiomyopathy; suspected to be ICM - EF 44% by Echo.  - NYHA class II or III symptoms. -   Volume stable by exam today on no scheduled loop diuretics. - Continue Lisinopril 10 mg daily. - Continue Toprol XL 25 mg daily. 3.  HTN - normotensive in the office today. - Continue current regimen  - Encouraged low sodium diet and daily home monitoring     4. Dyslipidemia - intolerant to simva in the past due to myalgias. Had tolerated Pravastatin in the past but developed transaminitis.      5. Type 2 DM - on insulin. followed by Dr. Thomasina Kayser. 7. Chronic indwelling pederson for BPH. 8. See Inga Ybarra NP and see Dr. Brock Galdamez in 1 year. ATTENTION:   This medical record was transcribed using an electronic medical records/speech recognition system. Although proofread, it may and can contain electronic, spelling and other errors. Corrections may be executed at a later time. Please feel free to contact us for any clarifications as needed. Subjective:     Declan Kilgore., a [de-identified]y.o. year-old who presents for followup. Currently gets SOB on doing some daily activities like shower. Has not had any decompensation and no peripheral edema. No chest pain.      Exam:     Physical Exam:  Visit Vitals  BP (!) 114/58 (BP 1 Location: Left upper arm, BP Patient Position: Sitting)   Pulse 69   Ht 6' (1.829 m)   Wt 179 lb (81.2 kg)   BMI 24.28 kg/m²     General appearance - alert, well appearing, and in no distress  Mental status - affect appropriate to mood  Eyes - sclera anicteric, moist mucous membranes  Neck - supple, no significant adenopathy  Chest - clear to auscultation, no wheezes, rales or rhonchi  Heart - normal rate, regular rhythm, normal S1, S2, no murmurs, rubs, clicks or gallops  Abdomen - soft, nontender, nondistended, no masses or organomegaly  Extremities - peripheral pulses normal, no pedal edema  Skin - normal coloration  no rashes    Medications:     Current Outpatient Medications   Medication Sig    metoprolol succinate (TOPROL-XL) 25 mg XL tablet Take 1 Tablet by mouth daily. lisinopriL (PRINIVIL, ZESTRIL) 10 mg tablet TAKE 1 TABLET BY MOUTH EVERY DAY    insulin NPH/insulin regular (NOVOLIN 70/30, HUMULIN 70/30) 100 unit/mL (70-30) injection 35 Units by SubCUTAneous route Before breakfast and dinner. famotidine (PEPCID) 20 mg tablet Take 1 Tablet by mouth daily. (Patient taking differently: Take 20 mg by mouth as needed.)    dutasteride (AVODART) 0.5 mg capsule Take 0.5 mg by mouth daily. cholecalciferol (VITAMIN D3) (1000 Units /25 mcg) tablet Take 1,000 Units by mouth daily. multivitamin (ONE A DAY) tablet Take 1 Tablet by mouth daily. tamsulosin (FLOMAX) 0.4 mg capsule Take 0.4 mg by mouth daily. aspirin delayed-release 81 mg tablet Take 81 mg by mouth daily. No current facility-administered medications for this visit. Diagnostic Data Review:       Echo 2d adult 12/2009  EF 50%, basal inferior akinesis, mild MR  Echo 9/21/11 - LVEF 50%, inferior AK, unchanged from Dec 2009    Stress echo March 2012 - 3 min, resting EF 50%, inferior AK, no ischemia    Echo 10/29/13 - EF 55 % to 60 %. There was akinesis of the basal-mid inferior wall(s). There was akinesis of the basal inferolateral wall(s). Paradoxical ventricular septal motion, no PFO, no significant Change from previous study. Echo 8/23/17 - EF 55-60%. Inferior AK. Mild LVH. Mild MAC with no MR. AoV sclerosis without stenosis. No change compared to study 2013.     Mashed jobs Cardiolite 2/2020 - Abnormal Lexiscan gated SPECT myocardial perfusion study demonstrating RCA territory infarction. No stress induced ischemia  LVEF 50%    CJW Lexiscan Cardiolite 9/10/20 - evidence of nontransmural myocardial infarction of the inferior wall with mild residual ischemia. LVEF 44% with reduced wall motion and thickening of the inferior wall. CJW Echo 9/9/20 - EF 40-45%. Mild LVH. HK of basal mid inferior and basal mid inferolateral myocardium. Lab Review:     Lab Results   Component Value Date/Time    Cholesterol, total 197 02/21/2023 09:27 AM    HDL Cholesterol 50 02/21/2023 09:27 AM    LDL, calculated 126.4 (H) 02/21/2023 09:27 AM    Triglyceride 103 02/21/2023 09:27 AM    CHOL/HDL Ratio 3.9 02/21/2023 09:27 AM     Lab Results   Component Value Date/Time    Creatinine (POC) 1.0 07/25/2009 11:34 AM    Creatinine 1.01 05/26/2022 09:32 PM     Lab Results   Component Value Date/Time    BUN 23 (H) 05/26/2022 09:32 PM    BUN (POC) 18 07/25/2009 11:34 AM     Lab Results   Component Value Date/Time    Potassium 4.1 05/26/2022 09:32 PM     Lab Results   Component Value Date/Time    Hemoglobin A1c 5.9 (H) 05/11/2022 11:10 AM    Hemoglobin A1c, External 6.9 10/28/2015 12:00 AM     Lab Results   Component Value Date/Time    Hemoglobin (POC) 10.9 (L) 07/25/2009 11:34 AM    HGB 12.9 05/26/2022 09:32 PM     Lab Results   Component Value Date/Time    PLATELET 742 82/92/0031 09:32 PM     No results for input(s): CPK, CKMB, TROIQ in the last 72 hours. No lab exists for component: CKQMB, CPKMB             ___________________________________________________    Guilherme Anonir.  Joss Masterson MD, [facility]

## 2023-03-08 LAB
ECHO AO ASC DIAM: 3.8 CM
ECHO AO ASCENDING AORTA INDEX: 1.87 CM/M2
ECHO AO ROOT DIAM: 3.8 CM
ECHO AO ROOT INDEX: 1.87 CM/M2
ECHO AV AREA PEAK VELOCITY: 2.7 CM2
ECHO AV AREA VTI: 3 CM2
ECHO AV AREA/BSA PEAK VELOCITY: 1.3 CM2/M2
ECHO AV AREA/BSA VTI: 1.5 CM2/M2
ECHO AV MEAN GRADIENT: 5 MMHG
ECHO AV MEAN VELOCITY: 1 M/S
ECHO AV PEAK GRADIENT: 9 MMHG
ECHO AV PEAK VELOCITY: 1.5 M/S
ECHO AV VELOCITY RATIO: 0.6
ECHO AV VTI: 25.5 CM
ECHO LA DIAMETER INDEX: 2.27 CM/M2
ECHO LA DIAMETER: 4.6 CM
ECHO LA TO AORTIC ROOT RATIO: 1.21
ECHO LA VOL 2C: 41 ML (ref 18–58)
ECHO LA VOL 4C: 49 ML (ref 18–58)
ECHO LA VOLUME AREA LENGTH: 48 ML
ECHO LA VOLUME INDEX A2C: 20 ML/M2 (ref 16–34)
ECHO LA VOLUME INDEX A4C: 24 ML/M2 (ref 16–34)
ECHO LA VOLUME INDEX AREA LENGTH: 24 ML/M2 (ref 16–34)
ECHO LV E' LATERAL VELOCITY: 8 CM/S
ECHO LV E' SEPTAL VELOCITY: 4 CM/S
ECHO LV EDV A2C: 109 ML
ECHO LV EDV A4C: 119 ML
ECHO LV EDV BP: 116 ML (ref 67–155)
ECHO LV EDV INDEX A4C: 59 ML/M2
ECHO LV EDV INDEX BP: 57 ML/M2
ECHO LV EDV NDEX A2C: 54 ML/M2
ECHO LV EJECTION FRACTION A2C: 41 %
ECHO LV EJECTION FRACTION A4C: 44 %
ECHO LV EJECTION FRACTION BIPLANE: 44 % (ref 55–100)
ECHO LV ESV A2C: 64 ML
ECHO LV ESV A4C: 67 ML
ECHO LV ESV BP: 65 ML (ref 22–58)
ECHO LV ESV INDEX A2C: 32 ML/M2
ECHO LV ESV INDEX A4C: 33 ML/M2
ECHO LV ESV INDEX BP: 32 ML/M2
ECHO LV INTERNAL DIMENSION DIASTOLE INDEX: 2.46 CM/M2
ECHO LV INTERNAL DIMENSION DIASTOLIC: 5 CM (ref 4.2–5.9)
ECHO LV IVSD: 1 CM (ref 0.6–1)
ECHO LV MASS 2D: 182 G (ref 88–224)
ECHO LV MASS INDEX 2D: 89.6 G/M2 (ref 49–115)
ECHO LV POSTERIOR WALL DIASTOLIC: 1 CM (ref 0.6–1)
ECHO LV RELATIVE WALL THICKNESS RATIO: 0.4
ECHO LVOT AREA: 4.2 CM2
ECHO LVOT AV VTI INDEX: 0.7
ECHO LVOT DIAM: 2.3 CM
ECHO LVOT MEAN GRADIENT: 2 MMHG
ECHO LVOT PEAK GRADIENT: 3 MMHG
ECHO LVOT PEAK VELOCITY: 0.9 M/S
ECHO LVOT STROKE VOLUME INDEX: 36.6 ML/M2
ECHO LVOT SV: 74.3 ML
ECHO LVOT VTI: 17.9 CM
ECHO MV A VELOCITY: 0.71 M/S
ECHO MV AREA PHT: 3 CM2
ECHO MV AREA VTI: 4.1 CM2
ECHO MV E DECELERATION TIME (DT): 255.3 MS
ECHO MV E VELOCITY: 0.53 M/S
ECHO MV E/A RATIO: 0.75
ECHO MV E/E' LATERAL: 6.63
ECHO MV E/E' RATIO (AVERAGED): 9.94
ECHO MV E/E' SEPTAL: 13.25
ECHO MV LVOT VTI INDEX: 1.01
ECHO MV MAX VELOCITY: 0.9 M/S
ECHO MV MEAN GRADIENT: 1 MMHG
ECHO MV MEAN VELOCITY: 0.5 M/S
ECHO MV PEAK GRADIENT: 3 MMHG
ECHO MV PRESSURE HALF TIME (PHT): 74 MS
ECHO MV VTI: 18 CM

## 2023-03-08 PROCEDURE — 93306 TTE W/DOPPLER COMPLETE: CPT | Performed by: INTERNAL MEDICINE

## 2023-05-05 NOTE — PROGRESS NOTES
12/3/2019   CARE MANAGEMENT NOTE:  CM reviewed EMR and handoff received from the weekend . Pt was admitted with dx of Rhabdo and falls (remained on the floor for 2 days). Reportedly, pt resides with his wife and handicapped son.     Transition Plan of Care:  1. SNF - referrals were made to [address] [place] [address] (accepted),and [facility]). Wife will tour SNFs. Pt is reluctant to SNF however wife agrees to plan. [address] (skilled nursing, PT, OT) was arranged prior to hospital admission.     CM will continue to follow pt for SNF placement.   Antonette Thank you for bringing Deanna Gore in today! Please go to the  to make your next appointment. Return in about 1 year (around 5/5/2024) for 7yo well check.        See you next time!  Dr. Silvestre

## 2023-09-06 NOTE — PROGRESS NOTES
Office Follow-up  Name: Goran Colunga    : 1940  MRN: [id number]  Date: 2023      Primary Cardiologist: Jose Chu. Kev Brown MD, [facility]  Last Office Visit: 3/7/23        Assessment and Plan:             1. CAD/hx of MI s/p 3v CABG in . Negative ischemic evaluation 2020 with evidence of inferior infarct and LVEF of 40-45%. Repeat Echo  with stable LV function at 44%. - Continue ASA 81 mg daily  - Intolerant to statin therapy    - Continue BB     2. Cardiomyopathy; suspected to be ICM - EF 44% by Echo.  - NYHA class II or III symptoms. -   Volume stable by exam today on no scheduled loop diuretics. - Continue Lisinopril 10 mg daily. - Continue Toprol XL 25 mg daily. 3.  HTN - normotensive in the office today. - Continue current regimen  - Encouraged low sodium diet and daily home monitoring     4. Dyslipidemia - intolerant to simva in the past due to myalgias. Had tolerated Pravastatin in the past but developed transaminitis.      5. Type 2 DM - on insulin. followed by Dr. Shirin Man. 7. Chronic indwelling lópez for BPH. 8. See Dr. Kev Brown in 6 months. Subjective:        Goran Colunga., a 82y.o. year-old who presents for followup. Continues with lópez catheter for BPH. Denies chest pain. Has hiatal hernia. Denies edema, heart flutters. Continues to have shortness of breath with activity. Is not worse than previous.            Exam:       Physical Exam:  Visit Vitals  Vitals:    23 1237   BP: 122/70   Pulse: 76   SpO2: 97%       General appearance - alert, well appearing, and in no distress  Mental status - affect appropriate to mood  Eyes - sclera anicteric, moist mucous membranes  Neck - supple, no significant adenopathy  Chest - clear to auscultation, no wheezes, rales or rhonchi  Heart - normal rate, regular rhythm, normal S1, S2, no murmurs, rubs, clicks or

## 2023-09-08 ENCOUNTER — OFFICE VISIT (OUTPATIENT)
Age: 83
End: 2023-09-08
Payer: MEDICARE

## 2023-09-08 VITALS
OXYGEN SATURATION: 97 % | WEIGHT: 176 LBS | HEART RATE: 76 BPM | DIASTOLIC BLOOD PRESSURE: 70 MMHG | BODY MASS INDEX: 23.84 KG/M2 | HEIGHT: 72 IN | SYSTOLIC BLOOD PRESSURE: 122 MMHG

## 2023-09-08 DIAGNOSIS — Z95.1 PRESENCE OF AORTOCORONARY BYPASS GRAFT: ICD-10-CM

## 2023-09-08 DIAGNOSIS — I10 ESSENTIAL (PRIMARY) HYPERTENSION: ICD-10-CM

## 2023-09-08 DIAGNOSIS — I50.22 CHRONIC SYSTOLIC (CONGESTIVE) HEART FAILURE (HCC): Primary | ICD-10-CM

## 2023-09-08 DIAGNOSIS — I25.10 ATHEROSCLEROSIS OF NATIVE CORONARY ARTERY OF NATIVE HEART WITHOUT ANGINA PECTORIS: ICD-10-CM

## 2023-09-08 DIAGNOSIS — E78.5 HYPERLIPIDEMIA, UNSPECIFIED HYPERLIPIDEMIA TYPE: ICD-10-CM

## 2023-09-08 DIAGNOSIS — E11.9 TYPE 2 DIABETES MELLITUS WITHOUT COMPLICATION, UNSPECIFIED WHETHER LONG TERM INSULIN USE (HCC): ICD-10-CM

## 2023-09-08 DIAGNOSIS — I25.5 ISCHEMIC CARDIOMYOPATHY: ICD-10-CM

## 2023-09-08 PROCEDURE — 1036F TOBACCO NON-USER: CPT

## 2023-09-08 PROCEDURE — 99214 OFFICE O/P EST MOD 30 MIN: CPT

## 2023-09-08 PROCEDURE — 3078F DIAST BP <80 MM HG: CPT

## 2023-09-08 PROCEDURE — G8420 CALC BMI NORM PARAMETERS: HCPCS

## 2023-09-08 PROCEDURE — G8427 DOCREV CUR MEDS BY ELIG CLIN: HCPCS

## 2023-09-08 PROCEDURE — 3074F SYST BP LT 130 MM HG: CPT

## 2023-09-08 PROCEDURE — 1123F ACP DISCUSS/DSCN MKR DOCD: CPT

## 2023-09-08 RX ORDER — LISINOPRIL 10 MG/1
10 TABLET ORAL DAILY
Qty: 90 TABLET | Refills: 1 | Status: SHIPPED | OUTPATIENT
Start: 2023-09-08

## 2023-09-08 RX ORDER — METOPROLOL SUCCINATE 25 MG/1
25 TABLET, EXTENDED RELEASE ORAL DAILY
Qty: 90 TABLET | Refills: 1 | Status: SHIPPED | OUTPATIENT
Start: 2023-09-08

## 2023-09-08 ASSESSMENT — PATIENT HEALTH QUESTIONNAIRE - PHQ9
SUM OF ALL RESPONSES TO PHQ QUESTIONS 1-9: 0
2. FEELING DOWN, DEPRESSED OR HOPELESS: 0
SUM OF ALL RESPONSES TO PHQ QUESTIONS 1-9: 0
SUM OF ALL RESPONSES TO PHQ9 QUESTIONS 1 & 2: 0
1. LITTLE INTEREST OR PLEASURE IN DOING THINGS: 0

## 2023-09-23 ENCOUNTER — HOSPITAL ENCOUNTER (INPATIENT)
Facility: HOSPITAL | Age: 83
LOS: 3 days | Discharge: HOME HEALTH | DRG: 698 | End: 2023-09-26
Attending: EMERGENCY MEDICINE | Admitting: INTERNAL MEDICINE
Payer: MEDICARE

## 2023-09-23 DIAGNOSIS — N39.0 URINARY TRACT INFECTION ASSOCIATED WITH CATHETERIZATION OF URINARY TRACT, UNSPECIFIED INDWELLING URINARY CATHETER TYPE, INITIAL ENCOUNTER (HCC): Primary | ICD-10-CM

## 2023-09-23 DIAGNOSIS — T83.511A URINARY TRACT INFECTION ASSOCIATED WITH CATHETERIZATION OF URINARY TRACT, UNSPECIFIED INDWELLING URINARY CATHETER TYPE, INITIAL ENCOUNTER (HCC): Primary | ICD-10-CM

## 2023-09-23 PROBLEM — G93.40 ENCEPHALOPATHIES: Status: ACTIVE | Noted: 2023-09-23

## 2023-09-23 LAB
ALBUMIN SERPL-MCNC: 3.4 G/DL (ref 3.5–5)
ALBUMIN/GLOB SERPL: 0.9 (ref 1.1–2.2)
ALP SERPL-CCNC: 71 U/L (ref 45–117)
ALT SERPL-CCNC: 18 U/L (ref 12–78)
ANION GAP SERPL CALC-SCNC: 7 MMOL/L (ref 5–15)
APPEARANCE UR: ABNORMAL
AST SERPL-CCNC: 10 U/L (ref 15–37)
BACTERIA URNS QL MICRO: ABNORMAL /HPF
BASOPHILS # BLD: 0.1 K/UL (ref 0–0.1)
BASOPHILS NFR BLD: 1 % (ref 0–1)
BILIRUB SERPL-MCNC: 1 MG/DL (ref 0.2–1)
BILIRUB UR QL: NEGATIVE
BUN SERPL-MCNC: 21 MG/DL (ref 6–20)
BUN/CREAT SERPL: 19 (ref 12–20)
CALCIUM SERPL-MCNC: 9.2 MG/DL (ref 8.5–10.1)
CHLORIDE SERPL-SCNC: 106 MMOL/L (ref 97–108)
CO2 SERPL-SCNC: 24 MMOL/L (ref 21–32)
COLOR UR: ABNORMAL
COMMENT:: NORMAL
CREAT SERPL-MCNC: 1.13 MG/DL (ref 0.7–1.3)
DIFFERENTIAL METHOD BLD: ABNORMAL
EOSINOPHIL # BLD: 0.3 K/UL (ref 0–0.4)
EOSINOPHIL NFR BLD: 3 % (ref 0–7)
EPITH CASTS URNS QL MICRO: ABNORMAL /LPF
ERYTHROCYTE [DISTWIDTH] IN BLOOD BY AUTOMATED COUNT: 12.7 % (ref 11.5–14.5)
GLOBULIN SER CALC-MCNC: 4 G/DL (ref 2–4)
GLUCOSE SERPL-MCNC: 100 MG/DL (ref 65–100)
GLUCOSE UR STRIP.AUTO-MCNC: >1000 MG/DL
HCT VFR BLD AUTO: 42.9 % (ref 36.6–50.3)
HGB BLD-MCNC: 14.6 G/DL (ref 12.1–17)
HGB UR QL STRIP: ABNORMAL
IMM GRANULOCYTES # BLD AUTO: 0 K/UL (ref 0–0.04)
IMM GRANULOCYTES NFR BLD AUTO: 0 % (ref 0–0.5)
KETONES UR QL STRIP.AUTO: NEGATIVE MG/DL
LEUKOCYTE ESTERASE UR QL STRIP.AUTO: ABNORMAL
LYMPHOCYTES # BLD: 2.6 K/UL (ref 0.8–3.5)
LYMPHOCYTES NFR BLD: 21 % (ref 12–49)
MCH RBC QN AUTO: 29.3 PG (ref 26–34)
MCHC RBC AUTO-ENTMCNC: 34 G/DL (ref 30–36.5)
MCV RBC AUTO: 86 FL (ref 80–99)
MONOCYTES # BLD: 1.4 K/UL (ref 0–1)
MONOCYTES NFR BLD: 11 % (ref 5–13)
NEUTS SEG # BLD: 8 K/UL (ref 1.8–8)
NEUTS SEG NFR BLD: 64 % (ref 32–75)
NITRITE UR QL STRIP.AUTO: NEGATIVE
NRBC # BLD: 0 K/UL (ref 0–0.01)
NRBC BLD-RTO: 0 PER 100 WBC
PH UR STRIP: 6.5 (ref 5–8)
PLATELET # BLD AUTO: 364 K/UL (ref 150–400)
PMV BLD AUTO: 8.4 FL (ref 8.9–12.9)
POTASSIUM SERPL-SCNC: 3.6 MMOL/L (ref 3.5–5.1)
PROT SERPL-MCNC: 7.4 G/DL (ref 6.4–8.2)
PROT UR STRIP-MCNC: 100 MG/DL
RBC # BLD AUTO: 4.99 M/UL (ref 4.1–5.7)
RBC #/AREA URNS HPF: ABNORMAL /HPF (ref 0–5)
SODIUM SERPL-SCNC: 137 MMOL/L (ref 136–145)
SP GR UR REFRACTOMETRY: 1.02 (ref 1–1.03)
SPECIMEN HOLD: NORMAL
SPECIMEN HOLD: NORMAL
UROBILINOGEN UR QL STRIP.AUTO: 0.2 EU/DL (ref 0.2–1)
WBC # BLD AUTO: 12.4 K/UL (ref 4.1–11.1)
WBC URNS QL MICRO: >100 /HPF (ref 0–4)

## 2023-09-23 PROCEDURE — 87077 CULTURE AEROBIC IDENTIFY: CPT

## 2023-09-23 PROCEDURE — 99285 EMERGENCY DEPT VISIT HI MDM: CPT

## 2023-09-23 PROCEDURE — 1100000000 HC RM PRIVATE

## 2023-09-23 PROCEDURE — 87086 URINE CULTURE/COLONY COUNT: CPT

## 2023-09-23 PROCEDURE — 80053 COMPREHEN METABOLIC PANEL: CPT

## 2023-09-23 PROCEDURE — 87040 BLOOD CULTURE FOR BACTERIA: CPT

## 2023-09-23 PROCEDURE — 2580000003 HC RX 258: Performed by: INTERNAL MEDICINE

## 2023-09-23 PROCEDURE — 81001 URINALYSIS AUTO W/SCOPE: CPT

## 2023-09-23 PROCEDURE — 2580000003 HC RX 258: Performed by: EMERGENCY MEDICINE

## 2023-09-23 PROCEDURE — 87186 SC STD MICRODIL/AGAR DIL: CPT

## 2023-09-23 PROCEDURE — 6360000002 HC RX W HCPCS: Performed by: EMERGENCY MEDICINE

## 2023-09-23 PROCEDURE — 51702 INSERT TEMP BLADDER CATH: CPT

## 2023-09-23 PROCEDURE — 85025 COMPLETE CBC W/AUTO DIFF WBC: CPT

## 2023-09-23 PROCEDURE — 6370000000 HC RX 637 (ALT 250 FOR IP): Performed by: STUDENT IN AN ORGANIZED HEALTH CARE EDUCATION/TRAINING PROGRAM

## 2023-09-23 PROCEDURE — 84145 PROCALCITONIN (PCT): CPT

## 2023-09-23 PROCEDURE — 83605 ASSAY OF LACTIC ACID: CPT

## 2023-09-23 PROCEDURE — 96374 THER/PROPH/DIAG INJ IV PUSH: CPT

## 2023-09-23 PROCEDURE — 36415 COLL VENOUS BLD VENIPUNCTURE: CPT

## 2023-09-23 RX ORDER — LIDOCAINE HYDROCHLORIDE 20 MG/ML
JELLY TOPICAL PRN
Status: DISCONTINUED | OUTPATIENT
Start: 2023-09-23 | End: 2023-09-23

## 2023-09-23 RX ORDER — ONDANSETRON 4 MG/1
4 TABLET, ORALLY DISINTEGRATING ORAL EVERY 8 HOURS PRN
Status: DISCONTINUED | OUTPATIENT
Start: 2023-09-23 | End: 2023-09-26 | Stop reason: HOSPADM

## 2023-09-23 RX ORDER — POLYETHYLENE GLYCOL 3350 17 G/17G
17 POWDER, FOR SOLUTION ORAL DAILY PRN
Status: DISCONTINUED | OUTPATIENT
Start: 2023-09-23 | End: 2023-09-26 | Stop reason: HOSPADM

## 2023-09-23 RX ORDER — ACETAMINOPHEN 325 MG/1
650 TABLET ORAL EVERY 6 HOURS PRN
Status: DISCONTINUED | OUTPATIENT
Start: 2023-09-23 | End: 2023-09-26 | Stop reason: HOSPADM

## 2023-09-23 RX ORDER — ASPIRIN 81 MG/1
81 TABLET ORAL DAILY
Status: DISCONTINUED | OUTPATIENT
Start: 2023-09-24 | End: 2023-09-26 | Stop reason: HOSPADM

## 2023-09-23 RX ORDER — FAMOTIDINE 20 MG/1
20 TABLET, FILM COATED ORAL DAILY
Status: DISCONTINUED | OUTPATIENT
Start: 2023-09-24 | End: 2023-09-26 | Stop reason: HOSPADM

## 2023-09-23 RX ORDER — FINASTERIDE 5 MG/1
5 TABLET, FILM COATED ORAL DAILY
Status: DISCONTINUED | OUTPATIENT
Start: 2023-09-24 | End: 2023-09-26 | Stop reason: HOSPADM

## 2023-09-23 RX ORDER — SODIUM CHLORIDE 9 MG/ML
INJECTION, SOLUTION INTRAVENOUS PRN
Status: DISCONTINUED | OUTPATIENT
Start: 2023-09-23 | End: 2023-09-26 | Stop reason: HOSPADM

## 2023-09-23 RX ORDER — ONDANSETRON 2 MG/ML
4 INJECTION INTRAMUSCULAR; INTRAVENOUS EVERY 6 HOURS PRN
Status: DISCONTINUED | OUTPATIENT
Start: 2023-09-23 | End: 2023-09-26 | Stop reason: HOSPADM

## 2023-09-23 RX ORDER — METOPROLOL SUCCINATE 25 MG/1
25 TABLET, EXTENDED RELEASE ORAL DAILY
Status: DISCONTINUED | OUTPATIENT
Start: 2023-09-24 | End: 2023-09-26 | Stop reason: HOSPADM

## 2023-09-23 RX ORDER — LIDOCAINE HYDROCHLORIDE 20 MG/ML
JELLY TOPICAL ONCE
Status: COMPLETED | OUTPATIENT
Start: 2023-09-23 | End: 2023-09-23

## 2023-09-23 RX ORDER — ENOXAPARIN SODIUM 100 MG/ML
40 INJECTION SUBCUTANEOUS DAILY
Status: DISCONTINUED | OUTPATIENT
Start: 2023-09-24 | End: 2023-09-26 | Stop reason: HOSPADM

## 2023-09-23 RX ORDER — SODIUM CHLORIDE 0.9 % (FLUSH) 0.9 %
5-40 SYRINGE (ML) INJECTION PRN
Status: DISCONTINUED | OUTPATIENT
Start: 2023-09-23 | End: 2023-09-26 | Stop reason: HOSPADM

## 2023-09-23 RX ORDER — ACETAMINOPHEN 650 MG/1
650 SUPPOSITORY RECTAL EVERY 6 HOURS PRN
Status: DISCONTINUED | OUTPATIENT
Start: 2023-09-23 | End: 2023-09-26 | Stop reason: HOSPADM

## 2023-09-23 RX ORDER — SODIUM CHLORIDE 0.9 % (FLUSH) 0.9 %
5-40 SYRINGE (ML) INJECTION EVERY 12 HOURS SCHEDULED
Status: DISCONTINUED | OUTPATIENT
Start: 2023-09-23 | End: 2023-09-26 | Stop reason: HOSPADM

## 2023-09-23 RX ADMIN — WATER 2000 MG: 1 INJECTION INTRAMUSCULAR; INTRAVENOUS; SUBCUTANEOUS at 23:19

## 2023-09-23 RX ADMIN — LIDOCAINE HYDROCHLORIDE: 20 JELLY TOPICAL at 23:01

## 2023-09-23 RX ADMIN — SODIUM CHLORIDE, PRESERVATIVE FREE 10 ML: 5 INJECTION INTRAVENOUS at 23:27

## 2023-09-23 ASSESSMENT — PAIN DESCRIPTION - LOCATION: LOCATION: PENIS

## 2023-09-23 ASSESSMENT — PAIN SCALES - GENERAL: PAINLEVEL_OUTOF10: 10

## 2023-09-23 ASSESSMENT — PAIN - FUNCTIONAL ASSESSMENT: PAIN_FUNCTIONAL_ASSESSMENT: 0-10

## 2023-09-24 LAB
ANION GAP SERPL CALC-SCNC: 8 MMOL/L (ref 5–15)
BASOPHILS # BLD: 0 K/UL (ref 0–0.1)
BASOPHILS NFR BLD: 0 % (ref 0–1)
BUN SERPL-MCNC: 18 MG/DL (ref 6–20)
BUN/CREAT SERPL: 20 (ref 12–20)
CALCIUM SERPL-MCNC: 8.8 MG/DL (ref 8.5–10.1)
CHLORIDE SERPL-SCNC: 108 MMOL/L (ref 97–108)
CO2 SERPL-SCNC: 21 MMOL/L (ref 21–32)
CREAT SERPL-MCNC: 0.89 MG/DL (ref 0.7–1.3)
DIFFERENTIAL METHOD BLD: ABNORMAL
EOSINOPHIL # BLD: 0.1 K/UL (ref 0–0.4)
EOSINOPHIL NFR BLD: 1 % (ref 0–7)
ERYTHROCYTE [DISTWIDTH] IN BLOOD BY AUTOMATED COUNT: 12.6 % (ref 11.5–14.5)
GLUCOSE BLD STRIP.AUTO-MCNC: 138 MG/DL (ref 65–117)
GLUCOSE BLD STRIP.AUTO-MCNC: 272 MG/DL (ref 65–117)
GLUCOSE SERPL-MCNC: 138 MG/DL (ref 65–100)
HCT VFR BLD AUTO: 41.3 % (ref 36.6–50.3)
HGB BLD-MCNC: 14 G/DL (ref 12.1–17)
IMM GRANULOCYTES # BLD AUTO: 0.1 K/UL (ref 0–0.04)
IMM GRANULOCYTES NFR BLD AUTO: 1 % (ref 0–0.5)
LACTATE SERPL-SCNC: 1.9 MMOL/L (ref 0.4–2)
LYMPHOCYTES # BLD: 1.4 K/UL (ref 0.8–3.5)
LYMPHOCYTES NFR BLD: 12 % (ref 12–49)
MCH RBC QN AUTO: 29.3 PG (ref 26–34)
MCHC RBC AUTO-ENTMCNC: 33.9 G/DL (ref 30–36.5)
MCV RBC AUTO: 86.4 FL (ref 80–99)
MONOCYTES # BLD: 1 K/UL (ref 0–1)
MONOCYTES NFR BLD: 9 % (ref 5–13)
NEUTS SEG # BLD: 8.9 K/UL (ref 1.8–8)
NEUTS SEG NFR BLD: 77 % (ref 32–75)
NRBC # BLD: 0 K/UL (ref 0–0.01)
NRBC BLD-RTO: 0 PER 100 WBC
PHOSPHATE SERPL-MCNC: 3.5 MG/DL (ref 2.6–4.7)
PLATELET # BLD AUTO: 300 K/UL (ref 150–400)
PMV BLD AUTO: 8.5 FL (ref 8.9–12.9)
POTASSIUM SERPL-SCNC: 3.6 MMOL/L (ref 3.5–5.1)
PROCALCITONIN SERPL-MCNC: <0.05 NG/ML
RBC # BLD AUTO: 4.78 M/UL (ref 4.1–5.7)
SERVICE CMNT-IMP: ABNORMAL
SERVICE CMNT-IMP: ABNORMAL
SODIUM SERPL-SCNC: 137 MMOL/L (ref 136–145)
WBC # BLD AUTO: 11.6 K/UL (ref 4.1–11.1)

## 2023-09-24 PROCEDURE — 80048 BASIC METABOLIC PNL TOTAL CA: CPT

## 2023-09-24 PROCEDURE — 6360000002 HC RX W HCPCS: Performed by: INTERNAL MEDICINE

## 2023-09-24 PROCEDURE — 2580000003 HC RX 258: Performed by: INTERNAL MEDICINE

## 2023-09-24 PROCEDURE — 6370000000 HC RX 637 (ALT 250 FOR IP): Performed by: INTERNAL MEDICINE

## 2023-09-24 PROCEDURE — 94761 N-INVAS EAR/PLS OXIMETRY MLT: CPT

## 2023-09-24 PROCEDURE — 84100 ASSAY OF PHOSPHORUS: CPT

## 2023-09-24 PROCEDURE — 85025 COMPLETE CBC W/AUTO DIFF WBC: CPT

## 2023-09-24 PROCEDURE — 82962 GLUCOSE BLOOD TEST: CPT

## 2023-09-24 PROCEDURE — 1100000000 HC RM PRIVATE

## 2023-09-24 RX ORDER — ZIPRASIDONE MESYLATE 20 MG/ML
10 INJECTION, POWDER, LYOPHILIZED, FOR SOLUTION INTRAMUSCULAR EVERY 12 HOURS PRN
Status: DISCONTINUED | OUTPATIENT
Start: 2023-09-24 | End: 2023-09-26 | Stop reason: HOSPADM

## 2023-09-24 RX ADMIN — CEFEPIME 2000 MG: 2 INJECTION, POWDER, FOR SOLUTION INTRAVENOUS at 11:13

## 2023-09-24 RX ADMIN — FINASTERIDE 5 MG: 5 TABLET, FILM COATED ORAL at 11:07

## 2023-09-24 RX ADMIN — INSULIN HUMAN 21 UNITS: 100 INJECTION, SUSPENSION SUBCUTANEOUS at 17:00

## 2023-09-24 RX ADMIN — ASPIRIN 81 MG: 81 TABLET, COATED ORAL at 11:03

## 2023-09-24 RX ADMIN — METOPROLOL SUCCINATE 25 MG: 25 TABLET, EXTENDED RELEASE ORAL at 11:03

## 2023-09-24 RX ADMIN — FAMOTIDINE 20 MG: 20 TABLET, FILM COATED ORAL at 11:03

## 2023-09-24 RX ADMIN — ZIPRASIDONE MESYLATE 10 MG: 20 INJECTION, POWDER, LYOPHILIZED, FOR SOLUTION INTRAMUSCULAR at 04:47

## 2023-09-24 RX ADMIN — SODIUM CHLORIDE, PRESERVATIVE FREE 10 ML: 5 INJECTION INTRAVENOUS at 21:00

## 2023-09-24 RX ADMIN — ACETAMINOPHEN 650 MG: 325 TABLET ORAL at 00:16

## 2023-09-24 RX ADMIN — SODIUM CHLORIDE, PRESERVATIVE FREE 10 ML: 5 INJECTION INTRAVENOUS at 11:10

## 2023-09-24 RX ADMIN — ENOXAPARIN SODIUM 40 MG: 100 INJECTION SUBCUTANEOUS at 11:04

## 2023-09-24 ASSESSMENT — PAIN SCALES - GENERAL
PAINLEVEL_OUTOF10: 3
PAINLEVEL_OUTOF10: 5
PAINLEVEL_OUTOF10: 0

## 2023-09-24 ASSESSMENT — PAIN DESCRIPTION - LOCATION: LOCATION: PENIS

## 2023-09-24 NOTE — ED NOTES
Bedside shift change report given to Esequiel (oncoming nurse) by Tom Salinas (offgoing nurse). Report included the following information ED Encounter Summary, ED SBAR, MAR, and Recent Results.        Sejal Ko RN  09/23/23 2508

## 2023-09-24 NOTE — H&P
History and Physical    Date of Service:  9/23/2023  Primary Care Provider: Isai Cohn MD  Source of information: Patient, Family, External Medical Records    Chief Complaint: pain in the penis (Urinary catheter in place)      History of Presenting Illness:   Marylene Pain Sr. is a 82 y.o. male with a past medical history of CAD s/p PCI to RCA, LAD disease, three-vessel CABG in 2009, DM 2, HTN, depression, BPH, GERD, C. difficile colitis, who presented to the emergency room due to leakage of his Rothman catheter. Patient was seen by Dr. Margretta Leventhal and urology yesterday, and states that the nurse had placed a Rothman catheter due to BPH. He began to have leakage around the Rothman catheter, with lots of sediment, and came to the ER for further evaluation. On ER evaluation there was no retention, otherwise quite a bit of leakage with sediment around the Rothman catheter and attempt at replacement was made. However, nursing was unable to replace with a new catheter. Patient became quite encephalopathic while in the ER, and a repeat urinalysis demonstrated greater than 100 WBCs concerning for encephalopathy in the 2/2 acute UTI. Internal medicine was consulted for admission    Patient is confused and does not complete a full review of systems but does state he has no other complaints    Patient's wife states has a history of ataxia, dementia, and is followed by Rosina Stroud with neurology. She states that recently he has not been waking up to use the bathroom and he just \"urinates all over the depends and sheets. \"  She states this is why he had the Rothman replaced. REVIEW OF SYSTEMS:  Review of systems not obtained due to patient factors.      Past Medical History:   Diagnosis Date    CAD (coronary artery disease), native coronary artery     Cancer [facility])     melanoma removed on forehead    Depression     Diabetic neuropathy [facility])     ED (erectile dysfunction)     Enlarged prostate 07/19/2021

## 2023-09-24 NOTE — ED TRIAGE NOTES
Pt ate breakfast this morning and started to experience pain in his penis off/on all day, Pt took 3 Tylenol that did not ease the pain. The nurse at [facility] placed the urinary catheter yesterday to the pts knowledge.  Pt has not noticed any blood and limited output

## 2023-09-24 NOTE — ED PROVIDER NOTES
IV fluids and started on Cefepime. Did not call urology for emergent catheter placement as there is no evidence of retention at this time. Spouse states that patient appears confused. Will admit for urinary tract infection    FINAL IMPRESSION      1. Urinary tract infection associated with catheterization of urinary tract, unspecified indwelling urinary catheter type, initial encounter [facility])          DISPOSITION/PLAN   DISPOSITION Admitted 09/23/2023 11:25:03 PM      PATIENT REFERRED TO:  [address]  [address] [facility]  [phone]    Follow up  Home health for skilled nursing, PT, OT and social work    Helen Hernandez, [address] [facility] [address]  [phone]    Follow up in 3 day(s)        DISCHARGE MEDICATIONS:  Discharge Medication List as of 9/26/2023  2:15 PM        START taking these medications    Details   ! ! polyethylene glycol (GLYCOLAX) 17 g packet Take 1 packet by mouth daily as needed for Constipation, Disp-30 packet, R-0Normal      !! polyethylene glycol (GLYCOLAX) 17 g packet Take 1 packet by mouth daily, Disp-30 packet, R-0Normal      finasteride (PROSCAR) 5 MG tablet Take 1 tablet by mouth daily, Disp-30 tablet, R-3Normal      clindamycin (CLEOCIN) 300 MG capsule Take 1 capsule by mouth 3 times daily for 7 days, Disp-21 capsule, R-0Normal       !! - Potential duplicate medications found. Please discuss with provider.             (Please note that portions of this note were completed with a voice recognition program.  Efforts were made to edit the dictations but occasionally words are mis-transcribed.)    Diego Holt DO (electronically signed)  Emergency Attending Physician / Physician Assistant / Nurse Practitioner             Edin Do DO  09/30/23 1352

## 2023-09-24 NOTE — ED NOTES
Pt mental status has changed by being confused and more repetitive, provider made aware.      Brandt Montoya, RN  09/23/23 5963

## 2023-09-24 NOTE — ED NOTES
Attempted to insert 16fr Coude catheter on patient. Insertion attempt unsuccessful.  Primary RN notified     Katelyn Frazier RN  09/23/23 7370

## 2023-09-24 NOTE — CONSULTS
Department of Urology  Attending Consult Note          CHIEF COMPLAINT:  UTI, difficult lópez        HISTORY OF PRESENT ILLNESS:                The patient is a 82 y.o. male with significant past medical history of CAD s/p PCI to RCA, LAD disease, three-vessel CABG in 2009, DM 2, HTN, depression, GERD, C. difficile colitis, and BPH with chronic urinary retention. Underwent PVP with Dr. Nai Garcia, but has been unable to void. Nursing in ER unable to place lópez. Patient reports feeling need to urinate. López last changed at [facility] 9/5/2023.     Past Medical History:        Diagnosis Date    CAD (coronary artery disease), native coronary artery     Cancer ([address])     melanoma removed on forehead    Depression     Diabetic neuropathy (HCC)     ED (erectile dysfunction)     Enlarged prostate 07/19/2021    Hypertension, benign     Ill-defined condition     states he has memory problems    Postsurgical aortocoronary bypass status     Type II or unspecified type diabetes mellitus without mention of complication, not stated as uncontrolled      Past Surgical History:        Procedure Laterality Date    COLONOSCOPY N/A 5/3/2016    COLONOSCOPY performed by Keaton Timmons MD at [address]  2009    ECHO 2D ADULT  12/2009    EF 50%, basal inferior akinesis, mild MR    ECHO 2D ADULT  9/21/11    LVEF 50%, inferior AK, unchanged from Dec 2009    OTHER SURGICAL HISTORY  2008    melanoma removed from forehead     Current Medications:   Current Facility-Administered Medications: insulin NPH (HumuLIN N;NovoLIN N) injection vial 21 Units, 21 Units, SubCUTAneous, BID AC  ziprasidone (GEODON) injection 10 mg, 10 mg, IntraMUSCular, Q12H PRN  sodium chloride flush 0.9 % injection 5-40 mL, 5-40 mL, IntraVENous, 2 times per day  sodium chloride flush 0.9 % injection 5-40 mL, 5-40 mL, IntraVENous, PRN  0.9 % sodium chloride infusion, , IntraVENous, PRN  enoxaparin (LOVENOX) injection 40 mg, 40 mg,

## 2023-09-24 NOTE — ED NOTES
Patient c/o pain in the penis. Patient leaking urine from lópez catheter site, bladder scan shoed 130 cc of urine the bladder. López catheter bag from home had sediment in about 20 cc of urine. López catheter from home removed per Dr. Mart Doctor orders.  Patient urinated after lópez catheter removal. Attempted to place a 16 fr lópez catheter without success     Grupo Moran RN  09/23/23 4395

## 2023-09-25 LAB
ANION GAP SERPL CALC-SCNC: 6 MMOL/L (ref 5–15)
BASOPHILS # BLD: 0.1 K/UL (ref 0–0.1)
BASOPHILS NFR BLD: 1 % (ref 0–1)
BUN SERPL-MCNC: 18 MG/DL (ref 6–20)
BUN/CREAT SERPL: 21 (ref 12–20)
CALCIUM SERPL-MCNC: 8.7 MG/DL (ref 8.5–10.1)
CHLORIDE SERPL-SCNC: 109 MMOL/L (ref 97–108)
CO2 SERPL-SCNC: 21 MMOL/L (ref 21–32)
CREAT SERPL-MCNC: 0.84 MG/DL (ref 0.7–1.3)
DIFFERENTIAL METHOD BLD: ABNORMAL
EOSINOPHIL # BLD: 0.1 K/UL (ref 0–0.4)
EOSINOPHIL NFR BLD: 2 % (ref 0–7)
ERYTHROCYTE [DISTWIDTH] IN BLOOD BY AUTOMATED COUNT: 12.8 % (ref 11.5–14.5)
GLUCOSE BLD STRIP.AUTO-MCNC: 125 MG/DL (ref 65–117)
GLUCOSE BLD STRIP.AUTO-MCNC: 71 MG/DL (ref 65–117)
GLUCOSE SERPL-MCNC: 146 MG/DL (ref 65–100)
HCT VFR BLD AUTO: 40.6 % (ref 36.6–50.3)
HGB BLD-MCNC: 13.5 G/DL (ref 12.1–17)
IMM GRANULOCYTES # BLD AUTO: 0 K/UL (ref 0–0.04)
IMM GRANULOCYTES NFR BLD AUTO: 0 % (ref 0–0.5)
LYMPHOCYTES # BLD: 2.1 K/UL (ref 0.8–3.5)
LYMPHOCYTES NFR BLD: 31 % (ref 12–49)
MCH RBC QN AUTO: 29 PG (ref 26–34)
MCHC RBC AUTO-ENTMCNC: 33.3 G/DL (ref 30–36.5)
MCV RBC AUTO: 87.1 FL (ref 80–99)
MONOCYTES # BLD: 0.9 K/UL (ref 0–1)
MONOCYTES NFR BLD: 14 % (ref 5–13)
NEUTS SEG # BLD: 3.5 K/UL (ref 1.8–8)
NEUTS SEG NFR BLD: 52 % (ref 32–75)
NRBC # BLD: 0 K/UL (ref 0–0.01)
NRBC BLD-RTO: 0 PER 100 WBC
PHOSPHATE SERPL-MCNC: 2.8 MG/DL (ref 2.6–4.7)
PLATELET # BLD AUTO: 266 K/UL (ref 150–400)
PMV BLD AUTO: 8.5 FL (ref 8.9–12.9)
POTASSIUM SERPL-SCNC: 3.6 MMOL/L (ref 3.5–5.1)
RBC # BLD AUTO: 4.66 M/UL (ref 4.1–5.7)
SERVICE CMNT-IMP: ABNORMAL
SERVICE CMNT-IMP: NORMAL
SODIUM SERPL-SCNC: 136 MMOL/L (ref 136–145)
WBC # BLD AUTO: 6.6 K/UL (ref 4.1–11.1)

## 2023-09-25 PROCEDURE — 6370000000 HC RX 637 (ALT 250 FOR IP): Performed by: STUDENT IN AN ORGANIZED HEALTH CARE EDUCATION/TRAINING PROGRAM

## 2023-09-25 PROCEDURE — 85025 COMPLETE CBC W/AUTO DIFF WBC: CPT

## 2023-09-25 PROCEDURE — 36415 COLL VENOUS BLD VENIPUNCTURE: CPT

## 2023-09-25 PROCEDURE — 6370000000 HC RX 637 (ALT 250 FOR IP): Performed by: INTERNAL MEDICINE

## 2023-09-25 PROCEDURE — 6360000002 HC RX W HCPCS: Performed by: INTERNAL MEDICINE

## 2023-09-25 PROCEDURE — 97161 PT EVAL LOW COMPLEX 20 MIN: CPT

## 2023-09-25 PROCEDURE — 1100000000 HC RM PRIVATE

## 2023-09-25 PROCEDURE — 84100 ASSAY OF PHOSPHORUS: CPT

## 2023-09-25 PROCEDURE — 82962 GLUCOSE BLOOD TEST: CPT

## 2023-09-25 PROCEDURE — 80048 BASIC METABOLIC PNL TOTAL CA: CPT

## 2023-09-25 PROCEDURE — 97116 GAIT TRAINING THERAPY: CPT

## 2023-09-25 PROCEDURE — 97535 SELF CARE MNGMENT TRAINING: CPT

## 2023-09-25 PROCEDURE — 2580000003 HC RX 258: Performed by: INTERNAL MEDICINE

## 2023-09-25 PROCEDURE — 97530 THERAPEUTIC ACTIVITIES: CPT

## 2023-09-25 PROCEDURE — 94761 N-INVAS EAR/PLS OXIMETRY MLT: CPT

## 2023-09-25 PROCEDURE — 97165 OT EVAL LOW COMPLEX 30 MIN: CPT

## 2023-09-25 RX ORDER — POLYETHYLENE GLYCOL 3350 17 G/17G
17 POWDER, FOR SOLUTION ORAL DAILY
Status: DISCONTINUED | OUTPATIENT
Start: 2023-09-25 | End: 2023-09-26 | Stop reason: HOSPADM

## 2023-09-25 RX ADMIN — INSULIN HUMAN 21 UNITS: 100 INJECTION, SUSPENSION SUBCUTANEOUS at 08:09

## 2023-09-25 RX ADMIN — SODIUM CHLORIDE, PRESERVATIVE FREE 10 ML: 5 INJECTION INTRAVENOUS at 22:17

## 2023-09-25 RX ADMIN — CEFEPIME 2000 MG: 2 INJECTION, POWDER, FOR SOLUTION INTRAVENOUS at 10:16

## 2023-09-25 RX ADMIN — ENOXAPARIN SODIUM 40 MG: 100 INJECTION SUBCUTANEOUS at 08:08

## 2023-09-25 RX ADMIN — ZIPRASIDONE MESYLATE 10 MG: 20 INJECTION, POWDER, LYOPHILIZED, FOR SOLUTION INTRAMUSCULAR at 22:52

## 2023-09-25 RX ADMIN — CEFEPIME 2000 MG: 2 INJECTION, POWDER, FOR SOLUTION INTRAVENOUS at 22:52

## 2023-09-25 RX ADMIN — METOPROLOL SUCCINATE 25 MG: 25 TABLET, EXTENDED RELEASE ORAL at 08:09

## 2023-09-25 RX ADMIN — CEFEPIME 2000 MG: 2 INJECTION, POWDER, FOR SOLUTION INTRAVENOUS at 00:26

## 2023-09-25 RX ADMIN — FAMOTIDINE 20 MG: 20 TABLET, FILM COATED ORAL at 08:09

## 2023-09-25 RX ADMIN — ASPIRIN 81 MG: 81 TABLET, COATED ORAL at 08:09

## 2023-09-25 RX ADMIN — FINASTERIDE 5 MG: 5 TABLET, FILM COATED ORAL at 08:09

## 2023-09-25 RX ADMIN — SODIUM CHLORIDE, PRESERVATIVE FREE 10 ML: 5 INJECTION INTRAVENOUS at 08:10

## 2023-09-25 RX ADMIN — POLYETHYLENE GLYCOL 3350 17 G: 17 POWDER, FOR SOLUTION ORAL at 16:46

## 2023-09-25 RX ADMIN — ZIPRASIDONE MESYLATE 10 MG: 20 INJECTION, POWDER, LYOPHILIZED, FOR SOLUTION INTRAMUSCULAR at 02:08

## 2023-09-25 ASSESSMENT — PAIN SCALES - PAIN ASSESSMENT IN ADVANCED DEMENTIA (PAINAD)
CONSOLABILITY: 0
NEGVOCALIZATION: 0
FACIALEXPRESSION: 0
FACIALEXPRESSION: 0
BREATHING: 0
TOTALSCORE: 0
BODYLANGUAGE: 0
TOTALSCORE: 0
BREATHING: 0
CONSOLABILITY: 0
BODYLANGUAGE: 0
NEGVOCALIZATION: 0

## 2023-09-25 ASSESSMENT — PAIN SCALES - GENERAL
PAINLEVEL_OUTOF10: 0
PAINLEVEL_OUTOF10: 0

## 2023-09-26 VITALS
RESPIRATION RATE: 14 BRPM | OXYGEN SATURATION: 100 % | HEIGHT: 72 IN | DIASTOLIC BLOOD PRESSURE: 61 MMHG | SYSTOLIC BLOOD PRESSURE: 135 MMHG | BODY MASS INDEX: 26.05 KG/M2 | WEIGHT: 192.3 LBS | TEMPERATURE: 97.5 F | HEART RATE: 65 BPM

## 2023-09-26 PROBLEM — G93.40 ENCEPHALOPATHIES: Status: RESOLVED | Noted: 2023-09-23 | Resolved: 2023-09-26

## 2023-09-26 LAB
ANION GAP SERPL CALC-SCNC: 6 MMOL/L (ref 5–15)
BACTERIA SPEC CULT: ABNORMAL
BASOPHILS # BLD: 0 K/UL (ref 0–0.1)
BASOPHILS NFR BLD: 0 % (ref 0–1)
BUN SERPL-MCNC: 18 MG/DL (ref 6–20)
BUN/CREAT SERPL: 24 (ref 12–20)
CALCIUM SERPL-MCNC: 8.8 MG/DL (ref 8.5–10.1)
CC UR VC: ABNORMAL
CHLORIDE SERPL-SCNC: 108 MMOL/L (ref 97–108)
CO2 SERPL-SCNC: 23 MMOL/L (ref 21–32)
CREAT SERPL-MCNC: 0.74 MG/DL (ref 0.7–1.3)
DIFFERENTIAL METHOD BLD: ABNORMAL
EOSINOPHIL # BLD: 0.1 K/UL (ref 0–0.4)
EOSINOPHIL NFR BLD: 1 % (ref 0–7)
ERYTHROCYTE [DISTWIDTH] IN BLOOD BY AUTOMATED COUNT: 12.6 % (ref 11.5–14.5)
GLUCOSE BLD STRIP.AUTO-MCNC: 166 MG/DL (ref 65–117)
GLUCOSE BLD STRIP.AUTO-MCNC: 187 MG/DL (ref 65–117)
GLUCOSE SERPL-MCNC: 234 MG/DL (ref 65–100)
HCT VFR BLD AUTO: 40.4 % (ref 36.6–50.3)
HGB BLD-MCNC: 13.6 G/DL (ref 12.1–17)
IMM GRANULOCYTES # BLD AUTO: 0 K/UL (ref 0–0.04)
IMM GRANULOCYTES NFR BLD AUTO: 0 % (ref 0–0.5)
LYMPHOCYTES # BLD: 1.3 K/UL (ref 0.8–3.5)
LYMPHOCYTES NFR BLD: 18 % (ref 12–49)
MCH RBC QN AUTO: 29.6 PG (ref 26–34)
MCHC RBC AUTO-ENTMCNC: 33.7 G/DL (ref 30–36.5)
MCV RBC AUTO: 88 FL (ref 80–99)
MONOCYTES # BLD: 0.8 K/UL (ref 0–1)
MONOCYTES NFR BLD: 11 % (ref 5–13)
NEUTS SEG # BLD: 5.4 K/UL (ref 1.8–8)
NEUTS SEG NFR BLD: 70 % (ref 32–75)
NRBC # BLD: 0 K/UL (ref 0–0.01)
NRBC BLD-RTO: 0 PER 100 WBC
PHOSPHATE SERPL-MCNC: 3.1 MG/DL (ref 2.6–4.7)
PLATELET # BLD AUTO: 268 K/UL (ref 150–400)
PMV BLD AUTO: 8.8 FL (ref 8.9–12.9)
POTASSIUM SERPL-SCNC: 4.3 MMOL/L (ref 3.5–5.1)
RBC # BLD AUTO: 4.59 M/UL (ref 4.1–5.7)
SERVICE CMNT-IMP: ABNORMAL
SODIUM SERPL-SCNC: 137 MMOL/L (ref 136–145)
WBC # BLD AUTO: 7.6 K/UL (ref 4.1–11.1)

## 2023-09-26 PROCEDURE — 2580000003 HC RX 258: Performed by: INTERNAL MEDICINE

## 2023-09-26 PROCEDURE — 6360000002 HC RX W HCPCS: Performed by: INTERNAL MEDICINE

## 2023-09-26 PROCEDURE — 94761 N-INVAS EAR/PLS OXIMETRY MLT: CPT

## 2023-09-26 PROCEDURE — 6370000000 HC RX 637 (ALT 250 FOR IP): Performed by: INTERNAL MEDICINE

## 2023-09-26 PROCEDURE — 80048 BASIC METABOLIC PNL TOTAL CA: CPT

## 2023-09-26 PROCEDURE — 84100 ASSAY OF PHOSPHORUS: CPT

## 2023-09-26 PROCEDURE — 6370000000 HC RX 637 (ALT 250 FOR IP): Performed by: STUDENT IN AN ORGANIZED HEALTH CARE EDUCATION/TRAINING PROGRAM

## 2023-09-26 PROCEDURE — 36415 COLL VENOUS BLD VENIPUNCTURE: CPT

## 2023-09-26 PROCEDURE — 51702 INSERT TEMP BLADDER CATH: CPT

## 2023-09-26 PROCEDURE — 82962 GLUCOSE BLOOD TEST: CPT

## 2023-09-26 PROCEDURE — 85025 COMPLETE CBC W/AUTO DIFF WBC: CPT

## 2023-09-26 RX ORDER — POLYETHYLENE GLYCOL 3350 17 G/17G
17 POWDER, FOR SOLUTION ORAL DAILY
Qty: 30 PACKET | Refills: 0 | Status: SHIPPED | OUTPATIENT
Start: 2023-09-27 | End: 2023-10-27

## 2023-09-26 RX ORDER — FINASTERIDE 5 MG/1
5 TABLET, FILM COATED ORAL DAILY
Qty: 30 TABLET | Refills: 3 | Status: SHIPPED | OUTPATIENT
Start: 2023-09-27

## 2023-09-26 RX ORDER — POLYETHYLENE GLYCOL 3350 17 G/17G
17 POWDER, FOR SOLUTION ORAL DAILY PRN
Qty: 30 PACKET | Refills: 0 | Status: SHIPPED | OUTPATIENT
Start: 2023-09-26 | End: 2023-10-26

## 2023-09-26 RX ORDER — CLINDAMYCIN HYDROCHLORIDE 300 MG/1
300 CAPSULE ORAL 3 TIMES DAILY
Qty: 21 CAPSULE | Refills: 0 | Status: SHIPPED | OUTPATIENT
Start: 2023-09-26 | End: 2023-10-03

## 2023-09-26 RX ADMIN — FINASTERIDE 5 MG: 5 TABLET, FILM COATED ORAL at 08:33

## 2023-09-26 RX ADMIN — METOPROLOL SUCCINATE 25 MG: 25 TABLET, EXTENDED RELEASE ORAL at 08:33

## 2023-09-26 RX ADMIN — FAMOTIDINE 20 MG: 20 TABLET, FILM COATED ORAL at 08:33

## 2023-09-26 RX ADMIN — ASPIRIN 81 MG: 81 TABLET, COATED ORAL at 08:33

## 2023-09-26 RX ADMIN — ENOXAPARIN SODIUM 40 MG: 100 INJECTION SUBCUTANEOUS at 08:33

## 2023-09-26 RX ADMIN — CEFEPIME 2000 MG: 2 INJECTION, POWDER, FOR SOLUTION INTRAVENOUS at 10:18

## 2023-09-26 RX ADMIN — INSULIN HUMAN 21 UNITS: 100 INJECTION, SUSPENSION SUBCUTANEOUS at 08:54

## 2023-09-26 RX ADMIN — SODIUM CHLORIDE, PRESERVATIVE FREE 10 ML: 5 INJECTION INTRAVENOUS at 08:55

## 2023-09-26 RX ADMIN — POLYETHYLENE GLYCOL 3350 17 G: 17 POWDER, FOR SOLUTION ORAL at 08:33

## 2023-09-26 NOTE — PLAN OF CARE
Problem: Chronic Conditions and Co-morbidities  Goal: Patient's chronic conditions and co-morbidity symptoms are monitored and maintained or improved  Outcome: Progressing  Flowsheets (Taken 9/25/2023 0951)  Care Plan - Patient's Chronic Conditions and Co-Morbidity Symptoms are Monitored and Maintained or Improved:   Monitor and assess patient's chronic conditions and comorbid symptoms for stability, deterioration, or improvement   Collaborate with multidisciplinary team to address chronic and comorbid conditions and prevent exacerbation or deterioration   Update acute care plan with appropriate goals if chronic or comorbid symptoms are exacerbated and prevent overall improvement and discharge     Problem: Safety - Adult  Goal: Free from fall injury  Outcome: Progressing  Flowsheets (Taken 9/25/2023 0951)  Free From Fall Injury: Instruct family/caregiver on patient safety     Problem: Pain  Goal: Verbalizes/displays adequate comfort level or baseline comfort level  Outcome: Progressing  Flowsheets (Taken 9/25/2023 0951)  Verbalizes/displays adequate comfort level or baseline comfort level:   Assess pain using appropriate pain scale   Implement non-pharmacological measures as appropriate and evaluate response   Administer analgesics based on type and severity of pain and evaluate response
Problem: Discharge Planning  Goal: Discharge to home or other facility with appropriate resources  9/26/2023 0914 by Macario Murphy RN  Outcome: Progressing  9/25/2023 2135 by Divina Arora RN  Outcome: Progressing     Problem: Skin/Tissue Integrity  Goal: Absence of new skin breakdown  Description: 1. Monitor for areas of redness and/or skin breakdown  2. Assess vascular access sites hourly  3. Every 4-6 hours minimum:  Change oxygen saturation probe site  4. Every 4-6 hours:  If on nasal continuous positive airway pressure, respiratory therapy assess nares and determine need for appliance change or resting period.   9/26/2023 0914 by Macario Murphy RN  Outcome: Progressing  9/25/2023 2135 by Divina Arora RN  Outcome: Progressing     Problem: Chronic Conditions and Co-morbidities  Goal: Patient's chronic conditions and co-morbidity symptoms are monitored and maintained or improved  9/26/2023 0914 by Macario Murphy RN  Outcome: Progressing  9/25/2023 2135 by Divina Arora RN  Outcome: Progressing     Problem: Safety - Adult  Goal: Free from fall injury  9/26/2023 0914 by Macario Murphy RN  Outcome: Progressing  9/25/2023 2135 by Divina Arora RN  Outcome: Progressing     Problem: Pain  Goal: Verbalizes/displays adequate comfort level or baseline comfort level  9/26/2023 0914 by Macario Murphy RN  Outcome: Progressing  9/25/2023 2135 by Divina Arora RN  Outcome: Progressing
Problem: Discharge Planning  Goal: Discharge to home or other facility with appropriate resources  Outcome: Progressing     Problem: Skin/Tissue Integrity  Goal: Absence of new skin breakdown  Description: 1. Monitor for areas of redness and/or skin breakdown  2. Assess vascular access sites hourly  3. Every 4-6 hours minimum:  Change oxygen saturation probe site  4. Every 4-6 hours:  If on nasal continuous positive airway pressure, respiratory therapy assess nares and determine need for appliance change or resting period.   9/24/2023 1802 by Ana Laura Zambrano RN  Outcome: Progressing  9/24/2023 0734 by Kervin Barrett RN  Outcome: Progressing     Problem: Chronic Conditions and Co-morbidities  Goal: Patient's chronic conditions and co-morbidity symptoms are monitored and maintained or improved  9/24/2023 1802 by Ana Laura Zambrano RN  Outcome: Progressing  9/24/2023 0734 by Kervin Barrett RN  Outcome: Progressing     Problem: Safety - Adult  Goal: Free from fall injury  9/24/2023 1802 by Ana Laura Zambrano RN  Outcome: Progressing  9/24/2023 0734 by Kervin Barrett RN  Outcome: Progressing
Problem: Skin/Tissue Integrity  Goal: Absence of new skin breakdown  Description: 1. Monitor for areas of redness and/or skin breakdown  2. Assess vascular access sites hourly  3. Every 4-6 hours minimum:  Change oxygen saturation probe site  4. Every 4-6 hours:  If on nasal continuous positive airway pressure, respiratory therapy assess nares and determine need for appliance change or resting period.   9/24/2023 0734 by Ernie Madsen RN  Outcome: Progressing  9/24/2023 0146 by Ernie Madsen RN  Outcome: Progressing     Problem: Chronic Conditions and Co-morbidities  Goal: Patient's chronic conditions and co-morbidity symptoms are monitored and maintained or improved  9/24/2023 0734 by Ernie Madsen RN  Outcome: Progressing  9/24/2023 0147 by Ernie Madsen RN  Outcome: Progressing     Problem: Safety - Adult  Goal: Free from fall injury  9/24/2023 0734 by Ernie Madsen RN  Outcome: Progressing  9/24/2023 0147 by Ernie Madsen RN  Outcome: Progressing
Problem: Skin/Tissue Integrity  Goal: Absence of new skin breakdown  Description: 1. Monitor for areas of redness and/or skin breakdown  2. Assess vascular access sites hourly  3. Every 4-6 hours minimum:  Change oxygen saturation probe site  4. Every 4-6 hours:  If on nasal continuous positive airway pressure, respiratory therapy assess nares and determine need for appliance change or resting period.   Outcome: Progressing     Problem: Chronic Conditions and Co-morbidities  Goal: Patient's chronic conditions and co-morbidity symptoms are monitored and maintained or improved  Outcome: Progressing     Problem: Safety - Adult  Goal: Free from fall injury  Outcome: Progressing
Injury: Instruct family/caregiver on patient safety     Problem: Pain  Goal: Verbalizes/displays adequate comfort level or baseline comfort level  9/25/2023 2135 by Marta Henley RN  Outcome: Progressing  9/25/2023 1840 by Darrell Estevez RN  Outcome: Progressing  9/25/2023 0951 by Liang Schreiber RN  Outcome: Progressing  Flowsheets (Taken 9/25/2023 3291)  Verbalizes/displays adequate comfort level or baseline comfort level:   Assess pain using appropriate pain scale   Implement non-pharmacological measures as appropriate and evaluate response   Administer analgesics based on type and severity of pain and evaluate response     Problem: Occupational Therapy - Adult  Goal: By Discharge: Performs self-care activities at highest level of function for planned discharge setting. See evaluation for individualized goals. Description: FUNCTIONAL STATUS PRIOR TO ADMISSION:  Patient is a poor historian and unable to provide background information. Will need to confirm with family. HOME SUPPORT: Patient is a poor historian. Per chart review, he lived with his wife. Occupational Therapy Goals:  Initiated 9/25/2023  1. Patient will perform grooming with Supervision within 7 day(s). 2.  Patient will perform upper body dressing and bathing with minimum assistance within 7 day(s). 3.  Patient will perform lower body dressing and bathing with Moderate Assist within 7 day(s). 4.  Patient will perform toilet transfers with Minimal Assist  within 7 day(s). 5.  Patient will perform all aspects of toileting with Moderate Assist within 7 day(s). 6.  Patient will participate in upper extremity therapeutic exercise/activities with Supervision for 10 minutes within 7 day(s). 9/25/2023 1318 by Vera Del Rosario OT  Outcome: Progressing     Problem: Physical Therapy - Adult  Goal: By Discharge: Performs mobility at highest level of function for planned discharge setting.   See evaluation for individualized
CORONARY ARTERY BYPASS GRAFT  2009    ECHO 2D ADULT  12/2009    EF 50%, basal inferior akinesis, mild MR    ECHO 2D ADULT  9/21/11    LVEF 50%, inferior AK, unchanged from Dec 2009    OTHER SURGICAL HISTORY  2008    melanoma removed from forehead          Expanded or extensive additional review of patient history:   Lives With: Spouse  Type of Home: House  Home Layout: Two level                                   Hand Dominance: right     EXAMINATION OF PERFORMANCE DEFICITS:    Cognitive/Behavioral Status:  Orientation  Overall Orientation Status: Impaired  Orientation Level: Oriented to place; Disoriented to place; Disoriented to time;Disoriented to situation  Cognition  Overall Cognitive Status: Exceptions  Arousal/Alertness: Inconsistent responses to stimuli  Following Commands: Inconsistently follows commands  Attention Span: Unable to maintain attention  Memory: Decreased short term memory  Safety Judgement: Decreased awareness of need for safety;Decreased awareness of need for assistance  Problem Solving: Decreased awareness of errors  Insights: Not aware of deficits  Initiation: Requires cues for all  Sequencing: Requires cues for all    Skin: Intact in the uppers    Edema: None noted noted in the uppers    Vision/Perceptual:    Vision - Basic Assessment  Prior Vision: No visual deficits  Visual History: No significant visual history  Patient Visual Report: No visual complaint reported.         Perception  Overall Perceptual Status: Impaired  Motor Planning: Cues to use objects appropriately  Perseveration: Perseverates during conversation    Range of Motion:   AROM: Within functional limits  PROM: Within functional limits      Strength:  Strength: Generally decreased, functional      Coordination:  Coordination: Within functional limits            Tone & Sensation:   Tone: Normal  Sensation: Intact          Functional Mobility and Transfers for ADLs:    Bed Mobility:     Bed Mobility Training  Bed Mobility
of care was discussed with: occupational therapist, registered nurse, and     Patient Education  Education Given To: Patient  Education Provided: Role of Therapy;Plan of Care;Home Exercise Program;Precautions;Orientation; Energy Conservation;Transfer Training; Fall Prevention Strategies  Education Method: Verbal  Barriers to Learning: Cognition  Education Outcome: Verbalized understanding;Continued education needed    Thank you for this referral.  NGA ALAMO, PT,WCC.   Minutes: 43      Physical Therapy Evaluation Charge Determination   History Examination Presentation Decision-Making   MEDIUM  Complexity : 1-2 comorbidities / personal factors will impact the outcome/ POC  MEDIUM Complexity : 3 Standardized tests and measures addressin body structure, function, activity limitation and / or participation in recreation  MEDIUM Complexity : Evolving with changing characteristics  AM-PAC  MEDIUM   Based on the above components, the patient evaluation is determined to be of the following complexity level: Medium

## 2023-09-26 NOTE — DISCHARGE INSTRUCTIONS
HOSPITALIST DISCHARGE INSTRUCTIONS  NAME:  Adam Lenz Sr.   :  1940   MRN:  [id number]     Date/Time:  2023 2:05 PM    ADMIT DATE: 2023     DISCHARGE DATE: 2023     DISCHARGE DIAGNOSIS:  UTI  Demansia     DISCHARGE INSTRUCTIONS:  Thank you for allowing us to participate in your care. Your discharging Hospitalist is Aurelia Ding MD. Jaya Ek were admitted for evaluation and treatment of the above. ER precautions discussed with the wife     MEDICATIONS:    It is important that you take the medication exactly as they are prescribed. Keep your medication in the bottles provided by the pharmacist and keep a list of the medication names, dosages, and times to be taken in your wallet. Do not take other medications without consulting your doctor. If you experience any of the following symptoms then please call your primary care physician or return to the emergency room if you cannot get hold of your doctor:  Fever, chills, nausea, vomiting, diarrhea, change in mentation, falling, bleeding, shortness of breath    Follow Up:  Please call the below provider to arrange hospital follow up appointment      [address]  [address] [facility]  [phone]    Follow up  Home health for skilled nursing, PT, OT and social work    Renetta [place], [address] [place] [address]  [phone]    Follow up in 3 day(s)        For questions regarding your Hospitalization or to contact the Hospital Medicine team, please call [phone]. Information obtained by :  I understand that if any problems occur once I am at home I am to contact my physician. I understand and acknowledge receipt of the instructions indicated above.                                                                                                                                            Physician's or R.N.'s Signature

## 2023-09-26 NOTE — DISCHARGE SUMMARY
Hospitalist Discharge Summary     Patient ID:  Waleska Bishop  [id number]  82 y.o.  1940    Admit date: 9/23/2023    Discharge date and time: 9/26/2023    Admission Diagnoses: Encephalopathies [G93.40]  Urinary tract infection associated with catheterization of urinary tract, unspecified indwelling urinary catheter type, initial encounter ([address]) [T83.511A, N39.0]    Discharge Diagnoses:    Principal Problem (Resolved):    Encephalopathies  Active Problems:    * No active hospital problems. *         Hospital Course:   82 y.o. male with a past medical history of CAD s/p PCI to RCA, LAD disease, three-vessel CABG in 2009, DM 2, HTN, depression, BPH, GERD, C. difficile colitis, who presented to the emergency room due to leakage of his López catheter, found to having a UTI causing AMS, patient mental status is improving, wife at bedside reporting it's close to baseline  Metabolic Encephalopathy 2/2 complicated UTI has improved, wife present in the room reporting that's patient baseline, UTI, catheter associated-Occurred prior to arrival, pt has been clinically improving, Urology consulted: recommended continuing abx, and lópez, FU outpatient    Patient's chronic medical conditions   #Hypertension, stable through hospital course    #Hyperlipidemia, POA  --Continue statin  #History of CAD  #History of three-vessel CABG 2009  #History of PCI  --Continue cardiac medications  --Pharmacy for med rec  --No acute issues  #History of C. difficile colitis  --No diarrhea during hospital stay     Imaging  No results found.      PCP: Jatin Ruff MD     Consults: none    Condition of patient at discharge: Improved    Discharge Exam:    Physical Exam:    Gen:  Well-developed, well-nourished, in no acute distress, pt with sense of humor  HEENT:  Pink conjunctivae, PERRL, hearing intact to voice, moist mucous membranes  Neck:  Supple, without masses, thyroid non-tender  Resp:  No accessory muscle use, clear breath Please schedule in 6-8 weeks, per Dr Andujar

## 2023-09-30 LAB
BACTERIA SPEC CULT: NORMAL
BACTERIA SPEC CULT: NORMAL
SERVICE CMNT-IMP: NORMAL
SERVICE CMNT-IMP: NORMAL

## 2023-11-14 NOTE — ED NOTES
Bedside shift change report given to JES De Dios (oncoming nurse) by Nils Councilman, RN (offgoing nurse). Report included the following information SBAR, ED Summary, MAR and Recent Results. Perineural Invasion (For Histology - Be Specific If Possible): absent

## 2024-01-06 ENCOUNTER — APPOINTMENT (OUTPATIENT)
Facility: HOSPITAL | Age: 84
DRG: 638 | End: 2024-01-06
Payer: MEDICARE

## 2024-01-06 ENCOUNTER — HOSPITAL ENCOUNTER (INPATIENT)
Facility: HOSPITAL | Age: 84
LOS: 2 days | Discharge: HOME | DRG: 638 | End: 2024-01-08
Attending: EMERGENCY MEDICINE | Admitting: HOSPITALIST
Payer: MEDICARE

## 2024-01-06 DIAGNOSIS — E16.2 HYPOGLYCEMIA: Primary | ICD-10-CM

## 2024-01-06 PROBLEM — E11.649 DIABETIC HYPOGLYCEMIA (HCC): Status: ACTIVE | Noted: 2024-01-06

## 2024-01-06 LAB
ALBUMIN SERPL-MCNC: 3.4 G/DL (ref 3.5–5)
ALBUMIN/GLOB SERPL: 0.9 (ref 1.1–2.2)
ALP SERPL-CCNC: 60 U/L (ref 45–117)
ALT SERPL-CCNC: 19 U/L (ref 12–78)
ANION GAP SERPL CALC-SCNC: 5 MMOL/L (ref 5–15)
APPEARANCE UR: CLEAR
AST SERPL-CCNC: 17 U/L (ref 15–37)
B PERT DNA SPEC QL NAA+PROBE: NOT DETECTED
BACTERIA URNS QL MICRO: NEGATIVE /HPF
BASOPHILS # BLD: 0.1 K/UL (ref 0–0.1)
BASOPHILS NFR BLD: 1 % (ref 0–1)
BILIRUB SERPL-MCNC: 0.7 MG/DL (ref 0.2–1)
BILIRUB UR QL: NEGATIVE
BORDETELLA PARAPERTUSSIS BY PCR: NOT DETECTED
BUN SERPL-MCNC: 22 MG/DL (ref 6–20)
BUN/CREAT SERPL: 20 (ref 12–20)
C PNEUM DNA SPEC QL NAA+PROBE: NOT DETECTED
CALCIUM SERPL-MCNC: 9.2 MG/DL (ref 8.5–10.1)
CHLORIDE SERPL-SCNC: 107 MMOL/L (ref 97–108)
CO2 SERPL-SCNC: 25 MMOL/L (ref 21–32)
COLOR UR: ABNORMAL
COMMENT:: NORMAL
CREAT SERPL-MCNC: 1.08 MG/DL (ref 0.7–1.3)
DIFFERENTIAL METHOD BLD: ABNORMAL
EOSINOPHIL # BLD: 0.1 K/UL (ref 0–0.4)
EOSINOPHIL NFR BLD: 1 % (ref 0–7)
EPITH CASTS URNS QL MICRO: ABNORMAL /LPF
ERYTHROCYTE [DISTWIDTH] IN BLOOD BY AUTOMATED COUNT: 13.2 % (ref 11.5–14.5)
EST. AVERAGE GLUCOSE BLD GHB EST-MCNC: 126 MG/DL
FLUAV SUBTYP SPEC NAA+PROBE: NOT DETECTED
FLUBV RNA SPEC QL NAA+PROBE: NOT DETECTED
GLOBULIN SER CALC-MCNC: 4 G/DL (ref 2–4)
GLUCOSE BLD STRIP.AUTO-MCNC: 105 MG/DL (ref 65–117)
GLUCOSE BLD STRIP.AUTO-MCNC: 144 MG/DL (ref 65–117)
GLUCOSE BLD STRIP.AUTO-MCNC: 151 MG/DL (ref 65–117)
GLUCOSE BLD STRIP.AUTO-MCNC: 174 MG/DL (ref 65–117)
GLUCOSE BLD STRIP.AUTO-MCNC: 180 MG/DL (ref 65–117)
GLUCOSE BLD STRIP.AUTO-MCNC: 199 MG/DL (ref 65–117)
GLUCOSE BLD STRIP.AUTO-MCNC: 233 MG/DL (ref 65–117)
GLUCOSE BLD STRIP.AUTO-MCNC: 56 MG/DL (ref 65–117)
GLUCOSE BLD STRIP.AUTO-MCNC: 57 MG/DL (ref 65–117)
GLUCOSE BLD STRIP.AUTO-MCNC: 57 MG/DL (ref 65–117)
GLUCOSE BLD STRIP.AUTO-MCNC: 63 MG/DL (ref 65–117)
GLUCOSE BLD STRIP.AUTO-MCNC: 75 MG/DL (ref 65–117)
GLUCOSE SERPL-MCNC: 64 MG/DL (ref 65–100)
GLUCOSE UR STRIP.AUTO-MCNC: NEGATIVE MG/DL
HADV DNA SPEC QL NAA+PROBE: NOT DETECTED
HBA1C MFR BLD: 6 % (ref 4–5.6)
HCOV 229E RNA SPEC QL NAA+PROBE: NOT DETECTED
HCOV HKU1 RNA SPEC QL NAA+PROBE: NOT DETECTED
HCOV NL63 RNA SPEC QL NAA+PROBE: NOT DETECTED
HCOV OC43 RNA SPEC QL NAA+PROBE: NOT DETECTED
HCT VFR BLD AUTO: 43.6 % (ref 36.6–50.3)
HGB BLD-MCNC: 14.7 G/DL (ref 12.1–17)
HGB UR QL STRIP: ABNORMAL
HMPV RNA SPEC QL NAA+PROBE: NOT DETECTED
HPIV1 RNA SPEC QL NAA+PROBE: NOT DETECTED
HPIV2 RNA SPEC QL NAA+PROBE: NOT DETECTED
HPIV3 RNA SPEC QL NAA+PROBE: NOT DETECTED
HPIV4 RNA SPEC QL NAA+PROBE: NOT DETECTED
HYALINE CASTS URNS QL MICRO: ABNORMAL /LPF (ref 0–2)
IMM GRANULOCYTES # BLD AUTO: 0 K/UL (ref 0–0.04)
IMM GRANULOCYTES NFR BLD AUTO: 0 % (ref 0–0.5)
KETONES UR QL STRIP.AUTO: NEGATIVE MG/DL
LEUKOCYTE ESTERASE UR QL STRIP.AUTO: NEGATIVE
LYMPHOCYTES # BLD: 1.9 K/UL (ref 0.8–3.5)
LYMPHOCYTES NFR BLD: 20 % (ref 12–49)
M PNEUMO DNA SPEC QL NAA+PROBE: NOT DETECTED
MCH RBC QN AUTO: 30.2 PG (ref 26–34)
MCHC RBC AUTO-ENTMCNC: 33.7 G/DL (ref 30–36.5)
MCV RBC AUTO: 89.7 FL (ref 80–99)
MONOCYTES # BLD: 0.7 K/UL (ref 0–1)
MONOCYTES NFR BLD: 7 % (ref 5–13)
NEUTS SEG # BLD: 6.7 K/UL (ref 1.8–8)
NEUTS SEG NFR BLD: 71 % (ref 32–75)
NITRITE UR QL STRIP.AUTO: NEGATIVE
NRBC # BLD: 0 K/UL (ref 0–0.01)
NRBC BLD-RTO: 0 PER 100 WBC
PH UR STRIP: 5 (ref 5–8)
PLATELET # BLD AUTO: 340 K/UL (ref 150–400)
PMV BLD AUTO: 8.7 FL (ref 8.9–12.9)
POTASSIUM SERPL-SCNC: 3.7 MMOL/L (ref 3.5–5.1)
PROT SERPL-MCNC: 7.4 G/DL (ref 6.4–8.2)
PROT UR STRIP-MCNC: 30 MG/DL
RBC # BLD AUTO: 4.86 M/UL (ref 4.1–5.7)
RBC #/AREA URNS HPF: ABNORMAL /HPF (ref 0–5)
RSV RNA SPEC QL NAA+PROBE: NOT DETECTED
RV+EV RNA SPEC QL NAA+PROBE: NOT DETECTED
SARS-COV-2 RNA RESP QL NAA+PROBE: NOT DETECTED
SERVICE CMNT-IMP: ABNORMAL
SERVICE CMNT-IMP: NORMAL
SERVICE CMNT-IMP: NORMAL
SODIUM SERPL-SCNC: 137 MMOL/L (ref 136–145)
SP GR UR REFRACTOMETRY: 1.01 (ref 1–1.03)
SPECIMEN HOLD: NORMAL
TROPONIN I SERPL HS-MCNC: 8 NG/L (ref 0–76)
URINE CULTURE IF INDICATED: ABNORMAL
UROBILINOGEN UR QL STRIP.AUTO: 0.2 EU/DL (ref 0.2–1)
WBC # BLD AUTO: 9.5 K/UL (ref 4.1–11.1)
WBC URNS QL MICRO: ABNORMAL /HPF (ref 0–4)

## 2024-01-06 PROCEDURE — 2580000003 HC RX 258: Performed by: EMERGENCY MEDICINE

## 2024-01-06 PROCEDURE — 6370000000 HC RX 637 (ALT 250 FOR IP): Performed by: HOSPITALIST

## 2024-01-06 PROCEDURE — 36415 COLL VENOUS BLD VENIPUNCTURE: CPT

## 2024-01-06 PROCEDURE — 96375 TX/PRO/DX INJ NEW DRUG ADDON: CPT

## 2024-01-06 PROCEDURE — 2580000003 HC RX 258: Performed by: HOSPITALIST

## 2024-01-06 PROCEDURE — 74177 CT ABD & PELVIS W/CONTRAST: CPT

## 2024-01-06 PROCEDURE — 96374 THER/PROPH/DIAG INJ IV PUSH: CPT

## 2024-01-06 PROCEDURE — 1100000000 HC RM PRIVATE

## 2024-01-06 PROCEDURE — 99285 EMERGENCY DEPT VISIT HI MDM: CPT

## 2024-01-06 PROCEDURE — 85025 COMPLETE CBC W/AUTO DIFF WBC: CPT

## 2024-01-06 PROCEDURE — 83036 HEMOGLOBIN GLYCOSYLATED A1C: CPT

## 2024-01-06 PROCEDURE — 80053 COMPREHEN METABOLIC PANEL: CPT

## 2024-01-06 PROCEDURE — 94761 N-INVAS EAR/PLS OXIMETRY MLT: CPT

## 2024-01-06 PROCEDURE — 2580000003 HC RX 258: Performed by: STUDENT IN AN ORGANIZED HEALTH CARE EDUCATION/TRAINING PROGRAM

## 2024-01-06 PROCEDURE — 6360000002 HC RX W HCPCS: Performed by: EMERGENCY MEDICINE

## 2024-01-06 PROCEDURE — 71045 X-RAY EXAM CHEST 1 VIEW: CPT

## 2024-01-06 PROCEDURE — 6360000004 HC RX CONTRAST MEDICATION: Performed by: RADIOLOGY

## 2024-01-06 PROCEDURE — 81001 URINALYSIS AUTO W/SCOPE: CPT

## 2024-01-06 PROCEDURE — 84484 ASSAY OF TROPONIN QUANT: CPT

## 2024-01-06 PROCEDURE — 93005 ELECTROCARDIOGRAM TRACING: CPT | Performed by: EMERGENCY MEDICINE

## 2024-01-06 PROCEDURE — 82962 GLUCOSE BLOOD TEST: CPT

## 2024-01-06 PROCEDURE — 6360000002 HC RX W HCPCS: Performed by: HOSPITALIST

## 2024-01-06 PROCEDURE — 0202U NFCT DS 22 TRGT SARS-COV-2: CPT

## 2024-01-06 RX ORDER — POTASSIUM CHLORIDE 7.45 MG/ML
10 INJECTION INTRAVENOUS PRN
Status: DISCONTINUED | OUTPATIENT
Start: 2024-01-06 | End: 2024-01-08 | Stop reason: HOSPADM

## 2024-01-06 RX ORDER — SODIUM CHLORIDE 0.9 % (FLUSH) 0.9 %
5-40 SYRINGE (ML) INJECTION PRN
Status: DISCONTINUED | OUTPATIENT
Start: 2024-01-06 | End: 2024-01-08 | Stop reason: HOSPADM

## 2024-01-06 RX ORDER — DEXTROSE MONOHYDRATE 100 MG/ML
INJECTION, SOLUTION INTRAVENOUS CONTINUOUS
Status: DISCONTINUED | OUTPATIENT
Start: 2024-01-06 | End: 2024-01-06

## 2024-01-06 RX ORDER — POLYETHYLENE GLYCOL 3350 17 G/17G
17 POWDER, FOR SOLUTION ORAL DAILY PRN
Status: DISCONTINUED | OUTPATIENT
Start: 2024-01-06 | End: 2024-01-07

## 2024-01-06 RX ORDER — POTASSIUM CHLORIDE 750 MG/1
40 TABLET, FILM COATED, EXTENDED RELEASE ORAL PRN
Status: DISCONTINUED | OUTPATIENT
Start: 2024-01-06 | End: 2024-01-08 | Stop reason: HOSPADM

## 2024-01-06 RX ORDER — ONDANSETRON 4 MG/1
4 TABLET, ORALLY DISINTEGRATING ORAL EVERY 8 HOURS PRN
Status: DISCONTINUED | OUTPATIENT
Start: 2024-01-06 | End: 2024-01-08 | Stop reason: HOSPADM

## 2024-01-06 RX ORDER — ENOXAPARIN SODIUM 100 MG/ML
40 INJECTION SUBCUTANEOUS DAILY
Status: DISCONTINUED | OUTPATIENT
Start: 2024-01-06 | End: 2024-01-08 | Stop reason: HOSPADM

## 2024-01-06 RX ORDER — DEXTROSE MONOHYDRATE 50 MG/ML
INJECTION, SOLUTION INTRAVENOUS CONTINUOUS
Status: DISCONTINUED | OUTPATIENT
Start: 2024-01-06 | End: 2024-01-06

## 2024-01-06 RX ORDER — ONDANSETRON 2 MG/ML
4 INJECTION INTRAMUSCULAR; INTRAVENOUS ONCE
Status: COMPLETED | OUTPATIENT
Start: 2024-01-06 | End: 2024-01-06

## 2024-01-06 RX ORDER — FINASTERIDE 5 MG/1
5 TABLET, FILM COATED ORAL DAILY
Status: DISCONTINUED | OUTPATIENT
Start: 2024-01-06 | End: 2024-01-08 | Stop reason: HOSPADM

## 2024-01-06 RX ORDER — SODIUM CHLORIDE 0.9 % (FLUSH) 0.9 %
5-40 SYRINGE (ML) INJECTION EVERY 12 HOURS SCHEDULED
Status: DISCONTINUED | OUTPATIENT
Start: 2024-01-06 | End: 2024-01-08 | Stop reason: HOSPADM

## 2024-01-06 RX ORDER — ASPIRIN 325 MG
325 TABLET ORAL DAILY
Status: DISCONTINUED | OUTPATIENT
Start: 2024-01-06 | End: 2024-01-08 | Stop reason: HOSPADM

## 2024-01-06 RX ORDER — ACETAMINOPHEN 650 MG/1
650 SUPPOSITORY RECTAL EVERY 6 HOURS PRN
Status: DISCONTINUED | OUTPATIENT
Start: 2024-01-06 | End: 2024-01-08 | Stop reason: HOSPADM

## 2024-01-06 RX ORDER — LISINOPRIL 5 MG/1
10 TABLET ORAL DAILY
Status: DISCONTINUED | OUTPATIENT
Start: 2024-01-06 | End: 2024-01-08 | Stop reason: HOSPADM

## 2024-01-06 RX ORDER — ONDANSETRON 2 MG/ML
4 INJECTION INTRAMUSCULAR; INTRAVENOUS EVERY 6 HOURS PRN
Status: DISCONTINUED | OUTPATIENT
Start: 2024-01-06 | End: 2024-01-08 | Stop reason: HOSPADM

## 2024-01-06 RX ORDER — METOPROLOL SUCCINATE 25 MG/1
25 TABLET, EXTENDED RELEASE ORAL DAILY
Status: DISCONTINUED | OUTPATIENT
Start: 2024-01-06 | End: 2024-01-08 | Stop reason: HOSPADM

## 2024-01-06 RX ORDER — FAMOTIDINE 20 MG/1
20 TABLET, FILM COATED ORAL DAILY
Status: DISCONTINUED | OUTPATIENT
Start: 2024-01-06 | End: 2024-01-08 | Stop reason: HOSPADM

## 2024-01-06 RX ORDER — LACTULOSE 10 G/15ML
20 SOLUTION ORAL 3 TIMES DAILY
Status: COMPLETED | OUTPATIENT
Start: 2024-01-06 | End: 2024-01-06

## 2024-01-06 RX ORDER — MAGNESIUM SULFATE IN WATER 40 MG/ML
2000 INJECTION, SOLUTION INTRAVENOUS PRN
Status: DISCONTINUED | OUTPATIENT
Start: 2024-01-06 | End: 2024-01-08 | Stop reason: HOSPADM

## 2024-01-06 RX ORDER — DEXTROSE MONOHYDRATE 100 MG/ML
INJECTION, SOLUTION INTRAVENOUS CONTINUOUS PRN
Status: DISCONTINUED | OUTPATIENT
Start: 2024-01-06 | End: 2024-01-08 | Stop reason: HOSPADM

## 2024-01-06 RX ORDER — ACETAMINOPHEN 325 MG/1
650 TABLET ORAL EVERY 6 HOURS PRN
Status: DISCONTINUED | OUTPATIENT
Start: 2024-01-06 | End: 2024-01-08 | Stop reason: HOSPADM

## 2024-01-06 RX ORDER — SODIUM CHLORIDE 9 MG/ML
INJECTION, SOLUTION INTRAVENOUS PRN
Status: DISCONTINUED | OUTPATIENT
Start: 2024-01-06 | End: 2024-01-08 | Stop reason: HOSPADM

## 2024-01-06 RX ADMIN — SODIUM CHLORIDE, PRESERVATIVE FREE 10 ML: 5 INJECTION INTRAVENOUS at 09:49

## 2024-01-06 RX ADMIN — FINASTERIDE 5 MG: 5 TABLET, FILM COATED ORAL at 09:48

## 2024-01-06 RX ADMIN — LACTULOSE 20 G: 20 SOLUTION ORAL at 09:49

## 2024-01-06 RX ADMIN — METOPROLOL SUCCINATE 25 MG: 25 TABLET, EXTENDED RELEASE ORAL at 09:48

## 2024-01-06 RX ADMIN — DEXTROSE MONOHYDRATE 250 ML: 100 INJECTION, SOLUTION INTRAVENOUS at 01:44

## 2024-01-06 RX ADMIN — ENOXAPARIN SODIUM 40 MG: 100 INJECTION SUBCUTANEOUS at 11:06

## 2024-01-06 RX ADMIN — LACTULOSE 20 G: 20 SOLUTION ORAL at 21:24

## 2024-01-06 RX ADMIN — LACTULOSE 20 G: 20 SOLUTION ORAL at 14:47

## 2024-01-06 RX ADMIN — LISINOPRIL 10 MG: 5 TABLET ORAL at 09:48

## 2024-01-06 RX ADMIN — FAMOTIDINE 20 MG: 20 TABLET ORAL at 09:48

## 2024-01-06 RX ADMIN — ASPIRIN 325 MG: 325 TABLET ORAL at 09:48

## 2024-01-06 RX ADMIN — ONDANSETRON 4 MG: 2 INJECTION INTRAMUSCULAR; INTRAVENOUS at 02:55

## 2024-01-06 RX ADMIN — IOPAMIDOL 100 ML: 755 INJECTION, SOLUTION INTRAVENOUS at 04:15

## 2024-01-06 RX ADMIN — SODIUM CHLORIDE, PRESERVATIVE FREE 10 ML: 5 INJECTION INTRAVENOUS at 21:25

## 2024-01-06 RX ADMIN — DEXTROSE MONOHYDRATE: 50 INJECTION, SOLUTION INTRAVENOUS at 11:08

## 2024-01-06 RX ADMIN — DEXTROSE MONOHYDRATE: 100 INJECTION, SOLUTION INTRAVENOUS at 05:28

## 2024-01-06 ASSESSMENT — PAIN SCALES - GENERAL
PAINLEVEL_OUTOF10: 0

## 2024-01-06 NOTE — ED PROVIDER NOTES
[facility]  EMERGENCY DEPARTMENT ENCOUNTER      Pt Name: Tristin Jerry Sr.  MRN: [id number]  Birthdate 1940  Date of evaluation: 1/6/2024  Provider: Pietro Montoya MD    CHIEF COMPLAINT       Chief Complaint   Patient presents with    Hypoglycemia         HISTORY OF PRESENT ILLNESS   (Location/Symptom, Timing/Onset, Context/Setting, Quality, Duration, Modifying Factors, Severity)  Note limiting factors.   83-year-old male with PMHx of CAD, HTN, dementia, depression, and insulin-dependent DM 2 presents via EMS following a hypoglycemic episode earlier this evening.  EMS states that patient's family administered 30 units of insulin after which he became unresponsive.  On their arrival, EMS checked blood glucose which was 40.  They report that they administered 150 cc of D10 with improvement in his BG to the 70s.  Patient reports feeling generally unwell and nauseated.  History otherwise limited secondary to his dementia.    The history is provided by the patient.         Review of External Medical Records:     Nursing Notes were reviewed.    REVIEW OF SYSTEMS    (2-9 systems for level 4, 10 or more for level 5)     Review of Systems   Constitutional:  Positive for fatigue.   HENT: Negative.     Eyes: Negative.    Respiratory: Negative.     Cardiovascular: Negative.    Gastrointestinal:  Positive for nausea and vomiting.   Genitourinary: Negative.    Musculoskeletal: Negative.    Skin: Negative.    Neurological: Negative.    Psychiatric/Behavioral: Negative.         Except as noted above the remainder of the review of systems was reviewed and negative.       PAST MEDICAL HISTORY     Past Medical History:   Diagnosis Date    CAD (coronary artery disease), native coronary artery     Cancer (HCC)     melanoma removed on forehead    Depression     Diabetic neuropathy (HCC)     ED (erectile dysfunction)     Enlarged prostate 07/19/2021    Hypertension, benign     Ill-defined condition

## 2024-01-06 NOTE — ED TRIAGE NOTES
Patient arrives via EMS from home for complaints of being unresponsive     Per EMS patient found to be hypoglycemic with initial BG of 40, D10 administered, repeat BG of 60 then 71

## 2024-01-06 NOTE — PROGRESS NOTES
[facility]                                                                               Hospitalist Progress Note  NAYLA CHAVEZ MD          Date of Service:  2024  NAME:  Tristin Jerry Sr.  :  1940  MRN:  [id number]    Please note that this dictation was completed with [employer], the computer voice recognition software.  Quite often unanticipated grammatical, syntax, homophones, and other interpretive errors are inadvertently transcribed by the computer software.  Please disregard these errors.  Please excuse any errors that have escaped final proofreading.    Admission Summary:    83 y.o.  male with PMHx DM2 on 70/30 insulin, CAD s/p CABG and PCI, HTN, dementia, BPH with chronic indwelling lópez, brought in by EMS after found unresponsive with BS 40.  Wife had given 70/30 30 units earlier.  BS improved to 70 after 150ml D10 by EMS.  In ER, BS have remained 56 to 63 despite giving pepsi and another 250ml bolus D10.  He is being started on D10 drip.     He reports intermittent left sided chest pain and some SOB and nausea.  Denies cough, fever, chills, abdominal pain, vomiting.  Denies any change in appetite. +constipation.  Says he has been dealing with uti.  He is unreliable historian due to dementia, A/O to self and place, and family has gone home.     Admitted last  with uti and AMS       Interval history / Subjective:   Patient sleepy but arousable. Oriented to place and person not to situation. On D10 gtt.      Assessment & Plan:     Anticipated discharge date : > 48 hours  Anticipated disposition : Home  Barriers to discharge : medical stability      Insulin dependent DM 2 with hypoglycemia, POA    BS 40 at home   -repeat A1c  -Q1H FSG. Switch to D5   -hold home 70/30     Constipation  with moderate to severe stool burden in right colon, POA  on lactulose 20g tid x 3 doses  -miralax daily      CAD   HTN  -cont aspirin, metoprolol, lisinopril

## 2024-01-06 NOTE — H&P
Hospitalist Admission Note    NAME:  Tristin Jeryr Sr.   :  1940   MRN:  [id number]     Date/Time:  2024 5:08 AM    Patient PCP: Amos Vargas MD    Please note that this dictation was completed with [employer], the computer voice recognition software.  Quite often unanticipated grammatical, syntax, homophones, and other interpretive errors are inadvertently transcribed by the computer software.  Please disregard these errors.  Please excuse any errors that have escaped final proofreading  ________________________________________________________________________    Given the patient's current clinical presentation, I have a high level of concern for decompensation if discharged from the emergency department.  Complex decision making was performed, which includes reviewing the patient's available past medical records, laboratory results, and x-ray films.       My assessment of this patient's clinical condition and my plan of care is as follows.    Assessment / Plan:    Insulin dependent DM 2 with hypoglycemia, POA  BS 40 at home   --put on D10 drip.  Q1h Accucheks.  Julian diet  --check troponin, UA.  CXR normal  --check A1c  --hold home 70/30.  Per wife, endocrinologist's office has told her to reduce to 20-25 units but she has not received written confirmation.    Constipation with moderate to severe stool burden in right colon, POA  --put on lactulose 20g tid x 3 doses  --miralax daily starting tomorrow    CAD   HTN  --cont aspirin, metoprolol, lisinopril    BPH with urinary retention and chronic indwelling lópez  --continue finasteride  --check UA    GERD  --continue home pepcid    Dementia  --patient oriented to self and place, not to situation or time.  Alert, answer questions.    Medical Decision Making:   I have personally reviewed the radiographs, laboratory data in Epic and decisions and statements above are based partially on this personal interpretation.    Drug monitoring:       Code

## 2024-01-07 LAB
ANION GAP SERPL CALC-SCNC: 6 MMOL/L (ref 5–15)
APPEARANCE UR: CLEAR
BACTERIA URNS QL MICRO: NEGATIVE /HPF
BASOPHILS # BLD: 0.1 K/UL (ref 0–0.1)
BASOPHILS NFR BLD: 1 % (ref 0–1)
BILIRUB UR QL: NEGATIVE
BUN SERPL-MCNC: 17 MG/DL (ref 6–20)
BUN/CREAT SERPL: 18 (ref 12–20)
CALCIUM SERPL-MCNC: 8.9 MG/DL (ref 8.5–10.1)
CAOX CRY URNS QL MICRO: ABNORMAL
CHLORIDE SERPL-SCNC: 107 MMOL/L (ref 97–108)
CO2 SERPL-SCNC: 21 MMOL/L (ref 21–32)
COLOR UR: ABNORMAL
CREAT SERPL-MCNC: 0.97 MG/DL (ref 0.7–1.3)
DIFFERENTIAL METHOD BLD: ABNORMAL
EOSINOPHIL # BLD: 0.1 K/UL (ref 0–0.4)
EOSINOPHIL NFR BLD: 1 % (ref 0–7)
EPITH CASTS URNS QL MICRO: ABNORMAL /LPF
ERYTHROCYTE [DISTWIDTH] IN BLOOD BY AUTOMATED COUNT: 13.3 % (ref 11.5–14.5)
GLUCOSE BLD STRIP.AUTO-MCNC: 124 MG/DL (ref 65–117)
GLUCOSE BLD STRIP.AUTO-MCNC: 141 MG/DL (ref 65–117)
GLUCOSE BLD STRIP.AUTO-MCNC: 147 MG/DL (ref 65–117)
GLUCOSE BLD STRIP.AUTO-MCNC: 189 MG/DL (ref 65–117)
GLUCOSE BLD STRIP.AUTO-MCNC: 248 MG/DL (ref 65–117)
GLUCOSE BLD STRIP.AUTO-MCNC: 259 MG/DL (ref 65–117)
GLUCOSE SERPL-MCNC: 157 MG/DL (ref 65–100)
GLUCOSE UR STRIP.AUTO-MCNC: >1000 MG/DL
HCT VFR BLD AUTO: 40.6 % (ref 36.6–50.3)
HGB BLD-MCNC: 13.9 G/DL (ref 12.1–17)
HGB UR QL STRIP: ABNORMAL
IMM GRANULOCYTES # BLD AUTO: 0.1 K/UL (ref 0–0.04)
IMM GRANULOCYTES NFR BLD AUTO: 0 % (ref 0–0.5)
KETONES UR QL STRIP.AUTO: ABNORMAL MG/DL
LEUKOCYTE ESTERASE UR QL STRIP.AUTO: NEGATIVE
LYMPHOCYTES # BLD: 1.9 K/UL (ref 0.8–3.5)
LYMPHOCYTES NFR BLD: 15 % (ref 12–49)
MAGNESIUM SERPL-MCNC: 2.1 MG/DL (ref 1.6–2.4)
MCH RBC QN AUTO: 30.2 PG (ref 26–34)
MCHC RBC AUTO-ENTMCNC: 34.2 G/DL (ref 30–36.5)
MCV RBC AUTO: 88.1 FL (ref 80–99)
MONOCYTES # BLD: 0.7 K/UL (ref 0–1)
MONOCYTES NFR BLD: 5 % (ref 5–13)
NEUTS SEG # BLD: 10.2 K/UL (ref 1.8–8)
NEUTS SEG NFR BLD: 78 % (ref 32–75)
NITRITE UR QL STRIP.AUTO: NEGATIVE
NRBC # BLD: 0 K/UL (ref 0–0.01)
NRBC BLD-RTO: 0 PER 100 WBC
PH UR STRIP: 5 (ref 5–8)
PHOSPHATE SERPL-MCNC: 2.8 MG/DL (ref 2.6–4.7)
PLATELET # BLD AUTO: 277 K/UL (ref 150–400)
PMV BLD AUTO: 9.1 FL (ref 8.9–12.9)
POTASSIUM SERPL-SCNC: 4.2 MMOL/L (ref 3.5–5.1)
PROT UR STRIP-MCNC: ABNORMAL MG/DL
RBC # BLD AUTO: 4.61 M/UL (ref 4.1–5.7)
RBC #/AREA URNS HPF: ABNORMAL /HPF (ref 0–5)
SERVICE CMNT-IMP: ABNORMAL
SODIUM SERPL-SCNC: 134 MMOL/L (ref 136–145)
SP GR UR REFRACTOMETRY: 1.03 (ref 1–1.03)
UROBILINOGEN UR QL STRIP.AUTO: 0.2 EU/DL (ref 0.2–1)
WBC # BLD AUTO: 13 K/UL (ref 4.1–11.1)
WBC URNS QL MICRO: ABNORMAL /HPF (ref 0–4)

## 2024-01-07 PROCEDURE — 94761 N-INVAS EAR/PLS OXIMETRY MLT: CPT

## 2024-01-07 PROCEDURE — 6360000002 HC RX W HCPCS: Performed by: STUDENT IN AN ORGANIZED HEALTH CARE EDUCATION/TRAINING PROGRAM

## 2024-01-07 PROCEDURE — 81001 URINALYSIS AUTO W/SCOPE: CPT

## 2024-01-07 PROCEDURE — 84100 ASSAY OF PHOSPHORUS: CPT

## 2024-01-07 PROCEDURE — 36415 COLL VENOUS BLD VENIPUNCTURE: CPT

## 2024-01-07 PROCEDURE — 85025 COMPLETE CBC W/AUTO DIFF WBC: CPT

## 2024-01-07 PROCEDURE — 80048 BASIC METABOLIC PNL TOTAL CA: CPT

## 2024-01-07 PROCEDURE — 82962 GLUCOSE BLOOD TEST: CPT

## 2024-01-07 PROCEDURE — 6370000000 HC RX 637 (ALT 250 FOR IP): Performed by: STUDENT IN AN ORGANIZED HEALTH CARE EDUCATION/TRAINING PROGRAM

## 2024-01-07 PROCEDURE — 1100000000 HC RM PRIVATE

## 2024-01-07 PROCEDURE — 6360000002 HC RX W HCPCS: Performed by: HOSPITALIST

## 2024-01-07 PROCEDURE — 83735 ASSAY OF MAGNESIUM: CPT

## 2024-01-07 PROCEDURE — 2580000003 HC RX 258: Performed by: HOSPITALIST

## 2024-01-07 PROCEDURE — 6370000000 HC RX 637 (ALT 250 FOR IP): Performed by: HOSPITALIST

## 2024-01-07 RX ORDER — INSULIN LISPRO 100 [IU]/ML
0-4 INJECTION, SOLUTION INTRAVENOUS; SUBCUTANEOUS NIGHTLY
Status: DISCONTINUED | OUTPATIENT
Start: 2024-01-07 | End: 2024-01-08 | Stop reason: HOSPADM

## 2024-01-07 RX ORDER — POLYETHYLENE GLYCOL 3350 17 G/17G
17 POWDER, FOR SOLUTION ORAL DAILY
Status: DISCONTINUED | OUTPATIENT
Start: 2024-01-07 | End: 2024-01-08 | Stop reason: HOSPADM

## 2024-01-07 RX ORDER — LORAZEPAM 2 MG/ML
1 INJECTION INTRAMUSCULAR EVERY 6 HOURS PRN
Status: DISCONTINUED | OUTPATIENT
Start: 2024-01-07 | End: 2024-01-08 | Stop reason: HOSPADM

## 2024-01-07 RX ORDER — INSULIN LISPRO 100 [IU]/ML
0-4 INJECTION, SOLUTION INTRAVENOUS; SUBCUTANEOUS
Status: DISCONTINUED | OUTPATIENT
Start: 2024-01-07 | End: 2024-01-08 | Stop reason: HOSPADM

## 2024-01-07 RX ORDER — LORAZEPAM 2 MG/ML
1 INJECTION INTRAMUSCULAR ONCE
Status: COMPLETED | OUTPATIENT
Start: 2024-01-07 | End: 2024-01-07

## 2024-01-07 RX ADMIN — ENOXAPARIN SODIUM 40 MG: 100 INJECTION SUBCUTANEOUS at 08:38

## 2024-01-07 RX ADMIN — FINASTERIDE 5 MG: 5 TABLET, FILM COATED ORAL at 08:38

## 2024-01-07 RX ADMIN — INSULIN LISPRO 1 UNITS: 100 INJECTION, SOLUTION INTRAVENOUS; SUBCUTANEOUS at 18:29

## 2024-01-07 RX ADMIN — ASPIRIN 325 MG: 325 TABLET ORAL at 08:39

## 2024-01-07 RX ADMIN — FAMOTIDINE 20 MG: 20 TABLET ORAL at 08:38

## 2024-01-07 RX ADMIN — METOPROLOL SUCCINATE 25 MG: 25 TABLET, EXTENDED RELEASE ORAL at 08:39

## 2024-01-07 RX ADMIN — POLYETHYLENE GLYCOL 3350 17 G: 17 POWDER, FOR SOLUTION ORAL at 08:39

## 2024-01-07 RX ADMIN — LORAZEPAM 1 MG: 2 INJECTION INTRAMUSCULAR; INTRAVENOUS at 14:21

## 2024-01-07 RX ADMIN — SODIUM CHLORIDE, PRESERVATIVE FREE 10 ML: 5 INJECTION INTRAVENOUS at 08:37

## 2024-01-07 RX ADMIN — LISINOPRIL 10 MG: 5 TABLET ORAL at 08:38

## 2024-01-07 NOTE — CARE COORDINATION
01/07/24  Care Management Assessment    Reason for Admission:     Diabetic hypoglycemia (HCC) [E11.649]  Hypoglycemia [E16.2]       RUR: Readmission Risk              Risk of Unplanned Readmission:  15         Advance Directive: Full Code      Healthcare Decision Maker:   Primary Decision Maker: Marjorie Jerry - Spouse - [phone]    Assessment:     01/07/24 1512   Service Assessment   Cognition Dementia / Early Alzheimer's   History Provided By Spouse   Primary Caregiver Spouse   Support Systems Spouse/Significant Other   Patient's Healthcare Decision Maker is: Legal Next of Kin   PCP Verified by CM Yes  (Dr. Vargas)   Last Visit to PCP Within last 6 months   Prior Functional Level Cooking;Housework;Shopping;Mobility;Bathing;Assistance with the following:   Can patient return to prior living arrangement Yes   Ability to make needs known: Fair   Family able to assist with home care needs: Yes   Financial Resources Medicare   Social/Functional History   Lives With Spouse;Son   Type of Home House   Home Layout Two level   Home Access Level entry   Bathroom Shower/Tub Tub/Shower unit   Bathroom Toilet Standard   Bathroom Equipment Grab bars in shower;Tub transfer bench   Home Equipment Walker, rolling;Wheelchair-manual   Receives Help From Family   ADL Assistance Needs assistance   Bath Supervision   Dressing Supervision   Grooming Supervision   Feeding Supervision   Active  No   Mode of Transportation Car   Occupation Retired   Discharge Planning   Type of Residence House   Living Arrangements Spouse/Significant Other;Children   Patient expects to be discharged to: House   One/Two Story Residence Two story   Interior Rails Right         Insurer:   Active Insurance as of 1/6/2024       Primary Coverage       Payor Plan Insurance Group Employer/Plan Group    MEDICARE MEDICARE PART A AND B        Payor Address Payor Phone Number Payor Fax Number Effective Dates    [address] [address] [phone]  11/1/1988 - None

## 2024-01-07 NOTE — PROGRESS NOTES
Hospitalist Progress Note      NAME:  Tristin Jerry .   :  1940  MRM:  [id number]    Date/Time: 2024  11:48 AM           Assessment / Plan:     83 y.o.  male with PMHx DM2 on 70/30 insulin, CAD s/p CABG and PCI, HTN, dementia, BPH with chronic indwelling lópez, brought in by EMS after found unresponsive with BS 40.     #Leucocytosis    WBC: 13  UA: clean  CXR: no infiltrates   No signs of infection, patient is afebrile, will continue to monitor   No indications for abx at the moment     #Insulin dependent DM 2 with hypoglycemia, POA    BS 40 at home   -A1c: 6, is low for his age, increase the risk of hypoglycemia episodes   -Q1H FSG.   -hold home 70/30, SSI   Consulted diabetes management team for home meds prior to discharge      #Constipation  with moderate to severe stool burden in right colon, POA  on lactulose 20g tid x 3 doses  -miralax daily      #CAD   #HTN  -cont aspirin, metoprolol, lisinopril     #BPH with urinary retention and chronic indwelling lópez  -continue finasteride     #GERD  -continue home pepcid     #Dementia  -patient oriented to self and place,    Alert, answer questions.  Pt has been seen by me in sept, when admitted for AMS and UTI, mental status is at baseline        I have personally reviewed the radiographs, laboratory data in Epic and decisions and statements above are based partially on this personal interpretation.                 Care Plan discussed with: Patient and Nursing Staff    Discussed:  Care Plan    Prophylaxis:  Lovenox    Disposition:  SNF/LTC           ___________________________________________________    Attending Physician: Yobany Khoury MD        Subjective:     Chief Complaint:  Hypoglycemia     ROS:  (bold if positive, if negative)    Tolerating PT  Tolerating Diet          Objective:   Pt is awake, alert, oriented to place and self today   No acute complains     Vitals:          Last 24hrs VS reviewed since prior progress note. Most recent

## 2024-01-07 NOTE — PROGRESS NOTES
Patient is agitated. Pulled off his IV and removed his telebox. Dr Khoury made aware with orders that telebox can be discontinued and Ativan 1mg IM once.

## 2024-01-08 ENCOUNTER — APPOINTMENT (OUTPATIENT)
Facility: HOSPITAL | Age: 84
DRG: 638 | End: 2024-01-08
Payer: MEDICARE

## 2024-01-08 VITALS
TEMPERATURE: 97.5 F | DIASTOLIC BLOOD PRESSURE: 62 MMHG | OXYGEN SATURATION: 99 % | HEIGHT: 72 IN | BODY MASS INDEX: 24.22 KG/M2 | SYSTOLIC BLOOD PRESSURE: 118 MMHG | HEART RATE: 58 BPM | WEIGHT: 178.79 LBS | RESPIRATION RATE: 16 BRPM

## 2024-01-08 PROBLEM — E11.649 DIABETIC HYPOGLYCEMIA (HCC): Status: RESOLVED | Noted: 2024-01-06 | Resolved: 2024-01-08

## 2024-01-08 PROBLEM — E11.649 TYPE 2 DIABETES MELLITUS WITH HYPOGLYCEMIA WITHOUT COMA (HCC): Status: ACTIVE | Noted: 2024-01-08

## 2024-01-08 PROBLEM — E16.2 HYPOGLYCEMIA: Status: RESOLVED | Noted: 2024-01-08 | Resolved: 2024-01-08

## 2024-01-08 PROBLEM — E16.2 HYPOGLYCEMIA: Status: ACTIVE | Noted: 2024-01-08

## 2024-01-08 LAB
ANION GAP SERPL CALC-SCNC: 2 MMOL/L (ref 5–15)
BUN SERPL-MCNC: 18 MG/DL (ref 6–20)
BUN/CREAT SERPL: 17 (ref 12–20)
CALCIUM SERPL-MCNC: 9 MG/DL (ref 8.5–10.1)
CHLORIDE SERPL-SCNC: 106 MMOL/L (ref 97–108)
CO2 SERPL-SCNC: 25 MMOL/L (ref 21–32)
CREAT SERPL-MCNC: 1.04 MG/DL (ref 0.7–1.3)
EKG ATRIAL RATE: 60 BPM
EKG DIAGNOSIS: NORMAL
EKG P AXIS: 9 DEGREES
EKG P-R INTERVAL: 178 MS
EKG Q-T INTERVAL: 424 MS
EKG QRS DURATION: 90 MS
EKG QTC CALCULATION (BAZETT): 424 MS
EKG R AXIS: -29 DEGREES
EKG T AXIS: 10 DEGREES
EKG VENTRICULAR RATE: 60 BPM
ERYTHROCYTE [DISTWIDTH] IN BLOOD BY AUTOMATED COUNT: 13.1 % (ref 11.5–14.5)
GLUCOSE BLD STRIP.AUTO-MCNC: 146 MG/DL (ref 65–117)
GLUCOSE BLD STRIP.AUTO-MCNC: 168 MG/DL (ref 65–117)
GLUCOSE SERPL-MCNC: 181 MG/DL (ref 65–100)
HCT VFR BLD AUTO: 43.7 % (ref 36.6–50.3)
HGB BLD-MCNC: 14.7 G/DL (ref 12.1–17)
MCH RBC QN AUTO: 29.8 PG (ref 26–34)
MCHC RBC AUTO-ENTMCNC: 33.6 G/DL (ref 30–36.5)
MCV RBC AUTO: 88.5 FL (ref 80–99)
NRBC # BLD: 0 K/UL (ref 0–0.01)
NRBC BLD-RTO: 0 PER 100 WBC
PLATELET # BLD AUTO: 274 K/UL (ref 150–400)
PMV BLD AUTO: 9.3 FL (ref 8.9–12.9)
POTASSIUM SERPL-SCNC: 5.1 MMOL/L (ref 3.5–5.1)
RBC # BLD AUTO: 4.94 M/UL (ref 4.1–5.7)
SERVICE CMNT-IMP: ABNORMAL
SERVICE CMNT-IMP: ABNORMAL
SODIUM SERPL-SCNC: 133 MMOL/L (ref 136–145)
WBC # BLD AUTO: 8.9 K/UL (ref 4.1–11.1)

## 2024-01-08 PROCEDURE — 36415 COLL VENOUS BLD VENIPUNCTURE: CPT

## 2024-01-08 PROCEDURE — 94761 N-INVAS EAR/PLS OXIMETRY MLT: CPT

## 2024-01-08 PROCEDURE — 6370000000 HC RX 637 (ALT 250 FOR IP): Performed by: HOSPITALIST

## 2024-01-08 PROCEDURE — 6360000002 HC RX W HCPCS: Performed by: HOSPITALIST

## 2024-01-08 PROCEDURE — 93010 ELECTROCARDIOGRAM REPORT: CPT | Performed by: INTERNAL MEDICINE

## 2024-01-08 PROCEDURE — 80048 BASIC METABOLIC PNL TOTAL CA: CPT

## 2024-01-08 PROCEDURE — 99223 1ST HOSP IP/OBS HIGH 75: CPT

## 2024-01-08 PROCEDURE — 82962 GLUCOSE BLOOD TEST: CPT

## 2024-01-08 PROCEDURE — 2580000003 HC RX 258: Performed by: HOSPITALIST

## 2024-01-08 PROCEDURE — 70450 CT HEAD/BRAIN W/O DYE: CPT

## 2024-01-08 PROCEDURE — 85027 COMPLETE CBC AUTOMATED: CPT

## 2024-01-08 PROCEDURE — 6360000002 HC RX W HCPCS: Performed by: STUDENT IN AN ORGANIZED HEALTH CARE EDUCATION/TRAINING PROGRAM

## 2024-01-08 RX ADMIN — POLYETHYLENE GLYCOL 3350 17 G: 17 POWDER, FOR SOLUTION ORAL at 08:46

## 2024-01-08 RX ADMIN — ASPIRIN 325 MG: 325 TABLET ORAL at 08:46

## 2024-01-08 RX ADMIN — LORAZEPAM 1 MG: 2 INJECTION INTRAMUSCULAR; INTRAVENOUS at 03:23

## 2024-01-08 RX ADMIN — FINASTERIDE 5 MG: 5 TABLET, FILM COATED ORAL at 08:46

## 2024-01-08 RX ADMIN — SODIUM CHLORIDE, PRESERVATIVE FREE 10 ML: 5 INJECTION INTRAVENOUS at 08:47

## 2024-01-08 RX ADMIN — LISINOPRIL 10 MG: 5 TABLET ORAL at 08:46

## 2024-01-08 RX ADMIN — FAMOTIDINE 20 MG: 20 TABLET ORAL at 08:46

## 2024-01-08 RX ADMIN — ENOXAPARIN SODIUM 40 MG: 100 INJECTION SUBCUTANEOUS at 08:46

## 2024-01-08 RX ADMIN — METOPROLOL SUCCINATE 25 MG: 25 TABLET, EXTENDED RELEASE ORAL at 08:46

## 2024-01-08 ASSESSMENT — ENCOUNTER SYMPTOMS
EYES NEGATIVE: 1
VOMITING: 1
NAUSEA: 1
RESPIRATORY NEGATIVE: 1

## 2024-01-08 NOTE — DISCHARGE SUMMARY
daily      famotidine (PEPCID) 20 MG tablet Take 1 tablet by mouth daily           STOP taking these medications       Multiple Vitamin (MULTIVITAMIN ADULT PO) Comments:   Reason for Stopping:             Activity: activity as tolerated  Diet: diabetic diet  Wound Care: none needed    Follow-up with Amos Vargas MD in 2 weeks.      Approximate time spent in patient care on day of discharge: 50 min     Signed:  Yobany Khoury MD  1/8/2024  12:45 PM

## 2024-01-08 NOTE — PROGRESS NOTES
Physician Progress Note      PATIENT:               ROBERT AKHTAR  CSN #:                  [id number]  :                       1940  ADMIT DATE:       2024 1:08 AM  DISCH DATE:        2024 1:59 PM  RESPONDING  PROVIDER #:        Yobany Khoury MD          QUERY TEXT:    Good afternoon  Patient admitted with hypoglycemia.  Pt noted to have BPH and was treated with Proscar.?    If possible, please document in progress notes and discharge summary if you   are evaluating and/or treating any of the following:    The medical record reflects the following:  Risk Factors: age, urinary retention, indwelling lópez, BPH  Clinical Indicators: Patient presented s/p hypoglycemic episode after   administration of insulin   H&P \"BPH with urinary retention and chronic indwelling lópez\"  Treatment: UA, proscar    Thank you  Tika Peñaloza RN [facility]  [phone]  Options provided:  -- BPH with partial/complete urinary obstruction  -- BPH with urinary retention without obstruction  -- Other - I will add my own diagnosis  -- Disagree - Not applicable / Not valid  -- Disagree - Clinically unable to determine / Unknown  -- Refer to Clinical Documentation Reviewer    PROVIDER RESPONSE TEXT:    This patient has BPH with partial/complete urinary obstruction.    Query created by: Tika Peñaloza on 2024 2:58 PM      Electronically signed by:  Yobany Khoury MD 2024 3:51 PM

## 2024-01-08 NOTE — DISCHARGE INSTRUCTIONS
HOSPITALIST DISCHARGE INSTRUCTIONS  NAME:  Tristin Jerry Sr.   :  1940   MRN:  [id number]     Date/Time:  2024 12:45 PM    ADMIT DATE: 2024     DISCHARGE DATE: 2024     DISCHARGE DIAGNOSIS:  Hypoglycemia     DISCHARGE INSTRUCTIONS:  Thank you for allowing us to participate in your care. Your discharging Hospitalist is Yobany Khoury MD. You were admitted for evaluation and treatment of the above.       MEDICATIONS:    It is important that you take the medication exactly as they are prescribed.   Keep your medication in the bottles provided by the pharmacist and keep a list of the medication names, dosages, and times to be taken in your wallet.   Do not take other medications without consulting your doctor.             If you experience any of the following symptoms then please call your primary care physician or return to the emergency room if you cannot get hold of your doctor:  Fever, chills, nausea, vomiting, diarrhea, change in mentation, falling, bleeding, shortness of breath    Follow Up:  Please call the below provider to arrange hospital follow up appointment      Amos Vargas MD  [address] [facility] [address] [phone]    Follow up in 1 week(s)      Amos Vargas MD  [address] [facility] [address] [phone]            For questions regarding your Hospitalization or to contact the Hospital Medicine team, please call [phone].      Information obtained by :  I understand that if any problems occur once I am at home I am to contact my physician.    I understand and acknowledge receipt of the instructions indicated above.                                                                                                                                           Physician's or R.N.'s Signature                                                                  Date/Time

## 2024-01-08 NOTE — PROGRESS NOTES
1250: Discharge instructions given to patient. Time for questions allowed. IV removed. Awaiting wife's arrival to transport home.

## 2024-01-08 NOTE — CONSULTS
Medical History  Acute complications  Was hypoglycemic first day  Macrovascular disease  CAD  Other associated conditions     HTN, dementia     Diabetes Medication History  Diabetes drug class Diabetes drug name Additional Comments   Insulin 70/30 insulin 30 units bid      Diabetes self-management practices:   Eating pattern   [x] Not always eating a carbohydrate-controlled meal plan  [x] Breakfast  Scrambled eggs, toast, apple cinnamon bar; 2-3 dried prunes; 1/2 banana  [x] Lunch   1/2 ham sandwich/ 5-6 grapes or mikel sausage  [x] Dinner   Hot dog, potato chips  [x] Snacks   Recently wanted cookies; mixed nuts  [x] Beverages  Recently wanted coca cola  [x] Dentition status   Physical activity pattern   [x] Not employing a physical activity program to control BG  Monitoring pattern   [x] Not testing BGs sufficiently to inform self-management adjustments: his wife had trouble with the lancets that she had    Taking medications pattern  [] Consistent administration  [] Affordable  Reducing risks  [] Influenza:      [] Pneumonia:     [] Hepatitis:    Social determinants of health impacting diabetes self-management practices    Concerned that you need to know more about how to stay healthy with diabetes  Wife manages his diabetes due to dementia  Overall evaluation:    [x] Achieving A1c target with drug therapy & self-care practices: A1c lower than goal for his age    Subjective   Sleeping      Objective   Physical exam  General Normal weight male in no acute distress, sleeping.   Neuro  Sleeping   Vital Signs   Vitals:    01/08/24 0810   BP: 123/69   Pulse: 62   Resp: 18   Temp: 97.5 °F (36.4 °C)   SpO2: 99%       Diabetic foot exam:    Deferred     Laboratory  Recent Labs     01/06/24  0121 01/07/24 0228 01/08/24  0211   WBC 9.5 13.0* 8.9   HGB 14.7 13.9 14.7   HCT 43.6 40.6 43.7   MCV 89.7 88.1 88.5    277 274     Recent Labs     01/06/24  0121 01/07/24  0228 01/08/24  0211    134* 133*   K 3.7 4.2

## 2024-01-08 NOTE — CARE COORDINATION
1/8/2024   CARE MANAGEMENT NOTE:  CM reviewed EMR and handoff received from previous  ([facility]).  Pt was admitted with leucocytosis, hypoglycemia, dementia.  Reportedly, pt resides with his wife and son.    RUR 14%    Transition Plan of Care:  Plan is for pt to return home with family  Wife would like home health skilled nursing for DM education (used [facility] in the past)  Outpatient follow up  Wife will transport pt home    CM will continue to follow pt until discharged.  Juan F

## 2024-02-05 ENCOUNTER — HOSPITAL ENCOUNTER (EMERGENCY)
Facility: HOSPITAL | Age: 84
Discharge: HOME | End: 2024-02-05
Attending: EMERGENCY MEDICINE
Payer: MEDICARE

## 2024-02-05 VITALS
OXYGEN SATURATION: 99 % | TEMPERATURE: 97.4 F | HEART RATE: 94 BPM | RESPIRATION RATE: 16 BRPM | SYSTOLIC BLOOD PRESSURE: 127 MMHG | DIASTOLIC BLOOD PRESSURE: 79 MMHG

## 2024-02-05 DIAGNOSIS — Z46.6 URINARY CATHETER (FOLEY) CHANGE REQUIRED: Primary | ICD-10-CM

## 2024-02-05 PROCEDURE — 99282 EMERGENCY DEPT VISIT SF MDM: CPT

## 2024-02-05 PROCEDURE — 51702 INSERT TEMP BLADDER CATH: CPT

## 2024-02-05 PROCEDURE — 6370000000 HC RX 637 (ALT 250 FOR IP): Performed by: NURSE PRACTITIONER

## 2024-02-05 RX ORDER — LIDOCAINE HYDROCHLORIDE 20 MG/ML
JELLY TOPICAL PRN
Status: DISCONTINUED | OUTPATIENT
Start: 2024-02-05 | End: 2024-02-05 | Stop reason: HOSPADM

## 2024-02-05 RX ORDER — LIDOCAINE HYDROCHLORIDE 20 MG/ML
JELLY TOPICAL ONCE
Status: COMPLETED | OUTPATIENT
Start: 2024-02-05 | End: 2024-02-05

## 2024-02-05 RX ADMIN — LIDOCAINE HYDROCHLORIDE: 20 JELLY TOPICAL at 15:52

## 2024-02-05 NOTE — ED PROVIDER NOTES
VITAMIN D (CHOLECALCIFEROL) 25 MCG (1000 UT) TABS TABLET    Take 1 tablet by mouth daily       ALLERGIES     Amoxicillin, Pravastatin, and Sulfa antibiotics    FAMILY HISTORY       Family History   Problem Relation Age of Onset    Breast Cancer Mother         w bone mets    Parkinson's Disease Maternal Aunt     Parkinson's Disease Maternal Uncle     Parkinson's Disease Maternal Grandfather           SOCIAL HISTORY       Social History     Socioeconomic History    Marital status:    Tobacco Use    Smoking status: Never    Smokeless tobacco: Never   Substance and Sexual Activity    Alcohol use: No    Drug use: No         PHYSICAL EXAM       ED Triage Vitals [02/05/24 1340]   BP Temp Temp Source Pulse Respirations SpO2 Height Weight   127/79 97.4 °F (36.3 °C) Oral 94 16 99 % -- --       There is no height or weight on file to calculate BMI.    Physical Exam        EMERGENCY DEPARTMENT COURSE and DIFFERENTIAL DIAGNOSIS/MDM:   Vitals:    Vitals:    02/05/24 1340   BP: 127/79   Pulse: 94   Resp: 16   Temp: 97.4 °F (36.3 °C)   TempSrc: Oral   SpO2: 99%         Medical Decision Making  Risk  Prescription drug management.        REASSESSMENT        CONSULTS:  None    PROCEDURES:     Procedures    Labs Reviewed - No data to display    No orders to display       4:20 PM  Pt has been reexamined. Rothman cath placed by urology.   Pt has no new complaints, changes or physical findings. Care plan outlined and precautions discussed. All available results were reviewed with pt. All medications were reviewed with pt. All of pt's questions and concerns were addressed. Pt agrees to F/U as instructed and agrees to return to ED upon further deterioration. Pt is ready to go home.  SHARRI Villa - NP      (Please note that portions of this note were completed with a voice recognition program.  Efforts were made to edit the dictations but occasionally words are mis-transcribed.)           there have been excessive drainage of urine around the catheter.  States they woke up from this morning and the bed was saturated.    After physical assessment, Rothman catheter was removed.  I tried twice to reinsert the Rothman catheter however he was screaming and unable to calm down despite the use of Uro-Jet.  I called urology who came down to the emergency room and on the second attempt was able to pass a Rothman catheter with positive urine drainage.  Patient was discharged to home and will follow-up with PCP as well as [facility] as an outpatient.  Return to the emergency room with worsening symptoms.  Patient and family in agreement with plan of care.    Any available vitals, labs, images, nursing notes, medications, allergies, PMH, PSH and/or previous records in the chart were reviewed. All of these were considered in the medical decision making process. I individually reviewed any labs and any images obtained. This was discussed with my attending and he/she is in agreement with plan of care.       Problems Addressed:  Urinary catheter (Rothman) change required: acute illness or injury    Risk  Prescription drug management.        REASSESSMENT        CONSULTS:  None    PROCEDURES:     Procedures    Labs Reviewed - No data to display    No orders to display       4:20 PM  Pt has been reexamined. Rothman cath placed by urology.   Pt has no new complaints, changes or physical findings. Care plan outlined and precautions discussed. All available results were reviewed with pt. All medications were reviewed with pt. All of pt's questions and concerns were addressed. Pt agrees to F/U as instructed and agrees to return to ED upon further deterioration. Pt is ready to go home.  SHARRI Villa - NP      (Please note that portions of this note were completed with a voice recognition program.  Efforts were made to edit the dictations but occasionally words are mis-transcribed.)             Shanna Hebert APRN

## 2024-02-05 NOTE — ED NOTES
Patient d/c to home after lópez was placed by urology. Patient verbalizes understanding D/C instructions.

## 2024-02-05 NOTE — ED TRIAGE NOTES
Patient arrived in wheelchair with wife via POV. Patient reports urinary catheter not draining since yesterday. Patient arrived with roughly 100mL urine in bag, reports this is all urine output since yesterday. Discomfort in lower abdomen

## 2024-02-05 NOTE — CONSULTS
New Urology Consult Note    Patient: Tristin Jerry Sr. MRN: [id number]  SSN: [id number]    YOB: 1940  Age: 83 y.o.  Sex: male            Plan:     Chronic lópez catheter  -Continue q30 day exchanges   -Easy to place catheter once pt was calm. He needs a lot of encouragement throughout catheter placement to relax his muscles.     Thank you for this consult. Please contact [facility] with any further questions/concerns.    History of Present Illness:     Chief Complaint:  urinary retention     Tristin Jerry Sr. is seen in consultation for reasons noted above at the request of Rob Roa MD. Admitted to hospital for <principal problem not specified>    This is a 83 y.o. male with a history of BPH s/p failed greenlight PVP earlier this year for BPH with obstruction, chronic lópez catheter last exchanged in office 1/23/24. Wife at bedside who acts as primary historian. Reports catheter has not been draining much since placement. Denies fever, worsening mentation, or abd/flank pain. López was removed in ER, staff unable to replace. Urology consulted for assistance.     In sterile fashion, I inserted urojet and attempted placement of 16fr coude. Resistance met. Unfortunately, 14fr coude not available in hospital at this time. I downsized to a 12fr coude which passed easily once I was able to calm pt down. Catheter was inserted to hub. Immediate return of clear yellow urine. Balloon inflated.     Subjective     Past Medical History  Past Medical History:   Diagnosis Date    CAD (coronary artery disease), native coronary artery     Cancer (HCC)     melanoma removed on forehead    Depression     Diabetic neuropathy (HCC)     ED (erectile dysfunction)     Enlarged prostate 07/19/2021    Hypertension, benign     Ill-defined condition     states he has memory problems    Postsurgical aortocoronary bypass status     Type II or unspecified type diabetes mellitus without mention

## 2024-02-10 ENCOUNTER — HOSPITAL ENCOUNTER (EMERGENCY)
Facility: HOSPITAL | Age: 84
Discharge: HOME | End: 2024-02-10
Attending: STUDENT IN AN ORGANIZED HEALTH CARE EDUCATION/TRAINING PROGRAM
Payer: MEDICARE

## 2024-02-10 VITALS
HEIGHT: 72 IN | WEIGHT: 179 LBS | DIASTOLIC BLOOD PRESSURE: 77 MMHG | TEMPERATURE: 97.7 F | HEART RATE: 72 BPM | OXYGEN SATURATION: 99 % | RESPIRATION RATE: 16 BRPM | BODY MASS INDEX: 24.24 KG/M2 | SYSTOLIC BLOOD PRESSURE: 131 MMHG

## 2024-02-10 DIAGNOSIS — T83.9XXA PROBLEM WITH FOLEY CATHETER, INITIAL ENCOUNTER (HCC): Primary | ICD-10-CM

## 2024-02-10 LAB
ANION GAP SERPL CALC-SCNC: 5 MMOL/L (ref 5–15)
BASOPHILS # BLD: 0.1 K/UL (ref 0–0.1)
BASOPHILS NFR BLD: 1 % (ref 0–1)
BUN SERPL-MCNC: 22 MG/DL (ref 6–20)
BUN/CREAT SERPL: 21 (ref 12–20)
CALCIUM SERPL-MCNC: 9.4 MG/DL (ref 8.5–10.1)
CHLORIDE SERPL-SCNC: 105 MMOL/L (ref 97–108)
CO2 SERPL-SCNC: 24 MMOL/L (ref 21–32)
CREAT SERPL-MCNC: 1.06 MG/DL (ref 0.7–1.3)
DIFFERENTIAL METHOD BLD: ABNORMAL
EOSINOPHIL # BLD: 0.1 K/UL (ref 0–0.4)
EOSINOPHIL NFR BLD: 1 % (ref 0–7)
ERYTHROCYTE [DISTWIDTH] IN BLOOD BY AUTOMATED COUNT: 12.5 % (ref 11.5–14.5)
GLUCOSE SERPL-MCNC: 221 MG/DL (ref 65–100)
HCT VFR BLD AUTO: 40.5 % (ref 36.6–50.3)
HGB BLD-MCNC: 14.1 G/DL (ref 12.1–17)
IMM GRANULOCYTES # BLD AUTO: 0 K/UL (ref 0–0.04)
IMM GRANULOCYTES NFR BLD AUTO: 0 % (ref 0–0.5)
LYMPHOCYTES # BLD: 1.7 K/UL (ref 0.8–3.5)
LYMPHOCYTES NFR BLD: 24 % (ref 12–49)
MCH RBC QN AUTO: 29.7 PG (ref 26–34)
MCHC RBC AUTO-ENTMCNC: 34.8 G/DL (ref 30–36.5)
MCV RBC AUTO: 85.3 FL (ref 80–99)
MONOCYTES # BLD: 0.5 K/UL (ref 0–1)
MONOCYTES NFR BLD: 7 % (ref 5–13)
NEUTS SEG # BLD: 4.6 K/UL (ref 1.8–8)
NEUTS SEG NFR BLD: 67 % (ref 32–75)
NRBC # BLD: 0 K/UL (ref 0–0.01)
NRBC BLD-RTO: 0 PER 100 WBC
PLATELET # BLD AUTO: 288 K/UL (ref 150–400)
PMV BLD AUTO: 8.8 FL (ref 8.9–12.9)
POTASSIUM SERPL-SCNC: 4.3 MMOL/L (ref 3.5–5.1)
RBC # BLD AUTO: 4.75 M/UL (ref 4.1–5.7)
SODIUM SERPL-SCNC: 134 MMOL/L (ref 136–145)
WBC # BLD AUTO: 6.9 K/UL (ref 4.1–11.1)

## 2024-02-10 PROCEDURE — 51798 US URINE CAPACITY MEASURE: CPT

## 2024-02-10 PROCEDURE — 99283 EMERGENCY DEPT VISIT LOW MDM: CPT

## 2024-02-10 PROCEDURE — 80048 BASIC METABOLIC PNL TOTAL CA: CPT

## 2024-02-10 PROCEDURE — 85025 COMPLETE CBC W/AUTO DIFF WBC: CPT

## 2024-02-10 PROCEDURE — 36415 COLL VENOUS BLD VENIPUNCTURE: CPT

## 2024-02-10 ASSESSMENT — PAIN - FUNCTIONAL ASSESSMENT: PAIN_FUNCTIONAL_ASSESSMENT: 0-10

## 2024-02-10 ASSESSMENT — PAIN SCALES - GENERAL: PAINLEVEL_OUTOF10: 0

## 2024-02-10 NOTE — ED PROVIDER NOTES
[facility] EMERGENCY DEPT  EMERGENCY DEPARTMENT ENCOUNTER      Pt Name: Tristin Jerry Sr.  MRN: [id number]  Birthdate 1940  Date of evaluation: 2/10/2024  Provider: SHARRI Samano   Patient is a 83 y.o. male with pmhx of CAD, melanoma, use, BPH, hypertension, dementia who presents today with complaints of urinary catheter problems.  Primary historian is patient's wife.  States that catheter needed to be replaced here on the fifth by urology for similar issues.  She states that the patient frequently moves around and erratic motions and she is concerned that the Rothman catheter is no longer in the correct place.  States the comfort and urology prior to coming to the emergency department but reports that they did not have any emergent visits on a Saturday.  States patient has not complained of any abdominal pain, flank pain.  States the patient has intermittently complained of penile pain, patient denies any currently.  Denies fevers.  Wife reports since replacement there have been excessive drainage of urine around the catheter.  States they woke up from this morning and the bed was saturated.    There are no other complaints, changes or physical findings at this time.      PAST MEDICAL HISTORY     Past Medical History:   Diagnosis Date    CAD (coronary artery disease), native coronary artery     Cancer (HCC)     melanoma removed on forehead    Depression     Diabetic neuropathy (HCC)     ED (erectile dysfunction)     Enlarged prostate 07/19/2021    Hypertension, benign     Ill-defined condition     states he has memory problems    Postsurgical aortocoronary bypass status     Type II or unspecified type diabetes mellitus without mention of complication, not stated as uncontrolled          SURGICAL HISTORY       Past Surgical History:   Procedure Laterality Date    COLONOSCOPY N/A 5/3/2016    COLONOSCOPY performed by Saul Walsh MD at [facility] ENDOSCOPY    CORONARY ARTERY BYPASS GRAFT  2009

## 2024-02-10 NOTE — ED NOTES
Changed patient's depends. Cleansed skin and applied skin barrier cream. Discharge instructions reviewed with patient and family. Opportunity to ask questions given.

## 2024-02-10 NOTE — ED TRIAGE NOTES
Wife brings in patient for complaints of urinary catheter problems.     Wife states that the last time lópez had output was 2/09.     Reports that they were seen at [facility] ED a few days ago for the same complaint and reports that urology had to come to replace lópez.     Pt denies any abdominal pain, fevers, or hematuria.     Wife states that patient is \"rambunctious\" and pulls at lópez from movement.     Pt reports he has pain in his penis.

## 2024-02-10 NOTE — DISCHARGE INSTRUCTIONS
Thank you for allowing us to provide you with medical care today.  We realize that you have many choices for your emergency care needs.  We thank you for choosing [facility].  Please choose us in the future for any continued health care needs.     We hope we addressed all of your medical concerns. We strive to provide excellent quality care in the Emergency Department.  Anything less than excellent does not meet our expectations.     The exam and treatment you received in the Emergency Department were for an emergent problem and are not intended as complete care. It is important that you follow up with a doctor, nurse practitioner, or physician’s assistant for ongoing care. If your symptoms worsen or you do not improve as expected and you are unable to reach your usual health care provider, you should return to the Emergency Department. We are available 24 hours a day.     Take this sheet with you when you go to your follow-up visit.     If you have any problem arranging the follow-up visit, contact the Emergency Department immediately.     Make an appointment your family doctor for follow up of this visit. Return to the ER if you are unable to be seen in a timely manner.

## 2024-03-04 RX ORDER — METOPROLOL SUCCINATE 25 MG/1
TABLET, EXTENDED RELEASE ORAL
Qty: 90 TABLET | Refills: 1 | Status: SHIPPED | OUTPATIENT
Start: 2024-03-04

## 2024-03-04 NOTE — TELEPHONE ENCOUNTER
Refill per VO of Dr. Hector Goodman:    9/8/2023    Future Appointments   Date Time Provider Department Center   3/27/2024  3:20 PM Hector Goodman MD [facility]       Requested Prescriptions     Pending Prescriptions Disp Refills    metoprolol succinate (TOPROL XL) 25 MG extended release tablet [Pharmacy Med Name: METOPROLOL SUCC ER 25 MG TAB] 90 tablet 3     Sig: TAKE 1 TABLET BY MOUTH EVERY DAY **MUST CALL MD FOR APPOINTMENT**

## 2024-03-27 ENCOUNTER — OFFICE VISIT (OUTPATIENT)
Age: 84
End: 2024-03-27
Payer: MEDICARE

## 2024-03-27 VITALS
WEIGHT: 170 LBS | BODY MASS INDEX: 23.03 KG/M2 | RESPIRATION RATE: 18 BRPM | SYSTOLIC BLOOD PRESSURE: 121 MMHG | HEIGHT: 72 IN | OXYGEN SATURATION: 99 % | HEART RATE: 76 BPM | DIASTOLIC BLOOD PRESSURE: 78 MMHG

## 2024-03-27 DIAGNOSIS — I10 HYPERTENSION, BENIGN: ICD-10-CM

## 2024-03-27 DIAGNOSIS — I10 ESSENTIAL (PRIMARY) HYPERTENSION: ICD-10-CM

## 2024-03-27 DIAGNOSIS — Z95.1 PRESENCE OF AORTOCORONARY BYPASS GRAFT: ICD-10-CM

## 2024-03-27 DIAGNOSIS — I50.22 CHRONIC SYSTOLIC (CONGESTIVE) HEART FAILURE (HCC): Primary | ICD-10-CM

## 2024-03-27 PROCEDURE — G8420 CALC BMI NORM PARAMETERS: HCPCS | Performed by: INTERNAL MEDICINE

## 2024-03-27 PROCEDURE — 1123F ACP DISCUSS/DSCN MKR DOCD: CPT | Performed by: INTERNAL MEDICINE

## 2024-03-27 PROCEDURE — 99214 OFFICE O/P EST MOD 30 MIN: CPT | Performed by: INTERNAL MEDICINE

## 2024-03-27 PROCEDURE — G8484 FLU IMMUNIZE NO ADMIN: HCPCS | Performed by: INTERNAL MEDICINE

## 2024-03-27 PROCEDURE — 3078F DIAST BP <80 MM HG: CPT | Performed by: INTERNAL MEDICINE

## 2024-03-27 PROCEDURE — G8428 CUR MEDS NOT DOCUMENT: HCPCS | Performed by: INTERNAL MEDICINE

## 2024-03-27 PROCEDURE — 1036F TOBACCO NON-USER: CPT | Performed by: INTERNAL MEDICINE

## 2024-03-27 PROCEDURE — 3074F SYST BP LT 130 MM HG: CPT | Performed by: INTERNAL MEDICINE

## 2024-03-27 RX ORDER — DUTASTERIDE 0.5 MG/1
0.5 CAPSULE, LIQUID FILLED ORAL DAILY
COMMUNITY

## 2024-03-27 NOTE — PROGRESS NOTES
had concerns including Congestive Heart Failure, Hypertension, Coronary Artery Disease, Cardiomyopathy, and Hyperlipidemia.    Vitals:    03/27/24 1515   BP: 121/78   Site: Left Upper Arm   Position: Sitting   Pulse: 76   Resp: 18   SpO2: 99%   Weight: 77.1 kg (170 lb)   Height: 1.829 m (6')         Pt Denies current chest pain  
mucous membranes  Neck - supple, no significant adenopathy      Medications:     Current Outpatient Medications   Medication Sig Dispense Refill    dutasteride (AVODART) 0.5 MG capsule Take 1 capsule by mouth daily      metoprolol succinate (TOPROL XL) 25 MG extended release tablet TAKE 1 TABLET BY MOUTH EVERY DAY **MUST CALL MD FOR APPOINTMENT** 90 tablet 1    Aspirin 81 MG CAPS Take 81 mg by mouth daily      lisinopril (PRINIVIL;ZESTRIL) 10 MG tablet Take 1 tablet by mouth daily 90 tablet 1    vitamin D (CHOLECALCIFEROL) 25 MCG (1000 UT) TABS tablet Take 1 tablet by mouth daily      famotidine (PEPCID) 20 MG tablet Take 1 tablet by mouth daily      insulin 70-30 (NOVOLIN 70/30) (70-30) 100 UNIT per ML injection vial Inject 15 Units into the skin every morning 4.5 mL 0    insulin 70-30 (NOVOLIN 70/30) (70-30) 100 UNIT per ML injection vial Inject 10 Units into the skin nightly Before dinner 3 mL 0    finasteride (PROSCAR) 5 MG tablet Take 1 tablet by mouth daily (Patient not taking: Reported on 3/27/2024) 30 tablet 3     No current facility-administered medications for this visit.         No current facility-administered medications for this visit.      Diagnostic Data Review:       Echo 2d adult 12/2009  EF 50%, basal inferior akinesis, mild MR  Echo 9/21/11 - LVEF 50%, inferior AK, unchanged from Dec 2009    Stress echo March 2012 - 3 min, resting EF 50%, inferior AK, no ischemia    Echo 10/29/13 - EF 55 % to 60 %. There was akinesis of the basal-mid inferior wall(s). There was akinesis of the basal inferolateral wall(s). Paradoxical ventricular septal motion, no PFO, no significant Change from previous study.    Echo 8/23/17 - EF 55-60%. Inferior AK. Mild LVH. Mild MAC with no MR. AoV sclerosis without stenosis. No change compared to study 2013.    Lexiscan Cardiolite 2/2020 - Abnormal Lexiscan gated SPECT myocardial perfusion study demonstrating RCA territory infarction. No stress induced ischemia  LVEF

## 2024-04-05 ENCOUNTER — TELEPHONE (OUTPATIENT)
Age: 84
End: 2024-04-05

## 2024-04-05 RX ORDER — LISINOPRIL 10 MG/1
10 TABLET ORAL DAILY
Qty: 90 TABLET | Refills: 3 | Status: SHIPPED | OUTPATIENT
Start: 2024-04-05

## 2024-04-05 RX ORDER — LISINOPRIL 10 MG/1
10 TABLET ORAL DAILY
Qty: 90 TABLET | Refills: 3 | Status: SHIPPED | OUTPATIENT
Start: 2024-04-05 | End: 2024-04-05 | Stop reason: SDUPTHER

## 2024-04-05 NOTE — TELEPHONE ENCOUNTER
Tana calling from [facility] pharmacy stated they accidentally deleted script sent in for patient medication (Lisinopril 10MG) and just needs Dr to send in new one.    Phone # [phone]

## 2024-07-13 NOTE — PROGRESS NOTES
Behavioral Services  Medicare Certification Upon Admission    I certify that this patient's inpatient psychiatric hospital admission is medically necessary for:    [x] (1) Treatment which could reasonably be expected to improve this patient's condition,       [x] (2) Or for diagnostic study;     AND     [x](2) The inpatient psychiatric services are provided while the individual is under the care of a physician and are included in the individualized plan of care.    Estimated length of stay/service 5    Plan for post-hospital care outpt    Electronically signed by SHAR KATHLEEN MD on 7/13/2024 at 8:07 AM       Office Follow-up    NAME: Mireya Garcia Sr.   :  1940  MRM:  [id number]    Date:  2021     Last seen by Dr. Yolanda Bruce 10/12/21 to re-establish cardiac care. GDMT optimized and he returns today for repeat Echo and ongoing evaluation. Assessment:     Problem List  Date Reviewed: 12/3/2021          Codes Class Noted    Diarrhea ICD-10-CM: R19.7  ICD-9-CM: 787.91  10/1/2021        Sepsis ([facility] ICD-10-CM: A41.9  ICD-9-CM: 038.9, 995.91  10/1/2021        UTI (urinary tract infection) due to urinary indwelling catheter ([facility] ICD-10-CM: T83.511A, N39.0  ICD-9-CM: 996.64, 599.0  10/1/2021        Atelectasis, left ICD-10-CM: J98.11  ICD-9-CM: 518.0  10/1/2021        Epigastric abdominal pain ICD-10-CM: R10.13  ICD-9-CM: 789.06  2020        Epigastric pain ICD-10-CM: R10.13  ICD-9-CM: 789.06  2020        Depression ICD-10-CM: F32. A  ICD-9-CM: 555  2020        Fall ICD-10-CM: Odaliser Tez. Matt Chau  ICD-9-CM: N537.5  2019        Traumatic rhabdomyolysis ([facility] ICD-10-CM: T79. 6XXA  ICD-9-CM: 958.6  2019        Dyslipidemia ICD-10-CM: E78.5  ICD-9-CM: 272.4  10/3/2012        CAD (coronary artery disease), native coronary artery ICD-10-CM: I25.10  ICD-9-CM: 414.01  Unknown    Overview Signed 3/22/2011  3:04 PM by Adis Barber MD     Inferior MI 2009  - PCI RCA with PTCA, significant LAD disease  - suspected reocclusion several hrs later with VF, IABP placed  3v CABG 2009 Dina Cabello @ [facility])  - LIMA-LAD, VG-OM, VG-PDA             Type 2 diabetes mellitus without complication ([facility] BPN-87-EP: E11.9  ICD-9-CM: 250.00  Unknown        Hypertension, benign ICD-10-CM: I10  ICD-9-CM: 401.1  Unknown                 Plan:     1. CAD/hx of MI s/p 3v CABG in . Negative ischemic evaluation 2020 with evidence of inferior infarct and LVEF of 40-45%. Repeat Echo today with stable LV function at 40-45%. No exertional sxs at his current functional capacity.     - Continue ASA 81 mg daily  - Intolerant to statin therapy    - Continue BB    2. Cardiomyopathy; suspected to be ICM - EF 40-45% by Echo today. Volume stable by exam today on no scheduled loop diuretics. - Continue Lisinopril 10 mg daily. Repeat BMP again in 1 month. Uptitrate following review of labs and ongoing BP trends. - Continue Toprol XL 25 mg daily. 3.  HTN - normotensive in the office today. - Continue current regimen  - Encouraged low sodium diet and daily home monitoring    4. Dyslipidemia - intolerant to simva in the past due to myalgias. Had tolerated Pravastatin in the past but developed transaminitis. 5. Type 2 DM - on insulin. followed by Dr. Tabatha Seay. Phone follow up to review lab results. Return to clinic to see Dr. Ever Balderrama again in 3 months. Sooner PRN. He was encouraged to continue current medications, remain active and call with any new complaints or concerns. Subjective:     Mr. Binta Marte reports that he has been doing well since his last visit. JIMENEZ has improved. Able to remain active around his home without concerns. No exertional chest pain. BP trends at home 110-120's, now on Lisinopril 10 mg daily. No dizziness or lightheadedness. No edema, orthopnea or PND. No tachycardia or palpitations. No syncope or near syncope.         Exam:     Visit Vitals  /70   Ht 6' (1.829 m)   Wt 186 lb (84.4 kg)   BMI 25.23 kg/m²     Wt Readings from Last 3 Encounters:   12/02/21 186 lb (84.4 kg)   11/19/21 186 lb (84.4 kg)   11/19/21 186 lb (84.4 kg)     General appearance - alert, well appearing, and in no distress  Mental status - affect appropriate to mood  Eyes - sclera anicteric, moist mucous membranes  Neck - supple, no significant adenopathy  Chest - clear to auscultation, no wheezes, rales or rhonchi  Heart - normal rate, regular rhythm, normal S1, S2, no murmurs, rubs, clicks or gallops  Abdomen - soft, nontender, nondistended, no masses or organomegaly  Extremities - peripheral pulses normal, no pedal edema  Skin - normal coloration  no rashes    Medications:     Current Outpatient Medications   Medication Sig    lisinopriL (PRINIVIL, ZESTRIL) 10 mg tablet Take 1 Tablet by mouth daily.  insulin NPH/insulin regular (NOVOLIN 70/30, HUMULIN 70/30) 100 unit/mL (70-30) injection 35 Units by SubCUTAneous route Before breakfast and dinner.  famotidine (PEPCID) 20 mg tablet Take 1 Tablet by mouth daily.  lactobacillus-acidophilus (LACTINEX) 100 million cell grpk Take 1 Packet by mouth two (2) times a day. (Patient taking differently: Take 1 Packet by mouth daily.)    dutasteride (AVODART) 0.5 mg capsule Take 0.5 mg by mouth daily.  cholecalciferol (Vitamin D3) (1000 Units /25 mcg) tablet Take 1,000 Units by mouth daily.  multivitamin (ONE A DAY) tablet Take 1 Tablet by mouth daily.  metoprolol succinate (TOPROL-XL) 25 mg XL tablet TAKE 1 TABLET BY MOUTH EVERY DAY    tamsulosin (FLOMAX) 0.4 mg capsule Take 0.4 mg by mouth two (2) times a day.  aspirin delayed-release 81 mg tablet Take 81 mg by mouth daily. No current facility-administered medications for this visit. Diagnostic Data Review:       Echo 2d adult 12/2009  EF 50%, basal inferior akinesis, mild MR  Echo 9/21/11 - LVEF 50%, inferior AK, unchanged from Dec 2009    Stress echo March 2012 - 3 min, resting EF 50%, inferior AK, no ischemia    Echo 10/29/13 - EF 55 % to 60 %. There was akinesis of the basal-mid inferior wall(s). There was akinesis of the basal inferolateral wall(s). Paradoxical ventricular septal motion, no PFO, no significant Change from previous study. Echo 8/23/17 - EF 55-60%. Inferior AK. Mild LVH. Mild MAC with no MR. AoV sclerosis without stenosis. No change compared to study 2013. Lexiscan Cardiolite 2/2020 - Abnormal Lexiscan gated SPECT myocardial perfusion study demonstrating RCA territory infarction.  No stress induced ischemia  LVEF 50%    W Lexiscan Cardiolite 9/10/20 - evidence of nontransmural myocardial infarction of the inferior wall with mild residual ischemia. LVEF 44% with reduced wall motion and thickening of the inferior wall. CJW Echo 9/9/20 - EF 40-45%. Mild LVH. HK of basal mid inferior and basal mid inferolateral myocardium. Echo 11/19/21 - LV: Estimated LVEF is 45 - 50%. Biplane method used to measure ejection fraction. Normal cavity size. Mild concentric hypertrophy. Severe hypokinesis of the basal inferior, mid inferior and basal inferoseptal wall(s). Mild (grade 1) left ventricular diastolic dysfunction. Lab Review:     Lab Results   Component Value Date/Time    Cholesterol, total 174 02/11/2020 12:16 AM    HDL Cholesterol 40 02/11/2020 12:16 AM    LDL, calculated 110.2 (H) 02/11/2020 12:16 AM    Triglyceride 119 02/11/2020 12:16 AM    CHOL/HDL Ratio 4.4 02/11/2020 12:16 AM     Lab Results   Component Value Date/Time    Creatinine (POC) 1.0 07/25/2009 11:34 AM    Creatinine 0.72 10/06/2021 03:46 AM     Lab Results   Component Value Date/Time    BUN 6 10/06/2021 03:46 AM    BUN (POC) 18 07/25/2009 11:34 AM     Lab Results   Component Value Date/Time    Potassium 3.8 10/06/2021 03:46 AM     Lab Results   Component Value Date/Time    Hemoglobin A1c 6.4 (H) 09/30/2021 10:35 PM    Hemoglobin A1c, External 6.9 10/28/2015 12:00 AM     Lab Results   Component Value Date/Time    Hemoglobin (POC) 10.9 (L) 07/25/2009 11:34 AM    HGB 12.5 10/06/2021 03:46 AM     Lab Results   Component Value Date/Time    PLATELET 512 45/24/3704 03:46 AM     No results for input(s): CPK, CKMB, TROIQ in the last 72 hours.     No lab exists for component: CKQMB, [address], NP

## 2024-09-03 RX ORDER — METOPROLOL SUCCINATE 25 MG/1
25 TABLET, EXTENDED RELEASE ORAL DAILY
Qty: 90 TABLET | Refills: 1 | Status: SHIPPED | OUTPATIENT
Start: 2024-09-03

## 2024-09-03 NOTE — TELEPHONE ENCOUNTER
Refill per VO of Dr. Hector Goodman:    3/27/2024    Future Appointments   Date Time Provider Department Center   3/12/2025  1:00 PM Leticia Newsome, APRN - NP [facility]       Requested Prescriptions     Pending Prescriptions Disp Refills    metoprolol succinate (TOPROL XL) 25 MG extended release tablet [Pharmacy Med Name: METOPROLOL SUCC ER 25 MG TAB] 90 tablet 1     Sig: TAKE 1 TABLET BY MOUTH EVERY DAY **MUST CALL MD FOR APPOINTMENT**

## 2024-10-22 ENCOUNTER — APPOINTMENT (OUTPATIENT)
Facility: HOSPITAL | Age: 84
End: 2024-10-22
Payer: MEDICARE

## 2024-10-22 ENCOUNTER — HOSPITAL ENCOUNTER (EMERGENCY)
Facility: HOSPITAL | Age: 84
Discharge: HOME | End: 2024-10-22
Attending: EMERGENCY MEDICINE
Payer: MEDICARE

## 2024-10-22 VITALS
TEMPERATURE: 97.7 F | SYSTOLIC BLOOD PRESSURE: 144 MMHG | BODY MASS INDEX: 20.89 KG/M2 | DIASTOLIC BLOOD PRESSURE: 76 MMHG | RESPIRATION RATE: 16 BRPM | WEIGHT: 154 LBS | OXYGEN SATURATION: 99 % | HEART RATE: 87 BPM

## 2024-10-22 DIAGNOSIS — K59.00 CONSTIPATION, UNSPECIFIED CONSTIPATION TYPE: Primary | ICD-10-CM

## 2024-10-22 LAB
ALBUMIN SERPL-MCNC: 3.9 G/DL (ref 3.5–5)
ALBUMIN/GLOB SERPL: 0.9 (ref 1.1–2.2)
ALP SERPL-CCNC: 84 U/L (ref 45–117)
ALT SERPL-CCNC: 18 U/L (ref 12–78)
ANION GAP SERPL CALC-SCNC: 4 MMOL/L (ref 2–12)
AST SERPL-CCNC: 19 U/L (ref 15–37)
BASOPHILS # BLD: 0.1 K/UL (ref 0–0.1)
BASOPHILS NFR BLD: 1 % (ref 0–1)
BILIRUB SERPL-MCNC: 1.2 MG/DL (ref 0.2–1)
BUN SERPL-MCNC: 18 MG/DL (ref 6–20)
BUN/CREAT SERPL: 20 (ref 12–20)
CALCIUM SERPL-MCNC: 10.1 MG/DL (ref 8.5–10.1)
CHLORIDE SERPL-SCNC: 106 MMOL/L (ref 97–108)
CO2 SERPL-SCNC: 26 MMOL/L (ref 21–32)
CREAT SERPL-MCNC: 0.9 MG/DL (ref 0.7–1.3)
DIFFERENTIAL METHOD BLD: ABNORMAL
EOSINOPHIL # BLD: 0 K/UL (ref 0–0.4)
EOSINOPHIL NFR BLD: 0 % (ref 0–7)
ERYTHROCYTE [DISTWIDTH] IN BLOOD BY AUTOMATED COUNT: 13 % (ref 11.5–14.5)
GLOBULIN SER CALC-MCNC: 4.3 G/DL (ref 2–4)
GLUCOSE SERPL-MCNC: 105 MG/DL (ref 65–100)
HCT VFR BLD AUTO: 49.3 % (ref 36.6–50.3)
HGB BLD-MCNC: 17 G/DL (ref 12.1–17)
IMM GRANULOCYTES # BLD AUTO: 0 K/UL (ref 0–0.04)
IMM GRANULOCYTES NFR BLD AUTO: 0 % (ref 0–0.5)
LIPASE SERPL-CCNC: 30 U/L (ref 13–75)
LYMPHOCYTES # BLD: 1.8 K/UL (ref 0.8–3.5)
LYMPHOCYTES NFR BLD: 17 % (ref 12–49)
MCH RBC QN AUTO: 29.6 PG (ref 26–34)
MCHC RBC AUTO-ENTMCNC: 34.5 G/DL (ref 30–36.5)
MCV RBC AUTO: 85.9 FL (ref 80–99)
MONOCYTES # BLD: 0.7 K/UL (ref 0–1)
MONOCYTES NFR BLD: 6 % (ref 5–13)
NEUTS SEG # BLD: 8.4 K/UL (ref 1.8–8)
NEUTS SEG NFR BLD: 76 % (ref 32–75)
NRBC # BLD: 0 K/UL (ref 0–0.01)
NRBC BLD-RTO: 0 PER 100 WBC
NT PRO BNP: 234 PG/ML
PLATELET # BLD AUTO: 348 K/UL (ref 150–400)
PMV BLD AUTO: 8.6 FL (ref 8.9–12.9)
POTASSIUM SERPL-SCNC: 4.3 MMOL/L (ref 3.5–5.1)
PROT SERPL-MCNC: 8.2 G/DL (ref 6.4–8.2)
RBC # BLD AUTO: 5.74 M/UL (ref 4.1–5.7)
SODIUM SERPL-SCNC: 136 MMOL/L (ref 136–145)
TROPONIN I SERPL HS-MCNC: 6 NG/L (ref 0–76)
WBC # BLD AUTO: 10.9 K/UL (ref 4.1–11.1)

## 2024-10-22 PROCEDURE — 83690 ASSAY OF LIPASE: CPT

## 2024-10-22 PROCEDURE — 6360000004 HC RX CONTRAST MEDICATION

## 2024-10-22 PROCEDURE — 93005 ELECTROCARDIOGRAM TRACING: CPT

## 2024-10-22 PROCEDURE — 83880 ASSAY OF NATRIURETIC PEPTIDE: CPT

## 2024-10-22 PROCEDURE — 80053 COMPREHEN METABOLIC PANEL: CPT

## 2024-10-22 PROCEDURE — 99285 EMERGENCY DEPT VISIT HI MDM: CPT

## 2024-10-22 PROCEDURE — 84484 ASSAY OF TROPONIN QUANT: CPT

## 2024-10-22 PROCEDURE — 70450 CT HEAD/BRAIN W/O DYE: CPT

## 2024-10-22 PROCEDURE — 85025 COMPLETE CBC W/AUTO DIFF WBC: CPT

## 2024-10-22 PROCEDURE — 74177 CT ABD & PELVIS W/CONTRAST: CPT

## 2024-10-22 PROCEDURE — 36415 COLL VENOUS BLD VENIPUNCTURE: CPT

## 2024-10-22 RX ORDER — POLYETHYLENE GLYCOL 3350 17 G/17G
17 POWDER, FOR SOLUTION ORAL DAILY
Qty: 510 G | Refills: 1 | Status: SHIPPED | OUTPATIENT
Start: 2024-10-22 | End: 2024-11-21

## 2024-10-22 RX ORDER — MAGNESIUM CITRATE
300 SOLUTION, ORAL ORAL ONCE
Qty: 300 ML | Refills: 0 | Status: SHIPPED | OUTPATIENT
Start: 2024-10-22 | End: 2024-10-22

## 2024-10-22 RX ORDER — IOPAMIDOL 755 MG/ML
100 INJECTION, SOLUTION INTRAVASCULAR
Status: COMPLETED | OUTPATIENT
Start: 2024-10-22 | End: 2024-10-22

## 2024-10-22 RX ADMIN — IOPAMIDOL 95 ML: 755 INJECTION, SOLUTION INTRAVENOUS at 20:48

## 2024-10-22 ASSESSMENT — ENCOUNTER SYMPTOMS
VOMITING: 0
CONSTIPATION: 1
BLOOD IN STOOL: 0
SHORTNESS OF BREATH: 0
ABDOMINAL PAIN: 1
NAUSEA: 0

## 2024-10-22 ASSESSMENT — LIFESTYLE VARIABLES
HOW OFTEN DO YOU HAVE A DRINK CONTAINING ALCOHOL: NEVER
HOW MANY STANDARD DRINKS CONTAINING ALCOHOL DO YOU HAVE ON A TYPICAL DAY: PATIENT DOES NOT DRINK

## 2024-10-22 ASSESSMENT — PAIN DESCRIPTION - DESCRIPTORS: DESCRIPTORS: ACHING

## 2024-10-22 ASSESSMENT — PAIN SCALES - GENERAL: PAINLEVEL_OUTOF10: 10

## 2024-10-22 ASSESSMENT — PAIN DESCRIPTION - PAIN TYPE: TYPE: ACUTE PAIN

## 2024-10-22 ASSESSMENT — PAIN DESCRIPTION - ORIENTATION: ORIENTATION: LOWER

## 2024-10-22 ASSESSMENT — PAIN DESCRIPTION - LOCATION: LOCATION: ABDOMEN

## 2024-10-22 ASSESSMENT — PAIN DESCRIPTION - FREQUENCY: FREQUENCY: CONTINUOUS

## 2024-10-22 NOTE — ED PROVIDER NOTES
[facility] EMERGENCY DEPT  EMERGENCY DEPARTMENT ENCOUNTER      Pt Name: Tristin Jerry Sr.  MRN: [id number]  Birthdate 1940  Date of evaluation: 10/22/2024  Provider: Alexsander Guzman DO    CHIEF COMPLAINT       Chief Complaint   Patient presents with    Constipation    Abdominal Pain         HISTORY OF PRESENT ILLNESS   (Location/Symptom, Timing/Onset, Context/Setting, Quality, Duration, Modifying Factors, Severity)  Note limiting factors.   HPI    This is an 84 year old male with a history of dementia, hypertension, CAD with CABGx3 in July 2009, hypertension, hyperlipidemia, chronic constipation presenting for evaluation of constipation and lower abdominal pain.    Patient has a history of dementia at baseline is alert and oriented x 2 which makes obtaining history difficult.  Patient's wife Marjorie called for collateral information and she confirms history.  Patient has a history of chronic constipation in the past.  Wife notes that he will often be on the toilet straining.  Patient reports he does not remember when his last bowel movement was but notes that he thinks he went yesterday and the stool was very hard.  Denies any blood in the stools.  Denies any current nausea/vomiting.  Wife notes that around 5 AM he was screaming that he vomited all over the bed and floor however there was no evidence of vomiting in his room per wife.  Has a history of indwelling Rothman catheter.  Patient reports that the day before yesterday he sustained a fall and hit his head.  Wife confirms that he has a history of ataxia and has been losing his balance.  She states that 3 weeks ago he sustained a fall while going up the stairs to enter his home and fell backwards and hit his head.  Patient is currently complaining of a cervical headache.  Denies any current chest pain/shortness of breath.      Review of External Medical Records:     Nursing Notes were reviewed.    REVIEW OF SYSTEMS    (2-9 systems for level 4, 10 or more for

## 2024-10-22 NOTE — ED TRIAGE NOTES
Pt arrived via [place] [address] Report of \" call came in from home son called for father having constipation starting today, wife was out and notified. Son is Intellectually delayed Pt complaining of lower abdominal pain. HX constipation issues. Pt is poor historian for medical history or meds.Pt appears to have been digging at his rectum has stool in his finger nails and there was stool smeared over surfaces near patient.\" Pt arrives with lópez from home does not know why he has it.

## 2024-10-23 NOTE — ED NOTES
I have reviewed discharge instructions with the patient.  Opportunity for questions and clarification was provided.  The patient verbalized understanding.  Patient discharged out of the ED via W/C with no difficulty and in stable condition.

## 2024-10-23 NOTE — DISCHARGE INSTRUCTIONS
Please  magnesium citrate and MiraLAX from the pharmacy.  I recommend taking the magnesium citrate on October 23 in the late morning for your symptoms.  This should cause quick relief of your constipation.  Please drink plenty of water after using this laxative.  Use MiraLAX daily.

## 2024-10-24 LAB
EKG ATRIAL RATE: 84 BPM
EKG DIAGNOSIS: NORMAL
EKG P-R INTERVAL: 232 MS
EKG Q-T INTERVAL: 372 MS
EKG QRS DURATION: 102 MS
EKG QTC CALCULATION (BAZETT): 439 MS
EKG R AXIS: -27 DEGREES
EKG T AXIS: -24 DEGREES
EKG VENTRICULAR RATE: 84 BPM

## 2025-01-04 ENCOUNTER — HOSPITAL ENCOUNTER (EMERGENCY)
Facility: HOSPITAL | Age: 85
Discharge: HOME | End: 2025-01-05
Attending: EMERGENCY MEDICINE
Payer: MEDICARE

## 2025-01-04 ENCOUNTER — APPOINTMENT (OUTPATIENT)
Facility: HOSPITAL | Age: 85
End: 2025-01-04
Payer: MEDICARE

## 2025-01-04 DIAGNOSIS — T83.511A URINARY TRACT INFECTION ASSOCIATED WITH INDWELLING URETHRAL CATHETER, INITIAL ENCOUNTER (HCC): Primary | ICD-10-CM

## 2025-01-04 DIAGNOSIS — K59.01 SLOW TRANSIT CONSTIPATION: ICD-10-CM

## 2025-01-04 DIAGNOSIS — N39.0 URINARY TRACT INFECTION ASSOCIATED WITH INDWELLING URETHRAL CATHETER, INITIAL ENCOUNTER (HCC): Primary | ICD-10-CM

## 2025-01-04 DIAGNOSIS — R10.9 INTESTINAL CRAMPS: ICD-10-CM

## 2025-01-04 DIAGNOSIS — Z97.8 FOLEY CATHETER IN PLACE: ICD-10-CM

## 2025-01-04 PROCEDURE — 6370000000 HC RX 637 (ALT 250 FOR IP): Performed by: EMERGENCY MEDICINE

## 2025-01-04 PROCEDURE — 87186 SC STD MICRODIL/AGAR DIL: CPT

## 2025-01-04 PROCEDURE — 74176 CT ABD & PELVIS W/O CONTRAST: CPT

## 2025-01-04 PROCEDURE — 99284 EMERGENCY DEPT VISIT MOD MDM: CPT

## 2025-01-04 PROCEDURE — 87086 URINE CULTURE/COLONY COUNT: CPT

## 2025-01-04 PROCEDURE — 51702 INSERT TEMP BLADDER CATH: CPT

## 2025-01-04 PROCEDURE — 87088 URINE BACTERIA CULTURE: CPT

## 2025-01-04 PROCEDURE — 81001 URINALYSIS AUTO W/SCOPE: CPT

## 2025-01-04 RX ORDER — ACETAMINOPHEN 500 MG
1000 TABLET ORAL
Status: COMPLETED | OUTPATIENT
Start: 2025-01-04 | End: 2025-01-04

## 2025-01-04 RX ORDER — DICYCLOMINE HYDROCHLORIDE 10 MG/1
10 CAPSULE ORAL EVERY 6 HOURS PRN
Qty: 28 CAPSULE | Refills: 0 | Status: SHIPPED | OUTPATIENT
Start: 2025-01-04 | End: 2025-01-11

## 2025-01-04 RX ORDER — POLYETHYLENE GLYCOL 3350 17 G/17G
17 POWDER, FOR SOLUTION ORAL DAILY PRN
Qty: 510 G | Refills: 0 | Status: SHIPPED | OUTPATIENT
Start: 2025-01-04 | End: 2025-02-03

## 2025-01-04 RX ORDER — DICYCLOMINE HCL 20 MG
20 TABLET ORAL ONCE
Status: COMPLETED | OUTPATIENT
Start: 2025-01-04 | End: 2025-01-04

## 2025-01-04 RX ADMIN — ACETAMINOPHEN 1000 MG: 500 TABLET ORAL at 22:40

## 2025-01-04 RX ADMIN — DICYCLOMINE HYDROCHLORIDE 20 MG: 20 TABLET ORAL at 22:41

## 2025-01-05 VITALS
WEIGHT: 171.52 LBS | HEIGHT: 72 IN | TEMPERATURE: 97.9 F | HEART RATE: 75 BPM | BODY MASS INDEX: 23.23 KG/M2 | DIASTOLIC BLOOD PRESSURE: 64 MMHG | SYSTOLIC BLOOD PRESSURE: 120 MMHG | RESPIRATION RATE: 14 BRPM | OXYGEN SATURATION: 97 %

## 2025-01-05 LAB
APPEARANCE UR: ABNORMAL
BACTERIA URNS QL MICRO: ABNORMAL /HPF
BILIRUB UR QL: NEGATIVE
COLOR UR: ABNORMAL
EPITH CASTS URNS QL MICRO: ABNORMAL /LPF
GLUCOSE UR STRIP.AUTO-MCNC: NEGATIVE MG/DL
HGB UR QL STRIP: ABNORMAL
KETONES UR QL STRIP.AUTO: NEGATIVE MG/DL
LEUKOCYTE ESTERASE UR QL STRIP.AUTO: ABNORMAL
NITRITE UR QL STRIP.AUTO: NEGATIVE
PH UR STRIP: 7.5 (ref 5–8)
PROT UR STRIP-MCNC: 30 MG/DL
RBC #/AREA URNS HPF: ABNORMAL /HPF (ref 0–5)
SP GR UR REFRACTOMETRY: 1.01 (ref 1–1.03)
UROBILINOGEN UR QL STRIP.AUTO: 0.2 EU/DL (ref 0.2–1)
WBC URNS QL MICRO: >100 /HPF (ref 0–4)

## 2025-01-05 PROCEDURE — 6370000000 HC RX 637 (ALT 250 FOR IP): Performed by: EMERGENCY MEDICINE

## 2025-01-05 RX ORDER — CEFDINIR 300 MG/1
300 CAPSULE ORAL
Status: COMPLETED | OUTPATIENT
Start: 2025-01-05 | End: 2025-01-05

## 2025-01-05 RX ORDER — CEFDINIR 300 MG/1
300 CAPSULE ORAL 2 TIMES DAILY
Qty: 20 CAPSULE | Refills: 0 | Status: SHIPPED | OUTPATIENT
Start: 2025-01-05 | End: 2025-01-15

## 2025-01-05 RX ORDER — ACETAMINOPHEN 500 MG
1000 TABLET ORAL EVERY 6 HOURS PRN
Qty: 60 TABLET | Refills: 0 | Status: SHIPPED | OUTPATIENT
Start: 2025-01-05

## 2025-01-05 RX ADMIN — CEFDINIR 300 MG: 300 CAPSULE ORAL at 00:52

## 2025-01-05 NOTE — ED TRIAGE NOTES
Patient to ED via EMS for rectal pain which started after patient had bowel movement 2 hours ago. Denying any bleeding, unsure if he has hemorrhoids.     Pt arrives with maribel holloway historian, stating his wife who is en route will be able to answer further questions

## 2025-01-05 NOTE — ED PROVIDER NOTES
[facility] EMERGENCY DEPT  EMERGENCY DEPARTMENT ENCOUNTER      Pt Name: Tristin Jerry Sr.  MRN: [id number]  Birthdate 1940  Date of evaluation: 1/4/2025  Provider: Eusebio Martinez MD    CHIEF COMPLAINT       Chief Complaint   Patient presents with    Rectal Pain         HISTORY OF PRESENT ILLNESS   (Location/Symptom, Timing/Onset, Context/Setting, Quality, Duration, Modifying Factors, Severity)  Note limiting factors.   The history is provided by the patient and medical records. The history is limited by the absence of a caregiver (wife not at bedside. Attempted to call using number in chart but unable to connect with her).         Review of External Medical Records:   Urology notes sure that patient underwent catheter exchange 12/30 and was treated with 3 doses of gentamicin prior to this  Urine culture 12/19 with proteus resistant to nitrofurantoin but otherwise sensitive  Nursing Notes were reviewed.    REVIEW OF SYSTEMS    (2-9 systems for level 4, 10 or more for level 5)     Review of Systems    Except as noted above the remainder of the review of systems was reviewed and negative.       PAST MEDICAL HISTORY     Past Medical History:   Diagnosis Date    CAD (coronary artery disease), native coronary artery     Cancer (HCC)     melanoma removed on forehead    Depression     Diabetic neuropathy (HCC)     ED (erectile dysfunction)     Enlarged prostate 07/19/2021    Hypertension, benign     Ill-defined condition     states he has memory problems    Postsurgical aortocoronary bypass status     Type II or unspecified type diabetes mellitus without mention of complication, not stated as uncontrolled          SURGICAL HISTORY       Past Surgical History:   Procedure Laterality Date    COLONOSCOPY N/A 5/3/2016    COLONOSCOPY performed by Saul Walsh MD at [facility] ENDOSCOPY    CORONARY ARTERY BYPASS GRAFT  2009    ECHO 2D ADULT  12/2009    EF 50%, basal inferior akinesis, mild MR    ECHO 2D ADULT  9/21/11

## 2025-01-05 NOTE — ED NOTES
Discharge instructions along with necessary paperwork and bib care wipes sent home with patient through [facility] transport.

## 2025-01-05 NOTE — ED NOTES
Discharge instructions reviewed with patients wife, Marjorie, by primary RN and physician, notified of transport being arranged to bring patient home.    oral

## 2025-01-05 NOTE — DISCHARGE INSTRUCTIONS
Your catheter was exchanged today, and your urine has been sent for culture. We will treat you for infection with Omnicef.  You also have evidence of stool buildup on the right side of your abdomen in the early part of the colon.    You do not have evidence of bowel obstruction, UTI, abscess, appendicitis, or internal bleeding.   Because your symptoms are better controlled and you can tolerate fluid, we are allowing you to go home. Make sure to drink plenty of clear fluids. Use medications as prescribed. Contact your primary care provider or the provided specialists for further evaluation.    It is rare but possible that some things can be missed on your initial workup; if you notice pain or vomiting that cannot be controlled, fevers, weakness, large amounts of blood in your stool or vomit, unable to urinate, or you are becoming confused or unable to get out of bed, return to the ER for evaluation.

## 2025-01-07 LAB
BACTERIA SPEC CULT: ABNORMAL
CC UR VC: ABNORMAL
SERVICE CMNT-IMP: ABNORMAL

## 2025-01-11 ENCOUNTER — HOSPITAL ENCOUNTER (EMERGENCY)
Facility: HOSPITAL | Age: 85
Discharge: HOME | End: 2025-01-11
Attending: EMERGENCY MEDICINE
Payer: MEDICARE

## 2025-01-11 ENCOUNTER — APPOINTMENT (OUTPATIENT)
Facility: HOSPITAL | Age: 85
End: 2025-01-11
Payer: MEDICARE

## 2025-01-11 VITALS
DIASTOLIC BLOOD PRESSURE: 64 MMHG | OXYGEN SATURATION: 100 % | HEART RATE: 75 BPM | SYSTOLIC BLOOD PRESSURE: 135 MMHG | RESPIRATION RATE: 16 BRPM | TEMPERATURE: 97.7 F

## 2025-01-11 DIAGNOSIS — K59.00 CONSTIPATION, UNSPECIFIED CONSTIPATION TYPE: Primary | ICD-10-CM

## 2025-01-11 PROCEDURE — 99283 EMERGENCY DEPT VISIT LOW MDM: CPT

## 2025-01-11 PROCEDURE — 74018 RADEX ABDOMEN 1 VIEW: CPT

## 2025-01-11 PROCEDURE — 6370000000 HC RX 637 (ALT 250 FOR IP)

## 2025-01-11 RX ORDER — SODIUM PHOSPHATE, DIBASIC AND SODIUM PHOSPHATE, MONOBASIC 7; 19 G/230ML; G/230ML
1 ENEMA RECTAL
Status: COMPLETED | OUTPATIENT
Start: 2025-01-11 | End: 2025-01-11

## 2025-01-11 RX ORDER — POLYETHYLENE GLYCOL 3350 17 G/17G
68 POWDER, FOR SOLUTION ORAL ONCE
Status: DISCONTINUED | OUTPATIENT
Start: 2025-01-11 | End: 2025-01-11

## 2025-01-11 RX ADMIN — SALINE LAXATIVE 1 ENEMA: 7; 19 ENEMA RECTAL at 21:35

## 2025-01-11 ASSESSMENT — PAIN SCALES - PAIN ASSESSMENT IN ADVANCED DEMENTIA (PAINAD)
TOTALSCORE: 0
CONSOLABILITY: NO NEED TO CONSOLE
BODYLANGUAGE: RELAXED
FACIALEXPRESSION: SMILING OR INEXPRESSIVE
BREATHING: NORMAL

## 2025-01-11 ASSESSMENT — PAIN - FUNCTIONAL ASSESSMENT: PAIN_FUNCTIONAL_ASSESSMENT: PAIN ASSESSMENT IN ADVANCED DEMENTIA (PAINAD)

## 2025-01-11 NOTE — ED TRIAGE NOTES
Pt arrives to ED via EMS from home c/o constipation. EMS reports pt's wife called and told them that he is not taking his medication for constipation that he was prescribed on 1/4 at [facility] ED. Pt is A&Ox3 and has intermittent confusion regarding situation. Hx alzheimers

## 2025-01-12 NOTE — ED NOTES
RN went to room to explain enema procedure. Pt refused to allow RN to perform enema until wife is at bedside. RN called pt's wife and informed her of situation and she informed RN that she does not feel safe driving to the hospital due to the snow and ice in their driveway. Provider notified

## 2025-01-12 NOTE — ED NOTES
Pt spoke with wife on the telephone and pt has now given consent for RN to perform enema. Provider notified

## 2025-01-12 NOTE — DISCHARGE INSTRUCTIONS
Please continue to take the medication that was sent for constipation.  Additionally, please continue the antibiotics you are taking for the urinary tract infection.  Please follow-up with your primary care provider in regard to the constipation, urinary tract infection.  If you develop any new or concerning symptoms or worsening difficulty having bowel movements, urinary symptoms please return for reevaluation.

## 2025-01-12 NOTE — ED PROVIDER NOTES
[facility] EMERGENCY DEPARTMENT  EMERGENCY DEPARTMENT ENCOUNTER      Pt Name: Tristin Jerry Sr.  MRN: [id number]  Birthdate 1940  Date of evaluation: 1/11/2025  Provider: Yusuf Olea PA-C    CHIEF COMPLAINT       Chief Complaint   Patient presents with    Constipation         HISTORY OF PRESENT ILLNESS   (Location/Symptom, Timing/Onset, Context/Setting, Quality, Duration, Modifying Factors, Severity)  Note limiting factors.   HPI  84-year-old male presenting to the ED with chief complaint of constipation.  Patient has a history of Alzheimer's disease.  He reports that he is taking his medication for constipation that he was prescribed for constipation 1 week ago.  His wife reports he is not taking this medication.  He also reports a burning in the penis.  He is taking antibiotics for a urinary tract infection.  Reports general discomfort in stomach.  Denies fevers, chills.    Review of External Medical Records:     Nursing Notes were reviewed.    REVIEW OF SYSTEMS    (2-9 systems for level 4, 10 or more for level 5)     Review of Systems    Except as noted above the remainder of the review of systems was reviewed and negative.       PAST MEDICAL HISTORY     Past Medical History:   Diagnosis Date    CAD (coronary artery disease), native coronary artery     Cancer (HCC)     melanoma removed on forehead    Depression     Diabetic neuropathy (HCC)     ED (erectile dysfunction)     Enlarged prostate 07/19/2021    Hypertension, benign     Ill-defined condition     states he has memory problems    Postsurgical aortocoronary bypass status     Type II or unspecified type diabetes mellitus without mention of complication, not stated as uncontrolled          SURGICAL HISTORY       Past Surgical History:   Procedure Laterality Date    COLONOSCOPY N/A 5/3/2016    COLONOSCOPY performed by Saul Walsh MD at [facility] ENDOSCOPY    CORONARY ARTERY BYPASS GRAFT  2009    ECHO 2D ADULT  12/2009

## 2025-02-06 ENCOUNTER — APPOINTMENT (OUTPATIENT)
Facility: HOSPITAL | Age: 85
End: 2025-02-06
Payer: MEDICARE

## 2025-02-06 ENCOUNTER — HOSPITAL ENCOUNTER (EMERGENCY)
Facility: HOSPITAL | Age: 85
Discharge: HOME | End: 2025-02-06
Attending: STUDENT IN AN ORGANIZED HEALTH CARE EDUCATION/TRAINING PROGRAM
Payer: MEDICARE

## 2025-02-06 VITALS
SYSTOLIC BLOOD PRESSURE: 126 MMHG | HEART RATE: 69 BPM | RESPIRATION RATE: 20 BRPM | HEIGHT: 71 IN | TEMPERATURE: 97.9 F | BODY MASS INDEX: 24.22 KG/M2 | WEIGHT: 173 LBS | OXYGEN SATURATION: 100 % | DIASTOLIC BLOOD PRESSURE: 49 MMHG

## 2025-02-06 DIAGNOSIS — R10.84 GENERALIZED ABDOMINAL PAIN: Primary | ICD-10-CM

## 2025-02-06 DIAGNOSIS — K59.00 CONSTIPATION, UNSPECIFIED CONSTIPATION TYPE: ICD-10-CM

## 2025-02-06 DIAGNOSIS — T83.511A URINARY TRACT INFECTION ASSOCIATED WITH INDWELLING URETHRAL CATHETER, INITIAL ENCOUNTER (HCC): ICD-10-CM

## 2025-02-06 DIAGNOSIS — N39.0 URINARY TRACT INFECTION ASSOCIATED WITH INDWELLING URETHRAL CATHETER, INITIAL ENCOUNTER (HCC): ICD-10-CM

## 2025-02-06 LAB
ALBUMIN SERPL-MCNC: 3.5 G/DL (ref 3.5–5)
ALBUMIN/GLOB SERPL: 0.8 (ref 1.1–2.2)
ALP SERPL-CCNC: 80 U/L (ref 45–117)
ALT SERPL-CCNC: 21 U/L (ref 12–78)
ANION GAP SERPL CALC-SCNC: 5 MMOL/L (ref 2–12)
APPEARANCE UR: ABNORMAL
AST SERPL-CCNC: 21 U/L (ref 15–37)
BACTERIA URNS QL MICRO: ABNORMAL /HPF
BASOPHILS # BLD: 0.05 K/UL (ref 0–0.1)
BASOPHILS NFR BLD: 0.7 % (ref 0–1)
BILIRUB SERPL-MCNC: 1.2 MG/DL (ref 0.2–1)
BILIRUB UR QL: NEGATIVE
BUN SERPL-MCNC: 15 MG/DL (ref 6–20)
BUN/CREAT SERPL: 17 (ref 12–20)
CALCIUM SERPL-MCNC: 10 MG/DL (ref 8.5–10.1)
CHLORIDE SERPL-SCNC: 104 MMOL/L (ref 97–108)
CO2 SERPL-SCNC: 25 MMOL/L (ref 21–32)
COLOR UR: ABNORMAL
CREAT SERPL-MCNC: 0.9 MG/DL (ref 0.7–1.3)
DIFFERENTIAL METHOD BLD: ABNORMAL
EOSINOPHIL # BLD: 0.23 K/UL (ref 0–0.4)
EOSINOPHIL NFR BLD: 3.3 % (ref 0–7)
EPITH CASTS URNS QL MICRO: ABNORMAL /LPF
ERYTHROCYTE [DISTWIDTH] IN BLOOD BY AUTOMATED COUNT: 12.9 % (ref 11.5–14.5)
GLOBULIN SER CALC-MCNC: 4.4 G/DL (ref 2–4)
GLUCOSE SERPL-MCNC: 106 MG/DL (ref 65–100)
GLUCOSE UR STRIP.AUTO-MCNC: NEGATIVE MG/DL
HCT VFR BLD AUTO: 47.5 % (ref 36.6–50.3)
HGB BLD-MCNC: 15.9 G/DL (ref 12.1–17)
HGB UR QL STRIP: ABNORMAL
HYALINE CASTS URNS QL MICRO: ABNORMAL /LPF (ref 0–2)
IMM GRANULOCYTES # BLD AUTO: 0.01 K/UL (ref 0–0.04)
IMM GRANULOCYTES NFR BLD AUTO: 0.1 % (ref 0–0.5)
KETONES UR QL STRIP.AUTO: NEGATIVE MG/DL
LEUKOCYTE ESTERASE UR QL STRIP.AUTO: ABNORMAL
LIPASE SERPL-CCNC: 54 U/L (ref 13–75)
LYMPHOCYTES # BLD: 2.11 K/UL (ref 0.8–3.5)
LYMPHOCYTES NFR BLD: 30.1 % (ref 12–49)
MCH RBC QN AUTO: 29.2 PG (ref 26–34)
MCHC RBC AUTO-ENTMCNC: 33.5 G/DL (ref 30–36.5)
MCV RBC AUTO: 87.3 FL (ref 80–99)
MONOCYTES # BLD: 0.8 K/UL (ref 0–1)
MONOCYTES NFR BLD: 11.4 % (ref 5–13)
NEUTS SEG # BLD: 3.81 K/UL (ref 1.8–8)
NEUTS SEG NFR BLD: 54.4 % (ref 32–75)
NITRITE UR QL STRIP.AUTO: NEGATIVE
NRBC # BLD: 0 K/UL (ref 0–0.01)
NRBC BLD-RTO: 0 PER 100 WBC
PH UR STRIP: 6 (ref 5–8)
PLATELET # BLD AUTO: 287 K/UL (ref 150–400)
PMV BLD AUTO: 8.2 FL (ref 8.9–12.9)
POTASSIUM SERPL-SCNC: 4.3 MMOL/L (ref 3.5–5.1)
PROT SERPL-MCNC: 7.9 G/DL (ref 6.4–8.2)
PROT UR STRIP-MCNC: NEGATIVE MG/DL
RBC # BLD AUTO: 5.44 M/UL (ref 4.1–5.7)
RBC #/AREA URNS HPF: ABNORMAL /HPF (ref 0–5)
SODIUM SERPL-SCNC: 134 MMOL/L (ref 136–145)
SP GR UR REFRACTOMETRY: 1.01 (ref 1–1.03)
URINE CULTURE IF INDICATED: ABNORMAL
UROBILINOGEN UR QL STRIP.AUTO: 0.2 EU/DL (ref 0.2–1)
WBC # BLD AUTO: 7 K/UL (ref 4.1–11.1)
WBC URNS QL MICRO: >100 /HPF (ref 0–4)

## 2025-02-06 PROCEDURE — 80053 COMPREHEN METABOLIC PANEL: CPT

## 2025-02-06 PROCEDURE — 74177 CT ABD & PELVIS W/CONTRAST: CPT

## 2025-02-06 PROCEDURE — 81001 URINALYSIS AUTO W/SCOPE: CPT

## 2025-02-06 PROCEDURE — 87088 URINE BACTERIA CULTURE: CPT

## 2025-02-06 PROCEDURE — 36415 COLL VENOUS BLD VENIPUNCTURE: CPT

## 2025-02-06 PROCEDURE — 51702 INSERT TEMP BLADDER CATH: CPT

## 2025-02-06 PROCEDURE — 87186 SC STD MICRODIL/AGAR DIL: CPT

## 2025-02-06 PROCEDURE — 6360000004 HC RX CONTRAST MEDICATION: Performed by: STUDENT IN AN ORGANIZED HEALTH CARE EDUCATION/TRAINING PROGRAM

## 2025-02-06 PROCEDURE — 83690 ASSAY OF LIPASE: CPT

## 2025-02-06 PROCEDURE — 85025 COMPLETE CBC W/AUTO DIFF WBC: CPT

## 2025-02-06 PROCEDURE — 6370000000 HC RX 637 (ALT 250 FOR IP): Performed by: STUDENT IN AN ORGANIZED HEALTH CARE EDUCATION/TRAINING PROGRAM

## 2025-02-06 PROCEDURE — 87086 URINE CULTURE/COLONY COUNT: CPT

## 2025-02-06 PROCEDURE — 99285 EMERGENCY DEPT VISIT HI MDM: CPT

## 2025-02-06 RX ORDER — POLYETHYLENE GLYCOL 3350 17 G/17G
17 POWDER, FOR SOLUTION ORAL DAILY
Qty: 510 G | Refills: 0 | Status: SHIPPED | OUTPATIENT
Start: 2025-02-06 | End: 2025-03-08

## 2025-02-06 RX ORDER — IOPAMIDOL 755 MG/ML
100 INJECTION, SOLUTION INTRAVASCULAR
Status: COMPLETED | OUTPATIENT
Start: 2025-02-06 | End: 2025-02-06

## 2025-02-06 RX ORDER — DOCUSATE SODIUM 100 MG/1
100 CAPSULE, LIQUID FILLED ORAL 2 TIMES DAILY
Qty: 60 CAPSULE | Refills: 0 | Status: SHIPPED | OUTPATIENT
Start: 2025-02-06 | End: 2025-03-08

## 2025-02-06 RX ORDER — CEFDINIR 300 MG/1
300 CAPSULE ORAL
Status: COMPLETED | OUTPATIENT
Start: 2025-02-06 | End: 2025-02-06

## 2025-02-06 RX ORDER — CEFDINIR 300 MG/1
300 CAPSULE ORAL 2 TIMES DAILY
Qty: 20 CAPSULE | Refills: 0 | Status: SHIPPED | OUTPATIENT
Start: 2025-02-06 | End: 2025-02-16

## 2025-02-06 RX ADMIN — IOPAMIDOL 100 ML: 755 INJECTION, SOLUTION INTRAVENOUS at 05:22

## 2025-02-06 RX ADMIN — CEFDINIR 300 MG: 300 CAPSULE ORAL at 06:21

## 2025-02-06 ASSESSMENT — PAIN - FUNCTIONAL ASSESSMENT: PAIN_FUNCTIONAL_ASSESSMENT: NONE - DENIES PAIN

## 2025-02-06 NOTE — ED TRIAGE NOTES
Pt brought in by EMS for CC of lower abdominal pain.     Pt has a chronic lópez. Pt states he has had a recent bowel movement.     Pt has a hx of dementia.

## 2025-02-06 NOTE — ED PROVIDER NOTES
Abnormal; Notable for the following components:       Result Value    MPV 8.2 (*)     All other components within normal limits   COMPREHENSIVE METABOLIC PANEL - Abnormal; Notable for the following components:    Sodium 134 (*)     Glucose 106 (*)     Total Bilirubin 1.2 (*)     Globulin 4.4 (*)     Albumin/Globulin Ratio 0.8 (*)     All other components within normal limits   URINALYSIS WITH REFLEX TO CULTURE - Abnormal; Notable for the following components:    Appearance CLOUDY (*)     Blood, Urine TRACE (*)     Leukocyte Esterase, Urine LARGE (*)     Urine Culture if Indicated URINE CULTURE ORDERED (*)     WBC, UA >100 (*)     BACTERIA, URINE 1+ (*)     All other components within normal limits   CULTURE, URINE   LIPASE       All other labs were within normal range or not returned as of this dictation.    EMERGENCY DEPARTMENT COURSE and DIFFERENTIAL DIAGNOSIS/MDM:   Vitals:    Vitals:    02/06/25 0534 02/06/25 0545 02/06/25 0600 02/06/25 0630   BP: 128/62 (!) 111/59 114/63 116/60   Pulse:  71 70 69   Resp:  23 15 17   Temp:       TempSrc:       SpO2: 99% 99% 99% 99%   Weight:       Height:               Medical Decision Making      DECISION MAKING:  Tristin Jerry Sr. is a 84 y.o. male who comes in as above.  Vital signs reviewed, patient afebrile.  Blood pressure 146/78, vital signs otherwise stable.  On my examination, he is denying any complaints, but notes he did have a large bowel movement prior to arrival.  He does have dementia therefore history limited.  Abdomen is soft, there is tenderness in the right lower quadrant.  Will obtain CT imaging of the abdomen pelvis to rule out bowel obstruction or other acute intra-abdominal process.  Will replace Rothman catheter to obtain clean sample for urinalysis.      Labs reviewed.  CBC without leukocytosis or anemia.  Electrolytes are stable.  Kidney function within normal limits.  Bilirubin 1.2, LFTs unremarkable.  UA does show greater than 100 WBCs with large

## 2025-02-06 NOTE — ED NOTES
Bedside and Verbal shift change report given to RENETTA Rincon (oncoming nurse) by RENETTA Chavez (offgoing nurse). Report included the following information Nurse Handoff Report, Index, ED SBAR, MAR, and Event Log.

## 2025-02-06 NOTE — DISCHARGE INSTRUCTIONS
Give antibiotic as prescribed.     Make sure patient is taking daily miralax and stool softener to prevent further constipation and keep regular bowel movements.

## 2025-02-08 LAB
BACTERIA SPEC CULT: ABNORMAL
CC UR VC: ABNORMAL
SERVICE CMNT-IMP: ABNORMAL

## 2025-02-10 ENCOUNTER — APPOINTMENT (OUTPATIENT)
Facility: HOSPITAL | Age: 85
End: 2025-02-10
Payer: MEDICARE

## 2025-02-10 ENCOUNTER — HOSPITAL ENCOUNTER (EMERGENCY)
Facility: HOSPITAL | Age: 85
Discharge: HOME | End: 2025-02-10
Attending: EMERGENCY MEDICINE
Payer: MEDICARE

## 2025-02-10 VITALS
DIASTOLIC BLOOD PRESSURE: 74 MMHG | TEMPERATURE: 97.7 F | RESPIRATION RATE: 16 BRPM | WEIGHT: 173 LBS | SYSTOLIC BLOOD PRESSURE: 133 MMHG | HEIGHT: 71 IN | HEART RATE: 67 BPM | BODY MASS INDEX: 24.22 KG/M2 | OXYGEN SATURATION: 99 %

## 2025-02-10 DIAGNOSIS — K62.89 RECTAL PAIN: Primary | ICD-10-CM

## 2025-02-10 LAB
ALBUMIN SERPL-MCNC: 3 G/DL (ref 3.5–5)
ALBUMIN/GLOB SERPL: 0.7 (ref 1.1–2.2)
ALP SERPL-CCNC: 72 U/L (ref 45–117)
ALT SERPL-CCNC: 18 U/L (ref 12–78)
ANION GAP SERPL CALC-SCNC: 6 MMOL/L (ref 2–12)
AST SERPL-CCNC: 38 U/L (ref 15–37)
BASOPHILS # BLD: 0.04 K/UL (ref 0–0.1)
BASOPHILS NFR BLD: 0.8 % (ref 0–1)
BILIRUB SERPL-MCNC: 1 MG/DL (ref 0.2–1)
BUN SERPL-MCNC: 22 MG/DL (ref 6–20)
BUN/CREAT SERPL: 22 (ref 12–20)
CALCIUM SERPL-MCNC: 9.2 MG/DL (ref 8.5–10.1)
CHLORIDE SERPL-SCNC: 103 MMOL/L (ref 97–108)
CO2 SERPL-SCNC: 26 MMOL/L (ref 21–32)
CREAT SERPL-MCNC: 0.98 MG/DL (ref 0.7–1.3)
DIFFERENTIAL METHOD BLD: ABNORMAL
EOSINOPHIL # BLD: 0.26 K/UL (ref 0–0.4)
EOSINOPHIL NFR BLD: 5 % (ref 0–7)
ERYTHROCYTE [DISTWIDTH] IN BLOOD BY AUTOMATED COUNT: 13.2 % (ref 11.5–14.5)
GLOBULIN SER CALC-MCNC: 4.2 G/DL (ref 2–4)
GLUCOSE SERPL-MCNC: 210 MG/DL (ref 65–100)
HCT VFR BLD AUTO: 40.9 % (ref 36.6–50.3)
HGB BLD-MCNC: 14 G/DL (ref 12.1–17)
IMM GRANULOCYTES # BLD AUTO: 0.01 K/UL (ref 0–0.04)
IMM GRANULOCYTES NFR BLD AUTO: 0.2 % (ref 0–0.5)
LIPASE SERPL-CCNC: 44 U/L (ref 13–75)
LYMPHOCYTES # BLD: 1.73 K/UL (ref 0.8–3.5)
LYMPHOCYTES NFR BLD: 33 % (ref 12–49)
MCH RBC QN AUTO: 29.2 PG (ref 26–34)
MCHC RBC AUTO-ENTMCNC: 34.2 G/DL (ref 30–36.5)
MCV RBC AUTO: 85.4 FL (ref 80–99)
MONOCYTES # BLD: 0.64 K/UL (ref 0–1)
MONOCYTES NFR BLD: 12.2 % (ref 5–13)
NEUTS SEG # BLD: 2.57 K/UL (ref 1.8–8)
NEUTS SEG NFR BLD: 48.8 % (ref 32–75)
NRBC # BLD: 0 K/UL (ref 0–0.01)
NRBC BLD-RTO: 0 PER 100 WBC
PLATELET # BLD AUTO: 328 K/UL (ref 150–400)
PMV BLD AUTO: 8.8 FL (ref 8.9–12.9)
POTASSIUM SERPL-SCNC: 5.2 MMOL/L (ref 3.5–5.1)
PROT SERPL-MCNC: 7.2 G/DL (ref 6.4–8.2)
RBC # BLD AUTO: 4.79 M/UL (ref 4.1–5.7)
SODIUM SERPL-SCNC: 135 MMOL/L (ref 136–145)
WBC # BLD AUTO: 5.3 K/UL (ref 4.1–11.1)

## 2025-02-10 PROCEDURE — 36415 COLL VENOUS BLD VENIPUNCTURE: CPT

## 2025-02-10 PROCEDURE — 74176 CT ABD & PELVIS W/O CONTRAST: CPT

## 2025-02-10 PROCEDURE — 83690 ASSAY OF LIPASE: CPT

## 2025-02-10 PROCEDURE — 80053 COMPREHEN METABOLIC PANEL: CPT

## 2025-02-10 PROCEDURE — 85025 COMPLETE CBC W/AUTO DIFF WBC: CPT

## 2025-02-10 PROCEDURE — 99284 EMERGENCY DEPT VISIT MOD MDM: CPT

## 2025-02-10 ASSESSMENT — PAIN - FUNCTIONAL ASSESSMENT: PAIN_FUNCTIONAL_ASSESSMENT: NONE - DENIES PAIN

## 2025-02-10 NOTE — ED TRIAGE NOTES
Pt. Brought in by EMS for re-occurring rectal pain, states has a large bowel movement and now is in severe pain in his rectum. Pt. Has dementia. Pt. Alert to self place, pt. Has chronic lópez in at this time.

## 2025-02-14 ENCOUNTER — HOSPITAL ENCOUNTER (EMERGENCY)
Facility: HOSPITAL | Age: 85
Discharge: HOME | End: 2025-02-14
Attending: STUDENT IN AN ORGANIZED HEALTH CARE EDUCATION/TRAINING PROGRAM
Payer: MEDICARE

## 2025-02-14 ENCOUNTER — APPOINTMENT (OUTPATIENT)
Facility: HOSPITAL | Age: 85
End: 2025-02-14
Payer: MEDICARE

## 2025-02-14 VITALS
SYSTOLIC BLOOD PRESSURE: 131 MMHG | OXYGEN SATURATION: 100 % | BODY MASS INDEX: 24.13 KG/M2 | HEART RATE: 74 BPM | DIASTOLIC BLOOD PRESSURE: 60 MMHG | WEIGHT: 173 LBS | TEMPERATURE: 97.8 F | RESPIRATION RATE: 16 BRPM

## 2025-02-14 DIAGNOSIS — K59.00 CONSTIPATION, UNSPECIFIED CONSTIPATION TYPE: Primary | ICD-10-CM

## 2025-02-14 DIAGNOSIS — Z97.8 CHRONIC INDWELLING FOLEY CATHETER: ICD-10-CM

## 2025-02-14 LAB
ALBUMIN SERPL-MCNC: 3 G/DL (ref 3.5–5)
ALBUMIN/GLOB SERPL: 0.8 (ref 1.1–2.2)
ALP SERPL-CCNC: 60 U/L (ref 45–117)
ALT SERPL-CCNC: 17 U/L (ref 12–78)
ANION GAP SERPL CALC-SCNC: 7 MMOL/L (ref 2–12)
AST SERPL-CCNC: 14 U/L (ref 15–37)
BASOPHILS # BLD: 0.06 K/UL (ref 0–0.1)
BASOPHILS NFR BLD: 0.8 % (ref 0–1)
BILIRUB SERPL-MCNC: 0.8 MG/DL (ref 0.2–1)
BUN SERPL-MCNC: 24 MG/DL (ref 6–20)
BUN/CREAT SERPL: 25 (ref 12–20)
CALCIUM SERPL-MCNC: 8.6 MG/DL (ref 8.5–10.1)
CHLORIDE SERPL-SCNC: 104 MMOL/L (ref 97–108)
CO2 SERPL-SCNC: 25 MMOL/L (ref 21–32)
CREAT SERPL-MCNC: 0.97 MG/DL (ref 0.7–1.3)
DIFFERENTIAL METHOD BLD: ABNORMAL
EOSINOPHIL # BLD: 0.13 K/UL (ref 0–0.4)
EOSINOPHIL NFR BLD: 1.8 % (ref 0–7)
ERYTHROCYTE [DISTWIDTH] IN BLOOD BY AUTOMATED COUNT: 12.9 % (ref 11.5–14.5)
GLOBULIN SER CALC-MCNC: 3.6 G/DL (ref 2–4)
GLUCOSE SERPL-MCNC: 141 MG/DL (ref 65–100)
HCT VFR BLD AUTO: 38.6 % (ref 36.6–50.3)
HGB BLD-MCNC: 13.2 G/DL (ref 12.1–17)
IMM GRANULOCYTES # BLD AUTO: 0.08 K/UL (ref 0–0.04)
IMM GRANULOCYTES NFR BLD AUTO: 1.1 % (ref 0–0.5)
LYMPHOCYTES # BLD: 1.86 K/UL (ref 0.8–3.5)
LYMPHOCYTES NFR BLD: 25.1 % (ref 12–49)
MCH RBC QN AUTO: 28.9 PG (ref 26–34)
MCHC RBC AUTO-ENTMCNC: 34.2 G/DL (ref 30–36.5)
MCV RBC AUTO: 84.5 FL (ref 80–99)
MONOCYTES # BLD: 0.76 K/UL (ref 0–1)
MONOCYTES NFR BLD: 10.3 % (ref 5–13)
NEUTS SEG # BLD: 4.52 K/UL (ref 1.8–8)
NEUTS SEG NFR BLD: 60.9 % (ref 32–75)
NRBC # BLD: 0 K/UL (ref 0–0.01)
NRBC BLD-RTO: 0 PER 100 WBC
PLATELET # BLD AUTO: 259 K/UL (ref 150–400)
PMV BLD AUTO: 8.7 FL (ref 8.9–12.9)
POTASSIUM SERPL-SCNC: 4.4 MMOL/L (ref 3.5–5.1)
PROT SERPL-MCNC: 6.6 G/DL (ref 6.4–8.2)
RBC # BLD AUTO: 4.57 M/UL (ref 4.1–5.7)
SODIUM SERPL-SCNC: 136 MMOL/L (ref 136–145)
WBC # BLD AUTO: 7.4 K/UL (ref 4.1–11.1)

## 2025-02-14 PROCEDURE — 99285 EMERGENCY DEPT VISIT HI MDM: CPT

## 2025-02-14 PROCEDURE — 85025 COMPLETE CBC W/AUTO DIFF WBC: CPT

## 2025-02-14 PROCEDURE — 6370000000 HC RX 637 (ALT 250 FOR IP): Performed by: STUDENT IN AN ORGANIZED HEALTH CARE EDUCATION/TRAINING PROGRAM

## 2025-02-14 PROCEDURE — 74177 CT ABD & PELVIS W/CONTRAST: CPT

## 2025-02-14 PROCEDURE — 6370000000 HC RX 637 (ALT 250 FOR IP): Performed by: EMERGENCY MEDICINE

## 2025-02-14 PROCEDURE — 2580000003 HC RX 258: Performed by: STUDENT IN AN ORGANIZED HEALTH CARE EDUCATION/TRAINING PROGRAM

## 2025-02-14 PROCEDURE — 6360000004 HC RX CONTRAST MEDICATION: Performed by: STUDENT IN AN ORGANIZED HEALTH CARE EDUCATION/TRAINING PROGRAM

## 2025-02-14 PROCEDURE — 36415 COLL VENOUS BLD VENIPUNCTURE: CPT

## 2025-02-14 PROCEDURE — 80053 COMPREHEN METABOLIC PANEL: CPT

## 2025-02-14 RX ORDER — SENNOSIDES 8.6 MG
TABLET ORAL
Qty: 10 TABLET | Refills: 0 | Status: SHIPPED | OUTPATIENT
Start: 2025-02-14 | End: 2025-02-21

## 2025-02-14 RX ORDER — BISACODYL 10 MG
10 SUPPOSITORY, RECTAL RECTAL
Status: COMPLETED | OUTPATIENT
Start: 2025-02-14 | End: 2025-02-14

## 2025-02-14 RX ORDER — 0.9 % SODIUM CHLORIDE 0.9 %
1000 INTRAVENOUS SOLUTION INTRAVENOUS ONCE
Status: COMPLETED | OUTPATIENT
Start: 2025-02-14 | End: 2025-02-14

## 2025-02-14 RX ORDER — IOPAMIDOL 755 MG/ML
100 INJECTION, SOLUTION INTRAVASCULAR
Status: COMPLETED | OUTPATIENT
Start: 2025-02-14 | End: 2025-02-14

## 2025-02-14 RX ORDER — SODIUM PHOSPHATE, DIBASIC AND SODIUM PHOSPHATE, MONOBASIC 7; 19 G/230ML; G/230ML
1 ENEMA RECTAL ONCE
Status: COMPLETED | OUTPATIENT
Start: 2025-02-14 | End: 2025-02-14

## 2025-02-14 RX ADMIN — SODIUM CHLORIDE 1000 ML: 9 INJECTION, SOLUTION INTRAVENOUS at 21:10

## 2025-02-14 RX ADMIN — BISACODYL 10 MG: 10 SUPPOSITORY RECTAL at 21:10

## 2025-02-14 RX ADMIN — SODIUM PHOSPHATE, DIBASIC AND SODIUM PHOSPHATE, MONOBASIC 1 ENEMA: 7; 19 ENEMA RECTAL at 22:28

## 2025-02-14 RX ADMIN — IOPAMIDOL 100 ML: 755 INJECTION, SOLUTION INTRAVENOUS at 22:09

## 2025-02-14 ASSESSMENT — PAIN DESCRIPTION - DESCRIPTORS: DESCRIPTORS: PRESSURE

## 2025-02-14 ASSESSMENT — PAIN SCALES - WONG BAKER: WONGBAKER_NUMERICALRESPONSE: HURTS A LITTLE BIT

## 2025-02-14 ASSESSMENT — PAIN DESCRIPTION - ORIENTATION: ORIENTATION: MID

## 2025-02-14 ASSESSMENT — PAIN - FUNCTIONAL ASSESSMENT
PAIN_FUNCTIONAL_ASSESSMENT: WONG-BAKER FACES
PAIN_FUNCTIONAL_ASSESSMENT: PREVENTS OR INTERFERES SOME ACTIVE ACTIVITIES AND ADLS

## 2025-02-14 ASSESSMENT — PAIN DESCRIPTION - ONSET: ONSET: ON-GOING

## 2025-02-14 ASSESSMENT — PAIN DESCRIPTION - PAIN TYPE: TYPE: CHRONIC PAIN

## 2025-02-14 ASSESSMENT — PAIN DESCRIPTION - FREQUENCY: FREQUENCY: CONTINUOUS

## 2025-02-14 ASSESSMENT — PAIN DESCRIPTION - LOCATION: LOCATION: RECTUM

## 2025-02-15 NOTE — ED TRIAGE NOTES
Pt and family believe that pt is constipated but family reports periods of having stool. Pt complains of rectal pain. Pt was recently seen at [facility] where he was given an enema and produced a large stool.

## 2025-02-15 NOTE — ED PROVIDER NOTES
[place] EMERGENCY DEPARTMENT  EMERGENCY DEPARTMENT ENCOUNTER      Pt Name: Tristin Jerry Sr.  MRN: [id number]  Birthdate 1940  Date of evaluation: 2/14/2025  Provider: Marion Walsh MD    CHIEF COMPLAINT     No chief complaint on file.        HISTORY OF PRESENT ILLNESS   (Location/Symptom, Timing/Onset, Context/Setting, Quality, Duration, Modifying Factors, Severity)  Note limiting factors.   The history is provided by the patient.     84-year-old male with a history of CAD, depression, diabetic neuropathy, hypertension, diabetes presenting for constipation.  Patient is accompanied by wife who provides additional history, patient is limited historian due to a history of dementia.  Family reports that patient was seen a week ago for constipation, required an enema, has been constipated since, having a hard stool yesterday which was only small alessandra.  Reports that he is only taking MiraLAX once daily, has not picked up the Dulcolax from the pharmacy.  Reports having abdominal pain and pain when trying to have a bowel movement.  Reports no blood in stool.  Reports no nausea or vomiting.  Reports no fevers or chills.  Patient reports he is not sure how much water he drinks        Review of External Medical Records:         Nursing Notes were reviewed.      REVIEW OF SYSTEMS    (2-9 systems for level 4, 10 or more for level 5)     Except as noted above the remainder of the review of systems was reviewed and negative.       PAST MEDICAL HISTORY     Past Medical History:   Diagnosis Date    CAD (coronary artery disease), native coronary artery     Cancer (HCC)     melanoma removed on forehead    Depression     Diabetic neuropathy (HCC)     ED (erectile dysfunction)     Enlarged prostate 07/19/2021    Hypertension, benign     Ill-defined condition     states he has memory problems    Postsurgical aortocoronary bypass status     Type II or unspecified type diabetes mellitus without mention of  Physician who either signs or Co-signs this chart in the absence of a cardiologist.        RADIOLOGY:   Non-plain film images such as CT, Ultrasound and MRI are read by the radiologist. Plain radiographic images are visualized and preliminarily interpreted by the emergency physician as documented in ED course.      Interpretation per the Radiologist below, if available at the time of this note:    No orders to display        LABS:  Labs Reviewed - No data to display    All other labs were within normal range or not returned as of this dictation.    EMERGENCY DEPARTMENT COURSE and DIFFERENTIAL DIAGNOSIS/MDM:   Vitals:  There were no vitals filed for this visit.        Medical Decision Making  Patient presenting for evaluation of constipation, reports intermittent abdominal pain and occasional rectal pain-- pt seen in ED multiple times for same, chronic constipation hx, wife reports occasionally taking miralax at home, no attempts at enema at home.   Hemodynamically stable, no acute distress, soft nontender abdomen, no rebound or guarding  Labs reviewed and reassuring  CT abdomen pending.     10PM  Change of shift.  Care of patient signed over to Dr. Silvestre.  Bedside handoff complete. Awaiting CT abdomen .           Amount and/or Complexity of Data Reviewed  Labs: ordered. Decision-making details documented in ED Course.  Radiology: ordered.    Risk  OTC drugs.  Prescription drug management.                REASSESSMENT     ED Course as of 02/16/25 1159   Fri Feb 14, 2025 2142 WBC: 7.4 [SL]   2142 Hemoglobin Quant: 13.2 [SL]   2221 Signout received from Dr. Walsh.  Patient pending CT abd/pel  In summary:    [ZD]      ED Course User Index  [SL] Marion Walsh MD  [ZD] Baljeet Silvestre MD           CONSULTS:  None    PROCEDURES:  Unless otherwise noted below, none     Procedures          FINAL IMPRESSION    No diagnosis found.        DISPOSITION/PLAN   DISPOSITION        PATIENT REFERRED TO:  No follow-up provider

## 2025-02-15 NOTE — ED NOTES
The patient was discharged home by Dr. madsen and viral Greer in stable condition, accompanied by family. The patient is alert and oriented, is in no respiratory distress and has vital signs within normal limits . The patient's diagnosis, condition and treatment were explained to family. The family expressed understanding. No prescriptions given to pt. No work/school note given to pt. A discharge plan has been developed. A  was not involved in the process. Aftercare instructions were given to family. Pt's saline lock removed without complications. Pt given a wheelchair ride to the front of the ED. Family will transport pt home.

## 2025-02-15 NOTE — DISCHARGE INSTRUCTIONS
Use 5 capfuls of MiraLAX mixed in with 30 ounces of water and drink within 2 to 3 hours.  The following day use 3 capfuls of MiraLAX mixed in with 20 ounces of water and drink within 2 to 3 hours.  The following days 1 capful MiraLAX mixed in with a glass of water daily.  If going 2 to 3 days without a bowel movement or having hard bowel movements increase up to 3 capfuls of MiraLAX for that day and then back down to 1 capful daily.

## 2025-02-15 NOTE — ED NOTES
ED Course as of 02/14/25 2247 Fri Feb 14, 2025 2142 WBC: 7.4 [SL]   2142 Hemoglobin Quant: 13.2 [SL]   2221 Signout received from Dr. Walsh.  Patient pending CT abd/pel  In summary:    [ZD]      ED Course User Index  [SL] Marion Walsh MD  [ZD] Baljeet Silvestre MD     Patient presented to ER with report of constipation.  Has been seen previously for this.  Has been having some hard stools.  Having some abdominal discomfort.  Patient reportedly been taking MiraLAX however on clarification is only been taking it intermittently.  Has not picked up prescription of Dulcolax from the pharmacy.  Patient also has recent ER visit with positive urine culture patient was started on cefdinir for 10 days.  Patient should still be on this antibiotic.  Patient's Rothman catheter does appear to be draining appropriately.  Has large prostate on previous imaging.  I performed a rectal exam due to concern for rectal impaction however unable to remove any stool.  Enema administered at this time.  Patient did have bowel movement with enema.  I discussed with family stool softener regiment and provided information discharge paperwork.  Of note patient did complete the course of antibiotics prescribed by his urologist.      Vitals:    02/14/25 2039 02/14/25 2130 02/14/25 2145   BP: (!) 160/77 139/61 131/60   Pulse: 74     Resp: 16     Temp: 97.8 °F (36.6 °C)     SpO2: 100% 100% 100%   Weight: 78.5 kg (173 lb)             Results for orders placed or performed during the hospital encounter of 02/14/25   CBC with Auto Differential   Result Value Ref Range    WBC 7.4 4.1 - 11.1 K/uL    RBC 4.57 4.10 - 5.70 M/uL    Hemoglobin 13.2 12.1 - 17.0 g/dL    Hematocrit 38.6 36.6 - 50.3 %    MCV 84.5 80.0 - 99.0 FL    MCH 28.9 26.0 - 34.0 PG    MCHC 34.2 30.0 - 36.5 g/dL    RDW 12.9 11.5 - 14.5 %    Platelets 259 150 - 400 K/uL    MPV 8.7 (L) 8.9 - 12.9 FL    Nucleated RBCs 0.0 0.0  WBC    nRBC 0.00 0.00 - 0.01 K/uL    Neutrophils % 60.9  32.0 - 75.0 %    Lymphocytes % 25.1 12.0 - 49.0 %    Monocytes % 10.3 5.0 - 13.0 %    Eosinophils % 1.8 0.0 - 7.0 %    Basophils % 0.8 0.0 - 1.0 %    Immature Granulocytes % 1.1 (H) 0 - 0.5 %    Neutrophils Absolute 4.52 1.80 - 8.00 K/UL    Lymphocytes Absolute 1.86 0.80 - 3.50 K/UL    Monocytes Absolute 0.76 0.00 - 1.00 K/UL    Eosinophils Absolute 0.13 0.00 - 0.40 K/UL    Basophils Absolute 0.06 0.00 - 0.10 K/UL    Immature Granulocytes Absolute 0.08 (H) 0.00 - 0.04 K/UL    Differential Type AUTOMATED     Comprehensive Metabolic Panel   Result Value Ref Range    Sodium 136 136 - 145 mmol/L    Potassium 4.4 3.5 - 5.1 mmol/L    Chloride 104 97 - 108 mmol/L    CO2 25 21 - 32 mmol/L    Anion Gap 7 2 - 12 mmol/L    Glucose 141 (H) 65 - 100 mg/dL    BUN 24 (H) 6 - 20 MG/DL    Creatinine 0.97 0.70 - 1.30 MG/DL    BUN/Creatinine Ratio 25 (H) 12 - 20      Est, Glom Filt Rate 78 >60 ml/min/1.73m2    Calcium 8.6 8.5 - 10.1 MG/DL    Total Bilirubin 0.8 0.2 - 1.0 MG/DL    ALT 17 12 - 78 U/L    AST 14 (L) 15 - 37 U/L    Alk Phosphatase 60 45 - 117 U/L    Total Protein 6.6 6.4 - 8.2 g/dL    Albumin 3.0 (L) 3.5 - 5.0 g/dL    Globulin 3.6 2.0 - 4.0 g/dL    Albumin/Globulin Ratio 0.8 (L) 1.1 - 2.2           CT ABDOMEN PELVIS W IV CONTRAST Additional Contrast? None   Final Result   1.  There is mild bladder wall prominence and adjacent fatty infiltration. The   findings may be due to underdistention, chronic bladder outlet obstruction, or   cystitis. Recommend correlation with urinalysis.   2.  Marked prostatomegaly is again noted.   3.  Moderate amount of stool in the colon. No evidence of intestinal   obstruction.   4.  Cholelithiasis is again noted..      Electronically signed by Baljeet Howard MD  02/14/25 0549

## 2025-02-15 NOTE — ED NOTES
Dr. Silvestre attempted disimpaction which was not successful. Enema was given and pt was placed on a bedside toilet. Pt encouraged to push. Wife at bedside.

## 2025-02-21 ASSESSMENT — ENCOUNTER SYMPTOMS
SHORTNESS OF BREATH: 0
BACK PAIN: 0
SORE THROAT: 0
VOMITING: 0
DIARRHEA: 0
ABDOMINAL PAIN: 1
COLOR CHANGE: 0
COUGH: 0
EYE PAIN: 0
RHINORRHEA: 0
NAUSEA: 0

## 2025-02-21 NOTE — ED PROVIDER NOTES
[facility] EMERGENCY DEPARTMENT  EMERGENCY DEPARTMENT ENCOUNTER      Pt Name: Tristin Jerry Sr.  MRN: [id number]  Birthdate 1940  Date of evaluation: 2/10/2025  Provider: Momo Nelson MD    CHIEF COMPLAINT       Chief Complaint   Patient presents with    Rectal Pain         HISTORY OF PRESENT ILLNESS   (Location/Symptom, Timing/Onset, Context/Setting, Quality, Duration, Modifying Factors, Severity)  Note limiting factors.   84-year-old male comes to the ER for recurrent rectal pain that he believes is related to constipation.  He has a history of dementia.  Patient states he had a large bowel movement earlier today and pain.  No bleeding.  No fever or chills.  No nausea or vomiting    The history is provided by the patient.   Abdominal Pain  Pain location: Rectal.  Pain quality: dull    Pain radiates to:  Does not radiate  Pain severity:  Moderate  Onset quality:  Gradual  Timing:  Intermittent  Progression:  Waxing and waning  Chronicity:  Recurrent  Context: not alcohol use, not awakening from sleep and not diet changes    Context comment:  Constipation  Relieved by:  Nothing  Worsened by:  Nothing  Ineffective treatments:  None tried  Associated symptoms: no chest pain, no cough, no diarrhea, no dysuria, no fatigue, no fever, no nausea, no shortness of breath, no sore throat and no vomiting          Review of External Medical Records:     Nursing Notes were reviewed.    REVIEW OF SYSTEMS    (2-9 systems for level 4, 10 or more for level 5)     Review of Systems   Constitutional:  Negative for fatigue and fever.   HENT:  Negative for ear pain, rhinorrhea and sore throat.    Eyes:  Negative for pain and visual disturbance.   Respiratory:  Negative for cough and shortness of breath.    Cardiovascular:  Negative for chest pain.   Gastrointestinal:  Positive for abdominal pain. Negative for diarrhea, nausea and vomiting.   Genitourinary:  Negative for dysuria.   Musculoskeletal:

## 2025-02-24 ENCOUNTER — APPOINTMENT (OUTPATIENT)
Facility: HOSPITAL | Age: 85
End: 2025-02-24
Payer: MEDICARE

## 2025-02-24 ENCOUNTER — HOSPITAL ENCOUNTER (EMERGENCY)
Facility: HOSPITAL | Age: 85
Discharge: HOME | End: 2025-02-25
Attending: EMERGENCY MEDICINE
Payer: MEDICARE

## 2025-02-24 DIAGNOSIS — R07.89 CHEST WALL PAIN: Primary | ICD-10-CM

## 2025-02-24 DIAGNOSIS — N39.0 URINARY TRACT INFECTION ASSOCIATED WITH INDWELLING URETHRAL CATHETER, INITIAL ENCOUNTER: ICD-10-CM

## 2025-02-24 DIAGNOSIS — T83.511A URINARY TRACT INFECTION ASSOCIATED WITH INDWELLING URETHRAL CATHETER, INITIAL ENCOUNTER: ICD-10-CM

## 2025-02-24 LAB
ALBUMIN SERPL-MCNC: 3.4 G/DL (ref 3.5–5)
ALBUMIN/GLOB SERPL: 0.9 (ref 1.1–2.2)
ALP SERPL-CCNC: 69 U/L (ref 45–117)
ALT SERPL-CCNC: 13 U/L (ref 12–78)
ANION GAP SERPL CALC-SCNC: 9 MMOL/L (ref 2–12)
APPEARANCE UR: ABNORMAL
AST SERPL-CCNC: 22 U/L (ref 15–37)
BACTERIA URNS QL MICRO: ABNORMAL /HPF
BASOPHILS # BLD: 0.06 K/UL (ref 0–0.1)
BASOPHILS NFR BLD: 0.9 % (ref 0–1)
BILIRUB SERPL-MCNC: 0.8 MG/DL (ref 0.2–1)
BILIRUB UR QL: NEGATIVE
BUN SERPL-MCNC: 24 MG/DL (ref 6–20)
BUN/CREAT SERPL: 25 (ref 12–20)
CALCIUM SERPL-MCNC: 9.3 MG/DL (ref 8.5–10.1)
CHLORIDE SERPL-SCNC: 102 MMOL/L (ref 97–108)
CO2 SERPL-SCNC: 24 MMOL/L (ref 21–32)
COLOR UR: ABNORMAL
CREAT SERPL-MCNC: 0.97 MG/DL (ref 0.7–1.3)
DIFFERENTIAL METHOD BLD: ABNORMAL
EOSINOPHIL # BLD: 0.18 K/UL (ref 0–0.4)
EOSINOPHIL NFR BLD: 2.8 % (ref 0–7)
EPITH CASTS URNS QL MICRO: ABNORMAL /LPF
ERYTHROCYTE [DISTWIDTH] IN BLOOD BY AUTOMATED COUNT: 12.9 % (ref 11.5–14.5)
GLOBULIN SER CALC-MCNC: 4 G/DL (ref 2–4)
GLUCOSE SERPL-MCNC: 75 MG/DL (ref 65–100)
GLUCOSE UR STRIP.AUTO-MCNC: NEGATIVE MG/DL
HCT VFR BLD AUTO: 43.3 % (ref 36.6–50.3)
HGB BLD-MCNC: 15 G/DL (ref 12.1–17)
HGB UR QL STRIP: ABNORMAL
IMM GRANULOCYTES # BLD AUTO: 0.04 K/UL (ref 0–0.04)
IMM GRANULOCYTES NFR BLD AUTO: 0.6 % (ref 0–0.5)
KETONES UR QL STRIP.AUTO: NEGATIVE MG/DL
LEUKOCYTE ESTERASE UR QL STRIP.AUTO: ABNORMAL
LYMPHOCYTES # BLD: 1.71 K/UL (ref 0.8–3.5)
LYMPHOCYTES NFR BLD: 26.4 % (ref 12–49)
MCH RBC QN AUTO: 29 PG (ref 26–34)
MCHC RBC AUTO-ENTMCNC: 34.6 G/DL (ref 30–36.5)
MCV RBC AUTO: 83.6 FL (ref 80–99)
MONOCYTES # BLD: 0.73 K/UL (ref 0–1)
MONOCYTES NFR BLD: 11.3 % (ref 5–13)
NEUTS SEG # BLD: 3.75 K/UL (ref 1.8–8)
NEUTS SEG NFR BLD: 58 % (ref 32–75)
NITRITE UR QL STRIP.AUTO: NEGATIVE
NRBC # BLD: 0 K/UL (ref 0–0.01)
NRBC BLD-RTO: 0 PER 100 WBC
PH UR STRIP: 8 (ref 5–8)
PLATELET # BLD AUTO: 298 K/UL (ref 150–400)
PMV BLD AUTO: 8.3 FL (ref 8.9–12.9)
POTASSIUM SERPL-SCNC: 4.2 MMOL/L (ref 3.5–5.1)
PROT SERPL-MCNC: 7.4 G/DL (ref 6.4–8.2)
PROT UR STRIP-MCNC: >300 MG/DL
RBC # BLD AUTO: 5.18 M/UL (ref 4.1–5.7)
RBC #/AREA URNS HPF: ABNORMAL /HPF (ref 0–5)
SODIUM SERPL-SCNC: 135 MMOL/L (ref 136–145)
SP GR UR REFRACTOMETRY: 1.02 (ref 1–1.03)
TROPONIN I SERPL HS-MCNC: 12 NG/L (ref 0–76)
URINE CULTURE IF INDICATED: ABNORMAL
UROBILINOGEN UR QL STRIP.AUTO: 1 EU/DL (ref 0.2–1)
WBC # BLD AUTO: 6.5 K/UL (ref 4.1–11.1)
WBC URNS QL MICRO: ABNORMAL /HPF (ref 0–4)

## 2025-02-24 PROCEDURE — 84484 ASSAY OF TROPONIN QUANT: CPT

## 2025-02-24 PROCEDURE — 99285 EMERGENCY DEPT VISIT HI MDM: CPT

## 2025-02-24 PROCEDURE — 96374 THER/PROPH/DIAG INJ IV PUSH: CPT

## 2025-02-24 PROCEDURE — 51702 INSERT TEMP BLADDER CATH: CPT

## 2025-02-24 PROCEDURE — 81001 URINALYSIS AUTO W/SCOPE: CPT

## 2025-02-24 PROCEDURE — 80053 COMPREHEN METABOLIC PANEL: CPT

## 2025-02-24 PROCEDURE — 71045 X-RAY EXAM CHEST 1 VIEW: CPT

## 2025-02-24 PROCEDURE — 36415 COLL VENOUS BLD VENIPUNCTURE: CPT

## 2025-02-24 PROCEDURE — 87088 URINE BACTERIA CULTURE: CPT

## 2025-02-24 PROCEDURE — 85025 COMPLETE CBC W/AUTO DIFF WBC: CPT

## 2025-02-24 PROCEDURE — 87186 SC STD MICRODIL/AGAR DIL: CPT

## 2025-02-24 PROCEDURE — 6360000002 HC RX W HCPCS: Performed by: EMERGENCY MEDICINE

## 2025-02-24 PROCEDURE — 93005 ELECTROCARDIOGRAM TRACING: CPT | Performed by: EMERGENCY MEDICINE

## 2025-02-24 PROCEDURE — 2500000003 HC RX 250 WO HCPCS: Performed by: EMERGENCY MEDICINE

## 2025-02-24 PROCEDURE — 87086 URINE CULTURE/COLONY COUNT: CPT

## 2025-02-24 RX ORDER — LIDOCAINE HYDROCHLORIDE 20 MG/ML
JELLY TOPICAL PRN
Status: DISCONTINUED | OUTPATIENT
Start: 2025-02-24 | End: 2025-02-25 | Stop reason: HOSPADM

## 2025-02-24 RX ORDER — CEFUROXIME AXETIL 250 MG/1
250 TABLET ORAL 2 TIMES DAILY
Qty: 14 TABLET | Refills: 0 | Status: SHIPPED | OUTPATIENT
Start: 2025-02-24 | End: 2025-03-03

## 2025-02-24 RX ORDER — CEFDINIR 300 MG/1
300 CAPSULE ORAL 2 TIMES DAILY
Qty: 14 CAPSULE | Refills: 0 | Status: SHIPPED | OUTPATIENT
Start: 2025-02-24 | End: 2025-02-24

## 2025-02-24 RX ADMIN — WATER 1000 MG: 1 INJECTION INTRAMUSCULAR; INTRAVENOUS; SUBCUTANEOUS at 23:44

## 2025-02-24 ASSESSMENT — PAIN SCALES - GENERAL
PAINLEVEL_OUTOF10: 9
PAINLEVEL_OUTOF10: 8

## 2025-02-24 ASSESSMENT — PAIN - FUNCTIONAL ASSESSMENT: PAIN_FUNCTIONAL_ASSESSMENT: 0-10

## 2025-02-24 ASSESSMENT — PAIN DESCRIPTION - DESCRIPTORS: DESCRIPTORS: BURNING

## 2025-02-24 ASSESSMENT — ENCOUNTER SYMPTOMS
SORE THROAT: 0
SHORTNESS OF BREATH: 0
CONSTIPATION: 0

## 2025-02-24 ASSESSMENT — PAIN DESCRIPTION - LOCATION
LOCATION: CHEST;PENIS
LOCATION: PENIS

## 2025-02-25 VITALS
SYSTOLIC BLOOD PRESSURE: 119 MMHG | HEIGHT: 71 IN | RESPIRATION RATE: 15 BRPM | HEART RATE: 81 BPM | WEIGHT: 170 LBS | TEMPERATURE: 97.8 F | BODY MASS INDEX: 23.8 KG/M2 | DIASTOLIC BLOOD PRESSURE: 66 MMHG | OXYGEN SATURATION: 100 %

## 2025-02-25 LAB
EKG ATRIAL RATE: 78 BPM
EKG DIAGNOSIS: NORMAL
EKG P AXIS: 55 DEGREES
EKG P-R INTERVAL: 190 MS
EKG Q-T INTERVAL: 404 MS
EKG QRS DURATION: 100 MS
EKG QTC CALCULATION (BAZETT): 460 MS
EKG R AXIS: -30 DEGREES
EKG T AXIS: 16 DEGREES
EKG VENTRICULAR RATE: 78 BPM

## 2025-02-25 RX ORDER — METOPROLOL SUCCINATE 25 MG/1
25 TABLET, EXTENDED RELEASE ORAL DAILY
Qty: 90 TABLET | Refills: 0 | Status: SHIPPED | OUTPATIENT
Start: 2025-02-25

## 2025-02-25 ASSESSMENT — PAIN SCALES - GENERAL: PAINLEVEL_OUTOF10: 4

## 2025-02-25 ASSESSMENT — PAIN DESCRIPTION - ONSET: ONSET: ON-GOING

## 2025-02-25 ASSESSMENT — PAIN - FUNCTIONAL ASSESSMENT
PAIN_FUNCTIONAL_ASSESSMENT: PREVENTS OR INTERFERES SOME ACTIVE ACTIVITIES AND ADLS
PAIN_FUNCTIONAL_ASSESSMENT: 0-10

## 2025-02-25 ASSESSMENT — PAIN DESCRIPTION - LOCATION: LOCATION: PENIS

## 2025-02-25 ASSESSMENT — PAIN DESCRIPTION - DESCRIPTORS: DESCRIPTORS: DISCOMFORT

## 2025-02-25 ASSESSMENT — PAIN DESCRIPTION - PAIN TYPE: TYPE: ACUTE PAIN

## 2025-02-25 NOTE — ED PROVIDER NOTES
[place] EMERGENCY DEPARTMENT  EMERGENCY DEPARTMENT ENCOUNTER      Pt Name: Tristin Jerry Sr.  MRN: [id number]  Birthdate 1940  Date of evaluation: 2/24/2025  Provider: Mason Burrows MD    CHIEF COMPLAINT       Chief Complaint   Patient presents with    Penis Pain    Chest Pain         HISTORY OF PRESENT ILLNESS   (Location/Symptom, Timing/Onset, Context/Setting, Quality, Duration, Modifying Factors, Severity)  Note limiting factors.   84-year-old male presents from home via EMS accompanied by his wife and son with complaints of needing his Rothman change and chest pain.  Had a twinge of chest pain about an hour and a half ago or so.  Patient states symptoms have since resolved.  Denies any cough, fever, vomiting, diarrhea.  Does report some pain and irritation around his penis from where his Rothman catheter inserts.  Family states this was last changed a few weeks ago.  Review of EMR shows a history significant for CAD, melanoma, diabetic neuropathy, enlarged prostate, benign hypertension, diabetes.    The history is provided by the patient, the EMS personnel, the spouse and medical records.         Review of External Medical Records:     Nursing Notes were reviewed.    REVIEW OF SYSTEMS    (2-9 systems for level 4, 10 or more for level 5)     Review of Systems   Constitutional:  Negative for fatigue.   HENT:  Negative for sore throat.    Eyes:  Negative for visual disturbance.   Respiratory:  Negative for shortness of breath.    Cardiovascular:  Negative for palpitations.   Gastrointestinal:  Negative for constipation.   Genitourinary:  Negative for difficulty urinating.   Musculoskeletal:  Negative for myalgias.   Skin:  Negative for rash.       Except as noted above the remainder of the review of systems was reviewed and negative.       PAST MEDICAL HISTORY     Past Medical History:   Diagnosis Date    CAD (coronary artery disease), native coronary artery     Cancer (HCC)     melanoma removed on

## 2025-02-25 NOTE — ED NOTES
Pt discharged home alert and oriented,still has penile pains though progressing to functional goals.Discharge instructions reviewed and no concerns raised at this time.

## 2025-02-27 LAB
BACTERIA SPEC CULT: ABNORMAL
BACTERIA SPEC CULT: ABNORMAL
CC UR VC: ABNORMAL
SERVICE CMNT-IMP: ABNORMAL

## 2025-03-25 RX ORDER — LISINOPRIL 10 MG/1
10 TABLET ORAL DAILY
Qty: 90 TABLET | Refills: 0 | Status: SHIPPED | OUTPATIENT
Start: 2025-03-25

## 2025-03-25 NOTE — TELEPHONE ENCOUNTER
Refill per VO of Dr. Hector Goodman:    3/27/2024    Future Appointments   Date Time Provider Department Center   4/15/2025  1:00 PM Leticia Newsome, APRN - NP [facility]       Requested Prescriptions     Pending Prescriptions Disp Refills    lisinopril (PRINIVIL;ZESTRIL) 10 MG tablet [Pharmacy Med Name: LISINOPRIL 10 MG TABLET] 90 tablet 3     Sig: TAKE 1 TABLET BY MOUTH EVERY DAY

## 2025-03-29 ENCOUNTER — HOSPITAL ENCOUNTER (EMERGENCY)
Facility: HOSPITAL | Age: 85
Discharge: HOME | End: 2025-03-29
Attending: EMERGENCY MEDICINE
Payer: MEDICARE

## 2025-03-29 VITALS
RESPIRATION RATE: 18 BRPM | BODY MASS INDEX: 23.8 KG/M2 | WEIGHT: 170 LBS | TEMPERATURE: 97.6 F | DIASTOLIC BLOOD PRESSURE: 63 MMHG | OXYGEN SATURATION: 100 % | SYSTOLIC BLOOD PRESSURE: 122 MMHG | HEART RATE: 83 BPM | HEIGHT: 71 IN

## 2025-03-29 DIAGNOSIS — N30.01 ACUTE CYSTITIS WITH HEMATURIA: ICD-10-CM

## 2025-03-29 DIAGNOSIS — T83.9XXA PROBLEM WITH FOLEY CATHETER, INITIAL ENCOUNTER: Primary | ICD-10-CM

## 2025-03-29 LAB
APPEARANCE UR: ABNORMAL
BACTERIA URNS QL MICRO: ABNORMAL /HPF
BILIRUB UR QL: NEGATIVE
COLOR UR: ABNORMAL
EPITH CASTS URNS QL MICRO: ABNORMAL /LPF
GLUCOSE UR STRIP.AUTO-MCNC: 250 MG/DL
HGB UR QL STRIP: ABNORMAL
KETONES UR QL STRIP.AUTO: NEGATIVE MG/DL
LEUKOCYTE ESTERASE UR QL STRIP.AUTO: ABNORMAL
NITRITE UR QL STRIP.AUTO: NEGATIVE
PH UR STRIP: 5.5 (ref 5–8)
PROT UR STRIP-MCNC: 300 MG/DL
RBC #/AREA URNS HPF: >100 /HPF (ref 0–5)
SP GR UR REFRACTOMETRY: 1.01 (ref 1–1.03)
UROBILINOGEN UR QL STRIP.AUTO: 0.2 EU/DL (ref 0.2–1)
WBC URNS QL MICRO: >100 /HPF (ref 0–4)

## 2025-03-29 PROCEDURE — 99283 EMERGENCY DEPT VISIT LOW MDM: CPT

## 2025-03-29 PROCEDURE — 87088 URINE BACTERIA CULTURE: CPT

## 2025-03-29 PROCEDURE — 87086 URINE CULTURE/COLONY COUNT: CPT

## 2025-03-29 PROCEDURE — 87186 SC STD MICRODIL/AGAR DIL: CPT

## 2025-03-29 PROCEDURE — 6370000000 HC RX 637 (ALT 250 FOR IP): Performed by: STUDENT IN AN ORGANIZED HEALTH CARE EDUCATION/TRAINING PROGRAM

## 2025-03-29 PROCEDURE — 51703 INSERT BLADDER CATH COMPLEX: CPT

## 2025-03-29 PROCEDURE — 81001 URINALYSIS AUTO W/SCOPE: CPT

## 2025-03-29 RX ORDER — LIDOCAINE HYDROCHLORIDE 20 MG/ML
JELLY TOPICAL
Status: DISCONTINUED | OUTPATIENT
Start: 2025-03-29 | End: 2025-03-29 | Stop reason: HOSPADM

## 2025-03-29 RX ORDER — CEFDINIR 300 MG/1
300 CAPSULE ORAL 2 TIMES DAILY
Qty: 20 CAPSULE | Refills: 0 | Status: SHIPPED | OUTPATIENT
Start: 2025-03-29 | End: 2025-04-08

## 2025-03-29 RX ORDER — LIDOCAINE HYDROCHLORIDE 20 MG/ML
JELLY TOPICAL PRN
Status: DISCONTINUED | OUTPATIENT
Start: 2025-03-29 | End: 2025-03-29 | Stop reason: HOSPADM

## 2025-03-29 RX ADMIN — LIDOCAINE HYDROCHLORIDE: 20 JELLY TOPICAL at 11:55

## 2025-03-29 RX ADMIN — LIDOCAINE HYDROCHLORIDE: 20 JELLY TOPICAL at 09:49

## 2025-03-29 ASSESSMENT — PAIN DESCRIPTION - DESCRIPTORS: DESCRIPTORS: PRESSURE

## 2025-03-29 ASSESSMENT — PAIN DESCRIPTION - LOCATION
LOCATION: PENIS;ABDOMEN
LOCATION: ABDOMEN

## 2025-03-29 ASSESSMENT — PAIN SCALES - GENERAL
PAINLEVEL_OUTOF10: 9
PAINLEVEL_OUTOF10: 2

## 2025-03-29 ASSESSMENT — PAIN - FUNCTIONAL ASSESSMENT: PAIN_FUNCTIONAL_ASSESSMENT: 0-10

## 2025-03-29 NOTE — ED NOTES
Attempted lópez cathere insertion, insertion unsuccessful. Provider delphine notified at this time.

## 2025-03-29 NOTE — ED TRIAGE NOTES
Pt arrived via EMS c/o clogged lópez catheter x \"several days\" with suprapubic pressure. Denies any other symptoms.

## 2025-03-29 NOTE — ED NOTES
TRANSFER - OUT REPORT:  Verbal report given to Gold Jerry Sr.  being transferred to [facility] ED for Urology     Report consisted of patient's Situation, Background, Assessment and Recommendations(SBAR).   Information from the following report(s) ED Encounter Summary, Adult Overview, Intake/Output, MAR, Neuro Assessment, and Event Log was reviewed with the receiving nurse.  Opportunity for questions and clarification was provided.      Patient transported with:  MAGNO

## 2025-03-29 NOTE — CONSULTS
Urology Note    Urology consulted for difficult López catheter placement.  Prior urologic history: BPH, chronic lópez.   Prior attempts:4    Using sterile technique, I placed a 14 Latvian caudae López catheter with some difficulty. 100 cc urine returned.  10 cc was added to the balloon.  The catheter was secured to the patient's leg.    PLAN:  - OP FU with [facility] in place

## 2025-03-29 NOTE — ED PROVIDER NOTES
[place] EMERGENCY DEPARTMENT  EMERGENCY DEPARTMENT ENCOUNTER        CHIEF COMPLAINT       Chief Complaint   Patient presents with    Urinary Catheter         HISTORY OF PRESENT ILLNESS      84y M with hx of dementia, CAD, BPH (with chronic indwelling lópez), recurrent UTI here with complaints that his lópez catheter is clogged up and suprapubic pain. Ongoing for a few days. No reports of fever. No vomiting. No pain elsewhere. No trouble breathing. No rash.    Review of External Medical Records:     Nursing Notes were reviewed.    REVIEW OF SYSTEMS       See hpi, otherwise unremarkable.         PAST MEDICAL HISTORY     Past Medical History:   Diagnosis Date    CAD (coronary artery disease), native coronary artery     Cancer (HCC)     melanoma removed on forehead    Depression     Diabetic neuropathy (HCC)     ED (erectile dysfunction)     Enlarged prostate 07/19/2021    Hypertension, benign     Ill-defined condition     states he has memory problems    Postsurgical aortocoronary bypass status     Type II or unspecified type diabetes mellitus without mention of complication, not stated as uncontrolled          SURGICAL HISTORY       Past Surgical History:   Procedure Laterality Date    COLONOSCOPY N/A 5/3/2016    COLONOSCOPY performed by Saul Walsh MD at [facility] ENDOSCOPY    CORONARY ARTERY BYPASS GRAFT  2009    ECHO 2D ADULT  12/2009    EF 50%, basal inferior akinesis, mild MR    ECHO 2D ADULT  9/21/11    LVEF 50%, inferior AK, unchanged from Dec 2009    OTHER SURGICAL HISTORY  2008    melanoma removed from forehead         ALLERGIES     Amoxicillin, Levaquin [levofloxacin], Pravastatin, and Sulfa antibiotics    FAMILY HISTORY       Family History   Problem Relation Age of Onset    Breast Cancer Mother         w bone mets    Parkinson's Disease Maternal Aunt     Parkinson's Disease Maternal Uncle     Parkinson's Disease Maternal Grandfather           SOCIAL HISTORY       Social History

## 2025-03-29 NOTE — ED NOTES
shift change report given to Vero BALLARD RN  (oncoming nurse) by RENETTA Campa (offgoing nurse). Report included the following information Nurse Handoff Report, ED SBAR, Intake/Output, MAR, and Recent Results.   /63   Pulse 83   Temp 97.6 °F (36.4 °C) (Oral)   Resp 18   Ht 1.803 m (5' 11\")   Wt 77.1 kg (170 lb)   SpO2 100%   BMI 23.71 kg/m² '

## 2025-03-29 NOTE — ED PROVIDER NOTES
CULTURE       All other labs were within normal range or not returned as of this dictation.    EMERGENCY DEPARTMENT COURSE and DIFFERENTIAL DIAGNOSIS/MDM:   Vitals:    Vitals:    03/29/25 0745 03/29/25 0845 03/29/25 0925 03/29/25 1200   BP: 119/64 123/62 139/65 122/63   Pulse:       Resp:       Temp:       TempSrc:       SpO2:  99% 99% 100%   Weight:       Height:               Medical Decision Making      DECISION MAKING:  Tristin Jerry Sr. is a 84 y.o. male who comes in as above.  Vital signs reviewed, patient is afebrile, vital signs stable.  On my examination, mildly uncomfortable appearing.  There is blood at the urethral meatus.    We reattempted Rothman catheter placement without success.  Urology subsequently consulted.    Urology NP was successfully able to place Rothman catheter.    UA shows large blood with large leukocyte esterase, greater than 100 WBCs and 4+ bacteria.  Will cover with antibiotics.    Amount and/or Complexity of Data Reviewed  Labs: ordered. Decision-making details documented in ED Course.    Risk  Prescription drug management.        CONSULTS:  IP CONSULT TO UROLOGY    PROCEDURES:  Unless otherwise noted below, none     Procedures    REASSESSMENT       All available laboratory results have been reviewed with patient and/or available family.  Patient and/or family verbally conveyed their understanding and agreement of the patient's signs, symptoms, diagnosis, treatment and prognosis and additionally agree to follow-up as recommended in the discharge instructions or to return to the Emergency Department should their condition change or worsen prior to their follow-up appointment.  All questions have been answered and patient and/or available family express understanding.          FINAL IMPRESSION      1. Problem with Rothman catheter, initial encounter    2. Acute cystitis with hematuria          DISPOSITION/PLAN   DISPOSITION Decision To Discharge 03/29/2025 01:33:51 PM      PATIENT REFERRED  TO:  Amos Vargas MD  [address] [facility] [address] [phone]    Schedule an appointment as soon as possible for a visit       Your Urologist    Schedule an appointment as soon as possible for a visit       [facility]  [address] [facility] [address]  [phone]    If symptoms worsen      DISCHARGE MEDICATIONS:  Discharge Medication List as of 3/29/2025  1:33 PM        START taking these medications    Details   cefdinir (OMNICEF) 300 MG capsule Take 1 capsule by mouth 2 times daily for 10 days, Disp-20 capsule, R-0Normal               (Please note that portions of this note were completed with a voice recognition program.  Efforts were made to edit the dictations but occasionally words are mis-transcribed.)    Vero Mark DO (electronically signed)  Emergency Medicine Attending Physician          Vero Mark DO  03/29/25 1550

## 2025-03-29 NOTE — ED NOTES
This is a recent snapshot of the patient's [facility] medical record.  For current drug dose and complete information and questions, call [phone] or In Basket pool, [facility] ([id number])  CSN Number:  [id number]     Urology paged

## 2025-03-30 ENCOUNTER — RESULTS FOLLOW-UP (OUTPATIENT)
Facility: HOSPITAL | Age: 85
End: 2025-03-30

## 2025-03-30 LAB
BACTERIA SPEC CULT: ABNORMAL
CC UR VC: ABNORMAL
SERVICE CMNT-IMP: ABNORMAL

## 2025-03-31 LAB
BACTERIA SPEC CULT: ABNORMAL
CC UR VC: ABNORMAL
SERVICE CMNT-IMP: ABNORMAL

## 2025-04-11 NOTE — PROGRESS NOTES
Office Follow-up  Name: Tristin Jerry Sr.    : 1940  MRN: [id number]  Date: 4/15/2025      Primary Cardiologist: Hector Goodman MD, [facility]        Assessment and Plan:             1. CAD/hx of MI s/p 3v CABG in .  Negative ischemic evaluation 2020 with evidence of inferior infarct and LVEF of 40-45%. Repeat Echo  with stable LV function at 44%.     - Continue ASA 81 mg daily  - Intolerant to statin therapy    - Continue BB     2.  Cardiomyopathy; suspected to be ICM - EF 44% by Echo 3/23.  - NYHA class II or III symptoms.   -   Volume stable by exam today on no scheduled loop diuretics.   - Continue Lisinopril 10 mg daily.    - Continue Toprol XL 25 mg daily.       3.  HTN - normotensive in the office today.   - Continue current regimen  - Encouraged low sodium diet and daily home monitoring     4.  Dyslipidemia - intolerant to simva in the past due to myalgias.  Had tolerated Pravastatin in the past but developed transaminitis.       5. Type 2 DM - on insulin. followed by Dr. Day.      7. Chronic indwelling lópez for BPH.      8. See Dr. Goodman in 1 year.                                                                                             Subjective:      Tristin Jerry Sr. 84 y.o. male who presents for followup. Denies chest pain, shortness of breath. Wheelchair bound.   Continues with lópez catheter since 2021. Having rectum and penis pain. Seeing urology soon.   Has sharp pain in chest that occurs at rest, lasts a few minutes. This has been ongoing for years.     Exam:       Physical Exam:  Visit Vitals  Vitals:    04/15/25 1308   BP: (!) 140/60   Pulse: 96         General appearance - alert, well appearing, and in no distress  Mental status - affect appropriate to mood  Eyes - sclera anicteric, moist mucous membranes  Neck - supple, no significant adenopathy  Chest - clear to auscultation, no wheezes, rales or rhonchi  Heart - normal rate, regular rhythm, normal S1, S2,

## 2025-04-14 ENCOUNTER — HOSPITAL ENCOUNTER (EMERGENCY)
Facility: HOSPITAL | Age: 85
Discharge: HOME | End: 2025-04-14
Attending: EMERGENCY MEDICINE
Payer: MEDICARE

## 2025-04-14 VITALS
TEMPERATURE: 97.8 F | DIASTOLIC BLOOD PRESSURE: 59 MMHG | SYSTOLIC BLOOD PRESSURE: 160 MMHG | HEART RATE: 64 BPM | BODY MASS INDEX: 23.8 KG/M2 | RESPIRATION RATE: 16 BRPM | OXYGEN SATURATION: 98 % | HEIGHT: 71 IN | WEIGHT: 170 LBS

## 2025-04-14 DIAGNOSIS — K56.41 FECAL IMPACTION IN RECTUM (HCC): Primary | ICD-10-CM

## 2025-04-14 PROCEDURE — 99283 EMERGENCY DEPT VISIT LOW MDM: CPT

## 2025-04-14 RX ORDER — POLYETHYLENE GLYCOL 3350 17 G/17G
17 POWDER, FOR SOLUTION ORAL DAILY
Qty: 500 G | Refills: 0 | Status: SHIPPED | OUTPATIENT
Start: 2025-04-14

## 2025-04-14 RX ORDER — POLYETHYLENE GLYCOL 3350 17 G/17G
17 POWDER, FOR SOLUTION ORAL DAILY
Qty: 500 G | Refills: 0 | Status: SHIPPED | OUTPATIENT
Start: 2025-04-14 | End: 2025-04-14

## 2025-04-14 RX ORDER — DOCUSATE SODIUM 100 MG/1
100 CAPSULE, LIQUID FILLED ORAL 2 TIMES DAILY
Qty: 180 CAPSULE | Refills: 0 | Status: SHIPPED | OUTPATIENT
Start: 2025-04-14

## 2025-04-14 RX ORDER — SODIUM PHOSPHATE, DIBASIC AND SODIUM PHOSPHATE, MONOBASIC 7; 19 G/230ML; G/230ML
1 ENEMA RECTAL
Qty: 230 ML | Refills: 0 | Status: SHIPPED | OUTPATIENT
Start: 2025-04-14

## 2025-04-14 ASSESSMENT — PAIN SCALES - GENERAL: PAINLEVEL_OUTOF10: 7

## 2025-04-14 ASSESSMENT — PAIN DESCRIPTION - FREQUENCY: FREQUENCY: CONTINUOUS

## 2025-04-14 ASSESSMENT — PAIN DESCRIPTION - LOCATION: LOCATION: RECTUM

## 2025-04-14 ASSESSMENT — PAIN DESCRIPTION - PAIN TYPE: TYPE: ACUTE PAIN

## 2025-04-14 ASSESSMENT — PAIN DESCRIPTION - DESCRIPTORS: DESCRIPTORS: BURNING

## 2025-04-14 NOTE — ED PROVIDER NOTES
[facility]PART[facility]  EMERGENCY DEPARTMENT ENCOUNTER      Pt Name: Tristin Jerry Sr.  MRN: [id number]  Birthdate 1940  Date of evaluation: 4/14/2025  Provider: Shukri Hunter MD    CHIEF COMPLAINT       Chief Complaint   Patient presents with    Constipation    Agitation       ALLERGIES     Amoxicillin, Levaquin [levofloxacin], Pravastatin, and Sulfa antibiotics    ENCOUNTER     HISTORY OF PRESENT ILLNESS    An 84-year-old male was brought to the Emergency Department by EMS. The history was provided by the patient himself. He presents with constipation, noting that his last bowel movement was yesterday.    PHYSICAL EXAM    General: Awake and alert adult, in no acute distress, appears consistent with stated age.    Eyes: Conjunctivae normal.    HENT: Moist mucus membranes, atraumatic.    Cardiac: Good perfusion with brisk distal capillary refill.   Respiratory: No respiratory distress, no tachypnea, no stridor, able to speak in full fluent sentences.    Abdomen: No abdominal distention.    MS: No trauma.    Skin: No pallor, cyanosis, or diaphoresis.    Neurologic: No altered mental status, appropriately conversant and moving extremities.    Psychological: Appropriate mood and affect, cooperative and participatory with examination.    Rectal: Large formed stool impacted in the rectal vault; septated digitally with removal of a very small amount of stool but unable to pass.    Other: Rothman catheter in place, chronic. No complaints.       PLAN    Administer an enema to help soften and pass the stool.    SUMMARY:  The patient, an 84-year-old male with a history of diabetes, presented with constipation and fecal impaction. A digital rectal exam revealed a large formed stool impacted in the rectal vault, which was septated digitally with minimal stool removal. The plan included administering an enema to soften and pass the stool. The primary diagnosis was fecal impaction of the rectum. The patient

## 2025-04-14 NOTE — ED TRIAGE NOTES
Pt brought in by EMS after wife called due to pt having uncontrolled agitation after trying to give him an enema. Pt has hx of dementia. Pt c/o constipation and rectal burning. Pt states his last BM was yesterday but states he is unsure if that is true.

## 2025-04-15 ENCOUNTER — OFFICE VISIT (OUTPATIENT)
Age: 85
End: 2025-04-15
Payer: MEDICARE

## 2025-04-15 VITALS
HEART RATE: 96 BPM | HEIGHT: 71 IN | SYSTOLIC BLOOD PRESSURE: 140 MMHG | DIASTOLIC BLOOD PRESSURE: 60 MMHG | BODY MASS INDEX: 23.8 KG/M2 | WEIGHT: 170 LBS

## 2025-04-15 DIAGNOSIS — I50.22 CHRONIC SYSTOLIC (CONGESTIVE) HEART FAILURE: ICD-10-CM

## 2025-04-15 DIAGNOSIS — I25.10 ATHEROSCLEROSIS OF NATIVE CORONARY ARTERY OF NATIVE HEART WITHOUT ANGINA PECTORIS: ICD-10-CM

## 2025-04-15 DIAGNOSIS — I25.5 ISCHEMIC CARDIOMYOPATHY: ICD-10-CM

## 2025-04-15 DIAGNOSIS — I10 ESSENTIAL (PRIMARY) HYPERTENSION: Primary | ICD-10-CM

## 2025-04-15 DIAGNOSIS — E78.5 HYPERLIPIDEMIA, UNSPECIFIED HYPERLIPIDEMIA TYPE: ICD-10-CM

## 2025-04-15 PROCEDURE — 1123F ACP DISCUSS/DSCN MKR DOCD: CPT

## 2025-04-15 PROCEDURE — G8427 DOCREV CUR MEDS BY ELIG CLIN: HCPCS

## 2025-04-15 PROCEDURE — 3078F DIAST BP <80 MM HG: CPT

## 2025-04-15 PROCEDURE — 1160F RVW MEDS BY RX/DR IN RCRD: CPT

## 2025-04-15 PROCEDURE — 99214 OFFICE O/P EST MOD 30 MIN: CPT

## 2025-04-15 PROCEDURE — 1159F MED LIST DOCD IN RCRD: CPT

## 2025-04-15 PROCEDURE — 1125F AMNT PAIN NOTED PAIN PRSNT: CPT

## 2025-04-15 PROCEDURE — 1036F TOBACCO NON-USER: CPT

## 2025-04-15 PROCEDURE — 3077F SYST BP >= 140 MM HG: CPT

## 2025-04-15 PROCEDURE — G8420 CALC BMI NORM PARAMETERS: HCPCS

## 2025-04-15 RX ORDER — METOPROLOL SUCCINATE 25 MG/1
25 TABLET, EXTENDED RELEASE ORAL DAILY
Qty: 90 TABLET | Refills: 3 | Status: SHIPPED | OUTPATIENT
Start: 2025-04-15

## 2025-04-15 RX ORDER — LISINOPRIL 10 MG/1
10 TABLET ORAL DAILY
Qty: 90 TABLET | Refills: 3 | Status: SHIPPED | OUTPATIENT
Start: 2025-04-15

## 2025-04-16 ENCOUNTER — APPOINTMENT (OUTPATIENT)
Facility: HOSPITAL | Age: 85
End: 2025-04-16
Payer: MEDICARE

## 2025-04-16 ENCOUNTER — HOSPITAL ENCOUNTER (EMERGENCY)
Facility: HOSPITAL | Age: 85
Discharge: HOME | End: 2025-04-16
Attending: EMERGENCY MEDICINE
Payer: MEDICARE

## 2025-04-16 VITALS
SYSTOLIC BLOOD PRESSURE: 100 MMHG | RESPIRATION RATE: 18 BRPM | OXYGEN SATURATION: 93 % | TEMPERATURE: 97.8 F | HEART RATE: 91 BPM | DIASTOLIC BLOOD PRESSURE: 50 MMHG

## 2025-04-16 DIAGNOSIS — R31.9 URINARY TRACT INFECTION WITH HEMATURIA, SITE UNSPECIFIED: ICD-10-CM

## 2025-04-16 DIAGNOSIS — R33.9 URINARY RETENTION: Primary | ICD-10-CM

## 2025-04-16 DIAGNOSIS — N39.0 URINARY TRACT INFECTION WITH HEMATURIA, SITE UNSPECIFIED: ICD-10-CM

## 2025-04-16 LAB
ALBUMIN SERPL-MCNC: 3.3 G/DL (ref 3.5–5)
ALBUMIN/GLOB SERPL: 0.8 (ref 1.1–2.2)
ALP SERPL-CCNC: 69 U/L (ref 45–117)
ALT SERPL-CCNC: 8 U/L (ref 12–78)
AMORPH CRY URNS QL MICRO: ABNORMAL
ANION GAP SERPL CALC-SCNC: 14 MMOL/L (ref 2–12)
APPEARANCE UR: ABNORMAL
AST SERPL-CCNC: 17 U/L (ref 15–37)
BACTERIA URNS QL MICRO: ABNORMAL /HPF
BASOPHILS # BLD: 0.03 K/UL (ref 0–0.1)
BASOPHILS NFR BLD: 0.3 % (ref 0–1)
BILIRUB SERPL-MCNC: 1.4 MG/DL (ref 0.2–1)
BILIRUB UR QL: NEGATIVE
BUN SERPL-MCNC: 28 MG/DL (ref 6–20)
BUN/CREAT SERPL: 22 (ref 12–20)
CALCIUM SERPL-MCNC: 9.1 MG/DL (ref 8.5–10.1)
CHLORIDE SERPL-SCNC: 98 MMOL/L (ref 97–108)
CO2 SERPL-SCNC: 19 MMOL/L (ref 21–32)
COLOR UR: ABNORMAL
CREAT SERPL-MCNC: 1.26 MG/DL (ref 0.7–1.3)
DIFFERENTIAL METHOD BLD: ABNORMAL
EOSINOPHIL # BLD: 0.02 K/UL (ref 0–0.4)
EOSINOPHIL NFR BLD: 0.2 % (ref 0–7)
EPITH CASTS URNS QL MICRO: ABNORMAL /LPF
ERYTHROCYTE [DISTWIDTH] IN BLOOD BY AUTOMATED COUNT: 13.7 % (ref 11.5–14.5)
GLOBULIN SER CALC-MCNC: 4 G/DL (ref 2–4)
GLUCOSE SERPL-MCNC: 244 MG/DL (ref 65–100)
GLUCOSE UR STRIP.AUTO-MCNC: >1000 MG/DL
HCT VFR BLD AUTO: 40.4 % (ref 36.6–50.3)
HGB BLD-MCNC: 14.1 G/DL (ref 12.1–17)
HGB UR QL STRIP: ABNORMAL
IMM GRANULOCYTES # BLD AUTO: 0.04 K/UL (ref 0–0.04)
IMM GRANULOCYTES NFR BLD AUTO: 0.3 % (ref 0–0.5)
KETONES UR QL STRIP.AUTO: 15 MG/DL
LEUKOCYTE ESTERASE UR QL STRIP.AUTO: ABNORMAL
LIPASE SERPL-CCNC: 29 U/L (ref 13–75)
LYMPHOCYTES # BLD: 1.58 K/UL (ref 0.8–3.5)
LYMPHOCYTES NFR BLD: 13.6 % (ref 12–49)
MCH RBC QN AUTO: 28.8 PG (ref 26–34)
MCHC RBC AUTO-ENTMCNC: 34.9 G/DL (ref 30–36.5)
MCV RBC AUTO: 82.6 FL (ref 80–99)
MONOCYTES # BLD: 1.13 K/UL (ref 0–1)
MONOCYTES NFR BLD: 9.7 % (ref 5–13)
NEUTS SEG # BLD: 8.81 K/UL (ref 1.8–8)
NEUTS SEG NFR BLD: 75.9 % (ref 32–75)
NITRITE UR QL STRIP.AUTO: NEGATIVE
NRBC # BLD: 0 K/UL (ref 0–0.01)
NRBC BLD-RTO: 0 PER 100 WBC
PH UR STRIP: >8.5 [PH] (ref 5–8)
PLATELET # BLD AUTO: 283 K/UL (ref 150–400)
PMV BLD AUTO: 8.8 FL (ref 8.9–12.9)
POTASSIUM SERPL-SCNC: 3.9 MMOL/L (ref 3.5–5.1)
PROT SERPL-MCNC: 7.3 G/DL (ref 6.4–8.2)
PROT UR STRIP-MCNC: >300 MG/DL
RBC # BLD AUTO: 4.89 M/UL (ref 4.1–5.7)
RBC #/AREA URNS HPF: ABNORMAL /HPF (ref 0–5)
SODIUM SERPL-SCNC: 131 MMOL/L (ref 136–145)
SP GR UR REFRACTOMETRY: 1.01 (ref 1–1.03)
SPECIMEN HOLD: NORMAL
UROBILINOGEN UR QL STRIP.AUTO: 0.2 EU/DL (ref 0.2–1)
WBC # BLD AUTO: 11.6 K/UL (ref 4.1–11.1)
WBC URNS QL MICRO: ABNORMAL /HPF (ref 0–4)

## 2025-04-16 PROCEDURE — 87086 URINE CULTURE/COLONY COUNT: CPT

## 2025-04-16 PROCEDURE — 36415 COLL VENOUS BLD VENIPUNCTURE: CPT

## 2025-04-16 PROCEDURE — 51702 INSERT TEMP BLADDER CATH: CPT

## 2025-04-16 PROCEDURE — 99285 EMERGENCY DEPT VISIT HI MDM: CPT

## 2025-04-16 PROCEDURE — 6370000000 HC RX 637 (ALT 250 FOR IP): Performed by: EMERGENCY MEDICINE

## 2025-04-16 PROCEDURE — 51798 US URINE CAPACITY MEASURE: CPT

## 2025-04-16 PROCEDURE — 6360000004 HC RX CONTRAST MEDICATION: Performed by: EMERGENCY MEDICINE

## 2025-04-16 PROCEDURE — 87088 URINE BACTERIA CULTURE: CPT

## 2025-04-16 PROCEDURE — 83690 ASSAY OF LIPASE: CPT

## 2025-04-16 PROCEDURE — 2500000003 HC RX 250 WO HCPCS: Performed by: EMERGENCY MEDICINE

## 2025-04-16 PROCEDURE — 87186 SC STD MICRODIL/AGAR DIL: CPT

## 2025-04-16 PROCEDURE — 6360000002 HC RX W HCPCS: Performed by: EMERGENCY MEDICINE

## 2025-04-16 PROCEDURE — 74177 CT ABD & PELVIS W/CONTRAST: CPT

## 2025-04-16 PROCEDURE — 85025 COMPLETE CBC W/AUTO DIFF WBC: CPT

## 2025-04-16 PROCEDURE — 80053 COMPREHEN METABOLIC PANEL: CPT

## 2025-04-16 PROCEDURE — 81001 URINALYSIS AUTO W/SCOPE: CPT

## 2025-04-16 RX ORDER — IOPAMIDOL 755 MG/ML
100 INJECTION, SOLUTION INTRAVASCULAR
Status: COMPLETED | OUTPATIENT
Start: 2025-04-16 | End: 2025-04-16

## 2025-04-16 RX ORDER — MORPHINE SULFATE 4 MG/ML
4 INJECTION, SOLUTION INTRAMUSCULAR; INTRAVENOUS
Status: COMPLETED | OUTPATIENT
Start: 2025-04-16 | End: 2025-04-16

## 2025-04-16 RX ORDER — CEFDINIR 300 MG/1
300 CAPSULE ORAL 2 TIMES DAILY
Qty: 14 CAPSULE | Refills: 0 | Status: SHIPPED | OUTPATIENT
Start: 2025-04-16 | End: 2025-04-23

## 2025-04-16 RX ORDER — LIDOCAINE HYDROCHLORIDE 20 MG/ML
JELLY TOPICAL PRN
Status: DISCONTINUED | OUTPATIENT
Start: 2025-04-16 | End: 2025-04-16 | Stop reason: HOSPADM

## 2025-04-16 RX ADMIN — LIDOCAINE HYDROCHLORIDE: 20 JELLY TOPICAL at 01:32

## 2025-04-16 RX ADMIN — MORPHINE SULFATE 4 MG: 4 INJECTION, SOLUTION INTRAMUSCULAR; INTRAVENOUS at 01:35

## 2025-04-16 RX ADMIN — IOPAMIDOL 100 ML: 755 INJECTION, SOLUTION INTRAVENOUS at 02:21

## 2025-04-16 RX ADMIN — WATER 1000 MG: 1 INJECTION INTRAMUSCULAR; INTRAVENOUS; SUBCUTANEOUS at 02:36

## 2025-04-16 ASSESSMENT — PAIN DESCRIPTION - DESCRIPTORS: DESCRIPTORS: CRAMPING

## 2025-04-16 ASSESSMENT — PAIN DESCRIPTION - LOCATION: LOCATION: ABDOMEN

## 2025-04-16 ASSESSMENT — PAIN SCALES - GENERAL: PAINLEVEL_OUTOF10: 10

## 2025-04-16 ASSESSMENT — PAIN DESCRIPTION - ORIENTATION: ORIENTATION: LOWER

## 2025-04-16 ASSESSMENT — PAIN DESCRIPTION - FREQUENCY: FREQUENCY: CONTINUOUS

## 2025-04-16 ASSESSMENT — PAIN - FUNCTIONAL ASSESSMENT: PAIN_FUNCTIONAL_ASSESSMENT: PREVENTS OR INTERFERES SOME ACTIVE ACTIVITIES AND ADLS

## 2025-04-16 ASSESSMENT — PAIN DESCRIPTION - ONSET: ONSET: SUDDEN

## 2025-04-16 ASSESSMENT — PAIN DESCRIPTION - PAIN TYPE: TYPE: ACUTE PAIN

## 2025-04-16 NOTE — ED TRIAGE NOTES
Pt arrives via rescue squad.  Pt complains of burning abdominal pain and states that he has been constipated for several days.  Pt told the rescue squad that he was seen in our facility yesterday for the same complaint.  Pt also states that he can't remember anything

## 2025-04-16 NOTE — ED PROVIDER NOTES
Detail Level: Generalized
Include Location In Plan?: Yes
by mouth 2 times daily    DUTASTERIDE (AVODART) 0.5 MG CAPSULE    Take 1 capsule by mouth daily    FAMOTIDINE (PEPCID) 20 MG TABLET    Take 1 tablet by mouth daily    INSULIN 70-30 (NOVOLIN 70/30) (70-30) 100 UNIT PER ML INJECTION VIAL    Inject 15 Units into the skin every morning    INSULIN 70-30 (NOVOLIN 70/30) (70-30) 100 UNIT PER ML INJECTION VIAL    Inject 10 Units into the skin nightly Before dinner    LISINOPRIL (PRINIVIL;ZESTRIL) 10 MG TABLET    Take 1 tablet by mouth daily    METOPROLOL SUCCINATE (TOPROL XL) 25 MG EXTENDED RELEASE TABLET    Take 1 tablet by mouth daily    POLYETHYLENE GLYCOL (MIRALAX) 17 GM/SCOOP POWDER    Take 17 g by mouth daily    SODIUM PHOSPHATE (FLEET)    Place 1 enema rectally once as needed (constipation)    VITAMIN D (CHOLECALCIFEROL) 25 MCG (1000 UT) TABS TABLET    Take 1 tablet by mouth daily       ALLERGIES     Amoxicillin, Levaquin [levofloxacin], Pravastatin, and Sulfa antibiotics    FAMILY HISTORY       Family History   Problem Relation Age of Onset    Breast Cancer Mother         w bone mets    Parkinson's Disease Maternal Aunt     Parkinson's Disease Maternal Uncle     Parkinson's Disease Maternal Grandfather           SOCIAL HISTORY       Social History     Socioeconomic History    Marital status:      Spouse name: None    Number of children: None    Years of education: None    Highest education level: None   Tobacco Use    Smoking status: Never    Smokeless tobacco: Never   Substance and Sexual Activity    Alcohol use: No    Drug use: No         PHYSICAL EXAM       ED Triage Vitals [04/16/25 0100]   BP Systolic BP Percentile Diastolic BP Percentile Temp Temp src Pulse Respirations SpO2   (!) 163/74 -- -- 97.8 °F (36.6 °C) -- 91 18 98 %      Height Weight         -- --             There is no height or weight on file to calculate BMI.    Physical Exam  Constitutional:       Comments: Appears uncomfortable and in pain   Eyes:      General: No scleral icterus.

## 2025-04-16 NOTE — DISCHARGE INSTRUCTIONS
You should take the antibiotics as directed.  Continue the MiraLAX and Colace that was prescribed to you to ensure that you do not get constipated.  I recommend you follow-up with your urologist given your catheter associated UTI

## 2025-04-16 NOTE — ED NOTES
Pt discharged home hemodynamically stable,alert and oriented.Discharge instructions reviewed and no concerns raised at thi s time.

## 2025-04-17 ENCOUNTER — RESULTS FOLLOW-UP (OUTPATIENT)
Facility: HOSPITAL | Age: 85
End: 2025-04-17

## 2025-04-18 LAB
BACTERIA SPEC CULT: ABNORMAL
CC UR VC: ABNORMAL
SERVICE CMNT-IMP: ABNORMAL

## 2025-05-21 ENCOUNTER — HOSPITAL ENCOUNTER (EMERGENCY)
Facility: HOSPITAL | Age: 85
Discharge: HOME | End: 2025-05-21
Attending: STUDENT IN AN ORGANIZED HEALTH CARE EDUCATION/TRAINING PROGRAM
Payer: MEDICARE

## 2025-05-21 VITALS
TEMPERATURE: 97.6 F | HEART RATE: 79 BPM | SYSTOLIC BLOOD PRESSURE: 147 MMHG | DIASTOLIC BLOOD PRESSURE: 74 MMHG | RESPIRATION RATE: 16 BRPM | OXYGEN SATURATION: 100 %

## 2025-05-21 DIAGNOSIS — T83.9XXA PROBLEM WITH FOLEY CATHETER, INITIAL ENCOUNTER: Primary | ICD-10-CM

## 2025-05-21 DIAGNOSIS — N30.01 ACUTE CYSTITIS WITH HEMATURIA: ICD-10-CM

## 2025-05-21 LAB
ANION GAP SERPL CALC-SCNC: 12 MMOL/L (ref 2–12)
APPEARANCE UR: ABNORMAL
BACTERIA URNS QL MICRO: NEGATIVE /HPF
BASOPHILS # BLD: 0.04 K/UL (ref 0–0.1)
BASOPHILS NFR BLD: 0.5 % (ref 0–1)
BILIRUB UR QL: NEGATIVE
BUN SERPL-MCNC: 18 MG/DL (ref 6–20)
BUN/CREAT SERPL: 18 (ref 12–20)
CALCIUM SERPL-MCNC: 9.6 MG/DL (ref 8.5–10.1)
CHLORIDE SERPL-SCNC: 102 MMOL/L (ref 97–108)
CO2 SERPL-SCNC: 25 MMOL/L (ref 21–32)
COLOR UR: ABNORMAL
CREAT SERPL-MCNC: 0.98 MG/DL (ref 0.7–1.3)
DIFFERENTIAL METHOD BLD: ABNORMAL
EOSINOPHIL # BLD: 0.08 K/UL (ref 0–0.4)
EOSINOPHIL NFR BLD: 1 % (ref 0–7)
EPITH CASTS URNS QL MICRO: ABNORMAL /LPF
ERYTHROCYTE [DISTWIDTH] IN BLOOD BY AUTOMATED COUNT: 13.8 % (ref 11.5–14.5)
GLUCOSE SERPL-MCNC: 114 MG/DL (ref 65–100)
GLUCOSE UR STRIP.AUTO-MCNC: NEGATIVE MG/DL
HCT VFR BLD AUTO: 41.1 % (ref 36.6–50.3)
HGB BLD-MCNC: 13.8 G/DL (ref 12.1–17)
HGB UR QL STRIP: ABNORMAL
IMM GRANULOCYTES # BLD AUTO: 0.03 K/UL (ref 0–0.04)
IMM GRANULOCYTES NFR BLD AUTO: 0.4 % (ref 0–0.5)
KETONES UR QL STRIP.AUTO: NEGATIVE MG/DL
LEUKOCYTE ESTERASE UR QL STRIP.AUTO: ABNORMAL
LYMPHOCYTES # BLD: 2.04 K/UL (ref 0.8–3.5)
LYMPHOCYTES NFR BLD: 25.3 % (ref 12–49)
MCH RBC QN AUTO: 28.1 PG (ref 26–34)
MCHC RBC AUTO-ENTMCNC: 33.6 G/DL (ref 30–36.5)
MCV RBC AUTO: 83.7 FL (ref 80–99)
MONOCYTES # BLD: 0.64 K/UL (ref 0–1)
MONOCYTES NFR BLD: 8 % (ref 5–13)
NEUTS SEG # BLD: 5.22 K/UL (ref 1.8–8)
NEUTS SEG NFR BLD: 64.8 % (ref 32–75)
NITRITE UR QL STRIP.AUTO: NEGATIVE
NRBC # BLD: 0 K/UL (ref 0–0.01)
NRBC BLD-RTO: 0 PER 100 WBC
PH UR STRIP: 6 (ref 5–8)
PLATELET # BLD AUTO: 281 K/UL (ref 150–400)
PMV BLD AUTO: 8.3 FL (ref 8.9–12.9)
POTASSIUM SERPL-SCNC: 3.8 MMOL/L (ref 3.5–5.1)
PROT UR STRIP-MCNC: NEGATIVE MG/DL
RBC # BLD AUTO: 4.91 M/UL (ref 4.1–5.7)
RBC #/AREA URNS HPF: ABNORMAL /HPF (ref 0–5)
SODIUM SERPL-SCNC: 139 MMOL/L (ref 136–145)
SP GR UR REFRACTOMETRY: <1.005 (ref 1–1.03)
UROBILINOGEN UR QL STRIP.AUTO: 0.2 EU/DL (ref 0.2–1)
WBC # BLD AUTO: 8.1 K/UL (ref 4.1–11.1)
WBC URNS QL MICRO: ABNORMAL /HPF (ref 0–4)

## 2025-05-21 PROCEDURE — 51702 INSERT TEMP BLADDER CATH: CPT

## 2025-05-21 PROCEDURE — 87088 URINE BACTERIA CULTURE: CPT

## 2025-05-21 PROCEDURE — 6370000000 HC RX 637 (ALT 250 FOR IP): Performed by: STUDENT IN AN ORGANIZED HEALTH CARE EDUCATION/TRAINING PROGRAM

## 2025-05-21 PROCEDURE — 87186 SC STD MICRODIL/AGAR DIL: CPT

## 2025-05-21 PROCEDURE — 81001 URINALYSIS AUTO W/SCOPE: CPT

## 2025-05-21 PROCEDURE — 99283 EMERGENCY DEPT VISIT LOW MDM: CPT

## 2025-05-21 PROCEDURE — 36415 COLL VENOUS BLD VENIPUNCTURE: CPT

## 2025-05-21 PROCEDURE — 87086 URINE CULTURE/COLONY COUNT: CPT

## 2025-05-21 PROCEDURE — 85025 COMPLETE CBC W/AUTO DIFF WBC: CPT

## 2025-05-21 PROCEDURE — 80048 BASIC METABOLIC PNL TOTAL CA: CPT

## 2025-05-21 RX ORDER — LIDOCAINE HYDROCHLORIDE 20 MG/ML
JELLY TOPICAL PRN
Status: DISCONTINUED | OUTPATIENT
Start: 2025-05-21 | End: 2025-05-21 | Stop reason: HOSPADM

## 2025-05-21 RX ORDER — CEFDINIR 300 MG/1
300 CAPSULE ORAL 2 TIMES DAILY
Qty: 14 CAPSULE | Refills: 0 | Status: SHIPPED | OUTPATIENT
Start: 2025-05-21 | End: 2025-05-28

## 2025-05-21 RX ADMIN — LIDOCAINE HYDROCHLORIDE: 20 JELLY TOPICAL at 05:17

## 2025-05-21 ASSESSMENT — LIFESTYLE VARIABLES
HOW MANY STANDARD DRINKS CONTAINING ALCOHOL DO YOU HAVE ON A TYPICAL DAY: PATIENT DOES NOT DRINK
HOW OFTEN DO YOU HAVE A DRINK CONTAINING ALCOHOL: NEVER

## 2025-05-21 NOTE — ED PROVIDER NOTES
OF SYSTEMS    (2-9 systems for level 4, 10 or more for level 5)     Except as noted above the remainder of the review of systems was reviewed and negative.       PAST MEDICAL HISTORY     Past Medical History:   Diagnosis Date    CAD (coronary artery disease), native coronary artery     Cancer (HCC)     melanoma removed on forehead    Depression     Diabetic neuropathy (HCC)     ED (erectile dysfunction)     Enlarged prostate 07/19/2021    Hypertension, benign     Ill-defined condition     states he has memory problems    Postsurgical aortocoronary bypass status     Type II or unspecified type diabetes mellitus without mention of complication, not stated as uncontrolled          SURGICAL HISTORY       Past Surgical History:   Procedure Laterality Date    COLONOSCOPY N/A 5/3/2016    COLONOSCOPY performed by Saul Walsh MD at [facility] ENDOSCOPY    CORONARY ARTERY BYPASS GRAFT  2009    ECHO 2D ADULT  12/2009    EF 50%, basal inferior akinesis, mild MR    ECHO 2D ADULT  9/21/11    LVEF 50%, inferior AK, unchanged from Dec 2009    OTHER SURGICAL HISTORY  2008    melanoma removed from forehead         CURRENT MEDICATIONS       Previous Medications    ACETAMINOPHEN (TYLENOL) 500 MG TABLET    Take 2 tablets by mouth every 6 hours as needed for Pain or Fever    ASPIRIN 81 MG CAPS    Take 81 mg by mouth daily    DICYCLOMINE (BENTYL) 10 MG CAPSULE    Take 1 capsule by mouth every 6 hours as needed (abdominal cramping)    DOCUSATE SODIUM (COLACE) 100 MG CAPSULE    Take 1 capsule by mouth 2 times daily    DUTASTERIDE (AVODART) 0.5 MG CAPSULE    Take 1 capsule by mouth daily    FAMOTIDINE (PEPCID) 20 MG TABLET    Take 1 tablet by mouth daily    INSULIN 70-30 (NOVOLIN 70/30) (70-30) 100 UNIT PER ML INJECTION VIAL    Inject 15 Units into the skin every morning    INSULIN 70-30 (NOVOLIN 70/30) (70-30) 100 UNIT PER ML INJECTION VIAL    Inject 10 Units into the skin nightly Before dinner    LISINOPRIL (PRINIVIL;ZESTRIL) 10 MG

## 2025-05-21 NOTE — ED NOTES
Pt discharged home with [facility]. Report given to [facility] staff. New brief applied to patient. Rothman draining clear yellow urine at discharge. No distress noted at discharge.

## 2025-05-21 NOTE — ED NOTES
Verbal shift change report given to Napoleon RN (oncoming nurse) by Benito RN (offgoing nurse). Report included the following information ED SBAR.

## 2025-05-21 NOTE — DISCHARGE INSTRUCTIONS
You were seen today for evaluation of López catheter problem.  Your catheter was replaced in the emergency department, your labs were reassuring and your urinalysis showed evidence of some blood and leukocyte esterase, you have been sent a prescription for antibiotics for treatment of potential urinary tract infection, you were advised to speak to [facility] regarding additional guidance on antibiotic use given your recent recommendations, as well as discussion of lópez management.    Please return the Emergency Department if you have new or worsening symptoms including abdominal pain, fever, persistent nausea and vomiting, or altered mental status      Thank you for allowing us to provide you with medical care today.  We realize that you have many choices for your emergency care needs.  We thank you for choosing [facility].  Please choose us in the future for any continued health care needs.      The exam and treatment you received in the Emergency Department were for an emergent problem and are not intended as complete care. It is important that you follow up with a doctor, nurse practitioner, or physician assistant for ongoing care. If your symptoms worsen or you do not improve as expected and you are unable to reach your usual health care provider, you should return to the Emergency Department. We are available 24 hours a day.      Please make an appointment with your healthcare provider(s) for follow up of your Emergency Department visit.  Take this sheet with you when you go to your follow-up visit.

## 2025-05-22 ENCOUNTER — TELEPHONE (OUTPATIENT)
Age: 85
End: 2025-05-22

## 2025-05-22 ENCOUNTER — RESULTS FOLLOW-UP (OUTPATIENT)
Facility: HOSPITAL | Age: 85
End: 2025-05-22

## 2025-05-22 NOTE — TELEPHONE ENCOUNTER
Referral received from PCP Amos Vargas to be seen for Memory loss. I tried calling the patient to schedule an appointment but the call wouldn't connect.

## 2025-05-23 LAB
BACTERIA SPEC CULT: ABNORMAL
CC UR VC: ABNORMAL
SERVICE CMNT-IMP: ABNORMAL

## 2025-08-20 ENCOUNTER — HOSPITAL ENCOUNTER (EMERGENCY)
Facility: HOSPITAL | Age: 85
Discharge: HOME | End: 2025-08-20
Attending: STUDENT IN AN ORGANIZED HEALTH CARE EDUCATION/TRAINING PROGRAM
Payer: MEDICARE

## 2025-08-20 ENCOUNTER — APPOINTMENT (OUTPATIENT)
Facility: HOSPITAL | Age: 85
End: 2025-08-20
Payer: MEDICARE

## 2025-08-20 VITALS
DIASTOLIC BLOOD PRESSURE: 59 MMHG | OXYGEN SATURATION: 97 % | SYSTOLIC BLOOD PRESSURE: 100 MMHG | HEART RATE: 92 BPM | RESPIRATION RATE: 18 BRPM | TEMPERATURE: 98.2 F

## 2025-08-20 DIAGNOSIS — R33.9 URINARY RETENTION: Primary | ICD-10-CM

## 2025-08-20 DIAGNOSIS — R30.0 DYSURIA: ICD-10-CM

## 2025-08-20 LAB
APPEARANCE UR: ABNORMAL
BACTERIA URNS QL MICRO: NEGATIVE /HPF
BILIRUB UR QL: NEGATIVE
COLOR UR: ABNORMAL
EPITH CASTS URNS QL MICRO: ABNORMAL /LPF
GLUCOSE BLD STRIP.AUTO-MCNC: 110 MG/DL (ref 65–117)
GLUCOSE BLD STRIP.AUTO-MCNC: 94 MG/DL (ref 65–117)
GLUCOSE UR STRIP.AUTO-MCNC: 500 MG/DL
HGB UR QL STRIP: ABNORMAL
HYALINE CASTS URNS QL MICRO: ABNORMAL /LPF (ref 0–2)
KETONES UR QL STRIP.AUTO: NEGATIVE MG/DL
LEUKOCYTE ESTERASE UR QL STRIP.AUTO: ABNORMAL
NITRITE UR QL STRIP.AUTO: NEGATIVE
PH UR STRIP: 6 (ref 5–8)
PROT UR STRIP-MCNC: 100 MG/DL
RBC #/AREA URNS HPF: >100 /HPF (ref 0–5)
SERVICE CMNT-IMP: NORMAL
SERVICE CMNT-IMP: NORMAL
SP GR UR REFRACTOMETRY: 1.02 (ref 1–1.03)
UROBILINOGEN UR QL STRIP.AUTO: 1 EU/DL (ref 0.2–1)
WBC URNS QL MICRO: >100 /HPF (ref 0–4)

## 2025-08-20 PROCEDURE — 6370000000 HC RX 637 (ALT 250 FOR IP): Performed by: NURSE PRACTITIONER

## 2025-08-20 PROCEDURE — 6370000000 HC RX 637 (ALT 250 FOR IP)

## 2025-08-20 PROCEDURE — 94761 N-INVAS EAR/PLS OXIMETRY MLT: CPT

## 2025-08-20 PROCEDURE — 87186 SC STD MICRODIL/AGAR DIL: CPT

## 2025-08-20 PROCEDURE — 6370000000 HC RX 637 (ALT 250 FOR IP): Performed by: EMERGENCY MEDICINE

## 2025-08-20 PROCEDURE — 99284 EMERGENCY DEPT VISIT MOD MDM: CPT

## 2025-08-20 PROCEDURE — 87086 URINE CULTURE/COLONY COUNT: CPT

## 2025-08-20 PROCEDURE — 82962 GLUCOSE BLOOD TEST: CPT

## 2025-08-20 PROCEDURE — 76775 US EXAM ABDO BACK WALL LIM: CPT

## 2025-08-20 PROCEDURE — 81001 URINALYSIS AUTO W/SCOPE: CPT

## 2025-08-20 RX ORDER — LIDOCAINE HYDROCHLORIDE 20 MG/ML
JELLY TOPICAL PRN
Status: DISCONTINUED | OUTPATIENT
Start: 2025-08-20 | End: 2025-08-20 | Stop reason: HOSPADM

## 2025-08-20 RX ORDER — PHENAZOPYRIDINE HYDROCHLORIDE 100 MG/1
100 TABLET, FILM COATED ORAL 3 TIMES DAILY PRN
Qty: 9 TABLET | Refills: 0 | Status: SHIPPED | OUTPATIENT
Start: 2025-08-20 | End: 2025-08-23

## 2025-08-20 RX ORDER — LIDOCAINE HYDROCHLORIDE 20 MG/ML
JELLY TOPICAL
Status: COMPLETED
Start: 2025-08-20 | End: 2025-08-20

## 2025-08-20 RX ORDER — PHENAZOPYRIDINE HYDROCHLORIDE 100 MG/1
200 TABLET, FILM COATED ORAL
Status: COMPLETED | OUTPATIENT
Start: 2025-08-20 | End: 2025-08-20

## 2025-08-20 RX ORDER — LIDOCAINE HYDROCHLORIDE 20 MG/ML
JELLY TOPICAL
Status: COMPLETED | OUTPATIENT
Start: 2025-08-20 | End: 2025-08-20

## 2025-08-20 RX ORDER — CEPHALEXIN 500 MG/1
500 CAPSULE ORAL 3 TIMES DAILY
Qty: 21 CAPSULE | Refills: 0 | Status: SHIPPED | OUTPATIENT
Start: 2025-08-20 | End: 2025-08-27

## 2025-08-20 RX ADMIN — LIDOCAINE HYDROCHLORIDE: 20 JELLY TOPICAL at 14:19

## 2025-08-20 RX ADMIN — CEPHALEXIN 250 MG: 250 CAPSULE ORAL at 19:35

## 2025-08-20 RX ADMIN — PHENAZOPYRIDINE 200 MG: 100 TABLET ORAL at 20:12

## 2025-08-20 RX ADMIN — LIDOCAINE HYDROCHLORIDE: 20 JELLY TOPICAL at 16:15

## 2025-08-20 ASSESSMENT — LIFESTYLE VARIABLES: HOW OFTEN DO YOU HAVE A DRINK CONTAINING ALCOHOL: NEVER

## 2025-08-20 ASSESSMENT — PAIN DESCRIPTION - DESCRIPTORS: DESCRIPTORS: CRAMPING

## 2025-08-20 ASSESSMENT — PAIN DESCRIPTION - LOCATION: LOCATION: PELVIS

## 2025-08-20 ASSESSMENT — PAIN - FUNCTIONAL ASSESSMENT: PAIN_FUNCTIONAL_ASSESSMENT: PREVENTS OR INTERFERES SOME ACTIVE ACTIVITIES AND ADLS

## 2025-08-20 ASSESSMENT — PAIN DESCRIPTION - ORIENTATION: ORIENTATION: MID

## 2025-08-20 ASSESSMENT — PAIN SCALES - GENERAL: PAINLEVEL_OUTOF10: 6
